# Patient Record
Sex: MALE | Race: WHITE | Employment: OTHER | URBAN - NONMETROPOLITAN AREA
[De-identification: names, ages, dates, MRNs, and addresses within clinical notes are randomized per-mention and may not be internally consistent; named-entity substitution may affect disease eponyms.]

---

## 2017-01-18 ENCOUNTER — OFFICE VISIT (OUTPATIENT)
Dept: FAMILY MEDICINE CLINIC | Age: 72
End: 2017-01-18

## 2017-01-18 VITALS
BODY MASS INDEX: 32.5 KG/M2 | SYSTOLIC BLOOD PRESSURE: 130 MMHG | WEIGHT: 227 LBS | HEIGHT: 70 IN | HEART RATE: 76 BPM | DIASTOLIC BLOOD PRESSURE: 80 MMHG

## 2017-01-18 DIAGNOSIS — E78.00 PURE HYPERCHOLESTEROLEMIA: ICD-10-CM

## 2017-01-18 DIAGNOSIS — F32.A DEPRESSION, UNSPECIFIED DEPRESSION TYPE: ICD-10-CM

## 2017-01-18 DIAGNOSIS — I10 ESSENTIAL HYPERTENSION: ICD-10-CM

## 2017-01-18 DIAGNOSIS — N18.3 CKD (CHRONIC KIDNEY DISEASE), STAGE 3 (MODERATE): ICD-10-CM

## 2017-01-18 DIAGNOSIS — Z11.59 NEED FOR HEPATITIS C SCREENING TEST: Primary | ICD-10-CM

## 2017-01-18 DIAGNOSIS — D64.9 ANEMIA, UNSPECIFIED TYPE: ICD-10-CM

## 2017-01-18 DIAGNOSIS — C44.92 SCC (SQUAMOUS CELL CARCINOMA): ICD-10-CM

## 2017-01-18 DIAGNOSIS — G47.33 OSA (OBSTRUCTIVE SLEEP APNEA): ICD-10-CM

## 2017-01-18 DIAGNOSIS — Z12.5 SCREENING PSA (PROSTATE SPECIFIC ANTIGEN): ICD-10-CM

## 2017-01-18 DIAGNOSIS — L57.0 ACTINIC KERATOSIS: ICD-10-CM

## 2017-01-18 DIAGNOSIS — M48.062 SPINAL STENOSIS, LUMBAR REGION, WITH NEUROGENIC CLAUDICATION: ICD-10-CM

## 2017-01-18 PROCEDURE — 1123F ACP DISCUSS/DSCN MKR DOCD: CPT | Performed by: FAMILY MEDICINE

## 2017-01-18 PROCEDURE — 1036F TOBACCO NON-USER: CPT | Performed by: FAMILY MEDICINE

## 2017-01-18 PROCEDURE — G8484 FLU IMMUNIZE NO ADMIN: HCPCS | Performed by: FAMILY MEDICINE

## 2017-01-18 PROCEDURE — 17000 DESTRUCT PREMALG LESION: CPT | Performed by: FAMILY MEDICINE

## 2017-01-18 PROCEDURE — G8427 DOCREV CUR MEDS BY ELIG CLIN: HCPCS | Performed by: FAMILY MEDICINE

## 2017-01-18 PROCEDURE — 17003 DESTRUCT PREMALG LES 2-14: CPT | Performed by: FAMILY MEDICINE

## 2017-01-18 PROCEDURE — 3017F COLORECTAL CA SCREEN DOC REV: CPT | Performed by: FAMILY MEDICINE

## 2017-01-18 PROCEDURE — 99214 OFFICE O/P EST MOD 30 MIN: CPT | Performed by: FAMILY MEDICINE

## 2017-01-18 PROCEDURE — 4040F PNEUMOC VAC/ADMIN/RCVD: CPT | Performed by: FAMILY MEDICINE

## 2017-01-18 PROCEDURE — G8419 CALC BMI OUT NRM PARAM NOF/U: HCPCS | Performed by: FAMILY MEDICINE

## 2017-01-18 RX ORDER — HYDROCODONE BITARTRATE AND ACETAMINOPHEN 5; 325 MG/1; MG/1
1 TABLET ORAL EVERY 6 HOURS PRN
Qty: 60 TABLET | Refills: 0 | Status: ON HOLD | OUTPATIENT
Start: 2017-01-18 | End: 2017-03-11

## 2017-01-18 RX ORDER — HYDROCODONE BITARTRATE AND ACETAMINOPHEN 5; 325 MG/1; MG/1
1 TABLET ORAL EVERY 6 HOURS PRN
COMMUNITY
End: 2017-01-18 | Stop reason: SDUPTHER

## 2017-01-18 ASSESSMENT — ENCOUNTER SYMPTOMS
BACK PAIN: 1
EYES NEGATIVE: 1
ALLERGIC/IMMUNOLOGIC NEGATIVE: 1
GASTROINTESTINAL NEGATIVE: 1
RESPIRATORY NEGATIVE: 1

## 2017-01-20 ENCOUNTER — INITIAL CONSULT (OUTPATIENT)
Dept: NEUROSURGERY | Age: 72
End: 2017-01-20

## 2017-01-20 VITALS
BODY MASS INDEX: 32.64 KG/M2 | SYSTOLIC BLOOD PRESSURE: 152 MMHG | HEIGHT: 70 IN | HEART RATE: 64 BPM | DIASTOLIC BLOOD PRESSURE: 98 MMHG | WEIGHT: 228 LBS

## 2017-01-20 DIAGNOSIS — M48.062 SPINAL STENOSIS, LUMBAR REGION, WITH NEUROGENIC CLAUDICATION: Primary | ICD-10-CM

## 2017-01-20 PROCEDURE — 4040F PNEUMOC VAC/ADMIN/RCVD: CPT | Performed by: NEUROLOGICAL SURGERY

## 2017-01-20 PROCEDURE — G8484 FLU IMMUNIZE NO ADMIN: HCPCS | Performed by: NEUROLOGICAL SURGERY

## 2017-01-20 PROCEDURE — G8419 CALC BMI OUT NRM PARAM NOF/U: HCPCS | Performed by: NEUROLOGICAL SURGERY

## 2017-01-20 PROCEDURE — 3017F COLORECTAL CA SCREEN DOC REV: CPT | Performed by: NEUROLOGICAL SURGERY

## 2017-01-20 PROCEDURE — 99203 OFFICE O/P NEW LOW 30 MIN: CPT | Performed by: NEUROLOGICAL SURGERY

## 2017-01-20 PROCEDURE — 1036F TOBACCO NON-USER: CPT | Performed by: NEUROLOGICAL SURGERY

## 2017-01-20 PROCEDURE — G8427 DOCREV CUR MEDS BY ELIG CLIN: HCPCS | Performed by: NEUROLOGICAL SURGERY

## 2017-01-24 ASSESSMENT — VISUAL ACUITY: VA_NORMAL: 1

## 2017-01-27 ENCOUNTER — OFFICE VISIT (OUTPATIENT)
Dept: ORTHOPEDIC SURGERY | Age: 72
End: 2017-01-27

## 2017-01-27 VITALS
HEIGHT: 70 IN | DIASTOLIC BLOOD PRESSURE: 90 MMHG | HEART RATE: 82 BPM | WEIGHT: 228 LBS | SYSTOLIC BLOOD PRESSURE: 144 MMHG | BODY MASS INDEX: 32.64 KG/M2

## 2017-01-27 DIAGNOSIS — M48.062 SPINAL STENOSIS, LUMBAR REGION, WITH NEUROGENIC CLAUDICATION: ICD-10-CM

## 2017-01-27 DIAGNOSIS — M43.24 ANKYLOSIS OF THORACIC SPINE: ICD-10-CM

## 2017-01-27 DIAGNOSIS — M48.04 STENOSIS, SPINAL, THORACIC: ICD-10-CM

## 2017-01-27 DIAGNOSIS — M51.37 DEGENERATION OF LUMBAR OR LUMBOSACRAL INTERVERTEBRAL DISC: Primary | ICD-10-CM

## 2017-01-27 PROCEDURE — 4040F PNEUMOC VAC/ADMIN/RCVD: CPT | Performed by: ORTHOPAEDIC SURGERY

## 2017-01-27 PROCEDURE — 1123F ACP DISCUSS/DSCN MKR DOCD: CPT | Performed by: ORTHOPAEDIC SURGERY

## 2017-01-27 PROCEDURE — 99213 OFFICE O/P EST LOW 20 MIN: CPT | Performed by: ORTHOPAEDIC SURGERY

## 2017-01-27 PROCEDURE — G8427 DOCREV CUR MEDS BY ELIG CLIN: HCPCS | Performed by: ORTHOPAEDIC SURGERY

## 2017-01-27 PROCEDURE — 3017F COLORECTAL CA SCREEN DOC REV: CPT | Performed by: ORTHOPAEDIC SURGERY

## 2017-01-27 PROCEDURE — G8419 CALC BMI OUT NRM PARAM NOF/U: HCPCS | Performed by: ORTHOPAEDIC SURGERY

## 2017-01-27 PROCEDURE — 1036F TOBACCO NON-USER: CPT | Performed by: ORTHOPAEDIC SURGERY

## 2017-01-27 PROCEDURE — G8484 FLU IMMUNIZE NO ADMIN: HCPCS | Performed by: ORTHOPAEDIC SURGERY

## 2017-02-02 ENCOUNTER — OFFICE VISIT (OUTPATIENT)
Dept: FAMILY MEDICINE CLINIC | Age: 72
End: 2017-02-02

## 2017-02-02 VITALS
WEIGHT: 226 LBS | BODY MASS INDEX: 32.35 KG/M2 | HEIGHT: 70 IN | HEART RATE: 72 BPM | SYSTOLIC BLOOD PRESSURE: 143 MMHG | DIASTOLIC BLOOD PRESSURE: 100 MMHG

## 2017-02-02 DIAGNOSIS — Z01.818 PREOPERATIVE GENERAL PHYSICAL EXAMINATION: Primary | ICD-10-CM

## 2017-02-02 PROCEDURE — G8419 CALC BMI OUT NRM PARAM NOF/U: HCPCS | Performed by: FAMILY MEDICINE

## 2017-02-02 PROCEDURE — 3017F COLORECTAL CA SCREEN DOC REV: CPT | Performed by: FAMILY MEDICINE

## 2017-02-02 PROCEDURE — G8484 FLU IMMUNIZE NO ADMIN: HCPCS | Performed by: FAMILY MEDICINE

## 2017-02-02 PROCEDURE — 1036F TOBACCO NON-USER: CPT | Performed by: FAMILY MEDICINE

## 2017-02-02 PROCEDURE — G8427 DOCREV CUR MEDS BY ELIG CLIN: HCPCS | Performed by: FAMILY MEDICINE

## 2017-02-02 PROCEDURE — 99214 OFFICE O/P EST MOD 30 MIN: CPT | Performed by: FAMILY MEDICINE

## 2017-02-02 PROCEDURE — 1123F ACP DISCUSS/DSCN MKR DOCD: CPT | Performed by: FAMILY MEDICINE

## 2017-02-02 PROCEDURE — 4040F PNEUMOC VAC/ADMIN/RCVD: CPT | Performed by: FAMILY MEDICINE

## 2017-02-02 RX ORDER — METOPROLOL SUCCINATE 50 MG/1
50 TABLET, EXTENDED RELEASE ORAL DAILY
Qty: 30 TABLET | Refills: 11 | Status: SHIPPED | OUTPATIENT
Start: 2017-02-02 | End: 2017-02-06 | Stop reason: SDUPTHER

## 2017-02-02 ASSESSMENT — ENCOUNTER SYMPTOMS
RESPIRATORY NEGATIVE: 1
BACK PAIN: 1
GASTROINTESTINAL NEGATIVE: 1
EYES NEGATIVE: 1
ALLERGIC/IMMUNOLOGIC NEGATIVE: 1

## 2017-02-03 ENCOUNTER — OFFICE VISIT (OUTPATIENT)
Dept: ORTHOPEDIC SURGERY | Age: 72
End: 2017-02-03

## 2017-02-03 ENCOUNTER — TELEPHONE (OUTPATIENT)
Dept: FAMILY MEDICINE CLINIC | Age: 72
End: 2017-02-03

## 2017-02-03 VITALS
HEIGHT: 70 IN | WEIGHT: 228 LBS | DIASTOLIC BLOOD PRESSURE: 90 MMHG | BODY MASS INDEX: 32.64 KG/M2 | SYSTOLIC BLOOD PRESSURE: 154 MMHG | HEART RATE: 65 BPM

## 2017-02-03 DIAGNOSIS — M51.37 DEGENERATION OF LUMBAR OR LUMBOSACRAL INTERVERTEBRAL DISC: ICD-10-CM

## 2017-02-03 DIAGNOSIS — M48.062 SPINAL STENOSIS, LUMBAR REGION, WITH NEUROGENIC CLAUDICATION: Primary | ICD-10-CM

## 2017-02-03 DIAGNOSIS — R22.1 MASS IN NECK: ICD-10-CM

## 2017-02-03 DIAGNOSIS — M54.2 NECK PAIN: ICD-10-CM

## 2017-02-03 PROCEDURE — 4040F PNEUMOC VAC/ADMIN/RCVD: CPT | Performed by: ORTHOPAEDIC SURGERY

## 2017-02-03 PROCEDURE — G8427 DOCREV CUR MEDS BY ELIG CLIN: HCPCS | Performed by: ORTHOPAEDIC SURGERY

## 2017-02-03 PROCEDURE — 3017F COLORECTAL CA SCREEN DOC REV: CPT | Performed by: ORTHOPAEDIC SURGERY

## 2017-02-03 PROCEDURE — 99213 OFFICE O/P EST LOW 20 MIN: CPT | Performed by: ORTHOPAEDIC SURGERY

## 2017-02-03 PROCEDURE — 1036F TOBACCO NON-USER: CPT | Performed by: ORTHOPAEDIC SURGERY

## 2017-02-03 PROCEDURE — 1123F ACP DISCUSS/DSCN MKR DOCD: CPT | Performed by: ORTHOPAEDIC SURGERY

## 2017-02-03 PROCEDURE — G8484 FLU IMMUNIZE NO ADMIN: HCPCS | Performed by: ORTHOPAEDIC SURGERY

## 2017-02-03 PROCEDURE — G8419 CALC BMI OUT NRM PARAM NOF/U: HCPCS | Performed by: ORTHOPAEDIC SURGERY

## 2017-02-06 ENCOUNTER — TELEPHONE (OUTPATIENT)
Dept: FAMILY MEDICINE CLINIC | Age: 72
End: 2017-02-06

## 2017-02-06 RX ORDER — METOPROLOL SUCCINATE 50 MG/1
TABLET, EXTENDED RELEASE ORAL
Qty: 30 TABLET | Refills: 11 | Status: SHIPPED | OUTPATIENT
Start: 2017-02-06 | End: 2018-02-05 | Stop reason: SDUPTHER

## 2017-02-12 PROBLEM — R22.1 MASS IN NECK: Status: ACTIVE | Noted: 2017-02-12

## 2017-02-14 ENCOUNTER — INITIAL CONSULT (OUTPATIENT)
Dept: SURGERY | Age: 72
End: 2017-02-14

## 2017-02-14 ENCOUNTER — TELEPHONE (OUTPATIENT)
Dept: SURGERY | Age: 72
End: 2017-02-14

## 2017-02-14 VITALS
HEART RATE: 72 BPM | TEMPERATURE: 98.3 F | HEIGHT: 70 IN | RESPIRATION RATE: 16 BRPM | SYSTOLIC BLOOD PRESSURE: 118 MMHG | DIASTOLIC BLOOD PRESSURE: 72 MMHG | WEIGHT: 225 LBS | BODY MASS INDEX: 32.21 KG/M2

## 2017-02-14 DIAGNOSIS — R59.0 LYMPHADENOPATHY, SUPRACLAVICULAR: Primary | ICD-10-CM

## 2017-02-14 PROCEDURE — G8419 CALC BMI OUT NRM PARAM NOF/U: HCPCS | Performed by: SURGERY

## 2017-02-14 PROCEDURE — 99204 OFFICE O/P NEW MOD 45 MIN: CPT | Performed by: SURGERY

## 2017-02-14 PROCEDURE — 4040F PNEUMOC VAC/ADMIN/RCVD: CPT | Performed by: SURGERY

## 2017-02-14 PROCEDURE — 1036F TOBACCO NON-USER: CPT | Performed by: SURGERY

## 2017-02-14 PROCEDURE — 1123F ACP DISCUSS/DSCN MKR DOCD: CPT | Performed by: SURGERY

## 2017-02-14 PROCEDURE — 3017F COLORECTAL CA SCREEN DOC REV: CPT | Performed by: SURGERY

## 2017-02-14 PROCEDURE — G8427 DOCREV CUR MEDS BY ELIG CLIN: HCPCS | Performed by: SURGERY

## 2017-02-14 PROCEDURE — G8484 FLU IMMUNIZE NO ADMIN: HCPCS | Performed by: SURGERY

## 2017-02-15 ENCOUNTER — TELEPHONE (OUTPATIENT)
Dept: GENERAL RADIOLOGY | Age: 72
End: 2017-02-15

## 2017-02-15 DIAGNOSIS — R59.0 SUPRACLAVICULAR LYMPHADENOPATHY: Primary | ICD-10-CM

## 2017-02-21 ENCOUNTER — HOSPITAL ENCOUNTER (OUTPATIENT)
Dept: ULTRASOUND IMAGING | Age: 72
Discharge: HOME | End: 2017-02-21
Payer: MEDICARE

## 2017-02-21 ENCOUNTER — HOSPITAL ENCOUNTER (OUTPATIENT)
Age: 72
Setting detail: SPECIMEN
Discharge: HOME | End: 2017-02-21
Payer: MEDICARE

## 2017-02-21 DIAGNOSIS — R59.0 SUPRACLAVICULAR ADENOPATHY: Primary | ICD-10-CM

## 2017-02-21 DIAGNOSIS — R59.0 SUPRACLAVICULAR LYMPHADENOPATHY: ICD-10-CM

## 2017-02-21 DIAGNOSIS — R59.0 SUPRACLAVICULAR LYMPHADENOPATHY: Primary | ICD-10-CM

## 2017-02-22 ENCOUNTER — HOSPITAL ENCOUNTER (OUTPATIENT)
Dept: PREADMISSION TESTING | Age: 72
Discharge: HOME | End: 2017-02-22
Payer: MEDICARE

## 2017-02-22 VITALS
TEMPERATURE: 97.9 F | DIASTOLIC BLOOD PRESSURE: 74 MMHG | HEART RATE: 58 BPM | OXYGEN SATURATION: 97 % | HEIGHT: 70 IN | SYSTOLIC BLOOD PRESSURE: 109 MMHG | RESPIRATION RATE: 16 BRPM | WEIGHT: 227.4 LBS | BODY MASS INDEX: 32.56 KG/M2

## 2017-02-22 LAB
ABSOLUTE EOS #: 0.2 K/UL (ref 0–0.4)
ABSOLUTE LYMPH #: 2.1 K/UL (ref 1–4.8)
ABSOLUTE MONO #: 1 K/UL (ref 0.1–1.3)
ANION GAP SERPL CALCULATED.3IONS-SCNC: 12 MMOL/L (ref 9–17)
BASOPHILS # BLD: 1 % (ref 0–2)
BASOPHILS ABSOLUTE: 0.1 K/UL (ref 0–0.2)
BUN BLDV-MCNC: 23 MG/DL (ref 8–23)
BUN/CREAT BLD: ABNORMAL (ref 9–20)
CALCIUM SERPL-MCNC: 9.7 MG/DL (ref 8.6–10.4)
CHLORIDE BLD-SCNC: 104 MMOL/L (ref 98–107)
CO2: 24 MMOL/L (ref 20–31)
CREAT SERPL-MCNC: 1.06 MG/DL (ref 0.7–1.2)
DIFFERENTIAL TYPE: ABNORMAL
EOSINOPHILS RELATIVE PERCENT: 2 % (ref 0–4)
GFR AFRICAN AMERICAN: >60 ML/MIN
GFR NON-AFRICAN AMERICAN: >60 ML/MIN
GFR SERPL CREATININE-BSD FRML MDRD: ABNORMAL ML/MIN/{1.73_M2}
GFR SERPL CREATININE-BSD FRML MDRD: ABNORMAL ML/MIN/{1.73_M2}
GLUCOSE BLD-MCNC: 110 MG/DL (ref 70–99)
HCT VFR BLD CALC: 44.5 % (ref 41–53)
HEMOGLOBIN: 14.9 G/DL (ref 13.5–17.5)
LYMPHOCYTES # BLD: 26 % (ref 24–44)
MCH RBC QN AUTO: 27.4 PG (ref 26–34)
MCHC RBC AUTO-ENTMCNC: 33.5 G/DL (ref 31–37)
MCV RBC AUTO: 81.6 FL (ref 80–100)
MONOCYTES # BLD: 13 % (ref 1–7)
PDW BLD-RTO: 15.5 % (ref 11.5–14.9)
PLATELET # BLD: 208 K/UL (ref 150–450)
PLATELET ESTIMATE: ABNORMAL
PMV BLD AUTO: 8.1 FL (ref 6–12)
POTASSIUM SERPL-SCNC: 4.8 MMOL/L (ref 3.7–5.3)
RBC # BLD: 5.45 M/UL (ref 4.5–5.9)
RBC # BLD: ABNORMAL 10*6/UL
SEG NEUTROPHILS: 58 % (ref 36–66)
SEGMENTED NEUTROPHILS ABSOLUTE COUNT: 4.7 K/UL (ref 1.3–9.1)
SODIUM BLD-SCNC: 140 MMOL/L (ref 135–144)
WBC # BLD: 8 K/UL (ref 3.5–11)
WBC # BLD: ABNORMAL 10*3/UL

## 2017-02-22 PROCEDURE — 36415 COLL VENOUS BLD VENIPUNCTURE: CPT

## 2017-02-22 PROCEDURE — 93005 ELECTROCARDIOGRAM TRACING: CPT

## 2017-02-22 PROCEDURE — 80048 BASIC METABOLIC PNL TOTAL CA: CPT

## 2017-02-22 PROCEDURE — 85025 COMPLETE CBC W/AUTO DIFF WBC: CPT

## 2017-02-23 ENCOUNTER — ANESTHESIA EVENT (OUTPATIENT)
Dept: OPERATING ROOM | Age: 72
DRG: 460 | End: 2017-02-23
Payer: MEDICARE

## 2017-02-23 LAB
EKG ATRIAL RATE: 54 BPM
EKG P AXIS: 27 DEGREES
EKG P-R INTERVAL: 194 MS
EKG Q-T INTERVAL: 440 MS
EKG QRS DURATION: 90 MS
EKG QTC CALCULATION (BAZETT): 417 MS
EKG R AXIS: 19 DEGREES
EKG T AXIS: 22 DEGREES
EKG VENTRICULAR RATE: 54 BPM
SURGICAL PATHOLOGY REPORT: NORMAL
SURGICAL PATHOLOGY REPORT: NORMAL

## 2017-02-24 LAB — FLOW CYTOMETRY BL: NORMAL

## 2017-03-03 ENCOUNTER — TELEPHONE (OUTPATIENT)
Dept: SURGERY | Age: 72
End: 2017-03-03

## 2017-03-08 ENCOUNTER — APPOINTMENT (OUTPATIENT)
Dept: GENERAL RADIOLOGY | Age: 72
DRG: 460 | End: 2017-03-08
Attending: ORTHOPAEDIC SURGERY
Payer: MEDICARE

## 2017-03-08 ENCOUNTER — HOSPITAL ENCOUNTER (INPATIENT)
Age: 72
LOS: 3 days | Discharge: HOME | DRG: 460 | End: 2017-03-11
Attending: ORTHOPAEDIC SURGERY | Admitting: ORTHOPAEDIC SURGERY
Payer: MEDICARE

## 2017-03-08 ENCOUNTER — ANESTHESIA (OUTPATIENT)
Dept: OPERATING ROOM | Age: 72
DRG: 460 | End: 2017-03-08
Payer: MEDICARE

## 2017-03-08 VITALS — DIASTOLIC BLOOD PRESSURE: 67 MMHG | OXYGEN SATURATION: 96 % | TEMPERATURE: 97.7 F | SYSTOLIC BLOOD PRESSURE: 136 MMHG

## 2017-03-08 PROCEDURE — 7100000000 HC PACU RECOVERY - FIRST 15 MIN: Performed by: ORTHOPAEDIC SURGERY

## 2017-03-08 PROCEDURE — 3700000000 HC ANESTHESIA ATTENDED CARE: Performed by: ORTHOPAEDIC SURGERY

## 2017-03-08 PROCEDURE — C1713 ANCHOR/SCREW BN/BN,TIS/BN: HCPCS | Performed by: ORTHOPAEDIC SURGERY

## 2017-03-08 PROCEDURE — 6370000000 HC RX 637 (ALT 250 FOR IP): Performed by: ORTHOPAEDIC SURGERY

## 2017-03-08 PROCEDURE — 76001 FL GREATER THAN 1 HOUR: CPT

## 2017-03-08 PROCEDURE — 6360000002 HC RX W HCPCS: Performed by: ORTHOPAEDIC SURGERY

## 2017-03-08 PROCEDURE — 94760 N-INVAS EAR/PLS OXIMETRY 1: CPT

## 2017-03-08 PROCEDURE — 72100 X-RAY EXAM L-S SPINE 2/3 VWS: CPT

## 2017-03-08 PROCEDURE — 4A11X4G MONITORING OF PERIPHERAL NERVOUS ELECTRICAL ACTIVITY, INTRAOPERATIVE, EXTERNAL APPROACH: ICD-10-PCS | Performed by: ORTHOPAEDIC SURGERY

## 2017-03-08 PROCEDURE — 2720000010 HC SURG SUPPLY STERILE: Performed by: ORTHOPAEDIC SURGERY

## 2017-03-08 PROCEDURE — 6360000002 HC RX W HCPCS: Performed by: ANESTHESIOLOGY

## 2017-03-08 PROCEDURE — 2500000003 HC RX 250 WO HCPCS: Performed by: NURSE ANESTHETIST, CERTIFIED REGISTERED

## 2017-03-08 PROCEDURE — 2580000003 HC RX 258: Performed by: ANESTHESIOLOGY

## 2017-03-08 PROCEDURE — L8699 PROSTHETIC IMPLANT NOS: HCPCS | Performed by: ORTHOPAEDIC SURGERY

## 2017-03-08 PROCEDURE — 2500000003 HC RX 250 WO HCPCS

## 2017-03-08 PROCEDURE — 3700000001 HC ADD 15 MINUTES (ANESTHESIA): Performed by: ORTHOPAEDIC SURGERY

## 2017-03-08 PROCEDURE — C1729 CATH, DRAINAGE: HCPCS | Performed by: ORTHOPAEDIC SURGERY

## 2017-03-08 PROCEDURE — 6370000000 HC RX 637 (ALT 250 FOR IP): Performed by: ANESTHESIOLOGY

## 2017-03-08 PROCEDURE — 6360000002 HC RX W HCPCS: Performed by: NURSE ANESTHETIST, CERTIFIED REGISTERED

## 2017-03-08 PROCEDURE — 1200000000 HC SEMI PRIVATE

## 2017-03-08 PROCEDURE — 0SG00A1 FUSION LUM JT W INTBD FUS DEV, POST APPR P COL, OPEN: ICD-10-PCS | Performed by: ORTHOPAEDIC SURGERY

## 2017-03-08 PROCEDURE — 3600000003 HC SURGERY LEVEL 3 BASE: Performed by: ORTHOPAEDIC SURGERY

## 2017-03-08 PROCEDURE — 2500000003 HC RX 250 WO HCPCS: Performed by: ANESTHESIOLOGY

## 2017-03-08 PROCEDURE — 2500000003 HC RX 250 WO HCPCS: Performed by: ORTHOPAEDIC SURGERY

## 2017-03-08 PROCEDURE — 3600000013 HC SURGERY LEVEL 3 ADDTL 15MIN: Performed by: ORTHOPAEDIC SURGERY

## 2017-03-08 PROCEDURE — 3209999900 FLUORO FOR SURGICAL PROCEDURES

## 2017-03-08 PROCEDURE — 2780000010 HC IMPLANT OTHER: Performed by: ORTHOPAEDIC SURGERY

## 2017-03-08 PROCEDURE — 2720000003 HC MISC SUTURE/STAPLES/RELOADS/ETC: Performed by: ORTHOPAEDIC SURGERY

## 2017-03-08 PROCEDURE — 94664 DEMO&/EVAL PT USE INHALER: CPT

## 2017-03-08 PROCEDURE — 2580000003 HC RX 258: Performed by: NURSE ANESTHETIST, CERTIFIED REGISTERED

## 2017-03-08 PROCEDURE — C1768 GRAFT, VASCULAR: HCPCS | Performed by: ORTHOPAEDIC SURGERY

## 2017-03-08 PROCEDURE — 7100000001 HC PACU RECOVERY - ADDTL 15 MIN: Performed by: ORTHOPAEDIC SURGERY

## 2017-03-08 DEVICE — DURASEAL® DURAL SEALANT SYSTEM 5ML 5 PACK
Type: IMPLANTABLE DEVICE | Site: SPINE LUMBAR | Status: FUNCTIONAL
Brand: DURASEAL®

## 2017-03-08 DEVICE — 6.5MM REVERE PEDICLE SCREW 40MM
Type: IMPLANTABLE DEVICE | Site: SPINE LUMBAR | Status: FUNCTIONAL
Brand: REVERE

## 2017-03-08 DEVICE — PARALLEL CONNECTOR CLAMP, 5.5MM TO 5.5MM
Type: IMPLANTABLE DEVICE | Site: SPINE LUMBAR | Status: FUNCTIONAL
Brand: REVERE

## 2017-03-08 DEVICE — REVERE LOCKING CAP
Type: IMPLANTABLE DEVICE | Site: SPINE LUMBAR | Status: FUNCTIONAL
Brand: REVERE

## 2017-03-08 DEVICE — 5.5MM STRAIGHT ROD, 75MM
Type: IMPLANTABLE DEVICE | Site: SPINE LUMBAR | Status: FUNCTIONAL
Brand: REVERE

## 2017-03-08 DEVICE — GRAFT BNE CHIP 30 CC FD CORTICAL CANC: Type: IMPLANTABLE DEVICE | Site: SPINE LUMBAR | Status: FUNCTIONAL

## 2017-03-08 DEVICE — RISE SPACER 8X22MM, 8-14MM, 10&DEG;
Type: IMPLANTABLE DEVICE | Site: SPINE LUMBAR | Status: FUNCTIONAL
Brand: RISE

## 2017-03-08 DEVICE — 5.5MM CURVED ROD, 75MM
Type: IMPLANTABLE DEVICE | Site: SPINE LUMBAR | Status: FUNCTIONAL
Brand: REVERE

## 2017-03-08 RX ORDER — ROCURONIUM BROMIDE 10 MG/ML
INJECTION, SOLUTION INTRAVENOUS PRN
Status: DISCONTINUED | OUTPATIENT
Start: 2017-03-08 | End: 2017-03-08 | Stop reason: SDUPTHER

## 2017-03-08 RX ORDER — METOPROLOL SUCCINATE 50 MG/1
50 TABLET, EXTENDED RELEASE ORAL DAILY
Status: DISCONTINUED | OUTPATIENT
Start: 2017-03-08 | End: 2017-03-11 | Stop reason: HOSPADM

## 2017-03-08 RX ORDER — SODIUM CHLORIDE 0.9 % (FLUSH) 0.9 %
10 SYRINGE (ML) INJECTION EVERY 12 HOURS SCHEDULED
Status: DISCONTINUED | OUTPATIENT
Start: 2017-03-08 | End: 2017-03-08 | Stop reason: HOSPADM

## 2017-03-08 RX ORDER — DOCUSATE SODIUM 100 MG/1
100 CAPSULE, LIQUID FILLED ORAL 2 TIMES DAILY
Status: DISCONTINUED | OUTPATIENT
Start: 2017-03-08 | End: 2017-03-11 | Stop reason: HOSPADM

## 2017-03-08 RX ORDER — EPHEDRINE SULFATE 50 MG/ML
INJECTION, SOLUTION INTRAVENOUS PRN
Status: DISCONTINUED | OUTPATIENT
Start: 2017-03-08 | End: 2017-03-08 | Stop reason: SDUPTHER

## 2017-03-08 RX ORDER — OXYCODONE HYDROCHLORIDE AND ACETAMINOPHEN 5; 325 MG/1; MG/1
2 TABLET ORAL EVERY 4 HOURS PRN
Status: DISCONTINUED | OUTPATIENT
Start: 2017-03-08 | End: 2017-03-10

## 2017-03-08 RX ORDER — FENOFIBRATE 160 MG/1
160 TABLET ORAL DAILY
Status: DISCONTINUED | OUTPATIENT
Start: 2017-03-08 | End: 2017-03-11 | Stop reason: HOSPADM

## 2017-03-08 RX ORDER — SODIUM CHLORIDE, SODIUM LACTATE, POTASSIUM CHLORIDE, CALCIUM CHLORIDE 600; 310; 30; 20 MG/100ML; MG/100ML; MG/100ML; MG/100ML
INJECTION, SOLUTION INTRAVENOUS CONTINUOUS
Status: DISCONTINUED | OUTPATIENT
Start: 2017-03-08 | End: 2017-03-08

## 2017-03-08 RX ORDER — MORPHINE SULFATE 4 MG/ML
4 INJECTION, SOLUTION INTRAMUSCULAR; INTRAVENOUS
Status: DISCONTINUED | OUTPATIENT
Start: 2017-03-08 | End: 2017-03-11 | Stop reason: HOSPADM

## 2017-03-08 RX ORDER — CYCLOBENZAPRINE HCL 10 MG
10 TABLET ORAL 3 TIMES DAILY PRN
Status: DISCONTINUED | OUTPATIENT
Start: 2017-03-08 | End: 2017-03-11 | Stop reason: HOSPADM

## 2017-03-08 RX ORDER — MORPHINE SULFATE 2 MG/ML
2 INJECTION, SOLUTION INTRAMUSCULAR; INTRAVENOUS EVERY 5 MIN PRN
Status: DISCONTINUED | OUTPATIENT
Start: 2017-03-08 | End: 2017-03-08 | Stop reason: HOSPADM

## 2017-03-08 RX ORDER — TRANEXAMIC ACID 100 MG/ML
INJECTION, SOLUTION INTRAVENOUS PRN
Status: DISCONTINUED | OUTPATIENT
Start: 2017-03-08 | End: 2017-03-08 | Stop reason: SDUPTHER

## 2017-03-08 RX ORDER — SCOLOPAMINE TRANSDERMAL SYSTEM 1 MG/1
1 PATCH, EXTENDED RELEASE TRANSDERMAL
Status: DISCONTINUED | OUTPATIENT
Start: 2017-03-08 | End: 2017-03-11

## 2017-03-08 RX ORDER — ONDANSETRON 2 MG/ML
4 INJECTION INTRAMUSCULAR; INTRAVENOUS
Status: COMPLETED | OUTPATIENT
Start: 2017-03-08 | End: 2017-03-08

## 2017-03-08 RX ORDER — ONDANSETRON 2 MG/ML
4 INJECTION INTRAMUSCULAR; INTRAVENOUS EVERY 6 HOURS PRN
Status: DISCONTINUED | OUTPATIENT
Start: 2017-03-08 | End: 2017-03-11 | Stop reason: HOSPADM

## 2017-03-08 RX ORDER — MORPHINE SULFATE 2 MG/ML
2 INJECTION, SOLUTION INTRAMUSCULAR; INTRAVENOUS
Status: DISCONTINUED | OUTPATIENT
Start: 2017-03-08 | End: 2017-03-11 | Stop reason: HOSPADM

## 2017-03-08 RX ORDER — PROPOFOL 10 MG/ML
INJECTION, EMULSION INTRAVENOUS PRN
Status: DISCONTINUED | OUTPATIENT
Start: 2017-03-08 | End: 2017-03-08 | Stop reason: SDUPTHER

## 2017-03-08 RX ORDER — SIMVASTATIN 20 MG
20 TABLET ORAL DAILY
Status: DISCONTINUED | OUTPATIENT
Start: 2017-03-08 | End: 2017-03-11 | Stop reason: HOSPADM

## 2017-03-08 RX ORDER — LABETALOL HYDROCHLORIDE 5 MG/ML
5 INJECTION, SOLUTION INTRAVENOUS EVERY 10 MIN PRN
Status: DISCONTINUED | OUTPATIENT
Start: 2017-03-08 | End: 2017-03-08 | Stop reason: HOSPADM

## 2017-03-08 RX ORDER — BUPIVACAINE HYDROCHLORIDE AND EPINEPHRINE 2.5; 5 MG/ML; UG/ML
INJECTION, SOLUTION EPIDURAL; INFILTRATION; INTRACAUDAL; PERINEURAL PRN
Status: DISCONTINUED | OUTPATIENT
Start: 2017-03-08 | End: 2017-03-08 | Stop reason: HOSPADM

## 2017-03-08 RX ORDER — SODIUM CHLORIDE 0.9 % (FLUSH) 0.9 %
10 SYRINGE (ML) INJECTION PRN
Status: DISCONTINUED | OUTPATIENT
Start: 2017-03-08 | End: 2017-03-11 | Stop reason: HOSPADM

## 2017-03-08 RX ORDER — ACETAMINOPHEN 325 MG/1
650 TABLET ORAL EVERY 4 HOURS PRN
Status: DISCONTINUED | OUTPATIENT
Start: 2017-03-08 | End: 2017-03-11 | Stop reason: HOSPADM

## 2017-03-08 RX ORDER — LIDOCAINE HYDROCHLORIDE 10 MG/ML
1 INJECTION, SOLUTION EPIDURAL; INFILTRATION; INTRACAUDAL; PERINEURAL
Status: COMPLETED | OUTPATIENT
Start: 2017-03-08 | End: 2017-03-08

## 2017-03-08 RX ORDER — DEXAMETHASONE SODIUM PHOSPHATE 4 MG/ML
INJECTION, SOLUTION INTRA-ARTICULAR; INTRALESIONAL; INTRAMUSCULAR; INTRAVENOUS; SOFT TISSUE PRN
Status: DISCONTINUED | OUTPATIENT
Start: 2017-03-08 | End: 2017-03-08 | Stop reason: SDUPTHER

## 2017-03-08 RX ORDER — OXYCODONE HYDROCHLORIDE AND ACETAMINOPHEN 5; 325 MG/1; MG/1
1 TABLET ORAL EVERY 4 HOURS PRN
Status: DISCONTINUED | OUTPATIENT
Start: 2017-03-08 | End: 2017-03-10

## 2017-03-08 RX ORDER — MEPERIDINE HYDROCHLORIDE 25 MG/ML
12.5 INJECTION INTRAMUSCULAR; INTRAVENOUS; SUBCUTANEOUS EVERY 5 MIN PRN
Status: DISCONTINUED | OUTPATIENT
Start: 2017-03-08 | End: 2017-03-08 | Stop reason: HOSPADM

## 2017-03-08 RX ORDER — DIPHENHYDRAMINE HYDROCHLORIDE 50 MG/ML
12.5 INJECTION INTRAMUSCULAR; INTRAVENOUS
Status: DISCONTINUED | OUTPATIENT
Start: 2017-03-08 | End: 2017-03-08 | Stop reason: HOSPADM

## 2017-03-08 RX ORDER — SERTRALINE HYDROCHLORIDE 100 MG/1
100 TABLET, FILM COATED ORAL DAILY
Status: DISCONTINUED | OUTPATIENT
Start: 2017-03-08 | End: 2017-03-11 | Stop reason: HOSPADM

## 2017-03-08 RX ORDER — SODIUM CHLORIDE 0.9 % (FLUSH) 0.9 %
10 SYRINGE (ML) INJECTION EVERY 12 HOURS SCHEDULED
Status: DISCONTINUED | OUTPATIENT
Start: 2017-03-08 | End: 2017-03-11 | Stop reason: HOSPADM

## 2017-03-08 RX ORDER — SODIUM CHLORIDE 0.9 % (FLUSH) 0.9 %
10 SYRINGE (ML) INJECTION PRN
Status: DISCONTINUED | OUTPATIENT
Start: 2017-03-08 | End: 2017-03-08 | Stop reason: HOSPADM

## 2017-03-08 RX ADMIN — TRANEXAMIC ACID 1000 MG: 100 INJECTION, SOLUTION INTRAVENOUS at 08:06

## 2017-03-08 RX ADMIN — EPHEDRINE SULFATE 10 MG: 50 INJECTION INTRAMUSCULAR; INTRAVENOUS; SUBCUTANEOUS at 08:25

## 2017-03-08 RX ADMIN — SODIUM CHLORIDE, POTASSIUM CHLORIDE, SODIUM LACTATE AND CALCIUM CHLORIDE: 600; 310; 30; 20 INJECTION, SOLUTION INTRAVENOUS at 07:16

## 2017-03-08 RX ADMIN — CEFAZOLIN SODIUM 2 G: 1 INJECTION, POWDER, FOR SOLUTION INTRAMUSCULAR; INTRAVENOUS at 07:36

## 2017-03-08 RX ADMIN — Medication 2 G: at 20:27

## 2017-03-08 RX ADMIN — TRANEXAMIC ACID 1000 MG: 100 INJECTION, SOLUTION INTRAVENOUS at 11:58

## 2017-03-08 RX ADMIN — EPHEDRINE SULFATE 10 MG: 50 INJECTION INTRAMUSCULAR; INTRAVENOUS; SUBCUTANEOUS at 08:10

## 2017-03-08 RX ADMIN — LIDOCAINE HYDROCHLORIDE 50 MG: 10 INJECTION, SOLUTION EPIDURAL; INFILTRATION; INTRACAUDAL; PERINEURAL at 07:42

## 2017-03-08 RX ADMIN — SODIUM CHLORIDE, POTASSIUM CHLORIDE, SODIUM LACTATE AND CALCIUM CHLORIDE: 600; 310; 30; 20 INJECTION, SOLUTION INTRAVENOUS at 12:40

## 2017-03-08 RX ADMIN — REMIFENTANIL HYDROCHLORIDE 0.11 MCG/KG/MIN: 1 INJECTION, POWDER, LYOPHILIZED, FOR SOLUTION INTRAVENOUS at 07:47

## 2017-03-08 RX ADMIN — SODIUM CHLORIDE, POTASSIUM CHLORIDE, SODIUM LACTATE AND CALCIUM CHLORIDE: 600; 310; 30; 20 INJECTION, SOLUTION INTRAVENOUS at 09:49

## 2017-03-08 RX ADMIN — PHENYLEPHRINE HYDROCHLORIDE 36 MCG/MIN: 10 INJECTION, SOLUTION INTRAMUSCULAR; INTRAVENOUS; SUBCUTANEOUS at 08:59

## 2017-03-08 RX ADMIN — ONDANSETRON 4 MG: 2 INJECTION INTRAMUSCULAR; INTRAVENOUS at 13:00

## 2017-03-08 RX ADMIN — CEFAZOLIN SODIUM 1 G: 1 INJECTION, SOLUTION INTRAVENOUS at 11:25

## 2017-03-08 RX ADMIN — PROPOFOL 160 MG: 10 INJECTION, EMULSION INTRAVENOUS at 07:42

## 2017-03-08 RX ADMIN — OXYCODONE HYDROCHLORIDE AND ACETAMINOPHEN 2 TABLET: 5; 325 TABLET ORAL at 17:49

## 2017-03-08 RX ADMIN — DOCUSATE SODIUM 100 MG: 100 CAPSULE, LIQUID FILLED ORAL at 20:23

## 2017-03-08 RX ADMIN — DEXAMETHASONE SODIUM PHOSPHATE 4 MG: 4 INJECTION, SOLUTION INTRAMUSCULAR; INTRAVENOUS at 12:58

## 2017-03-08 RX ADMIN — MORPHINE SULFATE 2 MG: 2 INJECTION, SOLUTION INTRAMUSCULAR; INTRAVENOUS at 20:24

## 2017-03-08 RX ADMIN — ROCURONIUM BROMIDE 40 MG: 10 INJECTION, SOLUTION INTRAVENOUS at 07:42

## 2017-03-08 RX ADMIN — MORPHINE SULFATE 4 MG: 4 INJECTION, SOLUTION INTRAMUSCULAR; INTRAVENOUS at 15:11

## 2017-03-08 ASSESSMENT — PAIN DESCRIPTION - LOCATION
LOCATION: BACK

## 2017-03-08 ASSESSMENT — PAIN DESCRIPTION - ONSET: ONSET: ON-GOING

## 2017-03-08 ASSESSMENT — PAIN DESCRIPTION - DESCRIPTORS
DESCRIPTORS: ACHING;DISCOMFORT
DESCRIPTORS: ACHING;DISCOMFORT

## 2017-03-08 ASSESSMENT — PAIN SCALES - GENERAL
PAINLEVEL_OUTOF10: 5
PAINLEVEL_OUTOF10: 0
PAINLEVEL_OUTOF10: 0
PAINLEVEL_OUTOF10: 3
PAINLEVEL_OUTOF10: 8
PAINLEVEL_OUTOF10: 3
PAINLEVEL_OUTOF10: 8
PAINLEVEL_OUTOF10: 9

## 2017-03-08 ASSESSMENT — PAIN DESCRIPTION - PAIN TYPE
TYPE: SURGICAL PAIN

## 2017-03-08 ASSESSMENT — PAIN DESCRIPTION - ORIENTATION
ORIENTATION: LOWER
ORIENTATION: LOWER

## 2017-03-08 ASSESSMENT — PAIN DESCRIPTION - FREQUENCY: FREQUENCY: INTERMITTENT

## 2017-03-08 ASSESSMENT — PAIN DESCRIPTION - PROGRESSION: CLINICAL_PROGRESSION: GRADUALLY IMPROVING

## 2017-03-08 ASSESSMENT — PAIN - FUNCTIONAL ASSESSMENT: PAIN_FUNCTIONAL_ASSESSMENT: 0-10

## 2017-03-09 ENCOUNTER — APPOINTMENT (OUTPATIENT)
Dept: GENERAL RADIOLOGY | Age: 72
DRG: 460 | End: 2017-03-09
Attending: ORTHOPAEDIC SURGERY
Payer: MEDICARE

## 2017-03-09 LAB
ALLEN TEST: ABNORMAL
CARBOXYHEMOGLOBIN: 1.6 % (ref 0–5)
FIO2: ABNORMAL
HCO3 ARTERIAL: 25 MMOL/L (ref 22–26)
HCT VFR BLD CALC: 39.8 % (ref 41–53)
HEMOGLOBIN: 13.2 G/DL (ref 13.5–17.5)
MCH RBC QN AUTO: 27.2 PG (ref 26–34)
MCHC RBC AUTO-ENTMCNC: 33.1 G/DL (ref 31–37)
MCV RBC AUTO: 82.1 FL (ref 80–100)
METHEMOGLOBIN: 0.7 % (ref 0–1.9)
MODE: ABNORMAL
NEGATIVE BASE EXCESS, ART: ABNORMAL MMOL/L (ref 0–2)
NOTIFICATION TIME: ABNORMAL
NOTIFICATION: ABNORMAL
O2 DEVICE/FLOW/%: ABNORMAL
O2 SAT, ARTERIAL: 92.3 % (ref 95–98)
OXYHEMOGLOBIN: ABNORMAL % (ref 95–98)
PATIENT TEMP: 37
PCO2 ARTERIAL: 36.2 MMHG (ref 35–45)
PCO2, ART, TEMP ADJ: ABNORMAL (ref 35–45)
PDW BLD-RTO: 16.2 % (ref 11.5–14.9)
PEEP/CPAP: ABNORMAL
PH ARTERIAL: 7.45 (ref 7.35–7.45)
PH, ART, TEMP ADJ: ABNORMAL (ref 7.35–7.45)
PLATELET # BLD: 164 K/UL (ref 150–450)
PMV BLD AUTO: 7.9 FL (ref 6–12)
PO2 ARTERIAL: 67.1 MMHG (ref 80–100)
PO2, ART, TEMP ADJ: ABNORMAL MMHG (ref 80–100)
POSITIVE BASE EXCESS, ART: 1 MMOL/L (ref 0–2)
PSV: ABNORMAL
PT. POSITION: ABNORMAL
RBC # BLD: 4.84 M/UL (ref 4.5–5.9)
RESPIRATORY RATE: 16
SAMPLE SITE: ABNORMAL
SET RATE: ABNORMAL
TEXT FOR RESPIRATORY: ABNORMAL
TOTAL HB: ABNORMAL G/DL (ref 12–16)
TOTAL RATE: ABNORMAL
VT: ABNORMAL
WBC # BLD: 18 K/UL (ref 3.5–11)

## 2017-03-09 PROCEDURE — 2580000003 HC RX 258: Performed by: ORTHOPAEDIC SURGERY

## 2017-03-09 PROCEDURE — 71010 XR CHEST PORTABLE: CPT

## 2017-03-09 PROCEDURE — 6360000002 HC RX W HCPCS: Performed by: ORTHOPAEDIC SURGERY

## 2017-03-09 PROCEDURE — 2500000003 HC RX 250 WO HCPCS: Performed by: INTERNAL MEDICINE

## 2017-03-09 PROCEDURE — 97530 THERAPEUTIC ACTIVITIES: CPT

## 2017-03-09 PROCEDURE — 6360000002 HC RX W HCPCS: Performed by: INTERNAL MEDICINE

## 2017-03-09 PROCEDURE — 6370000000 HC RX 637 (ALT 250 FOR IP): Performed by: INTERNAL MEDICINE

## 2017-03-09 PROCEDURE — 6370000000 HC RX 637 (ALT 250 FOR IP): Performed by: ORTHOPAEDIC SURGERY

## 2017-03-09 PROCEDURE — G8987 SELF CARE CURRENT STATUS: HCPCS

## 2017-03-09 PROCEDURE — 36600 WITHDRAWAL OF ARTERIAL BLOOD: CPT

## 2017-03-09 PROCEDURE — 36415 COLL VENOUS BLD VENIPUNCTURE: CPT

## 2017-03-09 PROCEDURE — 97162 PT EVAL MOD COMPLEX 30 MIN: CPT

## 2017-03-09 PROCEDURE — 85027 COMPLETE CBC AUTOMATED: CPT

## 2017-03-09 PROCEDURE — 99223 1ST HOSP IP/OBS HIGH 75: CPT | Performed by: INTERNAL MEDICINE

## 2017-03-09 PROCEDURE — 97166 OT EVAL MOD COMPLEX 45 MIN: CPT

## 2017-03-09 PROCEDURE — 82805 BLOOD GASES W/O2 SATURATION: CPT

## 2017-03-09 PROCEDURE — G8988 SELF CARE GOAL STATUS: HCPCS

## 2017-03-09 PROCEDURE — 1200000000 HC SEMI PRIVATE

## 2017-03-09 RX ORDER — HYDRALAZINE HYDROCHLORIDE 20 MG/ML
10 INJECTION INTRAMUSCULAR; INTRAVENOUS EVERY 4 HOURS PRN
Status: DISCONTINUED | OUTPATIENT
Start: 2017-03-09 | End: 2017-03-11 | Stop reason: HOSPADM

## 2017-03-09 RX ORDER — AMLODIPINE BESYLATE 10 MG/1
10 TABLET ORAL ONCE
Status: COMPLETED | OUTPATIENT
Start: 2017-03-09 | End: 2017-03-09

## 2017-03-09 RX ADMIN — METOPROLOL SUCCINATE 50 MG: 50 TABLET, EXTENDED RELEASE ORAL at 10:05

## 2017-03-09 RX ADMIN — HYDRALAZINE HYDROCHLORIDE 10 MG: 20 INJECTION INTRAMUSCULAR; INTRAVENOUS at 16:33

## 2017-03-09 RX ADMIN — Medication 10 ML: at 10:06

## 2017-03-09 RX ADMIN — MORPHINE SULFATE 2 MG: 2 INJECTION, SOLUTION INTRAMUSCULAR; INTRAVENOUS at 15:07

## 2017-03-09 RX ADMIN — FENOFIBRATE 160 MG: 160 TABLET ORAL at 10:05

## 2017-03-09 RX ADMIN — AMLODIPINE BESYLATE 10 MG: 10 TABLET ORAL at 14:14

## 2017-03-09 RX ADMIN — SIMVASTATIN 20 MG: 20 TABLET, FILM COATED ORAL at 10:05

## 2017-03-09 RX ADMIN — Medication 2 G: at 04:23

## 2017-03-09 RX ADMIN — OXYCODONE HYDROCHLORIDE AND ACETAMINOPHEN 2 TABLET: 5; 325 TABLET ORAL at 22:44

## 2017-03-09 RX ADMIN — DOCUSATE SODIUM 100 MG: 100 CAPSULE, LIQUID FILLED ORAL at 20:37

## 2017-03-09 RX ADMIN — Medication 10 ML: at 20:37

## 2017-03-09 RX ADMIN — SERTRALINE HYDROCHLORIDE 100 MG: 100 TABLET, FILM COATED ORAL at 10:05

## 2017-03-09 RX ADMIN — MORPHINE SULFATE 2 MG: 2 INJECTION, SOLUTION INTRAMUSCULAR; INTRAVENOUS at 20:36

## 2017-03-09 RX ADMIN — ONDANSETRON 4 MG: 2 INJECTION INTRAMUSCULAR; INTRAVENOUS at 15:21

## 2017-03-09 RX ADMIN — ONDANSETRON 4 MG: 2 INJECTION INTRAMUSCULAR; INTRAVENOUS at 08:53

## 2017-03-09 RX ADMIN — MORPHINE SULFATE 2 MG: 2 INJECTION, SOLUTION INTRAMUSCULAR; INTRAVENOUS at 09:20

## 2017-03-09 RX ADMIN — OXYCODONE HYDROCHLORIDE AND ACETAMINOPHEN 1 TABLET: 5; 325 TABLET ORAL at 03:16

## 2017-03-09 RX ADMIN — OXYCODONE HYDROCHLORIDE AND ACETAMINOPHEN 1 TABLET: 5; 325 TABLET ORAL at 12:58

## 2017-03-09 RX ADMIN — METOPROLOL TARTRATE 5 MG: 1 INJECTION, SOLUTION INTRAVENOUS at 15:21

## 2017-03-09 RX ADMIN — DOCUSATE SODIUM 100 MG: 100 CAPSULE, LIQUID FILLED ORAL at 10:05

## 2017-03-09 ASSESSMENT — PAIN SCALES - GENERAL
PAINLEVEL_OUTOF10: 3
PAINLEVEL_OUTOF10: 0
PAINLEVEL_OUTOF10: 10
PAINLEVEL_OUTOF10: 3
PAINLEVEL_OUTOF10: 5
PAINLEVEL_OUTOF10: 4
PAINLEVEL_OUTOF10: 5
PAINLEVEL_OUTOF10: 6
PAINLEVEL_OUTOF10: 3
PAINLEVEL_OUTOF10: 3
PAINLEVEL_OUTOF10: 0
PAINLEVEL_OUTOF10: 6
PAINLEVEL_OUTOF10: 5

## 2017-03-09 ASSESSMENT — PAIN DESCRIPTION - ORIENTATION
ORIENTATION: LOWER
ORIENTATION: LOWER

## 2017-03-09 ASSESSMENT — PAIN DESCRIPTION - LOCATION
LOCATION: BACK

## 2017-03-09 ASSESSMENT — PAIN DESCRIPTION - DESCRIPTORS
DESCRIPTORS: ACHING;DISCOMFORT
DESCRIPTORS: ACHING

## 2017-03-09 ASSESSMENT — PAIN DESCRIPTION - FREQUENCY: FREQUENCY: INTERMITTENT

## 2017-03-09 ASSESSMENT — PAIN DESCRIPTION - PAIN TYPE
TYPE: SURGICAL PAIN

## 2017-03-10 LAB
-: ABNORMAL
AMORPHOUS: ABNORMAL
BACTERIA: ABNORMAL
BILIRUBIN URINE: NEGATIVE
CASTS UA: ABNORMAL /LPF
COLOR: YELLOW
COMMENT UA: ABNORMAL
CRYSTALS, UA: ABNORMAL /HPF
EPITHELIAL CELLS UA: ABNORMAL /HPF
GLUCOSE URINE: NEGATIVE
HCT VFR BLD CALC: 37.1 % (ref 41–53)
HEMOGLOBIN: 12.3 G/DL (ref 13.5–17.5)
KETONES, URINE: NEGATIVE
LEUKOCYTE ESTERASE, URINE: NEGATIVE
MCH RBC QN AUTO: 27 PG (ref 26–34)
MCHC RBC AUTO-ENTMCNC: 33.2 G/DL (ref 31–37)
MCV RBC AUTO: 81.1 FL (ref 80–100)
MUCUS: ABNORMAL
NITRITE, URINE: NEGATIVE
OTHER OBSERVATIONS UA: ABNORMAL
PDW BLD-RTO: 16.5 % (ref 11.5–14.9)
PH UA: 6 (ref 5–8)
PLATELET # BLD: 185 K/UL (ref 150–450)
PMV BLD AUTO: 7.8 FL (ref 6–12)
PROTEIN UA: NEGATIVE
RBC # BLD: 4.57 M/UL (ref 4.5–5.9)
RBC UA: ABNORMAL /HPF
RENAL EPITHELIAL, UA: ABNORMAL /HPF
SPECIFIC GRAVITY UA: 1.01 (ref 1–1.03)
TRICHOMONAS: ABNORMAL
TURBIDITY: CLEAR
URINE HGB: ABNORMAL
UROBILINOGEN, URINE: NORMAL
WBC # BLD: 18.9 K/UL (ref 3.5–11)
WBC UA: ABNORMAL /HPF
YEAST: ABNORMAL

## 2017-03-10 PROCEDURE — 36415 COLL VENOUS BLD VENIPUNCTURE: CPT

## 2017-03-10 PROCEDURE — 51702 INSERT TEMP BLADDER CATH: CPT

## 2017-03-10 PROCEDURE — 6370000000 HC RX 637 (ALT 250 FOR IP): Performed by: NURSE PRACTITIONER

## 2017-03-10 PROCEDURE — 97116 GAIT TRAINING THERAPY: CPT

## 2017-03-10 PROCEDURE — 97110 THERAPEUTIC EXERCISES: CPT

## 2017-03-10 PROCEDURE — 6370000000 HC RX 637 (ALT 250 FOR IP): Performed by: ORTHOPAEDIC SURGERY

## 2017-03-10 PROCEDURE — 51798 US URINE CAPACITY MEASURE: CPT

## 2017-03-10 PROCEDURE — 97530 THERAPEUTIC ACTIVITIES: CPT

## 2017-03-10 PROCEDURE — 99232 SBSQ HOSP IP/OBS MODERATE 35: CPT | Performed by: INTERNAL MEDICINE

## 2017-03-10 PROCEDURE — 85027 COMPLETE CBC AUTOMATED: CPT

## 2017-03-10 PROCEDURE — 94660 CPAP INITIATION&MGMT: CPT

## 2017-03-10 PROCEDURE — 94762 N-INVAS EAR/PLS OXIMTRY CONT: CPT

## 2017-03-10 PROCEDURE — 2580000003 HC RX 258: Performed by: ORTHOPAEDIC SURGERY

## 2017-03-10 PROCEDURE — 97535 SELF CARE MNGMENT TRAINING: CPT

## 2017-03-10 PROCEDURE — 81001 URINALYSIS AUTO W/SCOPE: CPT

## 2017-03-10 PROCEDURE — 1200000000 HC SEMI PRIVATE

## 2017-03-10 RX ORDER — HYDROCODONE BITARTRATE AND ACETAMINOPHEN 5; 325 MG/1; MG/1
1 TABLET ORAL EVERY 6 HOURS PRN
Status: DISCONTINUED | OUTPATIENT
Start: 2017-03-10 | End: 2017-03-11 | Stop reason: HOSPADM

## 2017-03-10 RX ORDER — HYDROCODONE BITARTRATE AND ACETAMINOPHEN 5; 325 MG/1; MG/1
2 TABLET ORAL EVERY 6 HOURS PRN
Status: DISCONTINUED | OUTPATIENT
Start: 2017-03-10 | End: 2017-03-11 | Stop reason: HOSPADM

## 2017-03-10 RX ORDER — BISACODYL 10 MG
10 SUPPOSITORY, RECTAL RECTAL DAILY PRN
Status: DISCONTINUED | OUTPATIENT
Start: 2017-03-10 | End: 2017-03-11 | Stop reason: HOSPADM

## 2017-03-10 RX ADMIN — OXYCODONE HYDROCHLORIDE AND ACETAMINOPHEN 2 TABLET: 5; 325 TABLET ORAL at 02:10

## 2017-03-10 RX ADMIN — SIMVASTATIN 20 MG: 20 TABLET, FILM COATED ORAL at 08:00

## 2017-03-10 RX ADMIN — Medication 10 ML: at 08:00

## 2017-03-10 RX ADMIN — METOPROLOL SUCCINATE 50 MG: 50 TABLET, EXTENDED RELEASE ORAL at 08:00

## 2017-03-10 RX ADMIN — Medication 10 ML: at 20:34

## 2017-03-10 RX ADMIN — HYDROCODONE BITARTRATE AND ACETAMINOPHEN 1 TABLET: 5; 325 TABLET ORAL at 22:43

## 2017-03-10 RX ADMIN — SERTRALINE HYDROCHLORIDE 100 MG: 100 TABLET, FILM COATED ORAL at 08:00

## 2017-03-10 RX ADMIN — DOCUSATE SODIUM 100 MG: 100 CAPSULE, LIQUID FILLED ORAL at 20:34

## 2017-03-10 RX ADMIN — HYDROCODONE BITARTRATE AND ACETAMINOPHEN 2 TABLET: 5; 325 TABLET ORAL at 16:09

## 2017-03-10 RX ADMIN — FENOFIBRATE 160 MG: 160 TABLET ORAL at 08:00

## 2017-03-10 RX ADMIN — DOCUSATE SODIUM 100 MG: 100 CAPSULE, LIQUID FILLED ORAL at 08:00

## 2017-03-10 RX ADMIN — HYDROCODONE BITARTRATE AND ACETAMINOPHEN 2 TABLET: 5; 325 TABLET ORAL at 10:07

## 2017-03-10 ASSESSMENT — ENCOUNTER SYMPTOMS
BACK PAIN: 1
APNEA: 0
CHEST TIGHTNESS: 0
COUGH: 0
CONSTIPATION: 1
CHOKING: 0

## 2017-03-10 ASSESSMENT — PAIN DESCRIPTION - LOCATION
LOCATION: BACK

## 2017-03-10 ASSESSMENT — PAIN SCALES - GENERAL
PAINLEVEL_OUTOF10: 7
PAINLEVEL_OUTOF10: 0
PAINLEVEL_OUTOF10: 7
PAINLEVEL_OUTOF10: 5
PAINLEVEL_OUTOF10: 5
PAINLEVEL_OUTOF10: 6
PAINLEVEL_OUTOF10: 6
PAINLEVEL_OUTOF10: 4
PAINLEVEL_OUTOF10: 2
PAINLEVEL_OUTOF10: 7

## 2017-03-10 ASSESSMENT — PAIN DESCRIPTION - ORIENTATION
ORIENTATION: LOWER

## 2017-03-10 ASSESSMENT — PAIN DESCRIPTION - PROGRESSION: CLINICAL_PROGRESSION: GRADUALLY IMPROVING

## 2017-03-10 ASSESSMENT — PAIN DESCRIPTION - PAIN TYPE
TYPE: SURGICAL PAIN

## 2017-03-10 ASSESSMENT — PAIN DESCRIPTION - DESCRIPTORS: DESCRIPTORS: ACHING

## 2017-03-11 VITALS
TEMPERATURE: 98.1 F | RESPIRATION RATE: 16 BRPM | SYSTOLIC BLOOD PRESSURE: 162 MMHG | WEIGHT: 227 LBS | HEIGHT: 70 IN | BODY MASS INDEX: 32.5 KG/M2 | HEART RATE: 91 BPM | OXYGEN SATURATION: 99 % | DIASTOLIC BLOOD PRESSURE: 81 MMHG

## 2017-03-11 LAB
ABSOLUTE EOS #: 0 K/UL (ref 0–0.4)
ABSOLUTE LYMPH #: 1.74 K/UL (ref 1–4.8)
ABSOLUTE MONO #: 0.7 K/UL (ref 0.1–1.3)
ANION GAP SERPL CALCULATED.3IONS-SCNC: 17 MMOL/L (ref 9–17)
BASOPHILS # BLD: 0 % (ref 0–2)
BASOPHILS ABSOLUTE: 0 K/UL (ref 0–0.2)
BUN BLDV-MCNC: 19 MG/DL (ref 8–23)
BUN/CREAT BLD: ABNORMAL (ref 9–20)
CALCIUM SERPL-MCNC: 9.4 MG/DL (ref 8.6–10.4)
CHLORIDE BLD-SCNC: 97 MMOL/L (ref 98–107)
CO2: 20 MMOL/L (ref 20–31)
CREAT SERPL-MCNC: 0.92 MG/DL (ref 0.7–1.2)
DIFFERENTIAL TYPE: ABNORMAL
EOSINOPHILS RELATIVE PERCENT: 0 % (ref 0–4)
GFR AFRICAN AMERICAN: >60 ML/MIN
GFR NON-AFRICAN AMERICAN: >60 ML/MIN
GFR SERPL CREATININE-BSD FRML MDRD: ABNORMAL ML/MIN/{1.73_M2}
GFR SERPL CREATININE-BSD FRML MDRD: ABNORMAL ML/MIN/{1.73_M2}
GLUCOSE BLD-MCNC: 192 MG/DL (ref 70–99)
HCT VFR BLD CALC: 36.9 % (ref 41–53)
HEMOGLOBIN: 12.1 G/DL (ref 13.5–17.5)
LYMPHOCYTES # BLD: 10 % (ref 24–44)
MCH RBC QN AUTO: 27.3 PG (ref 26–34)
MCHC RBC AUTO-ENTMCNC: 32.7 G/DL (ref 31–37)
MCV RBC AUTO: 83.4 FL (ref 80–100)
MONOCYTES # BLD: 4 % (ref 1–7)
MORPHOLOGY: ABNORMAL
PDW BLD-RTO: 16.3 % (ref 11.5–14.9)
PLATELET # BLD: 203 K/UL (ref 150–450)
PLATELET ESTIMATE: ABNORMAL
PMV BLD AUTO: 8 FL (ref 6–12)
POTASSIUM SERPL-SCNC: 4.2 MMOL/L (ref 3.7–5.3)
RBC # BLD: 4.42 M/UL (ref 4.5–5.9)
RBC # BLD: ABNORMAL 10*6/UL
SEG NEUTROPHILS: 86 % (ref 36–66)
SEGMENTED NEUTROPHILS ABSOLUTE COUNT: 14.96 K/UL (ref 1.3–9.1)
SODIUM BLD-SCNC: 134 MMOL/L (ref 135–144)
WBC # BLD: 17.4 K/UL (ref 3.5–11)
WBC # BLD: ABNORMAL 10*3/UL

## 2017-03-11 PROCEDURE — 97530 THERAPEUTIC ACTIVITIES: CPT

## 2017-03-11 PROCEDURE — 94762 N-INVAS EAR/PLS OXIMTRY CONT: CPT

## 2017-03-11 PROCEDURE — 94660 CPAP INITIATION&MGMT: CPT

## 2017-03-11 PROCEDURE — 2580000003 HC RX 258: Performed by: ORTHOPAEDIC SURGERY

## 2017-03-11 PROCEDURE — 6370000000 HC RX 637 (ALT 250 FOR IP): Performed by: INTERNAL MEDICINE

## 2017-03-11 PROCEDURE — 6370000000 HC RX 637 (ALT 250 FOR IP): Performed by: NURSE PRACTITIONER

## 2017-03-11 PROCEDURE — 6370000000 HC RX 637 (ALT 250 FOR IP): Performed by: ORTHOPAEDIC SURGERY

## 2017-03-11 PROCEDURE — 80048 BASIC METABOLIC PNL TOTAL CA: CPT

## 2017-03-11 PROCEDURE — 97110 THERAPEUTIC EXERCISES: CPT

## 2017-03-11 PROCEDURE — 51798 US URINE CAPACITY MEASURE: CPT

## 2017-03-11 PROCEDURE — 85025 COMPLETE CBC W/AUTO DIFF WBC: CPT

## 2017-03-11 PROCEDURE — 97535 SELF CARE MNGMENT TRAINING: CPT

## 2017-03-11 PROCEDURE — 51702 INSERT TEMP BLADDER CATH: CPT

## 2017-03-11 PROCEDURE — 6360000002 HC RX W HCPCS: Performed by: ORTHOPAEDIC SURGERY

## 2017-03-11 PROCEDURE — 36415 COLL VENOUS BLD VENIPUNCTURE: CPT

## 2017-03-11 PROCEDURE — 99232 SBSQ HOSP IP/OBS MODERATE 35: CPT | Performed by: INTERNAL MEDICINE

## 2017-03-11 RX ORDER — TAMSULOSIN HYDROCHLORIDE 0.4 MG/1
0.4 CAPSULE ORAL DAILY
Status: DISCONTINUED | OUTPATIENT
Start: 2017-03-11 | End: 2017-03-11 | Stop reason: HOSPADM

## 2017-03-11 RX ORDER — HYDROCODONE BITARTRATE AND ACETAMINOPHEN 5; 325 MG/1; MG/1
1 TABLET ORAL EVERY 6 HOURS PRN
Qty: 15 TABLET | Refills: 0 | Status: SHIPPED | OUTPATIENT
Start: 2017-03-11 | End: 2017-05-05 | Stop reason: ALTCHOICE

## 2017-03-11 RX ORDER — TAMSULOSIN HYDROCHLORIDE 0.4 MG/1
0.4 CAPSULE ORAL DAILY
Qty: 30 CAPSULE | Refills: 12 | Status: SHIPPED | OUTPATIENT
Start: 2017-03-11 | End: 2017-04-03 | Stop reason: SDUPTHER

## 2017-03-11 RX ORDER — TAMSULOSIN HYDROCHLORIDE 0.4 MG/1
0.4 CAPSULE ORAL DAILY
Status: DISCONTINUED | OUTPATIENT
Start: 2017-03-11 | End: 2017-03-11 | Stop reason: SDUPTHER

## 2017-03-11 RX ORDER — DUTASTERIDE 0.5 MG/1
0.5 CAPSULE, LIQUID FILLED ORAL DAILY
Qty: 30 CAPSULE | Refills: 0 | Status: SHIPPED | OUTPATIENT
Start: 2017-03-11 | End: 2017-03-20

## 2017-03-11 RX ADMIN — HYDROCODONE BITARTRATE AND ACETAMINOPHEN 2 TABLET: 5; 325 TABLET ORAL at 15:33

## 2017-03-11 RX ADMIN — METOPROLOL SUCCINATE 50 MG: 50 TABLET, EXTENDED RELEASE ORAL at 09:04

## 2017-03-11 RX ADMIN — TAMSULOSIN HYDROCHLORIDE 0.4 MG: 0.4 CAPSULE ORAL at 15:34

## 2017-03-11 RX ADMIN — FENOFIBRATE 160 MG: 160 TABLET ORAL at 09:04

## 2017-03-11 RX ADMIN — DOCUSATE SODIUM 100 MG: 100 CAPSULE, LIQUID FILLED ORAL at 09:04

## 2017-03-11 RX ADMIN — Medication 10 ML: at 00:22

## 2017-03-11 RX ADMIN — SIMVASTATIN 20 MG: 20 TABLET, FILM COATED ORAL at 09:04

## 2017-03-11 RX ADMIN — SERTRALINE HYDROCHLORIDE 100 MG: 100 TABLET, FILM COATED ORAL at 09:04

## 2017-03-11 RX ADMIN — MORPHINE SULFATE 4 MG: 4 INJECTION, SOLUTION INTRAMUSCULAR; INTRAVENOUS at 00:22

## 2017-03-11 RX ADMIN — HYDROCODONE BITARTRATE AND ACETAMINOPHEN 2 TABLET: 5; 325 TABLET ORAL at 05:02

## 2017-03-11 RX ADMIN — Medication 10 ML: at 09:04

## 2017-03-11 ASSESSMENT — PAIN SCALES - GENERAL
PAINLEVEL_OUTOF10: 8
PAINLEVEL_OUTOF10: 2
PAINLEVEL_OUTOF10: 5
PAINLEVEL_OUTOF10: 6
PAINLEVEL_OUTOF10: 5
PAINLEVEL_OUTOF10: 3
PAINLEVEL_OUTOF10: 8
PAINLEVEL_OUTOF10: 8

## 2017-03-11 ASSESSMENT — PAIN DESCRIPTION - LOCATION
LOCATION: BACK

## 2017-03-11 ASSESSMENT — PAIN DESCRIPTION - ORIENTATION
ORIENTATION: LOWER

## 2017-03-11 ASSESSMENT — PAIN DESCRIPTION - DESCRIPTORS: DESCRIPTORS: ACHING

## 2017-03-11 ASSESSMENT — PAIN DESCRIPTION - PAIN TYPE
TYPE: SURGICAL PAIN

## 2017-03-17 ENCOUNTER — TELEPHONE (OUTPATIENT)
Dept: FAMILY MEDICINE CLINIC | Age: 72
End: 2017-03-17

## 2017-03-20 ENCOUNTER — OFFICE VISIT (OUTPATIENT)
Dept: UROLOGY | Age: 72
End: 2017-03-20
Payer: MEDICARE

## 2017-03-20 VITALS
BODY MASS INDEX: 31.5 KG/M2 | WEIGHT: 220 LBS | TEMPERATURE: 98.5 F | SYSTOLIC BLOOD PRESSURE: 114 MMHG | HEIGHT: 70 IN | HEART RATE: 82 BPM | DIASTOLIC BLOOD PRESSURE: 78 MMHG

## 2017-03-20 DIAGNOSIS — K59.00 CONSTIPATION, UNSPECIFIED CONSTIPATION TYPE: ICD-10-CM

## 2017-03-20 DIAGNOSIS — R33.9 URINARY RETENTION: Primary | ICD-10-CM

## 2017-03-20 DIAGNOSIS — R35.1 NOCTURIA: ICD-10-CM

## 2017-03-20 PROCEDURE — 1123F ACP DISCUSS/DSCN MKR DOCD: CPT | Performed by: NURSE PRACTITIONER

## 2017-03-20 PROCEDURE — 1036F TOBACCO NON-USER: CPT | Performed by: NURSE PRACTITIONER

## 2017-03-20 PROCEDURE — G8484 FLU IMMUNIZE NO ADMIN: HCPCS | Performed by: NURSE PRACTITIONER

## 2017-03-20 PROCEDURE — 51700 IRRIGATION OF BLADDER: CPT | Performed by: NURSE PRACTITIONER

## 2017-03-20 PROCEDURE — 1111F DSCHRG MED/CURRENT MED MERGE: CPT | Performed by: NURSE PRACTITIONER

## 2017-03-20 PROCEDURE — G8427 DOCREV CUR MEDS BY ELIG CLIN: HCPCS | Performed by: NURSE PRACTITIONER

## 2017-03-20 PROCEDURE — G8417 CALC BMI ABV UP PARAM F/U: HCPCS | Performed by: NURSE PRACTITIONER

## 2017-03-20 PROCEDURE — 4040F PNEUMOC VAC/ADMIN/RCVD: CPT | Performed by: NURSE PRACTITIONER

## 2017-03-20 PROCEDURE — 3017F COLORECTAL CA SCREEN DOC REV: CPT | Performed by: NURSE PRACTITIONER

## 2017-03-20 PROCEDURE — 99204 OFFICE O/P NEW MOD 45 MIN: CPT | Performed by: NURSE PRACTITIONER

## 2017-03-20 ASSESSMENT — ENCOUNTER SYMPTOMS
NAUSEA: 0
EYE PAIN: 0
WHEEZING: 0
SHORTNESS OF BREATH: 1
EYE REDNESS: 0
ABDOMINAL PAIN: 0
COUGH: 0
BACK PAIN: 0
VOMITING: 0
COLOR CHANGE: 0

## 2017-03-21 ENCOUNTER — TELEPHONE (OUTPATIENT)
Dept: UROLOGY | Age: 72
End: 2017-03-21

## 2017-03-21 NOTE — TELEPHONE ENCOUNTER
FYI:Patients wife is calling to report out put states that he is doing well 3:30pm - 10:00 out put was 1330 cc and today 1 am - 4:15 out was 2050 cc. Drinking lots of fluids.

## 2017-03-23 DIAGNOSIS — G89.29 CHRONIC LOW BACK PAIN, UNSPECIFIED BACK PAIN LATERALITY, WITH SCIATICA PRESENCE UNSPECIFIED: Primary | ICD-10-CM

## 2017-03-23 DIAGNOSIS — M54.5 CHRONIC LOW BACK PAIN, UNSPECIFIED BACK PAIN LATERALITY, WITH SCIATICA PRESENCE UNSPECIFIED: Primary | ICD-10-CM

## 2017-03-24 ENCOUNTER — OFFICE VISIT (OUTPATIENT)
Dept: ORTHOPEDIC SURGERY | Age: 72
End: 2017-03-24

## 2017-03-24 VITALS
BODY MASS INDEX: 31.5 KG/M2 | DIASTOLIC BLOOD PRESSURE: 85 MMHG | WEIGHT: 220 LBS | HEIGHT: 70 IN | HEART RATE: 73 BPM | SYSTOLIC BLOOD PRESSURE: 143 MMHG

## 2017-03-24 DIAGNOSIS — M51.37 DEGENERATION OF LUMBAR OR LUMBOSACRAL INTERVERTEBRAL DISC: Primary | ICD-10-CM

## 2017-03-24 DIAGNOSIS — M48.062 SPINAL STENOSIS, LUMBAR REGION, WITH NEUROGENIC CLAUDICATION: ICD-10-CM

## 2017-03-24 PROCEDURE — 99024 POSTOP FOLLOW-UP VISIT: CPT | Performed by: ORTHOPAEDIC SURGERY

## 2017-04-03 ENCOUNTER — OFFICE VISIT (OUTPATIENT)
Dept: UROLOGY | Age: 72
End: 2017-04-03
Payer: MEDICARE

## 2017-04-03 VITALS
HEIGHT: 70 IN | TEMPERATURE: 98.7 F | WEIGHT: 221 LBS | BODY MASS INDEX: 31.64 KG/M2 | DIASTOLIC BLOOD PRESSURE: 75 MMHG | SYSTOLIC BLOOD PRESSURE: 133 MMHG | HEART RATE: 72 BPM

## 2017-04-03 DIAGNOSIS — R35.1 NOCTURIA: ICD-10-CM

## 2017-04-03 DIAGNOSIS — R33.9 INCOMPLETE EMPTYING OF BLADDER: ICD-10-CM

## 2017-04-03 DIAGNOSIS — R33.9 URINARY RETENTION: Primary | ICD-10-CM

## 2017-04-03 PROCEDURE — 4040F PNEUMOC VAC/ADMIN/RCVD: CPT | Performed by: NURSE PRACTITIONER

## 2017-04-03 PROCEDURE — 99213 OFFICE O/P EST LOW 20 MIN: CPT | Performed by: NURSE PRACTITIONER

## 2017-04-03 PROCEDURE — 1036F TOBACCO NON-USER: CPT | Performed by: NURSE PRACTITIONER

## 2017-04-03 PROCEDURE — 1111F DSCHRG MED/CURRENT MED MERGE: CPT | Performed by: NURSE PRACTITIONER

## 2017-04-03 PROCEDURE — 3017F COLORECTAL CA SCREEN DOC REV: CPT | Performed by: NURSE PRACTITIONER

## 2017-04-03 PROCEDURE — 51798 US URINE CAPACITY MEASURE: CPT | Performed by: NURSE PRACTITIONER

## 2017-04-03 PROCEDURE — 1123F ACP DISCUSS/DSCN MKR DOCD: CPT | Performed by: NURSE PRACTITIONER

## 2017-04-03 PROCEDURE — G8427 DOCREV CUR MEDS BY ELIG CLIN: HCPCS | Performed by: NURSE PRACTITIONER

## 2017-04-03 PROCEDURE — G8417 CALC BMI ABV UP PARAM F/U: HCPCS | Performed by: NURSE PRACTITIONER

## 2017-04-03 RX ORDER — TAMSULOSIN HYDROCHLORIDE 0.4 MG/1
0.4 CAPSULE ORAL DAILY
Qty: 90 CAPSULE | Refills: 3 | Status: SHIPPED | OUTPATIENT
Start: 2017-04-03 | End: 2017-10-02 | Stop reason: SDUPTHER

## 2017-04-03 ASSESSMENT — ENCOUNTER SYMPTOMS
SHORTNESS OF BREATH: 0
WHEEZING: 0
EYE PAIN: 0
COUGH: 0
BACK PAIN: 0
NAUSEA: 0
EYE REDNESS: 0
VOMITING: 0
ABDOMINAL PAIN: 0
COLOR CHANGE: 0

## 2017-04-05 ENCOUNTER — OFFICE VISIT (OUTPATIENT)
Dept: PRIMARY CARE CLINIC | Age: 72
End: 2017-04-05
Payer: MEDICARE

## 2017-04-05 VITALS
HEIGHT: 70 IN | DIASTOLIC BLOOD PRESSURE: 62 MMHG | BODY MASS INDEX: 31.21 KG/M2 | WEIGHT: 218 LBS | SYSTOLIC BLOOD PRESSURE: 104 MMHG | HEART RATE: 80 BPM | RESPIRATION RATE: 16 BRPM

## 2017-04-05 DIAGNOSIS — R21 RASH OF BACK: ICD-10-CM

## 2017-04-05 PROCEDURE — G8417 CALC BMI ABV UP PARAM F/U: HCPCS | Performed by: PHYSICIAN ASSISTANT

## 2017-04-05 PROCEDURE — 1123F ACP DISCUSS/DSCN MKR DOCD: CPT | Performed by: PHYSICIAN ASSISTANT

## 2017-04-05 PROCEDURE — G8427 DOCREV CUR MEDS BY ELIG CLIN: HCPCS | Performed by: PHYSICIAN ASSISTANT

## 2017-04-05 PROCEDURE — 3017F COLORECTAL CA SCREEN DOC REV: CPT | Performed by: PHYSICIAN ASSISTANT

## 2017-04-05 PROCEDURE — 4040F PNEUMOC VAC/ADMIN/RCVD: CPT | Performed by: PHYSICIAN ASSISTANT

## 2017-04-05 PROCEDURE — 1036F TOBACCO NON-USER: CPT | Performed by: PHYSICIAN ASSISTANT

## 2017-04-05 PROCEDURE — 1111F DSCHRG MED/CURRENT MED MERGE: CPT | Performed by: PHYSICIAN ASSISTANT

## 2017-04-05 PROCEDURE — 99213 OFFICE O/P EST LOW 20 MIN: CPT | Performed by: PHYSICIAN ASSISTANT

## 2017-04-05 RX ORDER — METHYLPREDNISOLONE 4 MG/1
TABLET ORAL
Qty: 1 KIT | Refills: 0 | Status: SHIPPED | OUTPATIENT
Start: 2017-04-05 | End: 2017-05-05 | Stop reason: ALTCHOICE

## 2017-04-05 RX ORDER — SULFAMETHOXAZOLE AND TRIMETHOPRIM 800; 160 MG/1; MG/1
1 TABLET ORAL 2 TIMES DAILY
Qty: 14 TABLET | Refills: 0 | Status: SHIPPED | OUTPATIENT
Start: 2017-04-05 | End: 2017-07-31 | Stop reason: ALTCHOICE

## 2017-04-05 ASSESSMENT — ENCOUNTER SYMPTOMS
RESPIRATORY NEGATIVE: 1
BACK PAIN: 0
NAUSEA: 0

## 2017-04-21 ENCOUNTER — TELEPHONE (OUTPATIENT)
Dept: ORTHOPEDIC SURGERY | Age: 72
End: 2017-04-21

## 2017-04-21 DIAGNOSIS — M51.37 DEGENERATION OF LUMBAR OR LUMBOSACRAL INTERVERTEBRAL DISC: Primary | ICD-10-CM

## 2017-04-24 ENCOUNTER — EVALUATION (OUTPATIENT)
Dept: PHYSICAL THERAPY | Age: 72
End: 2017-04-24
Payer: MEDICARE

## 2017-04-24 DIAGNOSIS — M51.37 DEGENERATION OF LUMBAR OR LUMBOSACRAL INTERVERTEBRAL DISC: Primary | ICD-10-CM

## 2017-04-24 PROCEDURE — G8979 MOBILITY GOAL STATUS: HCPCS | Performed by: PHYSICAL THERAPIST

## 2017-04-24 PROCEDURE — G8978 MOBILITY CURRENT STATUS: HCPCS | Performed by: PHYSICAL THERAPIST

## 2017-04-24 PROCEDURE — 97110 THERAPEUTIC EXERCISES: CPT | Performed by: PHYSICAL THERAPIST

## 2017-04-24 PROCEDURE — 97161 PT EVAL LOW COMPLEX 20 MIN: CPT | Performed by: PHYSICAL THERAPIST

## 2017-04-24 ASSESSMENT — PAIN DESCRIPTION - PROGRESSION
CLINICAL_PROGRESSION: NOT CHANGED
CLINICAL_PROGRESSION: NOT CHANGED

## 2017-04-24 ASSESSMENT — PAIN DESCRIPTION - FREQUENCY
FREQUENCY: INTERMITTENT
FREQUENCY: INTERMITTENT

## 2017-04-24 ASSESSMENT — PAIN SCALES - GENERAL: PAINLEVEL_OUTOF10: 0

## 2017-04-24 ASSESSMENT — PAIN DESCRIPTION - PAIN TYPE
TYPE: CHRONIC PAIN
TYPE: CHRONIC PAIN

## 2017-04-24 ASSESSMENT — PAIN DESCRIPTION - DESCRIPTORS
DESCRIPTORS: CRAMPING
DESCRIPTORS: CRAMPING

## 2017-04-24 ASSESSMENT — PAIN DESCRIPTION - LOCATION
LOCATION: BACK;LEG
LOCATION: BACK;LEG

## 2017-04-24 ASSESSMENT — PAIN DESCRIPTION - ORIENTATION
ORIENTATION: LEFT
ORIENTATION: LEFT

## 2017-04-26 ENCOUNTER — TREATMENT (OUTPATIENT)
Dept: PHYSICAL THERAPY | Age: 72
End: 2017-04-26
Payer: MEDICARE

## 2017-04-26 DIAGNOSIS — M51.37 DEGENERATION OF LUMBAR OR LUMBOSACRAL INTERVERTEBRAL DISC: Primary | ICD-10-CM

## 2017-04-26 PROCEDURE — 97110 THERAPEUTIC EXERCISES: CPT | Performed by: PHYSICAL THERAPIST

## 2017-05-01 ENCOUNTER — TREATMENT (OUTPATIENT)
Dept: PHYSICAL THERAPY | Age: 72
End: 2017-05-01
Payer: MEDICARE

## 2017-05-01 DIAGNOSIS — M51.37 DEGENERATION OF LUMBAR OR LUMBOSACRAL INTERVERTEBRAL DISC: Primary | ICD-10-CM

## 2017-05-01 PROCEDURE — 97110 THERAPEUTIC EXERCISES: CPT | Performed by: PHYSICAL THERAPIST

## 2017-05-03 DIAGNOSIS — M48.062 SPINAL STENOSIS, LUMBAR REGION, WITH NEUROGENIC CLAUDICATION: Primary | ICD-10-CM

## 2017-05-04 ENCOUNTER — TREATMENT (OUTPATIENT)
Dept: PHYSICAL THERAPY | Age: 72
End: 2017-05-04
Payer: MEDICARE

## 2017-05-04 DIAGNOSIS — M51.37 DEGENERATION OF LUMBAR OR LUMBOSACRAL INTERVERTEBRAL DISC: Primary | ICD-10-CM

## 2017-05-04 PROCEDURE — 97110 THERAPEUTIC EXERCISES: CPT | Performed by: PHYSICAL THERAPIST

## 2017-05-05 ENCOUNTER — OFFICE VISIT (OUTPATIENT)
Dept: ORTHOPEDIC SURGERY | Age: 72
End: 2017-05-05

## 2017-05-05 VITALS
HEIGHT: 70 IN | SYSTOLIC BLOOD PRESSURE: 131 MMHG | WEIGHT: 221 LBS | BODY MASS INDEX: 31.64 KG/M2 | DIASTOLIC BLOOD PRESSURE: 79 MMHG | HEART RATE: 68 BPM

## 2017-05-05 DIAGNOSIS — M51.37 DEGENERATION OF LUMBAR OR LUMBOSACRAL INTERVERTEBRAL DISC: Primary | ICD-10-CM

## 2017-05-05 DIAGNOSIS — M48.062 SPINAL STENOSIS, LUMBAR REGION, WITH NEUROGENIC CLAUDICATION: ICD-10-CM

## 2017-05-05 PROCEDURE — 99024 POSTOP FOLLOW-UP VISIT: CPT | Performed by: ORTHOPAEDIC SURGERY

## 2017-05-08 ENCOUNTER — TREATMENT (OUTPATIENT)
Dept: PHYSICAL THERAPY | Age: 72
End: 2017-05-08
Payer: MEDICARE

## 2017-05-08 DIAGNOSIS — M51.37 DEGENERATION OF LUMBAR OR LUMBOSACRAL INTERVERTEBRAL DISC: Primary | ICD-10-CM

## 2017-05-08 PROCEDURE — 97110 THERAPEUTIC EXERCISES: CPT | Performed by: PHYSICAL THERAPIST

## 2017-05-11 ENCOUNTER — TREATMENT (OUTPATIENT)
Dept: PHYSICAL THERAPY | Age: 72
End: 2017-05-11
Payer: MEDICARE

## 2017-05-11 DIAGNOSIS — M51.37 DEGENERATION OF LUMBAR OR LUMBOSACRAL INTERVERTEBRAL DISC: Primary | ICD-10-CM

## 2017-05-11 PROCEDURE — 97110 THERAPEUTIC EXERCISES: CPT | Performed by: PHYSICAL THERAPIST

## 2017-05-18 ENCOUNTER — TREATMENT (OUTPATIENT)
Dept: PHYSICAL THERAPY | Age: 72
End: 2017-05-18
Payer: MEDICARE

## 2017-05-18 DIAGNOSIS — M51.37 DEGENERATION OF LUMBAR OR LUMBOSACRAL INTERVERTEBRAL DISC: Primary | ICD-10-CM

## 2017-05-18 PROCEDURE — 97110 THERAPEUTIC EXERCISES: CPT | Performed by: PHYSICAL THERAPIST

## 2017-05-22 ENCOUNTER — TREATMENT (OUTPATIENT)
Dept: PHYSICAL THERAPY | Age: 72
End: 2017-05-22
Payer: MEDICARE

## 2017-05-22 DIAGNOSIS — M51.37 DEGENERATION OF LUMBAR OR LUMBOSACRAL INTERVERTEBRAL DISC: Primary | ICD-10-CM

## 2017-05-22 PROCEDURE — 97110 THERAPEUTIC EXERCISES: CPT | Performed by: PHYSICAL THERAPIST

## 2017-07-05 RX ORDER — HYDROCODONE BITARTRATE AND ACETAMINOPHEN 5; 325 MG/1; MG/1
1 TABLET ORAL EVERY 6 HOURS PRN
Qty: 60 TABLET | Refills: 0 | Status: SHIPPED | OUTPATIENT
Start: 2017-07-05 | End: 2017-07-31 | Stop reason: ALTCHOICE

## 2017-07-12 ENCOUNTER — HOSPITAL ENCOUNTER (OUTPATIENT)
Age: 72
Setting detail: SPECIMEN
Discharge: HOME | End: 2017-07-12
Payer: MEDICARE

## 2017-07-12 LAB — HEPATITIS C ANTIBODY: NONREACTIVE

## 2017-07-31 ENCOUNTER — OFFICE VISIT (OUTPATIENT)
Dept: FAMILY MEDICINE CLINIC | Age: 72
End: 2017-07-31
Payer: MEDICARE

## 2017-07-31 VITALS
HEIGHT: 70 IN | BODY MASS INDEX: 32.5 KG/M2 | SYSTOLIC BLOOD PRESSURE: 120 MMHG | DIASTOLIC BLOOD PRESSURE: 70 MMHG | HEART RATE: 72 BPM | WEIGHT: 227 LBS

## 2017-07-31 DIAGNOSIS — M48.062 SPINAL STENOSIS, LUMBAR REGION, WITH NEUROGENIC CLAUDICATION: ICD-10-CM

## 2017-07-31 DIAGNOSIS — E78.00 PURE HYPERCHOLESTEROLEMIA: ICD-10-CM

## 2017-07-31 DIAGNOSIS — M21.372 FOOT DROP, LEFT: ICD-10-CM

## 2017-07-31 DIAGNOSIS — C44.92 SCC (SQUAMOUS CELL CARCINOMA): ICD-10-CM

## 2017-07-31 DIAGNOSIS — Z12.5 SCREENING PSA (PROSTATE SPECIFIC ANTIGEN): ICD-10-CM

## 2017-07-31 DIAGNOSIS — F32.A DEPRESSION, UNSPECIFIED DEPRESSION TYPE: ICD-10-CM

## 2017-07-31 DIAGNOSIS — Z12.11 SCREENING FOR COLON CANCER: Primary | ICD-10-CM

## 2017-07-31 DIAGNOSIS — I10 ESSENTIAL HYPERTENSION: ICD-10-CM

## 2017-07-31 DIAGNOSIS — N18.3 CKD (CHRONIC KIDNEY DISEASE), STAGE 3 (MODERATE): ICD-10-CM

## 2017-07-31 DIAGNOSIS — G47.33 OSA (OBSTRUCTIVE SLEEP APNEA): ICD-10-CM

## 2017-07-31 DIAGNOSIS — D64.9 ANEMIA, UNSPECIFIED TYPE: ICD-10-CM

## 2017-07-31 PROCEDURE — 99214 OFFICE O/P EST MOD 30 MIN: CPT | Performed by: FAMILY MEDICINE

## 2017-07-31 PROCEDURE — 4040F PNEUMOC VAC/ADMIN/RCVD: CPT | Performed by: FAMILY MEDICINE

## 2017-07-31 PROCEDURE — 3017F COLORECTAL CA SCREEN DOC REV: CPT | Performed by: FAMILY MEDICINE

## 2017-07-31 PROCEDURE — G8427 DOCREV CUR MEDS BY ELIG CLIN: HCPCS | Performed by: FAMILY MEDICINE

## 2017-07-31 PROCEDURE — 1123F ACP DISCUSS/DSCN MKR DOCD: CPT | Performed by: FAMILY MEDICINE

## 2017-07-31 PROCEDURE — G8417 CALC BMI ABV UP PARAM F/U: HCPCS | Performed by: FAMILY MEDICINE

## 2017-07-31 PROCEDURE — 1036F TOBACCO NON-USER: CPT | Performed by: FAMILY MEDICINE

## 2017-07-31 RX ORDER — GABAPENTIN 100 MG/1
100 CAPSULE ORAL 3 TIMES DAILY
COMMUNITY
End: 2017-07-31 | Stop reason: ALTCHOICE

## 2017-07-31 ASSESSMENT — ENCOUNTER SYMPTOMS
GASTROINTESTINAL NEGATIVE: 1
EYES NEGATIVE: 1
RESPIRATORY NEGATIVE: 1
BACK PAIN: 1
ALLERGIC/IMMUNOLOGIC NEGATIVE: 1

## 2017-07-31 ASSESSMENT — PATIENT HEALTH QUESTIONNAIRE - PHQ9
2. FEELING DOWN, DEPRESSED OR HOPELESS: 0
SUM OF ALL RESPONSES TO PHQ9 QUESTIONS 1 & 2: 0
1. LITTLE INTEREST OR PLEASURE IN DOING THINGS: 0
SUM OF ALL RESPONSES TO PHQ QUESTIONS 1-9: 0

## 2017-08-04 ENCOUNTER — EVALUATION (OUTPATIENT)
Dept: PHYSICAL THERAPY | Age: 72
End: 2017-08-04
Payer: MEDICARE

## 2017-08-04 DIAGNOSIS — M21.372 LEFT FOOT DROP: Primary | ICD-10-CM

## 2017-08-04 PROCEDURE — 97035 APP MDLTY 1+ULTRASOUND EA 15: CPT | Performed by: PHYSICAL THERAPIST

## 2017-08-04 PROCEDURE — G8979 MOBILITY GOAL STATUS: HCPCS | Performed by: PHYSICAL THERAPIST

## 2017-08-04 PROCEDURE — 97162 PT EVAL MOD COMPLEX 30 MIN: CPT | Performed by: PHYSICAL THERAPIST

## 2017-08-04 PROCEDURE — 97110 THERAPEUTIC EXERCISES: CPT | Performed by: PHYSICAL THERAPIST

## 2017-08-04 PROCEDURE — G8978 MOBILITY CURRENT STATUS: HCPCS | Performed by: PHYSICAL THERAPIST

## 2017-08-04 ASSESSMENT — PAIN DESCRIPTION - ORIENTATION: ORIENTATION: LEFT;ANTERIOR

## 2017-08-04 ASSESSMENT — PAIN DESCRIPTION - DESCRIPTORS: DESCRIPTORS: TIGHTNESS;DISCOMFORT

## 2017-08-04 ASSESSMENT — PAIN DESCRIPTION - PAIN TYPE: TYPE: ACUTE PAIN

## 2017-08-04 ASSESSMENT — PAIN DESCRIPTION - PROGRESSION: CLINICAL_PROGRESSION: GRADUALLY IMPROVING

## 2017-08-04 ASSESSMENT — PAIN SCALES - GENERAL: PAINLEVEL_OUTOF10: 0

## 2017-08-04 ASSESSMENT — PAIN DESCRIPTION - ONSET: ONSET: UNABLE TO TELL

## 2017-08-04 ASSESSMENT — PAIN DESCRIPTION - FREQUENCY: FREQUENCY: INTERMITTENT

## 2017-08-04 ASSESSMENT — PAIN DESCRIPTION - LOCATION: LOCATION: LEG;FOOT;ANKLE

## 2017-08-07 DIAGNOSIS — M54.5 LOW BACK PAIN, UNSPECIFIED BACK PAIN LATERALITY, UNSPECIFIED CHRONICITY, WITH SCIATICA PRESENCE UNSPECIFIED: Primary | ICD-10-CM

## 2017-08-08 ENCOUNTER — TREATMENT (OUTPATIENT)
Dept: PHYSICAL THERAPY | Age: 72
End: 2017-08-08
Payer: MEDICARE

## 2017-08-08 ENCOUNTER — TELEPHONE (OUTPATIENT)
Dept: FAMILY MEDICINE CLINIC | Age: 72
End: 2017-08-08

## 2017-08-08 ENCOUNTER — INITIAL CONSULT (OUTPATIENT)
Dept: SURGERY | Age: 72
End: 2017-08-08

## 2017-08-08 VITALS
TEMPERATURE: 98.4 F | BODY MASS INDEX: 32.78 KG/M2 | DIASTOLIC BLOOD PRESSURE: 82 MMHG | RESPIRATION RATE: 16 BRPM | HEIGHT: 70 IN | SYSTOLIC BLOOD PRESSURE: 128 MMHG | WEIGHT: 229 LBS

## 2017-08-08 DIAGNOSIS — M21.372 LEFT FOOT DROP: Primary | ICD-10-CM

## 2017-08-08 DIAGNOSIS — Z12.11 ENCOUNTER FOR SCREENING COLONOSCOPY: Primary | ICD-10-CM

## 2017-08-08 PROCEDURE — 3017F COLORECTAL CA SCREEN DOC REV: CPT | Performed by: SURGERY

## 2017-08-08 PROCEDURE — 97140 MANUAL THERAPY 1/> REGIONS: CPT | Performed by: PHYSICAL THERAPIST

## 2017-08-08 PROCEDURE — 1123F ACP DISCUSS/DSCN MKR DOCD: CPT | Performed by: SURGERY

## 2017-08-08 PROCEDURE — 97035 APP MDLTY 1+ULTRASOUND EA 15: CPT | Performed by: PHYSICAL THERAPIST

## 2017-08-08 PROCEDURE — 97110 THERAPEUTIC EXERCISES: CPT | Performed by: PHYSICAL THERAPIST

## 2017-08-08 PROCEDURE — 99999 PR OFFICE/OUTPT VISIT,PROCEDURE ONLY: CPT | Performed by: SURGERY

## 2017-08-08 PROCEDURE — 1036F TOBACCO NON-USER: CPT | Performed by: SURGERY

## 2017-08-08 PROCEDURE — G8417 CALC BMI ABV UP PARAM F/U: HCPCS | Performed by: SURGERY

## 2017-08-08 PROCEDURE — G8427 DOCREV CUR MEDS BY ELIG CLIN: HCPCS | Performed by: SURGERY

## 2017-08-08 PROCEDURE — 4040F PNEUMOC VAC/ADMIN/RCVD: CPT | Performed by: SURGERY

## 2017-08-10 ENCOUNTER — TREATMENT (OUTPATIENT)
Dept: PHYSICAL THERAPY | Age: 72
End: 2017-08-10
Payer: MEDICARE

## 2017-08-10 DIAGNOSIS — M21.372 LEFT FOOT DROP: Primary | ICD-10-CM

## 2017-08-10 PROCEDURE — 97110 THERAPEUTIC EXERCISES: CPT | Performed by: PHYSICAL THERAPIST

## 2017-08-10 PROCEDURE — 97140 MANUAL THERAPY 1/> REGIONS: CPT | Performed by: PHYSICAL THERAPIST

## 2017-08-10 PROCEDURE — 97035 APP MDLTY 1+ULTRASOUND EA 15: CPT | Performed by: PHYSICAL THERAPIST

## 2017-08-11 ENCOUNTER — OFFICE VISIT (OUTPATIENT)
Dept: ORTHOPEDIC SURGERY | Age: 72
End: 2017-08-11
Payer: MEDICARE

## 2017-08-11 VITALS
HEART RATE: 84 BPM | SYSTOLIC BLOOD PRESSURE: 122 MMHG | WEIGHT: 227 LBS | DIASTOLIC BLOOD PRESSURE: 76 MMHG | BODY MASS INDEX: 32.5 KG/M2 | HEIGHT: 70 IN

## 2017-08-11 DIAGNOSIS — M48.062 SPINAL STENOSIS, LUMBAR REGION, WITH NEUROGENIC CLAUDICATION: ICD-10-CM

## 2017-08-11 DIAGNOSIS — M54.5 LOW BACK PAIN, UNSPECIFIED BACK PAIN LATERALITY, UNSPECIFIED CHRONICITY, WITH SCIATICA PRESENCE UNSPECIFIED: Primary | ICD-10-CM

## 2017-08-11 DIAGNOSIS — M51.37 DEGENERATION OF LUMBAR OR LUMBOSACRAL INTERVERTEBRAL DISC: ICD-10-CM

## 2017-08-11 PROCEDURE — G8427 DOCREV CUR MEDS BY ELIG CLIN: HCPCS | Performed by: ORTHOPAEDIC SURGERY

## 2017-08-11 PROCEDURE — 1036F TOBACCO NON-USER: CPT | Performed by: ORTHOPAEDIC SURGERY

## 2017-08-11 PROCEDURE — 3017F COLORECTAL CA SCREEN DOC REV: CPT | Performed by: ORTHOPAEDIC SURGERY

## 2017-08-11 PROCEDURE — 1123F ACP DISCUSS/DSCN MKR DOCD: CPT | Performed by: ORTHOPAEDIC SURGERY

## 2017-08-11 PROCEDURE — 99213 OFFICE O/P EST LOW 20 MIN: CPT | Performed by: ORTHOPAEDIC SURGERY

## 2017-08-11 PROCEDURE — 4040F PNEUMOC VAC/ADMIN/RCVD: CPT | Performed by: ORTHOPAEDIC SURGERY

## 2017-08-11 PROCEDURE — G8417 CALC BMI ABV UP PARAM F/U: HCPCS | Performed by: ORTHOPAEDIC SURGERY

## 2017-08-14 ASSESSMENT — ENCOUNTER SYMPTOMS: BACK PAIN: 1

## 2017-08-15 ENCOUNTER — HOSPITAL ENCOUNTER (OUTPATIENT)
Dept: PHYSICAL THERAPY | Age: 72
Setting detail: THERAPIES SERIES
Discharge: HOME | End: 2017-08-15
Payer: MEDICARE

## 2017-08-15 PROCEDURE — 97110 THERAPEUTIC EXERCISES: CPT

## 2017-08-15 PROCEDURE — 97140 MANUAL THERAPY 1/> REGIONS: CPT

## 2017-08-18 ENCOUNTER — HOSPITAL ENCOUNTER (OUTPATIENT)
Dept: PHYSICAL THERAPY | Age: 72
Setting detail: THERAPIES SERIES
Discharge: HOME | End: 2017-08-18
Payer: MEDICARE

## 2017-08-18 PROCEDURE — 97110 THERAPEUTIC EXERCISES: CPT

## 2017-08-18 PROCEDURE — 97035 APP MDLTY 1+ULTRASOUND EA 15: CPT

## 2017-08-18 PROCEDURE — 97140 MANUAL THERAPY 1/> REGIONS: CPT

## 2017-08-22 ENCOUNTER — APPOINTMENT (OUTPATIENT)
Dept: PHYSICAL THERAPY | Age: 72
End: 2017-08-22
Payer: MEDICARE

## 2017-08-23 ENCOUNTER — ANESTHESIA (OUTPATIENT)
Dept: OPERATING ROOM | Age: 72
End: 2017-08-23
Payer: MEDICARE

## 2017-08-23 ENCOUNTER — ANESTHESIA EVENT (OUTPATIENT)
Dept: OPERATING ROOM | Age: 72
End: 2017-08-23
Payer: MEDICARE

## 2017-08-23 ENCOUNTER — HOSPITAL ENCOUNTER (OUTPATIENT)
Age: 72
Setting detail: OUTPATIENT SURGERY
Discharge: HOME | End: 2017-08-23
Attending: SURGERY | Admitting: SURGERY
Payer: MEDICARE

## 2017-08-23 VITALS
SYSTOLIC BLOOD PRESSURE: 120 MMHG | RESPIRATION RATE: 10 BRPM | DIASTOLIC BLOOD PRESSURE: 58 MMHG | OXYGEN SATURATION: 96 %

## 2017-08-23 VITALS
RESPIRATION RATE: 16 BRPM | SYSTOLIC BLOOD PRESSURE: 124 MMHG | BODY MASS INDEX: 31.64 KG/M2 | HEIGHT: 70 IN | DIASTOLIC BLOOD PRESSURE: 69 MMHG | TEMPERATURE: 97.2 F | HEART RATE: 56 BPM | OXYGEN SATURATION: 95 % | WEIGHT: 221 LBS

## 2017-08-23 PROCEDURE — 7100000011 HC PHASE II RECOVERY - ADDTL 15 MIN: Performed by: SURGERY

## 2017-08-23 PROCEDURE — 3700000001 HC ADD 15 MINUTES (ANESTHESIA): Performed by: SURGERY

## 2017-08-23 PROCEDURE — 3609027000 HC COLONOSCOPY: Performed by: SURGERY

## 2017-08-23 PROCEDURE — G0121 COLON CA SCRN NOT HI RSK IND: HCPCS | Performed by: SURGERY

## 2017-08-23 PROCEDURE — 7100000010 HC PHASE II RECOVERY - FIRST 15 MIN: Performed by: SURGERY

## 2017-08-23 PROCEDURE — 6360000002 HC RX W HCPCS: Performed by: NURSE ANESTHETIST, CERTIFIED REGISTERED

## 2017-08-23 PROCEDURE — 2580000003 HC RX 258: Performed by: SURGERY

## 2017-08-23 PROCEDURE — 3700000000 HC ANESTHESIA ATTENDED CARE: Performed by: SURGERY

## 2017-08-23 PROCEDURE — 00810 PR ANESTH,INTESTINE,SCOPE,LOW: CPT | Performed by: NURSE ANESTHETIST, CERTIFIED REGISTERED

## 2017-08-23 RX ORDER — FENTANYL CITRATE 50 UG/ML
INJECTION, SOLUTION INTRAMUSCULAR; INTRAVENOUS PRN
Status: DISCONTINUED | OUTPATIENT
Start: 2017-08-23 | End: 2017-08-23 | Stop reason: SDUPTHER

## 2017-08-23 RX ORDER — PROPOFOL 10 MG/ML
INJECTION, EMULSION INTRAVENOUS PRN
Status: DISCONTINUED | OUTPATIENT
Start: 2017-08-23 | End: 2017-08-23 | Stop reason: SDUPTHER

## 2017-08-23 RX ORDER — SODIUM CHLORIDE, SODIUM LACTATE, POTASSIUM CHLORIDE, CALCIUM CHLORIDE 600; 310; 30; 20 MG/100ML; MG/100ML; MG/100ML; MG/100ML
INJECTION, SOLUTION INTRAVENOUS CONTINUOUS
Status: DISCONTINUED | OUTPATIENT
Start: 2017-08-23 | End: 2017-08-23 | Stop reason: HOSPADM

## 2017-08-23 RX ORDER — MIDAZOLAM HYDROCHLORIDE 1 MG/ML
INJECTION INTRAMUSCULAR; INTRAVENOUS PRN
Status: DISCONTINUED | OUTPATIENT
Start: 2017-08-23 | End: 2017-08-23 | Stop reason: SDUPTHER

## 2017-08-23 RX ADMIN — PROPOFOL 20 MG: 10 INJECTION, EMULSION INTRAVENOUS at 08:46

## 2017-08-23 RX ADMIN — SODIUM CHLORIDE, POTASSIUM CHLORIDE, SODIUM LACTATE AND CALCIUM CHLORIDE: 600; 310; 30; 20 INJECTION, SOLUTION INTRAVENOUS at 08:14

## 2017-08-23 RX ADMIN — MIDAZOLAM 2 MG: 1 INJECTION INTRAMUSCULAR; INTRAVENOUS at 08:40

## 2017-08-23 RX ADMIN — PROPOFOL 40 MG: 10 INJECTION, EMULSION INTRAVENOUS at 08:40

## 2017-08-23 RX ADMIN — FENTANYL CITRATE 100 MCG: 50 INJECTION INTRAMUSCULAR; INTRAVENOUS at 08:40

## 2017-08-23 ASSESSMENT — PAIN SCALES - GENERAL
PAINLEVEL_OUTOF10: 0

## 2017-08-23 ASSESSMENT — PAIN - FUNCTIONAL ASSESSMENT: PAIN_FUNCTIONAL_ASSESSMENT: 0-10

## 2017-08-24 ENCOUNTER — APPOINTMENT (OUTPATIENT)
Dept: PHYSICAL THERAPY | Age: 72
End: 2017-08-24
Payer: MEDICARE

## 2017-08-24 ENCOUNTER — HOSPITAL ENCOUNTER (OUTPATIENT)
Dept: PHYSICAL THERAPY | Age: 72
Setting detail: THERAPIES SERIES
Discharge: HOME | End: 2017-08-24
Payer: MEDICARE

## 2017-08-24 PROCEDURE — 97140 MANUAL THERAPY 1/> REGIONS: CPT

## 2017-08-24 PROCEDURE — 97035 APP MDLTY 1+ULTRASOUND EA 15: CPT

## 2017-08-24 PROCEDURE — 97110 THERAPEUTIC EXERCISES: CPT

## 2017-08-25 ENCOUNTER — OFFICE VISIT (OUTPATIENT)
Dept: ORTHOPEDIC SURGERY | Age: 72
End: 2017-08-25
Payer: MEDICARE

## 2017-08-25 VITALS
WEIGHT: 227 LBS | HEART RATE: 64 BPM | HEIGHT: 70 IN | SYSTOLIC BLOOD PRESSURE: 114 MMHG | DIASTOLIC BLOOD PRESSURE: 68 MMHG | BODY MASS INDEX: 32.5 KG/M2

## 2017-08-25 DIAGNOSIS — G57.30 PERONEAL NERVE PALSY, UNSPECIFIED LATERALITY: ICD-10-CM

## 2017-08-25 DIAGNOSIS — M48.062 SPINAL STENOSIS, LUMBAR REGION, WITH NEUROGENIC CLAUDICATION: ICD-10-CM

## 2017-08-25 DIAGNOSIS — M51.37 DEGENERATION OF LUMBAR OR LUMBOSACRAL INTERVERTEBRAL DISC: ICD-10-CM

## 2017-08-25 DIAGNOSIS — M54.5 LOW BACK PAIN, UNSPECIFIED BACK PAIN LATERALITY, UNSPECIFIED CHRONICITY, WITH SCIATICA PRESENCE UNSPECIFIED: Primary | ICD-10-CM

## 2017-08-25 DIAGNOSIS — M21.372 LEFT FOOT DROP: ICD-10-CM

## 2017-08-25 PROCEDURE — 1123F ACP DISCUSS/DSCN MKR DOCD: CPT | Performed by: ORTHOPAEDIC SURGERY

## 2017-08-25 PROCEDURE — 99213 OFFICE O/P EST LOW 20 MIN: CPT | Performed by: ORTHOPAEDIC SURGERY

## 2017-08-25 PROCEDURE — G8427 DOCREV CUR MEDS BY ELIG CLIN: HCPCS | Performed by: ORTHOPAEDIC SURGERY

## 2017-08-25 PROCEDURE — 1036F TOBACCO NON-USER: CPT | Performed by: ORTHOPAEDIC SURGERY

## 2017-08-25 PROCEDURE — 3017F COLORECTAL CA SCREEN DOC REV: CPT | Performed by: ORTHOPAEDIC SURGERY

## 2017-08-25 PROCEDURE — G8417 CALC BMI ABV UP PARAM F/U: HCPCS | Performed by: ORTHOPAEDIC SURGERY

## 2017-08-25 PROCEDURE — 4040F PNEUMOC VAC/ADMIN/RCVD: CPT | Performed by: ORTHOPAEDIC SURGERY

## 2017-08-29 ENCOUNTER — HOSPITAL ENCOUNTER (OUTPATIENT)
Dept: PHYSICAL THERAPY | Age: 72
Setting detail: THERAPIES SERIES
Discharge: HOME | End: 2017-08-29
Payer: MEDICARE

## 2017-08-29 PROCEDURE — 97110 THERAPEUTIC EXERCISES: CPT

## 2017-08-29 PROCEDURE — 97140 MANUAL THERAPY 1/> REGIONS: CPT

## 2017-08-30 ASSESSMENT — ENCOUNTER SYMPTOMS: BACK PAIN: 1

## 2017-09-01 ENCOUNTER — HOSPITAL ENCOUNTER (OUTPATIENT)
Dept: PHYSICAL THERAPY | Age: 72
Setting detail: THERAPIES SERIES
Discharge: HOME | End: 2017-09-01
Payer: MEDICARE

## 2017-09-01 PROCEDURE — G8980 MOBILITY D/C STATUS: HCPCS | Performed by: PHYSICAL THERAPIST

## 2017-09-01 PROCEDURE — 97110 THERAPEUTIC EXERCISES: CPT | Performed by: PHYSICAL THERAPIST

## 2017-09-01 PROCEDURE — G8979 MOBILITY GOAL STATUS: HCPCS | Performed by: PHYSICAL THERAPIST

## 2017-09-01 PROCEDURE — 97112 NEUROMUSCULAR REEDUCATION: CPT | Performed by: PHYSICAL THERAPIST

## 2017-09-13 RX ORDER — SIMVASTATIN 20 MG
TABLET ORAL
Qty: 90 TABLET | Refills: 1 | Status: SHIPPED | OUTPATIENT
Start: 2017-09-13 | End: 2018-03-22 | Stop reason: SDUPTHER

## 2017-09-13 RX ORDER — FENOFIBRATE 145 MG/1
TABLET, COATED ORAL
Qty: 90 TABLET | Refills: 1 | Status: SHIPPED | OUTPATIENT
Start: 2017-09-13 | End: 2018-03-12 | Stop reason: SDUPTHER

## 2017-10-02 ENCOUNTER — OFFICE VISIT (OUTPATIENT)
Dept: UROLOGY | Age: 72
End: 2017-10-02
Payer: MEDICARE

## 2017-10-02 VITALS
WEIGHT: 227.07 LBS | RESPIRATION RATE: 16 BRPM | HEART RATE: 51 BPM | BODY MASS INDEX: 32.51 KG/M2 | HEIGHT: 70 IN | DIASTOLIC BLOOD PRESSURE: 82 MMHG | SYSTOLIC BLOOD PRESSURE: 160 MMHG

## 2017-10-02 DIAGNOSIS — R33.9 URINARY RETENTION: ICD-10-CM

## 2017-10-02 DIAGNOSIS — R35.1 NOCTURIA: ICD-10-CM

## 2017-10-02 DIAGNOSIS — R33.9 INCOMPLETE BLADDER EMPTYING: Primary | ICD-10-CM

## 2017-10-02 PROCEDURE — 1036F TOBACCO NON-USER: CPT | Performed by: NURSE PRACTITIONER

## 2017-10-02 PROCEDURE — G8484 FLU IMMUNIZE NO ADMIN: HCPCS | Performed by: NURSE PRACTITIONER

## 2017-10-02 PROCEDURE — G8417 CALC BMI ABV UP PARAM F/U: HCPCS | Performed by: NURSE PRACTITIONER

## 2017-10-02 PROCEDURE — 1123F ACP DISCUSS/DSCN MKR DOCD: CPT | Performed by: NURSE PRACTITIONER

## 2017-10-02 PROCEDURE — 4040F PNEUMOC VAC/ADMIN/RCVD: CPT | Performed by: NURSE PRACTITIONER

## 2017-10-02 PROCEDURE — 3017F COLORECTAL CA SCREEN DOC REV: CPT | Performed by: NURSE PRACTITIONER

## 2017-10-02 PROCEDURE — G8427 DOCREV CUR MEDS BY ELIG CLIN: HCPCS | Performed by: NURSE PRACTITIONER

## 2017-10-02 PROCEDURE — 99213 OFFICE O/P EST LOW 20 MIN: CPT | Performed by: NURSE PRACTITIONER

## 2017-10-02 PROCEDURE — 51798 US URINE CAPACITY MEASURE: CPT | Performed by: NURSE PRACTITIONER

## 2017-10-02 RX ORDER — TAMSULOSIN HYDROCHLORIDE 0.4 MG/1
0.4 CAPSULE ORAL DAILY
Qty: 90 CAPSULE | Refills: 3 | Status: SHIPPED | OUTPATIENT
Start: 2017-10-02 | End: 2018-08-09 | Stop reason: SDUPTHER

## 2017-10-02 ASSESSMENT — ENCOUNTER SYMPTOMS
VOMITING: 0
BACK PAIN: 0
SHORTNESS OF BREATH: 0
WHEEZING: 0
COLOR CHANGE: 0
EYE REDNESS: 0
COUGH: 0
ABDOMINAL PAIN: 0
EYE PAIN: 0
NAUSEA: 0

## 2017-10-02 NOTE — LETTER
[facility]  [facility] - [place]  [address]  [address]  [address] [facility] [address]  Dept: [phone]  Dept Fax: [phone]        10/2/17    Patient: Deacon Verma  YOB: 1945    Dear Perry Yoo MD,    I had the pleasure of seeing one of your patients, Alvaerz Antoine today in the office today. Below are the relevant portions of my assessment and plan of care. IMPRESSION:    1. Incomplete bladder emptying    2. Urinary retention    3. Nocturia        PLAN:  Continue Flomax    Decrease fluids 2 hours before bed    Call with worsening s/s or questions sooner. Check BP and report to PCP readings. With concerns discuss with PCP. No Follow-up on file. Prescriptions Ordered:  Orders Placed This Encounter   Medications    tamsulosin (FLOMAX) 0.4 MG capsule     Sig: Take 1 capsule by mouth daily     Dispense:  90 capsule     Refill:  3      Orders Placed:  Orders Placed This Encounter   Procedures    CT MEASUREMENT,POST-VOID RESIDUAL VOLUME BY US,NON-IMAGING       Thank you for allowing me to participate in the care of this patient. I will keep you updated on this patient's follow up and I look forward to serving you and your patients again in the future.     Ruth Henley, CNP

## 2017-10-02 NOTE — PROGRESS NOTES
[facility]  [facility] - [place]  [address]  [address]  [address] [facility] [address]  Dept: [address] [facility] Urology Office Note - Established    Patient:  Thien Lyle  YOB: 1945  Date: 10/2/2017    The patient is a 67 y.o. male who presents today for evaluation of the following problems:   Chief Complaint   Patient presents with    6 Month Follow-Up     doing well       HPI  Here with his spouse to for follow up on urinary retention 3/17. Here for 6 month follow up post cath pull. He continues Flomax daily- feels he empties well, rare nocturia, deneis weak stream. Denies constipation. Summary of old records: N/A    Additional History: PVR today 96 ml. Procedures Today: N/A    Urinalysis today:  No results found for this visit on 10/02/17. Last several PSA's:  Lab Results   Component Value Date    PSA 1.28 07/12/2017    PSA 1.64 06/16/2016    PSA 2.43 06/10/2015     Last total testosterone:  No results found for: TESTOSTERONE    AUA Symptom Score (10/2/2017):   INCOMPLETE EMPTYING: How often have you had the sensation of not emptying your bladder?: Not at all  FREQUENCY: How often do you have to urinate less than every two hours?: Not at all  INTERMITTENCY: How often have you found you stopped and started again several times when you urinated?: Not at all  URGENCY: How often have you found it difficult to postpone urination?: Not at all  WEAK STREAM: How often have you had a weak urinary stream?: Not at all  STRAINING: How often have you had to strain to start  urination?: Not at all  NOCTURIA: How many times did you typically get up at night to uriniate?: NONE  TOTAL I-PSS SCORE[de-identified] 0  How would you feel if you were to spend the rest of your life with your urinary condition?: Delighted    Last BUN and creatinine:  Lab Results   Component Value Date    BUN 18 07/12/2017     Lab Results   Component Value Date    CREATININE 1.05 07/12/2017       Additional Lab/Culture results: none    Imaging Reviewed during this Office Visit: none  (results were independently reviewed by physician and radiology report verified)    PAST MEDICAL, FAMILY AND SOCIAL HISTORY UPDATE:  Past Medical History:   Diagnosis Date    Agoraphobia     Anxiety     Arthritis     Chronic pain     back    CKD (chronic kidney disease)     Claustrophobia     Depression     Enlarged lymph node     rt. lower neck.  Hearing aid worn     dar. ears.  Hearing loss     Hyperlipidemia     Hypertension     RAFAT (obstructive sleep apnea)     PONV (postoperative nausea and vomiting)     SCC (squamous cell carcinoma)     HAND    Spinal stenosis, lumbar region, with neurogenic claudication     Wears glasses      Past Surgical History:   Procedure Laterality Date    BACK SURGERY  10/2013    Dr. Berta Edgar: hardware involved.     COLONOSCOPY  06/13/12    mild diverticular dz    LUMBAR FUSION N/A 3/8/2017    LUMBAR L2-3 POSTERIOR DECOMPRESSION FUSION INSTRUMENTATION W/REVISION L3-5 POSTERIOR DECOMPRESSION FUSION INSTRUMENTATION ([address] [place] [address])  CC SN/KILEY performed by Essence Abdul MD at [facility] Right     lower neck, 2-    FL COLON CA SCRN NOT HI RSK IND N/A 8/23/2017    COLONOSCOPY performed by Bill Grajeda MD at [address] [facility] PRE-MALIGNANT / BENIGN SKIN LESION EXCISION      SKIN BIOPSY      VASECTOMY       Family History   Problem Relation Age of Onset    Diabetes Mother     Diabetes Brother     Cancer Father      lung cancer    Cancer Brother      lung cancer    Diabetes Brother      Outpatient Prescriptions Marked as Taking for the 10/2/17 encounter (Office Visit) with Yobany Welsh CNP   Medication Sig Dispense Refill    tamsulosin (FLOMAX) 0.4 MG capsule Take 1 capsule by mouth daily 90 capsule 3    simvastatin (ZOCOR) 20 MG tablet TAKE ONE TABLET BY MOUTH DAILY 90 tablet 1    fenofibrate (TRICOR) 145 MG tablet TAKE ONE TABLET BY MOUTH DAILY 90 tablet 1    metoprolol succinate (TOPROL XL) 50 MG extended release tablet 02/0617-take 1/2 tab daily (Patient taking differently: Take 50 mg by mouth daily ) 30 tablet 11    sertraline (ZOLOFT) 100 MG tablet TAKE ONE TABLET BY MOUTH DAILY 90 tablet 2    aspirin 81 MG tablet Take 81 mg by mouth daily      acetaminophen (TYLENOL) 500 MG tablet Take 500 mg by mouth every 6 hours as needed. Gabapentin; Lorazepam; Phenergan [promethazine hcl]; and Morphine and related  History   Smoking Status    Never Smoker   Smokeless Tobacco    Never Used     (If patient a smoker, smoking cessation counseling offered)    History   Alcohol Use No       REVIEW OF SYSTEMS:  Review of Systems   Constitutional: Negative for activity change, chills and fever. Eyes: Negative for pain, redness and visual disturbance. Respiratory: Negative for cough, shortness of breath and wheezing. Cardiovascular: Negative for chest pain and leg swelling. Gastrointestinal: Negative for abdominal pain, nausea and vomiting. Genitourinary: Negative for difficulty urinating, discharge, dysuria, flank pain, frequency, hematuria, scrotal swelling and testicular pain. Musculoskeletal: Negative for back pain, joint swelling and myalgias. Skin: Negative for color change and rash. Neurological: Negative for dizziness, tremors and numbness. Hematological: Negative for adenopathy. Does not bruise/bleed easily. Physical Exam:      Vitals:    10/02/17 1054   BP: (!) 160/82   Pulse:    Resp:      Body mass index is 32.51 kg/(m^2). Patient is a 67 y.o. male in no acute distress and alert and oriented to person, place and time. Physical Exam  Constitutional: Patient in no acute distress. Neuro: Alert and oriented to person, place and time.   Psych: Mood normal, affect normal  Skin: warm, pink, No rash noted  HEENT: Head: Normocephalic and atraumatic  Conjunctivae and EOM are normal. Pupils are equal, round  Nose: Normal  Right External Ear: Normal; Left External Ear: Normal  Mouth: Mucosa Moist  Neck: Supple  Lungs: Respiratory effort is normal  Cardiovascular: strong and regular. Abdomen: Soft, non-tender, non-distended   Bladder non-tender and not distended. Musculoskeletal: Normal gait and station      Assessment and Plan      1. Incomplete bladder emptying    2. Urinary retention    3. Nocturia           Plan:    Continue Flomax    Decrease fluids 2 hours before bed    Call with worsening s/s or questions sooner. Check BP and report to PCP readings. With concerns discuss with PCP. No Follow-up on file.     Prescriptions Ordered:  Orders Placed This Encounter   Medications    tamsulosin (FLOMAX) 0.4 MG capsule     Sig: Take 1 capsule by mouth daily     Dispense:  90 capsule     Refill:  3      Orders Placed:  Orders Placed This Encounter   Procedures    [place] MEASUREMENT,POST-VOID RESIDUAL VOLUME BY US,NON-IMAGING          Chasity Chambers, CNP

## 2017-10-02 NOTE — MR AVS SNAPSHOT
After Visit Summary             Knaa Fontaine   10/2/2017 10:30 AM   Office Visit    Description:  Male : 1945   Provider:  Ashvin Tabor CNP   Department:  [facility] - [address] [place] [address] 114 and Future Appointments         Below is a list of your follow-up and future appointments. This may not be a complete list as you may have made appointments directly with providers that we are not aware of or your providers may have made some for you. Please call your providers to confirm appointments. It is important to keep your appointments. Please bring your current insurance card, photo ID, co-pay, and all medication bottles to your appointment. If self-pay, payment is expected at the time of service. Your To-Do List     Future Appointments Provider Department Dept Phone    10/20/2017 9:45 AM Zoran Robert MD [facility] ORTHOPEDICS [phone]    Please arrive 15 minutes prior to appointment, bring photo ID and insurance card. 2018 8:10 AM SCHEDULE, Kathy Rodriguez [facility] [address] LAB [facility]  Laboratory [phone]    If this is a fasting lab, please do not eat or drink past midnight other than water. 2018 8:40 AM Ar Kowalski MD [place] [address] [phone]    Please arrive 15 minutes prior to appointment, bring photo ID and insurance card. Follow-Up    Return in about 1 year (around 10/2/2018), or if symptoms worsen or fail to improve. Information from Your Visit        Department     Name Address Phone Fax    [facility] [address]  [address]  [address] [place] [address] 78 [phone]      You Were Seen for:         Comments    Incomplete bladder emptying   [788.21. ICD-9-CM]         Vital Signs     Blood Pressure Pulse Respirations Height Weight Body Mass Index    160/82 51 16 5' 10.08\" (1.78 m) 227 lb 1.2 oz (103 kg) 32.51 kg/m2    Smoking Status                   Never Smoker Additional Information about your Body Mass Index (BMI)           Your BMI as listed above is considered obese (30 or more). BMI is an estimate of body fat, calculated from your height and weight. The higher your BMI, the greater your risk of heart disease, high blood pressure, type 2 diabetes, stroke, gallstones, arthritis, sleep apnea, and certain cancers. BMI is not perfect. It may overestimate body fat in athletes and people who are more muscular. Even a small weight loss (between 5 and 10 percent of your current weight) by decreasing your calorie intake and becoming more physically active will help lower your risk of developing or worsening diseases associated with obesity. Learn more at: [employer]          Instructions    Continue Flomax    Decrease fluids 2 hours before bed    Call with worsening s/s or questions sooner. Check BP and report to PCP readings. With concerns discuss with PCP. Where to Get Your Medications      These medications were sent to [address] [place] [id number]     Phone:  [phone]     tamsulosin 0.4 MG capsule         Your Current Medications Are              tamsulosin (FLOMAX) 0.4 MG capsule Take 1 capsule by mouth daily    simvastatin (ZOCOR) 20 MG tablet TAKE ONE TABLET BY MOUTH DAILY    fenofibrate (TRICOR) 145 MG tablet TAKE ONE TABLET BY MOUTH DAILY    metoprolol succinate (TOPROL XL) 50 MG extended release tablet 02/0617-take 1/2 tab daily    sertraline (ZOLOFT) 100 MG tablet TAKE ONE TABLET BY MOUTH DAILY    aspirin 81 MG tablet Take 81 mg by mouth daily    acetaminophen (TYLENOL) 500 MG tablet Take 500 mg by mouth every 6 hours as needed.       Allergies              Gabapentin     dizzy    Lorazepam Other (See Comments)    Double vision    Phenergan [Promethazine Hcl] Other (See Comments)    \"out of it\" guardian has access to your record, the parent or guardian should login with their own [employer] username and password and access your record to view the After Visit Summary. Additional Information  If you have questions, please contact the physician practice where you receive care. Remember, [employer] is NOT to be used for urgent needs. For medical emergencies, dial 911. For questions regarding your [employer] account call [phone]. If you have a clinical question, please call your doctor's office.

## 2017-10-16 DIAGNOSIS — M48.062 SPINAL STENOSIS, LUMBAR REGION, WITH NEUROGENIC CLAUDICATION: ICD-10-CM

## 2017-10-16 DIAGNOSIS — M51.37 DEGENERATION OF LUMBAR OR LUMBOSACRAL INTERVERTEBRAL DISC: Primary | ICD-10-CM

## 2017-10-20 ENCOUNTER — NURSE ONLY (OUTPATIENT)
Dept: LAB | Age: 72
End: 2017-10-20
Payer: MEDICARE

## 2017-10-20 ENCOUNTER — HOSPITAL ENCOUNTER (OUTPATIENT)
Dept: GENERAL RADIOLOGY | Age: 72
Discharge: HOME | End: 2017-10-20
Payer: MEDICARE

## 2017-10-20 ENCOUNTER — OFFICE VISIT (OUTPATIENT)
Dept: ORTHOPEDIC SURGERY | Age: 72
End: 2017-10-20
Payer: MEDICARE

## 2017-10-20 VITALS
WEIGHT: 227 LBS | HEIGHT: 70 IN | DIASTOLIC BLOOD PRESSURE: 82 MMHG | HEART RATE: 76 BPM | SYSTOLIC BLOOD PRESSURE: 135 MMHG | BODY MASS INDEX: 32.5 KG/M2

## 2017-10-20 DIAGNOSIS — M48.062 SPINAL STENOSIS, LUMBAR REGION, WITH NEUROGENIC CLAUDICATION: Primary | ICD-10-CM

## 2017-10-20 DIAGNOSIS — M51.37 DEGENERATION OF LUMBAR OR LUMBOSACRAL INTERVERTEBRAL DISC: ICD-10-CM

## 2017-10-20 DIAGNOSIS — Z23 NEED FOR INFLUENZA VACCINATION: Primary | ICD-10-CM

## 2017-10-20 DIAGNOSIS — M48.062 SPINAL STENOSIS, LUMBAR REGION, WITH NEUROGENIC CLAUDICATION: ICD-10-CM

## 2017-10-20 PROCEDURE — 99213 OFFICE O/P EST LOW 20 MIN: CPT | Performed by: ORTHOPAEDIC SURGERY

## 2017-10-20 PROCEDURE — 4040F PNEUMOC VAC/ADMIN/RCVD: CPT | Performed by: ORTHOPAEDIC SURGERY

## 2017-10-20 PROCEDURE — G8427 DOCREV CUR MEDS BY ELIG CLIN: HCPCS | Performed by: ORTHOPAEDIC SURGERY

## 2017-10-20 PROCEDURE — 1036F TOBACCO NON-USER: CPT | Performed by: ORTHOPAEDIC SURGERY

## 2017-10-20 PROCEDURE — 3017F COLORECTAL CA SCREEN DOC REV: CPT | Performed by: ORTHOPAEDIC SURGERY

## 2017-10-20 PROCEDURE — G8417 CALC BMI ABV UP PARAM F/U: HCPCS | Performed by: ORTHOPAEDIC SURGERY

## 2017-10-20 PROCEDURE — 90662 IIV NO PRSV INCREASED AG IM: CPT | Performed by: FAMILY MEDICINE

## 2017-10-20 PROCEDURE — G8484 FLU IMMUNIZE NO ADMIN: HCPCS | Performed by: ORTHOPAEDIC SURGERY

## 2017-10-20 PROCEDURE — G0008 ADMIN INFLUENZA VIRUS VAC: HCPCS | Performed by: FAMILY MEDICINE

## 2017-10-20 PROCEDURE — 1123F ACP DISCUSS/DSCN MKR DOCD: CPT | Performed by: ORTHOPAEDIC SURGERY

## 2017-10-20 PROCEDURE — 72100 X-RAY EXAM L-S SPINE 2/3 VWS: CPT

## 2017-10-20 RX ORDER — MELOXICAM 15 MG/1
15 TABLET ORAL DAILY
Qty: 30 TABLET | Refills: 3 | Status: ON HOLD | OUTPATIENT
Start: 2017-10-20 | End: 2018-01-31 | Stop reason: HOSPADM

## 2017-11-10 NOTE — TELEPHONE ENCOUNTER
[place] from [place] and [place] reviewed, last filled 7/6/17 #60 for a 15 day supply. Report available for your review. Last OV 7/31/17; next OV 1/31/18.

## 2017-11-13 RX ORDER — HYDROCODONE BITARTRATE AND ACETAMINOPHEN 5; 325 MG/1; MG/1
1 TABLET ORAL EVERY 6 HOURS PRN
Qty: 60 TABLET | Refills: 0 | Status: SHIPPED | OUTPATIENT
Start: 2017-11-13 | End: 2017-12-18 | Stop reason: SDUPTHER

## 2017-11-17 ENCOUNTER — OFFICE VISIT (OUTPATIENT)
Dept: ORTHOPEDIC SURGERY | Age: 72
End: 2017-11-17
Payer: MEDICARE

## 2017-11-17 VITALS
BODY MASS INDEX: 32.5 KG/M2 | DIASTOLIC BLOOD PRESSURE: 75 MMHG | HEIGHT: 70 IN | SYSTOLIC BLOOD PRESSURE: 107 MMHG | WEIGHT: 227 LBS | HEART RATE: 76 BPM

## 2017-11-17 DIAGNOSIS — M51.37 DEGENERATION OF LUMBAR OR LUMBOSACRAL INTERVERTEBRAL DISC: Primary | ICD-10-CM

## 2017-11-17 DIAGNOSIS — M48.062 SPINAL STENOSIS, LUMBAR REGION, WITH NEUROGENIC CLAUDICATION: ICD-10-CM

## 2017-11-17 PROCEDURE — G8427 DOCREV CUR MEDS BY ELIG CLIN: HCPCS | Performed by: ORTHOPAEDIC SURGERY

## 2017-11-17 PROCEDURE — 1123F ACP DISCUSS/DSCN MKR DOCD: CPT | Performed by: ORTHOPAEDIC SURGERY

## 2017-11-17 PROCEDURE — 99213 OFFICE O/P EST LOW 20 MIN: CPT | Performed by: ORTHOPAEDIC SURGERY

## 2017-11-17 PROCEDURE — G8417 CALC BMI ABV UP PARAM F/U: HCPCS | Performed by: ORTHOPAEDIC SURGERY

## 2017-11-17 PROCEDURE — 1036F TOBACCO NON-USER: CPT | Performed by: ORTHOPAEDIC SURGERY

## 2017-11-17 PROCEDURE — 3017F COLORECTAL CA SCREEN DOC REV: CPT | Performed by: ORTHOPAEDIC SURGERY

## 2017-11-17 PROCEDURE — G8484 FLU IMMUNIZE NO ADMIN: HCPCS | Performed by: ORTHOPAEDIC SURGERY

## 2017-11-17 PROCEDURE — 4040F PNEUMOC VAC/ADMIN/RCVD: CPT | Performed by: ORTHOPAEDIC SURGERY

## 2017-11-20 ENCOUNTER — TELEPHONE (OUTPATIENT)
Dept: GENERAL RADIOLOGY | Age: 72
End: 2017-11-20

## 2017-11-20 DIAGNOSIS — Z79.01 CURRENT USE OF LONG TERM ANTICOAGULATION: ICD-10-CM

## 2017-11-20 DIAGNOSIS — Z01.818 PRE-OP TESTING: Primary | ICD-10-CM

## 2017-11-22 ENCOUNTER — HOSPITAL ENCOUNTER (OUTPATIENT)
Dept: LAB | Age: 72
Setting detail: SPECIMEN
Discharge: HOME | End: 2017-11-22
Payer: MEDICARE

## 2017-11-22 DIAGNOSIS — Z79.01 CURRENT USE OF LONG TERM ANTICOAGULATION: ICD-10-CM

## 2017-11-22 DIAGNOSIS — Z01.818 PRE-OP TESTING: ICD-10-CM

## 2017-11-22 LAB
INR BLD: 1.1
PARTIAL THROMBOPLASTIN TIME: 25 SEC (ref 27–35)
PROTHROMBIN TIME: 11.2 SEC (ref 9.4–11.3)

## 2017-11-22 PROCEDURE — 36415 COLL VENOUS BLD VENIPUNCTURE: CPT

## 2017-11-22 PROCEDURE — 85730 THROMBOPLASTIN TIME PARTIAL: CPT

## 2017-11-22 PROCEDURE — 85610 PROTHROMBIN TIME: CPT

## 2017-11-24 ENCOUNTER — HOSPITAL ENCOUNTER (OUTPATIENT)
Dept: GENERAL RADIOLOGY | Age: 72
Discharge: HOME | End: 2017-11-24
Payer: MEDICARE

## 2017-11-24 ENCOUNTER — HOSPITAL ENCOUNTER (OUTPATIENT)
Dept: CT IMAGING | Age: 72
Discharge: HOME | End: 2017-11-24
Payer: MEDICARE

## 2017-11-24 VITALS
BODY MASS INDEX: 32.24 KG/M2 | DIASTOLIC BLOOD PRESSURE: 77 MMHG | OXYGEN SATURATION: 95 % | TEMPERATURE: 98.1 F | SYSTOLIC BLOOD PRESSURE: 129 MMHG | HEIGHT: 70 IN | RESPIRATION RATE: 16 BRPM | WEIGHT: 225.2 LBS | HEART RATE: 57 BPM

## 2017-11-24 DIAGNOSIS — M51.37 DEGENERATION OF LUMBAR OR LUMBOSACRAL INTERVERTEBRAL DISC: ICD-10-CM

## 2017-11-24 DIAGNOSIS — M48.062 SPINAL STENOSIS, LUMBAR REGION, WITH NEUROGENIC CLAUDICATION: ICD-10-CM

## 2017-11-24 PROCEDURE — 7100000011 HC PHASE II RECOVERY - ADDTL 15 MIN

## 2017-11-24 PROCEDURE — 6360000004 HC RX CONTRAST MEDICATION: Performed by: ORTHOPAEDIC SURGERY

## 2017-11-24 PROCEDURE — 7100000010 HC PHASE II RECOVERY - FIRST 15 MIN

## 2017-11-24 PROCEDURE — 72132 CT LUMBAR SPINE W/DYE: CPT

## 2017-11-24 PROCEDURE — 62304 MYELOGRAPHY LUMBAR INJECTION: CPT

## 2017-11-24 RX ADMIN — IOHEXOL 12 ML: 240 INJECTION, SOLUTION INTRATHECAL; INTRAVASCULAR; INTRAVENOUS; ORAL at 12:31

## 2017-11-24 ASSESSMENT — PAIN - FUNCTIONAL ASSESSMENT: PAIN_FUNCTIONAL_ASSESSMENT: 0-10

## 2017-11-24 ASSESSMENT — PAIN SCALES - GENERAL
PAINLEVEL_OUTOF10: 0

## 2017-11-27 ENCOUNTER — TELEPHONE (OUTPATIENT)
Dept: ORTHOPEDIC SURGERY | Age: 72
End: 2017-11-27

## 2017-12-18 RX ORDER — SERTRALINE HYDROCHLORIDE 100 MG/1
TABLET, FILM COATED ORAL
Qty: 90 TABLET | Refills: 1 | Status: SHIPPED | OUTPATIENT
Start: 2017-12-18 | End: 2018-08-09 | Stop reason: SDUPTHER

## 2017-12-18 RX ORDER — HYDROCODONE BITARTRATE AND ACETAMINOPHEN 5; 325 MG/1; MG/1
1 TABLET ORAL EVERY 6 HOURS PRN
Qty: 60 TABLET | Refills: 0 | Status: SHIPPED | OUTPATIENT
Start: 2017-12-18 | End: 2018-01-17 | Stop reason: SDUPTHER

## 2017-12-22 ENCOUNTER — OFFICE VISIT (OUTPATIENT)
Dept: ORTHOPEDIC SURGERY | Age: 72
End: 2017-12-22
Payer: MEDICARE

## 2017-12-22 VITALS
SYSTOLIC BLOOD PRESSURE: 136 MMHG | BODY MASS INDEX: 32.5 KG/M2 | HEART RATE: 57 BPM | DIASTOLIC BLOOD PRESSURE: 80 MMHG | WEIGHT: 227 LBS | HEIGHT: 70 IN

## 2017-12-22 DIAGNOSIS — M51.37 DEGENERATION OF LUMBAR OR LUMBOSACRAL INTERVERTEBRAL DISC: Primary | ICD-10-CM

## 2017-12-22 DIAGNOSIS — M51.26 LUMBAR DISC HERNIATION: ICD-10-CM

## 2017-12-22 DIAGNOSIS — M48.062 SPINAL STENOSIS, LUMBAR REGION, WITH NEUROGENIC CLAUDICATION: ICD-10-CM

## 2017-12-22 DIAGNOSIS — M40.30 FLAT BACK SYNDROME, POSTPROCEDURAL: ICD-10-CM

## 2017-12-22 PROCEDURE — G8417 CALC BMI ABV UP PARAM F/U: HCPCS | Performed by: ORTHOPAEDIC SURGERY

## 2017-12-22 PROCEDURE — G8427 DOCREV CUR MEDS BY ELIG CLIN: HCPCS | Performed by: ORTHOPAEDIC SURGERY

## 2017-12-22 PROCEDURE — 1036F TOBACCO NON-USER: CPT | Performed by: ORTHOPAEDIC SURGERY

## 2017-12-22 PROCEDURE — 4040F PNEUMOC VAC/ADMIN/RCVD: CPT | Performed by: ORTHOPAEDIC SURGERY

## 2017-12-22 PROCEDURE — 99213 OFFICE O/P EST LOW 20 MIN: CPT | Performed by: ORTHOPAEDIC SURGERY

## 2017-12-22 PROCEDURE — 3017F COLORECTAL CA SCREEN DOC REV: CPT | Performed by: ORTHOPAEDIC SURGERY

## 2017-12-22 PROCEDURE — G8484 FLU IMMUNIZE NO ADMIN: HCPCS | Performed by: ORTHOPAEDIC SURGERY

## 2017-12-22 PROCEDURE — 1123F ACP DISCUSS/DSCN MKR DOCD: CPT | Performed by: ORTHOPAEDIC SURGERY

## 2017-12-22 ASSESSMENT — ENCOUNTER SYMPTOMS: BACK PAIN: 1

## 2017-12-22 NOTE — PROGRESS NOTES
Subjective:      Patient ID: Ahmet Bass is a 67 y.o. male. Back Pain     Please refer to all my previous clinic notes. Patient status post lumbar decompression fusion at on at to 3 to a previous L3 to 5 fusion. Patient initially did well for proximally 6 months and developed recurrent low back and radicular leg pain. Patient subsequently has had a CT myelogram    Review of Systems   Musculoskeletal: Positive for back pain. Objective:   Physical Exam   Constitutional: He is oriented to person, place, and time. He appears well-developed and well-nourished. HENT:   Head: Normocephalic and atraumatic. Eyes: Conjunctivae and EOM are normal.   Neck: Normal range of motion. Pulmonary/Chest: Effort normal. No respiratory distress. Neurological: He is alert and oriented to person, place, and time. He has normal strength. No sensory deficit. Normal gait   Skin: Skin is warm and dry. Psychiatric: His behavior is normal. Thought content normal.   Nursing note and vitals reviewed. CT myelogram is reviewed patient with solid arthrodesis L3 to L5 what appears to be a solid developing arthrodesis at L2-3 patient overall with a moderately severe flat back deformity particularly at L5-S1 patient with large new disc herniation L1-2 with severe canal compromise and complete obliteration of the myelographic   Assessment:      Encounter Diagnoses   Name Primary?     Degeneration of lumbar or lumbosacral intervertebral disc Yes    Spinal stenosis, lumbar region, with neurogenic claudication     Flat back syndrome, postprocedural     Lumbar disc herniation      Interval development of severe L1-2 disc herniation resulting in severe lumbar spinal stenosis at L1-2        Plan:      Unfortunately due to severity of severe disc herniation at L1-2 recommend patient undergo revision posterior instrumentation with an add-on L1-2 PLIF    Risks benefits complications discussed with the patient and his

## 2018-01-17 ENCOUNTER — OFFICE VISIT (OUTPATIENT)
Dept: FAMILY MEDICINE CLINIC | Age: 73
End: 2018-01-17
Payer: MEDICARE

## 2018-01-17 VITALS
SYSTOLIC BLOOD PRESSURE: 112 MMHG | DIASTOLIC BLOOD PRESSURE: 68 MMHG | WEIGHT: 229 LBS | HEIGHT: 70 IN | HEART RATE: 65 BPM | BODY MASS INDEX: 32.78 KG/M2 | OXYGEN SATURATION: 95 %

## 2018-01-17 DIAGNOSIS — Z01.818 PREOPERATIVE GENERAL PHYSICAL EXAMINATION: Primary | ICD-10-CM

## 2018-01-17 DIAGNOSIS — M48.062 SPINAL STENOSIS, LUMBAR REGION, WITH NEUROGENIC CLAUDICATION: ICD-10-CM

## 2018-01-17 DIAGNOSIS — N18.30 STAGE 3 CHRONIC KIDNEY DISEASE (HCC): ICD-10-CM

## 2018-01-17 DIAGNOSIS — G47.33 OSA (OBSTRUCTIVE SLEEP APNEA): ICD-10-CM

## 2018-01-17 DIAGNOSIS — D64.9 ANEMIA, UNSPECIFIED TYPE: ICD-10-CM

## 2018-01-17 DIAGNOSIS — H90.6 MIXED CONDUCTIVE AND SENSORINEURAL HEARING LOSS OF BOTH EARS: ICD-10-CM

## 2018-01-17 DIAGNOSIS — I10 ESSENTIAL HYPERTENSION: ICD-10-CM

## 2018-01-17 DIAGNOSIS — E78.00 PURE HYPERCHOLESTEROLEMIA: ICD-10-CM

## 2018-01-17 PROCEDURE — G8417 CALC BMI ABV UP PARAM F/U: HCPCS | Performed by: FAMILY MEDICINE

## 2018-01-17 PROCEDURE — 3017F COLORECTAL CA SCREEN DOC REV: CPT | Performed by: FAMILY MEDICINE

## 2018-01-17 PROCEDURE — G8484 FLU IMMUNIZE NO ADMIN: HCPCS | Performed by: FAMILY MEDICINE

## 2018-01-17 PROCEDURE — G8427 DOCREV CUR MEDS BY ELIG CLIN: HCPCS | Performed by: FAMILY MEDICINE

## 2018-01-17 PROCEDURE — 1036F TOBACCO NON-USER: CPT | Performed by: FAMILY MEDICINE

## 2018-01-17 PROCEDURE — 1123F ACP DISCUSS/DSCN MKR DOCD: CPT | Performed by: FAMILY MEDICINE

## 2018-01-17 PROCEDURE — 4040F PNEUMOC VAC/ADMIN/RCVD: CPT | Performed by: FAMILY MEDICINE

## 2018-01-17 PROCEDURE — 99214 OFFICE O/P EST MOD 30 MIN: CPT | Performed by: FAMILY MEDICINE

## 2018-01-17 RX ORDER — HYDROCODONE BITARTRATE AND ACETAMINOPHEN 5; 325 MG/1; MG/1
1 TABLET ORAL EVERY 6 HOURS PRN
Qty: 60 TABLET | Refills: 0 | Status: ON HOLD | OUTPATIENT
Start: 2018-01-17 | End: 2018-01-31 | Stop reason: HOSPADM

## 2018-01-17 ASSESSMENT — ENCOUNTER SYMPTOMS
RESPIRATORY NEGATIVE: 1
ALLERGIC/IMMUNOLOGIC NEGATIVE: 1
GASTROINTESTINAL NEGATIVE: 1
EYES NEGATIVE: 1
BACK PAIN: 1

## 2018-01-17 NOTE — PROGRESS NOTES
Allergic/Immunologic: Negative. Neurological: Positive for numbness (both legs). Hematological: Negative. Psychiatric/Behavioral: Negative. Objective:   Physical Exam   Constitutional: He is oriented to person, place, and time. He appears well-developed and well-nourished. No distress. HENT:   Head: Normocephalic and atraumatic. Right Ear: External ear normal.   Left Ear: External ear normal.   Mouth/Throat: Oropharynx is clear and moist. No oropharyngeal exudate. Eyes: Conjunctivae and EOM are normal. Pupils are equal, round, and reactive to light. No scleral icterus. Neck: Neck supple. No thyromegaly present. Cardiovascular: Normal rate, regular rhythm, normal heart sounds and intact distal pulses. No murmur heard. Pulmonary/Chest: Effort normal and breath sounds normal. No respiratory distress. He has no wheezes. Abdominal: Soft. Bowel sounds are normal. He exhibits no distension. There is no tenderness. There is no rebound. Genitourinary: Rectum normal. Rectal exam shows no mass and anal tone normal. Prostate is not enlarged and not tender. Musculoskeletal: He exhibits no edema. Lumbar back: He exhibits decreased range of motion, tenderness, deformity (flattened lordotic curve) and pain. Back:    Lymphadenopathy:     He has no cervical adenopathy. Neurological: He is alert and oriented to person, place, and time. A sensory deficit (left leg at present.) is present. Skin: Skin is warm and dry. No lesion and no rash noted. No erythema. Psychiatric: He has a normal mood and affect. His behavior is normal. Judgment and thought content normal.     /68   Pulse 65   Ht 5' 10\" (1.778 m)   Wt 229 lb (103.9 kg)   SpO2 95%   BMI 32.86 kg/m²     Assessment:    severe spinal stenosis from herniated disc L1-2. Bilateral radicular pain and paresthesias left > right  Prior decompression and fusion L3-5    Encounter Diagnoses   Name Primary?     Preoperative general physical examination Yes    Spinal stenosis, lumbar region, with neurogenic claudication     Pure hypercholesterolemia     Anemia, unspecified type     Essential hypertension     RAFAT (obstructive sleep apnea)     Stage 3 chronic kidney disease     Mixed conductive and sensorineural hearing loss of both ears            Plan:      Preoperative labs: he has routine follow up in a week or so with labs. Will update cxr and ekg at that. Ok to proceed with surgery as planned. Hold mobic and asa 7 days prior to surgery. Rec. Taking metoprolol am of surgery with small sip of water.

## 2018-01-18 ENCOUNTER — TELEPHONE (OUTPATIENT)
Dept: FAMILY MEDICINE CLINIC | Age: 73
End: 2018-01-18

## 2018-01-18 DIAGNOSIS — G47.33 OSA ON CPAP: Primary | ICD-10-CM

## 2018-01-18 DIAGNOSIS — Z99.89 OSA ON CPAP: Primary | ICD-10-CM

## 2018-01-24 ENCOUNTER — HOSPITAL ENCOUNTER (OUTPATIENT)
Dept: LAB | Age: 73
Setting detail: SPECIMEN
Discharge: HOME | End: 2018-01-24
Payer: MEDICARE

## 2018-01-24 ENCOUNTER — ANESTHESIA EVENT (OUTPATIENT)
Dept: OPERATING ROOM | Age: 73
DRG: 457 | End: 2018-01-24
Payer: MEDICARE

## 2018-01-24 DIAGNOSIS — D64.9 ANEMIA, UNSPECIFIED TYPE: ICD-10-CM

## 2018-01-24 DIAGNOSIS — Z01.818 PREOPERATIVE GENERAL PHYSICAL EXAMINATION: ICD-10-CM

## 2018-01-24 DIAGNOSIS — I10 ESSENTIAL HYPERTENSION: ICD-10-CM

## 2018-01-24 DIAGNOSIS — E78.00 PURE HYPERCHOLESTEROLEMIA: ICD-10-CM

## 2018-01-24 LAB
-: ABNORMAL
ABSOLUTE EOS #: 0.2 K/UL (ref 0–0.4)
ABSOLUTE IMMATURE GRANULOCYTE: ABNORMAL K/UL (ref 0–0.3)
ABSOLUTE LYMPH #: 2.1 K/UL (ref 1–4.8)
ABSOLUTE MONO #: 0.9 K/UL (ref 0.1–1.2)
AMORPHOUS: ABNORMAL
ANION GAP SERPL CALCULATED.3IONS-SCNC: 14 MMOL/L (ref 9–17)
BACTERIA: ABNORMAL
BASOPHILS # BLD: 1 % (ref 0–2)
BASOPHILS ABSOLUTE: 0.1 K/UL (ref 0–0.2)
BILIRUBIN URINE: NEGATIVE
BUN BLDV-MCNC: 18 MG/DL (ref 8–23)
BUN/CREAT BLD: 19 (ref 9–20)
CALCIUM SERPL-MCNC: 9.3 MG/DL (ref 8.6–10.4)
CASTS UA: ABNORMAL /LPF (ref 0–2)
CHLORIDE BLD-SCNC: 105 MMOL/L (ref 98–107)
CHOLESTEROL/HDL RATIO: 4.6
CHOLESTEROL: 175 MG/DL
CO2: 24 MMOL/L (ref 20–31)
COLOR: NORMAL
COMMENT UA: NORMAL
CREAT SERPL-MCNC: 0.94 MG/DL (ref 0.7–1.2)
CRYSTALS, UA: ABNORMAL /HPF
DIFFERENTIAL TYPE: ABNORMAL
EOSINOPHILS RELATIVE PERCENT: 3 % (ref 1–8)
EPITHELIAL CELLS UA: ABNORMAL /HPF (ref 0–5)
GFR AFRICAN AMERICAN: >60 ML/MIN
GFR NON-AFRICAN AMERICAN: >60 ML/MIN
GFR SERPL CREATININE-BSD FRML MDRD: ABNORMAL ML/MIN/{1.73_M2}
GFR SERPL CREATININE-BSD FRML MDRD: ABNORMAL ML/MIN/{1.73_M2}
GLUCOSE BLD-MCNC: 111 MG/DL (ref 70–99)
GLUCOSE URINE: NEGATIVE
HCT VFR BLD CALC: 42.9 % (ref 41–53)
HDLC SERPL-MCNC: 38 MG/DL
HEMOGLOBIN: 14.1 G/DL (ref 13.5–17.5)
IMMATURE GRANULOCYTES: ABNORMAL %
IRON SATURATION: 11 % (ref 20–55)
IRON: 43 UG/DL (ref 59–158)
KETONES, URINE: NEGATIVE
LDL CHOLESTEROL: 110 MG/DL (ref 0–130)
LEUKOCYTE ESTERASE, URINE: NEGATIVE
LYMPHOCYTES # BLD: 27 % (ref 15–43)
MCH RBC QN AUTO: 26.1 PG (ref 26–34)
MCHC RBC AUTO-ENTMCNC: 32.8 G/DL (ref 31–37)
MCV RBC AUTO: 79.6 FL (ref 80–100)
MONOCYTES # BLD: 11 % (ref 6–14)
MUCUS: ABNORMAL
NITRITE, URINE: NEGATIVE
NRBC AUTOMATED: ABNORMAL PER 100 WBC
OTHER OBSERVATIONS UA: ABNORMAL
PDW BLD-RTO: 16.9 % (ref 11–14.5)
PH UA: 6 (ref 5–6)
PLATELET # BLD: 211 K/UL (ref 140–450)
PLATELET ESTIMATE: ABNORMAL
PMV BLD AUTO: 7.6 FL (ref 6–12)
POTASSIUM SERPL-SCNC: 4.2 MMOL/L (ref 3.7–5.3)
PROTEIN UA: NEGATIVE
RBC # BLD: 5.39 M/UL (ref 4.5–5.9)
RBC # BLD: ABNORMAL 10*6/UL
RBC UA: ABNORMAL /HPF (ref 0–4)
RENAL EPITHELIAL, UA: ABNORMAL /HPF
SEG NEUTROPHILS: 58 % (ref 44–74)
SEGMENTED NEUTROPHILS ABSOLUTE COUNT: 4.6 K/UL (ref 1.8–7.7)
SODIUM BLD-SCNC: 143 MMOL/L (ref 135–144)
SPECIFIC GRAVITY UA: 1.02 (ref 1.01–1.02)
TOTAL IRON BINDING CAPACITY: 396 UG/DL (ref 250–450)
TRICHOMONAS: ABNORMAL
TRIGL SERPL-MCNC: 137 MG/DL
TURBIDITY: NORMAL
UNSATURATED IRON BINDING CAPACITY: 353 UG/DL (ref 112–347)
URINE HGB: NEGATIVE
UROBILINOGEN, URINE: NORMAL
VLDLC SERPL CALC-MCNC: ABNORMAL MG/DL (ref 1–30)
WBC # BLD: 7.9 K/UL (ref 3.5–11)
WBC # BLD: ABNORMAL 10*3/UL
WBC UA: ABNORMAL /HPF (ref 0–4)
YEAST: ABNORMAL

## 2018-01-24 PROCEDURE — 83540 ASSAY OF IRON: CPT

## 2018-01-24 PROCEDURE — 80048 BASIC METABOLIC PNL TOTAL CA: CPT

## 2018-01-24 PROCEDURE — 80061 LIPID PANEL: CPT

## 2018-01-24 PROCEDURE — 85025 COMPLETE CBC W/AUTO DIFF WBC: CPT

## 2018-01-24 PROCEDURE — 36415 COLL VENOUS BLD VENIPUNCTURE: CPT

## 2018-01-24 PROCEDURE — 81001 URINALYSIS AUTO W/SCOPE: CPT

## 2018-01-24 PROCEDURE — 83550 IRON BINDING TEST: CPT

## 2018-01-26 ENCOUNTER — APPOINTMENT (OUTPATIENT)
Dept: GENERAL RADIOLOGY | Age: 73
DRG: 457 | End: 2018-01-26
Attending: ORTHOPAEDIC SURGERY
Payer: MEDICARE

## 2018-01-26 ENCOUNTER — HOSPITAL ENCOUNTER (INPATIENT)
Age: 73
LOS: 5 days | Discharge: HOME HEALTH | DRG: 457 | End: 2018-01-31
Attending: ORTHOPAEDIC SURGERY | Admitting: ORTHOPAEDIC SURGERY
Payer: MEDICARE

## 2018-01-26 ENCOUNTER — ANESTHESIA (OUTPATIENT)
Dept: OPERATING ROOM | Age: 73
DRG: 457 | End: 2018-01-26
Payer: MEDICARE

## 2018-01-26 VITALS
TEMPERATURE: 97.3 F | DIASTOLIC BLOOD PRESSURE: 78 MMHG | RESPIRATION RATE: 18 BRPM | SYSTOLIC BLOOD PRESSURE: 140 MMHG | OXYGEN SATURATION: 100 %

## 2018-01-26 DIAGNOSIS — M48.062 SPINAL STENOSIS, LUMBAR REGION, WITH NEUROGENIC CLAUDICATION: Primary | ICD-10-CM

## 2018-01-26 PROBLEM — M54.9 BACK PAIN: Status: ACTIVE | Noted: 2018-01-26

## 2018-01-26 LAB
BILIRUBIN URINE: NEGATIVE
COLOR: YELLOW
COMMENT UA: NORMAL
GLUCOSE URINE: NEGATIVE
HCT VFR BLD CALC: 35.3 % (ref 41–53)
HEMOGLOBIN: 11.3 G/DL (ref 13.5–17.5)
KETONES, URINE: NEGATIVE
LEUKOCYTE ESTERASE, URINE: NEGATIVE
NITRITE, URINE: NEGATIVE
PH UA: 6.5 (ref 5–8)
PROTEIN UA: NEGATIVE
SPECIFIC GRAVITY UA: 1.02 (ref 1–1.03)
SURGICAL PATHOLOGY REPORT: NORMAL
TURBIDITY: CLEAR
URINE HGB: NEGATIVE
UROBILINOGEN, URINE: NORMAL

## 2018-01-26 PROCEDURE — 6360000002 HC RX W HCPCS: Performed by: ORTHOPAEDIC SURGERY

## 2018-01-26 PROCEDURE — 0SG1071 FUSION OF 2 OR MORE LUMBAR VERTEBRAL JOINTS WITH AUTOLOGOUS TISSUE SUBSTITUTE, POSTERIOR APPROACH, POSTERIOR COLUMN, OPEN APPROACH: ICD-10-PCS | Performed by: ORTHOPAEDIC SURGERY

## 2018-01-26 PROCEDURE — 22610 ARTHRD PST TQ 1NTRSPC THRC: CPT | Performed by: ORTHOPAEDIC SURGERY

## 2018-01-26 PROCEDURE — 2500000003 HC RX 250 WO HCPCS

## 2018-01-26 PROCEDURE — 36415 COLL VENOUS BLD VENIPUNCTURE: CPT

## 2018-01-26 PROCEDURE — 2580000003 HC RX 258

## 2018-01-26 PROCEDURE — 22842 INSERT SPINE FIXATION DEVICE: CPT | Performed by: ORTHOPAEDIC SURGERY

## 2018-01-26 PROCEDURE — 85014 HEMATOCRIT: CPT

## 2018-01-26 PROCEDURE — 01NB0ZZ RELEASE LUMBAR NERVE, OPEN APPROACH: ICD-10-PCS | Performed by: ORTHOPAEDIC SURGERY

## 2018-01-26 PROCEDURE — 7100000001 HC PACU RECOVERY - ADDTL 15 MIN: Performed by: ORTHOPAEDIC SURGERY

## 2018-01-26 PROCEDURE — 3600000013 HC SURGERY LEVEL 3 ADDTL 15MIN: Performed by: ORTHOPAEDIC SURGERY

## 2018-01-26 PROCEDURE — 63016 REMOVE SPINE LAMINA >2 THRC: CPT | Performed by: ORTHOPAEDIC SURGERY

## 2018-01-26 PROCEDURE — 6370000000 HC RX 637 (ALT 250 FOR IP): Performed by: ORTHOPAEDIC SURGERY

## 2018-01-26 PROCEDURE — 2580000003 HC RX 258: Performed by: ORTHOPAEDIC SURGERY

## 2018-01-26 PROCEDURE — 63030 LAMOT DCMPRN NRV RT 1 LMBR: CPT | Performed by: ORTHOPAEDIC SURGERY

## 2018-01-26 PROCEDURE — 0SG0071 FUSION OF LUMBAR VERTEBRAL JOINT WITH AUTOLOGOUS TISSUE SUBSTITUTE, POSTERIOR APPROACH, POSTERIOR COLUMN, OPEN APPROACH: ICD-10-PCS | Performed by: ORTHOPAEDIC SURGERY

## 2018-01-26 PROCEDURE — 22614 ARTHRD PST TQ 1NTRSPC EA ADD: CPT | Performed by: ORTHOPAEDIC SURGERY

## 2018-01-26 PROCEDURE — 2500000003 HC RX 250 WO HCPCS: Performed by: ORTHOPAEDIC SURGERY

## 2018-01-26 PROCEDURE — C1762 CONN TISS, HUMAN(INC FASCIA): HCPCS | Performed by: ORTHOPAEDIC SURGERY

## 2018-01-26 PROCEDURE — 88300 SURGICAL PATH GROSS: CPT

## 2018-01-26 PROCEDURE — 7100000000 HC PACU RECOVERY - FIRST 15 MIN: Performed by: ORTHOPAEDIC SURGERY

## 2018-01-26 PROCEDURE — 2720000010 HC SURG SUPPLY STERILE: Performed by: ORTHOPAEDIC SURGERY

## 2018-01-26 PROCEDURE — 6360000002 HC RX W HCPCS: Performed by: ANESTHESIOLOGY

## 2018-01-26 PROCEDURE — 86900 BLOOD TYPING SEROLOGIC ABO: CPT

## 2018-01-26 PROCEDURE — 3209999900 FLUORO FOR SURGICAL PROCEDURES

## 2018-01-26 PROCEDURE — A6402 STERILE GAUZE <= 16 SQ IN: HCPCS | Performed by: ORTHOPAEDIC SURGERY

## 2018-01-26 PROCEDURE — 86901 BLOOD TYPING SEROLOGIC RH(D): CPT

## 2018-01-26 PROCEDURE — 94664 DEMO&/EVAL PT USE INHALER: CPT

## 2018-01-26 PROCEDURE — 3700000000 HC ANESTHESIA ATTENDED CARE: Performed by: ORTHOPAEDIC SURGERY

## 2018-01-26 PROCEDURE — 94760 N-INVAS EAR/PLS OXIMETRY 1: CPT

## 2018-01-26 PROCEDURE — 0RGA071 FUSION OF THORACOLUMBAR VERTEBRAL JOINT WITH AUTOLOGOUS TISSUE SUBSTITUTE, POSTERIOR APPROACH, POSTERIOR COLUMN, OPEN APPROACH: ICD-10-PCS | Performed by: ORTHOPAEDIC SURGERY

## 2018-01-26 PROCEDURE — 2580000003 HC RX 258: Performed by: ANESTHESIOLOGY

## 2018-01-26 PROCEDURE — 6360000002 HC RX W HCPCS

## 2018-01-26 PROCEDURE — C1713 ANCHOR/SCREW BN/BN,TIS/BN: HCPCS | Performed by: ORTHOPAEDIC SURGERY

## 2018-01-26 PROCEDURE — 3700000001 HC ADD 15 MINUTES (ANESTHESIA): Performed by: ORTHOPAEDIC SURGERY

## 2018-01-26 PROCEDURE — 72100 X-RAY EXAM L-S SPINE 2/3 VWS: CPT

## 2018-01-26 PROCEDURE — 3600000003 HC SURGERY LEVEL 3 BASE: Performed by: ORTHOPAEDIC SURGERY

## 2018-01-26 PROCEDURE — A6403 STERILE GAUZE>16 <= 48 SQ IN: HCPCS | Performed by: ORTHOPAEDIC SURGERY

## 2018-01-26 PROCEDURE — 2780000010 HC IMPLANT OTHER: Performed by: ORTHOPAEDIC SURGERY

## 2018-01-26 PROCEDURE — 86850 RBC ANTIBODY SCREEN: CPT

## 2018-01-26 PROCEDURE — 85018 HEMOGLOBIN: CPT

## 2018-01-26 PROCEDURE — 0RG7071 FUSION OF 2 TO 7 THORACIC VERTEBRAL JOINTS WITH AUTOLOGOUS TISSUE SUBSTITUTE, POSTERIOR APPROACH, POSTERIOR COLUMN, OPEN APPROACH: ICD-10-PCS | Performed by: ORTHOPAEDIC SURGERY

## 2018-01-26 PROCEDURE — 86920 COMPATIBILITY TEST SPIN: CPT

## 2018-01-26 PROCEDURE — 0SP20JZ REMOVAL OF SYNTHETIC SUBSTITUTE FROM LUMBAR VERTEBRAL DISC, OPEN APPROACH: ICD-10-PCS | Performed by: ORTHOPAEDIC SURGERY

## 2018-01-26 PROCEDURE — 81003 URINALYSIS AUTO W/O SCOPE: CPT

## 2018-01-26 PROCEDURE — 00UT0JZ SUPPLEMENT SPINAL MENINGES WITH SYNTHETIC SUBSTITUTE, OPEN APPROACH: ICD-10-PCS | Performed by: ORTHOPAEDIC SURGERY

## 2018-01-26 PROCEDURE — 1200000000 HC SEMI PRIVATE

## 2018-01-26 PROCEDURE — 0ST20ZZ RESECTION OF LUMBAR VERTEBRAL DISC, OPEN APPROACH: ICD-10-PCS | Performed by: ORTHOPAEDIC SURGERY

## 2018-01-26 DEVICE — 5.5MM STRAIGHT ROD, CP2, 500MM
Type: IMPLANTABLE DEVICE | Site: SPINE LUMBAR | Status: FUNCTIONAL
Brand: REVERE

## 2018-01-26 DEVICE — DURAGEN® DURAL GRAFT MATRIX 1PK, 2X2 DOM
Type: IMPLANTABLE DEVICE | Site: SPINE LUMBAR | Status: FUNCTIONAL
Brand: DURAGEN®

## 2018-01-26 DEVICE — 5.5MM REVERE PEDICLE SCREW 45MM
Type: IMPLANTABLE DEVICE | Site: SPINE LUMBAR | Status: FUNCTIONAL
Brand: REVERE

## 2018-01-26 DEVICE — AGENT HEMSTAT 8ML FLX TIP MTRX + DISP SURGIFLO: Type: IMPLANTABLE DEVICE | Site: SPINE LUMBAR | Status: FUNCTIONAL

## 2018-01-26 DEVICE — GRAFT BNE CHIP 30 CC FD CORTICAL CANC: Type: IMPLANTABLE DEVICE | Site: SPINE LUMBAR | Status: FUNCTIONAL

## 2018-01-26 DEVICE — REVERE LOCKING CAP
Type: IMPLANTABLE DEVICE | Site: SPINE LUMBAR | Status: FUNCTIONAL
Brand: REVERE

## 2018-01-26 RX ORDER — ONDANSETRON 2 MG/ML
4 INJECTION INTRAMUSCULAR; INTRAVENOUS
Status: COMPLETED | OUTPATIENT
Start: 2018-01-26 | End: 2018-01-26

## 2018-01-26 RX ORDER — DIPHENHYDRAMINE HYDROCHLORIDE 50 MG/ML
12.5 INJECTION INTRAMUSCULAR; INTRAVENOUS
Status: DISCONTINUED | OUTPATIENT
Start: 2018-01-26 | End: 2018-01-26 | Stop reason: HOSPADM

## 2018-01-26 RX ORDER — SODIUM CHLORIDE, SODIUM LACTATE, POTASSIUM CHLORIDE, CALCIUM CHLORIDE 600; 310; 30; 20 MG/100ML; MG/100ML; MG/100ML; MG/100ML
INJECTION, SOLUTION INTRAVENOUS CONTINUOUS
Status: DISCONTINUED | OUTPATIENT
Start: 2018-01-26 | End: 2018-01-26

## 2018-01-26 RX ORDER — MEPERIDINE HYDROCHLORIDE 50 MG/ML
12.5 INJECTION INTRAMUSCULAR; INTRAVENOUS; SUBCUTANEOUS EVERY 5 MIN PRN
Status: DISCONTINUED | OUTPATIENT
Start: 2018-01-26 | End: 2018-01-26 | Stop reason: HOSPADM

## 2018-01-26 RX ORDER — MORPHINE SULFATE 2 MG/ML
1 INJECTION, SOLUTION INTRAMUSCULAR; INTRAVENOUS
Status: DISCONTINUED | OUTPATIENT
Start: 2018-01-26 | End: 2018-01-31 | Stop reason: HOSPADM

## 2018-01-26 RX ORDER — HYDROCODONE BITARTRATE AND ACETAMINOPHEN 5; 325 MG/1; MG/1
2 TABLET ORAL EVERY 4 HOURS PRN
Status: DISCONTINUED | OUTPATIENT
Start: 2018-01-26 | End: 2018-01-31 | Stop reason: HOSPADM

## 2018-01-26 RX ORDER — DEXAMETHASONE SODIUM PHOSPHATE 4 MG/ML
INJECTION, SOLUTION INTRA-ARTICULAR; INTRALESIONAL; INTRAMUSCULAR; INTRAVENOUS; SOFT TISSUE PRN
Status: DISCONTINUED | OUTPATIENT
Start: 2018-01-26 | End: 2018-01-26 | Stop reason: SDUPTHER

## 2018-01-26 RX ORDER — TRANEXAMIC ACID 100 MG/ML
INJECTION, SOLUTION INTRAVENOUS PRN
Status: DISCONTINUED | OUTPATIENT
Start: 2018-01-26 | End: 2018-01-26 | Stop reason: SDUPTHER

## 2018-01-26 RX ORDER — HYDROCODONE BITARTRATE AND ACETAMINOPHEN 5; 325 MG/1; MG/1
1 TABLET ORAL EVERY 4 HOURS PRN
Status: DISCONTINUED | OUTPATIENT
Start: 2018-01-26 | End: 2018-01-31 | Stop reason: HOSPADM

## 2018-01-26 RX ORDER — KETAMINE HYDROCHLORIDE 10 MG/ML
INJECTION, SOLUTION INTRAMUSCULAR; INTRAVENOUS PRN
Status: DISCONTINUED | OUTPATIENT
Start: 2018-01-26 | End: 2018-01-26 | Stop reason: SDUPTHER

## 2018-01-26 RX ORDER — SERTRALINE HYDROCHLORIDE 100 MG/1
100 TABLET, FILM COATED ORAL DAILY
Status: DISCONTINUED | OUTPATIENT
Start: 2018-01-26 | End: 2018-01-31 | Stop reason: HOSPADM

## 2018-01-26 RX ORDER — METOPROLOL SUCCINATE 50 MG/1
50 TABLET, EXTENDED RELEASE ORAL DAILY
Status: DISCONTINUED | OUTPATIENT
Start: 2018-01-27 | End: 2018-01-31 | Stop reason: HOSPADM

## 2018-01-26 RX ORDER — LIDOCAINE HYDROCHLORIDE 20 MG/ML
INJECTION, SOLUTION EPIDURAL; INFILTRATION; INTRACAUDAL; PERINEURAL PRN
Status: DISCONTINUED | OUTPATIENT
Start: 2018-01-26 | End: 2018-01-26 | Stop reason: SDUPTHER

## 2018-01-26 RX ORDER — ACETAMINOPHEN 325 MG/1
650 TABLET ORAL EVERY 4 HOURS PRN
Status: DISCONTINUED | OUTPATIENT
Start: 2018-01-26 | End: 2018-01-31 | Stop reason: HOSPADM

## 2018-01-26 RX ORDER — TAMSULOSIN HYDROCHLORIDE 0.4 MG/1
0.4 CAPSULE ORAL DAILY
Status: DISCONTINUED | OUTPATIENT
Start: 2018-01-26 | End: 2018-01-31 | Stop reason: HOSPADM

## 2018-01-26 RX ORDER — SODIUM CHLORIDE 0.9 % (FLUSH) 0.9 %
10 SYRINGE (ML) INJECTION PRN
Status: DISCONTINUED | OUTPATIENT
Start: 2018-01-26 | End: 2018-01-31 | Stop reason: HOSPADM

## 2018-01-26 RX ORDER — LABETALOL HYDROCHLORIDE 5 MG/ML
5 INJECTION, SOLUTION INTRAVENOUS EVERY 10 MIN PRN
Status: DISCONTINUED | OUTPATIENT
Start: 2018-01-26 | End: 2018-01-26 | Stop reason: HOSPADM

## 2018-01-26 RX ORDER — SIMVASTATIN 20 MG
20 TABLET ORAL NIGHTLY
Status: DISCONTINUED | OUTPATIENT
Start: 2018-01-26 | End: 2018-01-31 | Stop reason: HOSPADM

## 2018-01-26 RX ORDER — LIDOCAINE HYDROCHLORIDE 10 MG/ML
INJECTION, SOLUTION EPIDURAL; INFILTRATION; INTRACAUDAL; PERINEURAL PRN
Status: DISCONTINUED | OUTPATIENT
Start: 2018-01-26 | End: 2018-01-26 | Stop reason: SDUPTHER

## 2018-01-26 RX ORDER — FENTANYL CITRATE 50 UG/ML
INJECTION, SOLUTION INTRAMUSCULAR; INTRAVENOUS PRN
Status: DISCONTINUED | OUTPATIENT
Start: 2018-01-26 | End: 2018-01-26 | Stop reason: SDUPTHER

## 2018-01-26 RX ORDER — METHOCARBAMOL 750 MG/1
1500 TABLET, FILM COATED ORAL EVERY 6 HOURS PRN
Status: DISCONTINUED | OUTPATIENT
Start: 2018-01-26 | End: 2018-01-31 | Stop reason: HOSPADM

## 2018-01-26 RX ORDER — GLYCOPYRROLATE 0.2 MG/ML
INJECTION INTRAMUSCULAR; INTRAVENOUS PRN
Status: DISCONTINUED | OUTPATIENT
Start: 2018-01-26 | End: 2018-01-26 | Stop reason: SDUPTHER

## 2018-01-26 RX ORDER — SODIUM CHLORIDE 0.9 % (FLUSH) 0.9 %
10 SYRINGE (ML) INJECTION EVERY 12 HOURS SCHEDULED
Status: DISCONTINUED | OUTPATIENT
Start: 2018-01-26 | End: 2018-01-31 | Stop reason: HOSPADM

## 2018-01-26 RX ORDER — MIDAZOLAM HYDROCHLORIDE 1 MG/ML
INJECTION INTRAMUSCULAR; INTRAVENOUS PRN
Status: DISCONTINUED | OUTPATIENT
Start: 2018-01-26 | End: 2018-01-26 | Stop reason: SDUPTHER

## 2018-01-26 RX ORDER — FENOFIBRATE 160 MG/1
160 TABLET ORAL DAILY
Status: DISCONTINUED | OUTPATIENT
Start: 2018-01-26 | End: 2018-01-31 | Stop reason: HOSPADM

## 2018-01-26 RX ORDER — MORPHINE SULFATE 2 MG/ML
2 INJECTION, SOLUTION INTRAMUSCULAR; INTRAVENOUS
Status: DISCONTINUED | OUTPATIENT
Start: 2018-01-26 | End: 2018-01-31 | Stop reason: HOSPADM

## 2018-01-26 RX ORDER — PROPOFOL 10 MG/ML
INJECTION, EMULSION INTRAVENOUS PRN
Status: DISCONTINUED | OUTPATIENT
Start: 2018-01-26 | End: 2018-01-26 | Stop reason: SDUPTHER

## 2018-01-26 RX ORDER — EPHEDRINE SULFATE 50 MG/ML
INJECTION, SOLUTION INTRAVENOUS PRN
Status: DISCONTINUED | OUTPATIENT
Start: 2018-01-26 | End: 2018-01-26 | Stop reason: SDUPTHER

## 2018-01-26 RX ADMIN — ONDANSETRON 4 MG: 2 INJECTION INTRAMUSCULAR; INTRAVENOUS at 11:55

## 2018-01-26 RX ADMIN — PHENYLEPHRINE HYDROCHLORIDE 200 MCG: 10 INJECTION INTRAVENOUS at 07:51

## 2018-01-26 RX ADMIN — SODIUM CHLORIDE, POTASSIUM CHLORIDE, SODIUM LACTATE AND CALCIUM CHLORIDE: 600; 310; 30; 20 INJECTION, SOLUTION INTRAVENOUS at 08:23

## 2018-01-26 RX ADMIN — PHENYLEPHRINE HYDROCHLORIDE 200 MCG: 10 INJECTION INTRAVENOUS at 07:43

## 2018-01-26 RX ADMIN — CEFAZOLIN 2 G: 1 INJECTION, POWDER, FOR SOLUTION INTRAMUSCULAR; INTRAVENOUS at 22:01

## 2018-01-26 RX ADMIN — KETAMINE HYDROCHLORIDE 30 MG: 10 INJECTION, SOLUTION INTRAMUSCULAR; INTRAVENOUS at 07:34

## 2018-01-26 RX ADMIN — EPHEDRINE SULFATE 10 MG: 50 INJECTION INTRAMUSCULAR; INTRAVENOUS; SUBCUTANEOUS at 07:44

## 2018-01-26 RX ADMIN — EPHEDRINE SULFATE 10 MG: 50 INJECTION INTRAMUSCULAR; INTRAVENOUS; SUBCUTANEOUS at 07:48

## 2018-01-26 RX ADMIN — PHENYLEPHRINE HYDROCHLORIDE 200 MCG: 10 INJECTION INTRAVENOUS at 10:59

## 2018-01-26 RX ADMIN — KETAMINE HYDROCHLORIDE 10 MG: 10 INJECTION, SOLUTION INTRAMUSCULAR; INTRAVENOUS at 10:37

## 2018-01-26 RX ADMIN — EPHEDRINE SULFATE 5 MG: 50 INJECTION INTRAMUSCULAR; INTRAVENOUS; SUBCUTANEOUS at 11:00

## 2018-01-26 RX ADMIN — MIDAZOLAM HYDROCHLORIDE 2 MG: 2 INJECTION, SOLUTION INTRAMUSCULAR; INTRAVENOUS at 07:32

## 2018-01-26 RX ADMIN — DEXAMETHASONE SODIUM PHOSPHATE 4 MG: 4 INJECTION, SOLUTION INTRAMUSCULAR; INTRAVENOUS at 07:27

## 2018-01-26 RX ADMIN — LIDOCAINE HYDROCHLORIDE 100 MG: 20 INJECTION, SOLUTION EPIDURAL; INFILTRATION; INTRACAUDAL; PERINEURAL at 12:22

## 2018-01-26 RX ADMIN — PHENYLEPHRINE HYDROCHLORIDE 200 MCG: 10 INJECTION INTRAVENOUS at 09:50

## 2018-01-26 RX ADMIN — ONDANSETRON 4 MG: 2 INJECTION INTRAMUSCULAR; INTRAVENOUS at 07:27

## 2018-01-26 RX ADMIN — PHENYLEPHRINE HYDROCHLORIDE 100 MCG: 10 INJECTION INTRAVENOUS at 08:29

## 2018-01-26 RX ADMIN — EPHEDRINE SULFATE 5 MG: 50 INJECTION INTRAMUSCULAR; INTRAVENOUS; SUBCUTANEOUS at 07:55

## 2018-01-26 RX ADMIN — TAMSULOSIN HYDROCHLORIDE 0.4 MG: 0.4 CAPSULE ORAL at 22:02

## 2018-01-26 RX ADMIN — PHENYLEPHRINE HYDROCHLORIDE 200 MCG: 10 INJECTION INTRAVENOUS at 08:44

## 2018-01-26 RX ADMIN — PHENYLEPHRINE HYDROCHLORIDE 200 MCG: 10 INJECTION INTRAVENOUS at 09:05

## 2018-01-26 RX ADMIN — SIMVASTATIN 20 MG: 20 TABLET, FILM COATED ORAL at 22:02

## 2018-01-26 RX ADMIN — CEFAZOLIN SODIUM 2 G: 1 INJECTION, POWDER, FOR SOLUTION INTRAMUSCULAR; INTRAVENOUS at 11:39

## 2018-01-26 RX ADMIN — CEFAZOLIN SODIUM 2 G: 1 INJECTION, POWDER, FOR SOLUTION INTRAMUSCULAR; INTRAVENOUS at 07:39

## 2018-01-26 RX ADMIN — PHENYLEPHRINE HYDROCHLORIDE 200 MCG: 10 INJECTION INTRAVENOUS at 10:49

## 2018-01-26 RX ADMIN — PROPOFOL 150 MG: 10 INJECTION, EMULSION INTRAVENOUS at 07:34

## 2018-01-26 RX ADMIN — TRANEXAMIC ACID 1000 MG: 100 INJECTION, SOLUTION INTRAVENOUS at 11:02

## 2018-01-26 RX ADMIN — PHENYLEPHRINE HYDROCHLORIDE 100 MCG: 10 INJECTION INTRAVENOUS at 09:46

## 2018-01-26 RX ADMIN — SODIUM CHLORIDE, POTASSIUM CHLORIDE, SODIUM LACTATE AND CALCIUM CHLORIDE: 600; 310; 30; 20 INJECTION, SOLUTION INTRAVENOUS at 07:04

## 2018-01-26 RX ADMIN — LIDOCAINE HYDROCHLORIDE 50 MG: 10 INJECTION, SOLUTION EPIDURAL; INFILTRATION; INTRACAUDAL; PERINEURAL at 07:34

## 2018-01-26 RX ADMIN — FENTANYL CITRATE 100 MCG: 50 INJECTION INTRAMUSCULAR; INTRAVENOUS at 07:34

## 2018-01-26 RX ADMIN — SODIUM CHLORIDE, POTASSIUM CHLORIDE, SODIUM LACTATE AND CALCIUM CHLORIDE: 600; 310; 30; 20 INJECTION, SOLUTION INTRAVENOUS at 09:06

## 2018-01-26 RX ADMIN — PHENYLEPHRINE HYDROCHLORIDE 200 MCG: 10 INJECTION INTRAVENOUS at 08:53

## 2018-01-26 RX ADMIN — PHENYLEPHRINE HYDROCHLORIDE 200 MCG: 10 INJECTION INTRAVENOUS at 09:25

## 2018-01-26 RX ADMIN — PHENYLEPHRINE HYDROCHLORIDE 100 MCG: 10 INJECTION INTRAVENOUS at 09:43

## 2018-01-26 RX ADMIN — EPHEDRINE SULFATE 5 MG: 50 INJECTION INTRAMUSCULAR; INTRAVENOUS; SUBCUTANEOUS at 10:49

## 2018-01-26 RX ADMIN — PHENYLEPHRINE HYDROCHLORIDE 200 MCG: 10 INJECTION INTRAVENOUS at 07:48

## 2018-01-26 RX ADMIN — TRANEXAMIC ACID 1000 MG: 100 INJECTION, SOLUTION INTRAVENOUS at 07:39

## 2018-01-26 RX ADMIN — PHENYLEPHRINE HYDROCHLORIDE 100 MCG: 10 INJECTION INTRAVENOUS at 10:28

## 2018-01-26 RX ADMIN — FENOFIBRATE 160 MG: 160 TABLET ORAL at 22:01

## 2018-01-26 RX ADMIN — EPHEDRINE SULFATE 5 MG: 50 INJECTION INTRAMUSCULAR; INTRAVENOUS; SUBCUTANEOUS at 10:27

## 2018-01-26 RX ADMIN — PHENYLEPHRINE HYDROCHLORIDE 100 MCG: 10 INJECTION INTRAVENOUS at 10:25

## 2018-01-26 RX ADMIN — PHENYLEPHRINE HYDROCHLORIDE 100 MCG: 10 INJECTION INTRAVENOUS at 10:10

## 2018-01-26 RX ADMIN — GLYCOPYRROLATE 0.2 MG: 0.2 INJECTION, SOLUTION INTRAMUSCULAR; INTRAVENOUS at 07:42

## 2018-01-26 RX ADMIN — PHENYLEPHRINE HYDROCHLORIDE 200 MCG: 10 INJECTION INTRAVENOUS at 10:44

## 2018-01-26 RX ADMIN — PHENYLEPHRINE HYDROCHLORIDE 50 MCG/MIN: 10 INJECTION, SOLUTION INTRAMUSCULAR; INTRAVENOUS; SUBCUTANEOUS at 11:13

## 2018-01-26 RX ADMIN — FENTANYL CITRATE 50 MCG: 50 INJECTION INTRAMUSCULAR; INTRAVENOUS at 09:56

## 2018-01-26 RX ADMIN — PHENYLEPHRINE HYDROCHLORIDE 100 MCG: 10 INJECTION INTRAVENOUS at 08:02

## 2018-01-26 RX ADMIN — PHENYLEPHRINE HYDROCHLORIDE 200 MCG: 10 INJECTION INTRAVENOUS at 09:11

## 2018-01-26 RX ADMIN — EPHEDRINE SULFATE 5 MG: 50 INJECTION INTRAMUSCULAR; INTRAVENOUS; SUBCUTANEOUS at 08:12

## 2018-01-26 RX ADMIN — PHENYLEPHRINE HYDROCHLORIDE 200 MCG: 10 INJECTION INTRAVENOUS at 07:55

## 2018-01-26 RX ADMIN — EPHEDRINE SULFATE 5 MG: 50 INJECTION INTRAMUSCULAR; INTRAVENOUS; SUBCUTANEOUS at 10:10

## 2018-01-26 ASSESSMENT — PULMONARY FUNCTION TESTS
PIF_VALUE: 17
PIF_VALUE: 25
PIF_VALUE: 25
PIF_VALUE: 26
PIF_VALUE: 22
PIF_VALUE: 17
PIF_VALUE: 26
PIF_VALUE: 26
PIF_VALUE: 25
PIF_VALUE: 26
PIF_VALUE: 22
PIF_VALUE: 18
PIF_VALUE: 23
PIF_VALUE: 3
PIF_VALUE: 3
PIF_VALUE: 23
PIF_VALUE: 26
PIF_VALUE: 15
PIF_VALUE: 24
PIF_VALUE: 25
PIF_VALUE: 3
PIF_VALUE: 22
PIF_VALUE: 24
PIF_VALUE: 25
PIF_VALUE: 27
PIF_VALUE: 16
PIF_VALUE: 25
PIF_VALUE: 2
PIF_VALUE: 22
PIF_VALUE: 29
PIF_VALUE: 24
PIF_VALUE: 25
PIF_VALUE: 3
PIF_VALUE: 25
PIF_VALUE: 34
PIF_VALUE: 26
PIF_VALUE: 3
PIF_VALUE: 4
PIF_VALUE: 3
PIF_VALUE: 23
PIF_VALUE: 27
PIF_VALUE: 26
PIF_VALUE: 3
PIF_VALUE: 22
PIF_VALUE: 20
PIF_VALUE: 23
PIF_VALUE: 25
PIF_VALUE: 23
PIF_VALUE: 26
PIF_VALUE: 23
PIF_VALUE: 23
PIF_VALUE: 24
PIF_VALUE: 17
PIF_VALUE: 25
PIF_VALUE: 2
PIF_VALUE: 22
PIF_VALUE: 1
PIF_VALUE: 26
PIF_VALUE: 26
PIF_VALUE: 25
PIF_VALUE: 25
PIF_VALUE: 27
PIF_VALUE: 24
PIF_VALUE: 22
PIF_VALUE: 26
PIF_VALUE: 25
PIF_VALUE: 33
PIF_VALUE: 24
PIF_VALUE: 25
PIF_VALUE: 26
PIF_VALUE: 3
PIF_VALUE: 25
PIF_VALUE: 26
PIF_VALUE: 27
PIF_VALUE: 24
PIF_VALUE: 22
PIF_VALUE: 26
PIF_VALUE: 29
PIF_VALUE: 26
PIF_VALUE: 30
PIF_VALUE: 3
PIF_VALUE: 25
PIF_VALUE: 31
PIF_VALUE: 17
PIF_VALUE: 25
PIF_VALUE: 35
PIF_VALUE: 26
PIF_VALUE: 6
PIF_VALUE: 3
PIF_VALUE: 26
PIF_VALUE: 25
PIF_VALUE: 23
PIF_VALUE: 17
PIF_VALUE: 23
PIF_VALUE: 27
PIF_VALUE: 26
PIF_VALUE: 17
PIF_VALUE: 24
PIF_VALUE: 25
PIF_VALUE: 25
PIF_VALUE: 17
PIF_VALUE: 23
PIF_VALUE: 23
PIF_VALUE: 27
PIF_VALUE: 25
PIF_VALUE: 3
PIF_VALUE: 25
PIF_VALUE: 24
PIF_VALUE: 28
PIF_VALUE: 25
PIF_VALUE: 27
PIF_VALUE: 26
PIF_VALUE: 26
PIF_VALUE: 23
PIF_VALUE: 26
PIF_VALUE: 26
PIF_VALUE: 25
PIF_VALUE: 2
PIF_VALUE: 25
PIF_VALUE: 26
PIF_VALUE: 25
PIF_VALUE: 24
PIF_VALUE: 27
PIF_VALUE: 26
PIF_VALUE: 24
PIF_VALUE: 29
PIF_VALUE: 27
PIF_VALUE: 24
PIF_VALUE: 23
PIF_VALUE: 24
PIF_VALUE: 23
PIF_VALUE: 26
PIF_VALUE: 25
PIF_VALUE: 27
PIF_VALUE: 26
PIF_VALUE: 2
PIF_VALUE: 24
PIF_VALUE: 25
PIF_VALUE: 32
PIF_VALUE: 23
PIF_VALUE: 24
PIF_VALUE: 2
PIF_VALUE: 26
PIF_VALUE: 23
PIF_VALUE: 25
PIF_VALUE: 24
PIF_VALUE: 3
PIF_VALUE: 26
PIF_VALUE: 29
PIF_VALUE: 31
PIF_VALUE: 25
PIF_VALUE: 25
PIF_VALUE: 32
PIF_VALUE: 23
PIF_VALUE: 25
PIF_VALUE: 24
PIF_VALUE: 23
PIF_VALUE: 29
PIF_VALUE: 4
PIF_VALUE: 29
PIF_VALUE: 26
PIF_VALUE: 24
PIF_VALUE: 24
PIF_VALUE: 7
PIF_VALUE: 26
PIF_VALUE: 24
PIF_VALUE: 29
PIF_VALUE: 32
PIF_VALUE: 27
PIF_VALUE: 28
PIF_VALUE: 3
PIF_VALUE: 23
PIF_VALUE: 4
PIF_VALUE: 28
PIF_VALUE: 28
PIF_VALUE: 3
PIF_VALUE: 24
PIF_VALUE: 25
PIF_VALUE: 3
PIF_VALUE: 25
PIF_VALUE: 26
PIF_VALUE: 24
PIF_VALUE: 36
PIF_VALUE: 24
PIF_VALUE: 3
PIF_VALUE: 25
PIF_VALUE: 23
PIF_VALUE: 26
PIF_VALUE: 25
PIF_VALUE: 25
PIF_VALUE: 23
PIF_VALUE: 24
PIF_VALUE: 25
PIF_VALUE: 24
PIF_VALUE: 19
PIF_VALUE: 29
PIF_VALUE: 26
PIF_VALUE: 1
PIF_VALUE: 24
PIF_VALUE: 26
PIF_VALUE: 3
PIF_VALUE: 26
PIF_VALUE: 3
PIF_VALUE: 26
PIF_VALUE: 25
PIF_VALUE: 24
PIF_VALUE: 23
PIF_VALUE: 26
PIF_VALUE: 27
PIF_VALUE: 26
PIF_VALUE: 26
PIF_VALUE: 2
PIF_VALUE: 36
PIF_VALUE: 27
PIF_VALUE: 3
PIF_VALUE: 4
PIF_VALUE: 24
PIF_VALUE: 24
PIF_VALUE: 3
PIF_VALUE: 26
PIF_VALUE: 2
PIF_VALUE: 23
PIF_VALUE: 24
PIF_VALUE: 26
PIF_VALUE: 24
PIF_VALUE: 6
PIF_VALUE: 27
PIF_VALUE: 25
PIF_VALUE: 27
PIF_VALUE: 23
PIF_VALUE: 26
PIF_VALUE: 25
PIF_VALUE: 12
PIF_VALUE: 26
PIF_VALUE: 0
PIF_VALUE: 12
PIF_VALUE: 26
PIF_VALUE: 24
PIF_VALUE: 26
PIF_VALUE: 17
PIF_VALUE: 2
PIF_VALUE: 20
PIF_VALUE: 32
PIF_VALUE: 26
PIF_VALUE: 24
PIF_VALUE: 22
PIF_VALUE: 30
PIF_VALUE: 26
PIF_VALUE: 26
PIF_VALUE: 25
PIF_VALUE: 22
PIF_VALUE: 26
PIF_VALUE: 15
PIF_VALUE: 26
PIF_VALUE: 26
PIF_VALUE: 4
PIF_VALUE: 23
PIF_VALUE: 26
PIF_VALUE: 23
PIF_VALUE: 26
PIF_VALUE: 28
PIF_VALUE: 25
PIF_VALUE: 25
PIF_VALUE: 26
PIF_VALUE: 24
PIF_VALUE: 3
PIF_VALUE: 39
PIF_VALUE: 25
PIF_VALUE: 24
PIF_VALUE: 3
PIF_VALUE: 26
PIF_VALUE: 18
PIF_VALUE: 26
PIF_VALUE: 26
PIF_VALUE: 3
PIF_VALUE: 26
PIF_VALUE: 25
PIF_VALUE: 26
PIF_VALUE: 26
PIF_VALUE: 25
PIF_VALUE: 26
PIF_VALUE: 23
PIF_VALUE: 27
PIF_VALUE: 25
PIF_VALUE: 23
PIF_VALUE: 26
PIF_VALUE: 31
PIF_VALUE: 24
PIF_VALUE: 26
PIF_VALUE: 24
PIF_VALUE: 23
PIF_VALUE: 23
PIF_VALUE: 3
PIF_VALUE: 24
PIF_VALUE: 23
PIF_VALUE: 25
PIF_VALUE: 27
PIF_VALUE: 32
PIF_VALUE: 7
PIF_VALUE: 25
PIF_VALUE: 26
PIF_VALUE: 3
PIF_VALUE: 35

## 2018-01-26 ASSESSMENT — PAIN DESCRIPTION - DESCRIPTORS
DESCRIPTORS: ACHING
DESCRIPTORS: SORE

## 2018-01-26 ASSESSMENT — PAIN SCALES - GENERAL
PAINLEVEL_OUTOF10: 0
PAINLEVEL_OUTOF10: 2

## 2018-01-26 ASSESSMENT — PAIN DESCRIPTION - ORIENTATION: ORIENTATION: LOWER

## 2018-01-26 ASSESSMENT — PAIN DESCRIPTION - PROGRESSION: CLINICAL_PROGRESSION: NOT CHANGED

## 2018-01-26 ASSESSMENT — PAIN DESCRIPTION - FREQUENCY: FREQUENCY: INTERMITTENT

## 2018-01-26 ASSESSMENT — PAIN DESCRIPTION - PAIN TYPE: TYPE: SURGICAL PAIN

## 2018-01-26 ASSESSMENT — PAIN DESCRIPTION - LOCATION: LOCATION: BACK

## 2018-01-26 ASSESSMENT — PAIN DESCRIPTION - ONSET: ONSET: ON-GOING

## 2018-01-26 ASSESSMENT — PAIN - FUNCTIONAL ASSESSMENT: PAIN_FUNCTIONAL_ASSESSMENT: 0-10

## 2018-01-26 NOTE — OP NOTE
[address]                   [address] [facility], [address]                                 OPERATIVE REPORT    PATIENT NAME: Cira Corcoran                     :        1945  MED REC NO:   [id number]                              ROOM:  ACCOUNT NO:   [de-identified]                           ADMIT DATE: 2018  PROVIDER:     Yamilet Jenknis    DATE OF PROCEDURE:  2018    PREOPERATIVE DIAGNOSES:  L1-2 disk herniation, severe lumbar spinal  stenosis, near complete obliteration of the thecal canal status post L2-L5  PLIF. POSTOPERATIVE DIAGNOSES:  L1-2 disk herniation, severe lumbar spinal  stenosis, near complete obliteration of the thecal canal status post L2-L5  PLIF. OPERATING SURGEON:  Dr. Yamilet Jenkins. ANESTHESIA:  General.    BLOOD LOSS:  Between 1000 and 1500. OPERATION PERFORMED:  L1-2 decompression with diskectomy; posterior  segmental instrumentation, T10-L5; removal of hardware, deep, x2 to include  2 previously placed rods; posterior fusion, T10-L2 with allograft,  autograft, and fluoroscopic assistance. FINDINGS:  1.  Neuromonitoring performed throughout the case. No significant  abnormalities. 2.  Fluoroscopy utilized for level localization, aid in pedicle screw  placement, and final fluoroscopic images. All hardware appeared reasonable  with acceptable alignment. The patient with known moderate flat back  deformity. Unfortunately, due to inability to perform interbody fusion, we  were unable to substantially improve the L1-2 disk space. 3.  The patient with incidental durotomy after the maturity of disk  protrusion or at least the major offending portion of disk protrusion had  been enabled; however, in performing the more complete annulotomy, a  back-sided drill tear was obtained that was rather substantial.  This was  unable to be visualized, however, appeared to seal very readily with no  further CSF leakage. Nevertheless, this forced us to no longer pursue an  interbody fusion. PROCEDURE IN DETAIL:  The patient was taken to the operating room, placed  under general anesthesia, transferred to the [place] frame Kristin Narrow table,  checked for padding and positioning. Back was prepped and draped in usual  sterile fashion. Midline incision was made, carried down through the skin  and subcutaneous tissue extending up to T10 and down to the patient's  previous hardware. Beginning at T10, the parasternal musculature was elevated out to the  pedicle entry position and facet joints. This was carried distally and  gradually on to the hardware below exposing the hardware down to the bismark  connector below L3 pedicle screw. After obtaining acceptable exposure, the previously placed rods were  removed. At this juncture, decompression at L1-2 was performed with laminectomy. Bilateral facetectomies through the near foraminotomy on the right. The  patient was very tight. Through the left, we did a full foraminotomy and  approached the disk through a TLIF approach. The patient with severely  protruding disk significant epidural plexus, actually took an extended  period of time to obtain fairly decent hemostasis. Nevertheless,  intermittently, we had to try and control the venous plexuses overlying  this disk a little bit more and this was a little bit more troublesome than  the normal.    The patient's dura showed significant amounts of adhesion to the underlying  disk. Nevertheless, gradually, we were able to get it freed medially. An  annulotomy was initiated. This gave us roughly a 6-7 mm window to work through. As we were able to  do this, I was able to actually extract a fairly sizable disk fragment that  _____ the disk and migrated slightly cephaladly subligamentously. As this exposure increased, we were able to increase our dural retraction a  bit more.     We had initiated shaving with a 7 mm shaver and may be even guided up to an  8 or 9 mm shaver and then as we were going to get the dura a little bit  more freed up, we retracted a little bit more medially and increased our  annulotomy slightly and went to increase the annulotomy and it became quit  apparent that I had injured the dura on the front side adjacent to the  disk. Nevertheless, after removing the retractor and placement of Gel foam, this  very rapidly sealed with no further CSF leakage. Attempt to visualize the skin, however, proved that to perform an interbody  fusion was going to be highly dangerous and likely not very fruitful. We therefore elected to seal the dura with DuraGen followed by some blue  sealant despite the fact that if left alone, would no longer appear to be  leaking. After we did this, again, it no longer appeared to leak. It should be noted that prior to doing the decompression, we had actually  placed the L1 screws and for while I distracted on the L1-2 screws to try  and get a little bit decompression to aid in the diskectomy, unfortunately,  this put the epidural plexus _____ difficult to control the bleeding;  therefore, we had taken this off and then proceeded. After initially sealing the dura, we elected to go ahead and place the T10  and T11 screws with an aid of fluoroscopic assistance. Then we performed the full sealant with the DuraGen and sealant on the  dura. Then I placed the bismark, connected them to the previous bismark-bismark  connector and the L2 screw and then on up through the L1, T10, and T11  screws which we had added today. The bismark initially was placed on the left  and then on the right. 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 Therefore, I do not think we needed to go up to T9 on this patient.   The  lamina were decorticated from T9, 10, and 11 slightly with a high-speed  bur, cortical cancellous allograft was placed in this

## 2018-01-26 NOTE — BRIEF OP NOTE
Brief Postoperative Note    Raheem Bocanegra  YOB: 1945  [id number]    Pre-operative Diagnosis: L1-2 disk herniation history of L2-L5 PLIF    Post-operative Diagnosis: Same    Procedure: T10-L5 pif with L1-2 decompression diskectomy    Anesthesia: General    Surgeons/Assistants: Keenan    Estimated Blood Loss: [id number]    Complications: None    Specimens: Was Not Obtained    Findings: incidental durotomy anteriorly; globus    Electronically signed by Quique Rodgers MD on 1/26/2018 at 12:42 PM

## 2018-01-26 NOTE — H&P
palpation. No localized tenderness. No guarding or rigidity. No palpable organomegaly. LYMPHATICS:  No palpable cervical lymphadenopathy. LOCOMOTOR, BACK AND SPINE:  Pt has some limitation of movement to the back                EXTREMITIES:  Symmetrical, no pedal edema. Mars sign negative. No discoloration or ulcerations. NEUROLOGIC:  The patient is conscious, alert, oriented, No apparent focal sensory or motor deficits.                                                                                      PROVISIONAL DIAGNOSES / SURGERY:      L1-L2 DISC HERNIATION;T9 THRU L5 STENOSIS      LUMBAR POSTERIOR DECOMPRESSION AND FUSION L1 L2  THORACIC & LUMBAR POSTERIOR DECOMPRESSION AND FUSION REVISION T9 THRU L5          Patient Active Problem List    Diagnosis Date Noted    Lumbar disc herniation 12/22/2017    Flat back syndrome, postprocedural 12/22/2017    Left foot drop 08/04/2017    Mass in neck 02/12/2017    CKD (chronic kidney disease)     SCC (squamous cell carcinoma)     Claustrophobia     Depression     Hearing loss     Essential (primary) hypertension     Agoraphobia     Spinal stenosis, lumbar region, with neurogenic claudication 11/15/2013    Degeneration of lumbar or lumbosacral intervertebral disc 11/15/2013    Hyperlipemia 08/29/2013    RAFAT on CPAP 08/29/2013    Anxiety 08/29/2013           MARIBEL CAMPOVERDE, CNP on 1/26/2018 at 7:22 AM

## 2018-01-26 NOTE — CARE COORDINATION
CASE MANAGEMENT NOTE:    Admission Date:  1/26/2018 Yi Gustafson is a 68 y.o.  male    Admitted for : Back pain [M54.9]    Met with:  PatientKalpana Owusu    PCP:  Royal Machuca                                Insurance:  Medicare      Current Residence/ Living Arrangements:  independently at home             Current Services PTA:  No    Is patient agreeable to VNS: No    Freedom of choice provided: Yes         VNS chosen:  No    DME:  straight cane, Wheeled walker, CPAP    Home Oxygen: No    Nebulizer: No    Supplier: N/A    Potential Assistance Needed: Yes, ?outpt Therapy    SNF needed: No    Pharmacy:  [facility]       Does Patient want to use MEDS to BEDS? No    Family Members/Caregivers that pt would like involved in their care:    Yes    If yes, list name here:  Wife, [facility]    Transportation Provider:  Patient & Wife                   Discharge Plan:  1/26/18 Medicare Pt. Lives in 2 story home w/ steps w/ wife. DME wheeled walker, cane, CPAP, Denies VNS, ?outpt therapy.  PT/OT on board, will follow rec//KB               Readmission Risk              Readmission Risk:        13.5       Age 72 or Greater:  1    Admitted from SNF or Requires Paid or Family Care:  0    Currently has CHF,COPD,ARF,CRI,or is on dialysis:  0    Takes more than 5 Prescription Medications:  0    Takes Digoxin,Insulin,Anticoagulants,Narcotics or ASA/Plavix:  [facility] in Past 12 Months:  10    On Disability:  0    Patient Considers own Health:  2.5          Electronically signed by: Azam Casper RN on 1/26/2018 at 3:50 PM

## 2018-01-26 NOTE — ANESTHESIA PRE PROCEDURE
 SKIN BIOPSY      VASECTOMY         Social History:    Social History   Substance Use Topics    Smoking status: Never Smoker    Smokeless tobacco: Never Used    Alcohol use No                                Counseling given: Not Answered      Vital Signs (Current):   Vitals:    01/26/18 0627   BP: (!) 155/89   Pulse: 60   Resp: 18   Temp: 97.7 °F (36.5 °C)   TempSrc: Oral   SpO2: 97%   Weight: 226 lb (102.5 kg)   Height: 5' 10\" (1.778 m)                                              BP Readings from Last 3 Encounters:   01/26/18 (!) 155/89   01/17/18 112/68   12/22/17 136/80       NPO Status: Time of last liquid consumption: 1900                        Time of last solid consumption: 1900                        Date of last liquid consumption: 01/25/18                        Date of last solid food consumption: 01/25/18    BMI:   Wt Readings from Last 3 Encounters:   01/26/18 226 lb (102.5 kg)   01/17/18 229 lb (103.9 kg)   12/22/17 227 lb (103 kg)     Body mass index is 32.43 kg/m². CBC:   Lab Results   Component Value Date    WBC 7.9 01/24/2018    RBC 5.39 01/24/2018    HGB 14.1 01/24/2018    HCT 42.9 01/24/2018    MCV 79.6 01/24/2018    RDW 16.9 01/24/2018     01/24/2018       CMP:   Lab Results   Component Value Date     01/24/2018    K 4.2 01/24/2018     01/24/2018    CO2 24 01/24/2018    BUN 18 01/24/2018    CREATININE 0.94 01/24/2018    GFRAA >60 01/24/2018    LABGLOM >60 01/24/2018    GLUCOSE 111 01/24/2018    PROT 6.8 06/10/2014    CALCIUM 9.3 01/24/2018    BILITOT 0.38 06/10/2014    ALKPHOS 68 06/10/2014    AST 31 06/10/2014    ALT 18 06/10/2014       POC Tests: No results for input(s): POCGLU, POCNA, POCK, POCCL, POCBUN, POCHEMO, POCHCT in the last 72 hours.     Coags:   Lab Results   Component Value Date    PROTIME 11.2 11/22/2017    INR 1.1 11/22/2017    APTT 25.0 11/22/2017       HCG (If Applicable): No results found for: PREGTESTUR, PREGSERUM, HCG, HCGQUANT     ABGs:   Lab

## 2018-01-27 LAB
HCT VFR BLD CALC: 31.9 % (ref 41–53)
HEMOGLOBIN: 10.7 G/DL (ref 13.5–17.5)
MCH RBC QN AUTO: 26.6 PG (ref 26–34)
MCHC RBC AUTO-ENTMCNC: 33.5 G/DL (ref 31–37)
MCV RBC AUTO: 79.6 FL (ref 80–100)
NRBC AUTOMATED: ABNORMAL PER 100 WBC
PDW BLD-RTO: 16.5 % (ref 11.5–14.9)
PLATELET # BLD: 173 K/UL (ref 150–450)
PMV BLD AUTO: 8 FL (ref 6–12)
RBC # BLD: 4.01 M/UL (ref 4.5–5.9)
WBC # BLD: 14.9 K/UL (ref 3.5–11)

## 2018-01-27 PROCEDURE — 6360000002 HC RX W HCPCS: Performed by: ORTHOPAEDIC SURGERY

## 2018-01-27 PROCEDURE — 36415 COLL VENOUS BLD VENIPUNCTURE: CPT

## 2018-01-27 PROCEDURE — 99024 POSTOP FOLLOW-UP VISIT: CPT | Performed by: ORTHOPAEDIC SURGERY

## 2018-01-27 PROCEDURE — 1200000000 HC SEMI PRIVATE

## 2018-01-27 PROCEDURE — 6370000000 HC RX 637 (ALT 250 FOR IP): Performed by: ORTHOPAEDIC SURGERY

## 2018-01-27 PROCEDURE — 85027 COMPLETE CBC AUTOMATED: CPT

## 2018-01-27 PROCEDURE — 2580000003 HC RX 258: Performed by: ORTHOPAEDIC SURGERY

## 2018-01-27 RX ADMIN — CEFAZOLIN 2 G: 1 INJECTION, POWDER, FOR SOLUTION INTRAMUSCULAR; INTRAVENOUS at 05:31

## 2018-01-27 RX ADMIN — SERTRALINE HYDROCHLORIDE 100 MG: 100 TABLET ORAL at 08:34

## 2018-01-27 RX ADMIN — METOPROLOL SUCCINATE 50 MG: 50 TABLET, EXTENDED RELEASE ORAL at 08:34

## 2018-01-27 RX ADMIN — HYDROCODONE BITARTRATE AND ACETAMINOPHEN 1 TABLET: 5; 325 TABLET ORAL at 16:35

## 2018-01-27 RX ADMIN — FENOFIBRATE 160 MG: 160 TABLET ORAL at 08:34

## 2018-01-27 RX ADMIN — TAMSULOSIN HYDROCHLORIDE 0.4 MG: 0.4 CAPSULE ORAL at 08:34

## 2018-01-27 RX ADMIN — Medication 10 ML: at 21:01

## 2018-01-27 RX ADMIN — Medication 10 ML: at 08:34

## 2018-01-27 RX ADMIN — SIMVASTATIN 20 MG: 20 TABLET, FILM COATED ORAL at 21:01

## 2018-01-27 RX ADMIN — HYDROCODONE BITARTRATE AND ACETAMINOPHEN 1 TABLET: 5; 325 TABLET ORAL at 05:31

## 2018-01-27 ASSESSMENT — PAIN DESCRIPTION - ONSET: ONSET: ON-GOING

## 2018-01-27 ASSESSMENT — PAIN DESCRIPTION - PROGRESSION: CLINICAL_PROGRESSION: NOT CHANGED

## 2018-01-27 ASSESSMENT — PAIN DESCRIPTION - LOCATION: LOCATION: BACK

## 2018-01-27 ASSESSMENT — PAIN SCALES - GENERAL
PAINLEVEL_OUTOF10: 2
PAINLEVEL_OUTOF10: 3
PAINLEVEL_OUTOF10: 3

## 2018-01-27 ASSESSMENT — PAIN DESCRIPTION - DESCRIPTORS: DESCRIPTORS: SORE

## 2018-01-27 ASSESSMENT — PAIN DESCRIPTION - FREQUENCY: FREQUENCY: CONTINUOUS

## 2018-01-27 ASSESSMENT — PAIN DESCRIPTION - ORIENTATION: ORIENTATION: LOWER

## 2018-01-27 ASSESSMENT — PAIN DESCRIPTION - PAIN TYPE: TYPE: SURGICAL PAIN

## 2018-01-27 NOTE — PLAN OF CARE
Problem: Falls - Risk of  Goal: Absence of falls  Outcome: Met This Shift  Call light in reach, 2 top side rails are up. Bed is locked and in lowest position. Uses call light appropriately. Safety maintained and will continue to monitor. Patient remained on bedrest this shift. Problem: Pain:  Goal: Pain level will decrease  Pain level will decrease   Outcome: Met This Shift  Pain assessed with each interaction. Medication offered as needed. Assisted with/encouraged position of comfort. Pain reassessment done and will continue to monitor. Patient denies pain and no medications were given.

## 2018-01-27 NOTE — CARE COORDINATION
surgery? Yes  Did the physician reorder the beta blocker for post op medications? yes  If the physician did not reorder the beta blocker was he notified not applicable      Prophylactic Post Op Antibiotics  Is the antibiotic scheduled to end within 24 hours of Anesthesia end time? Yes    Rodriguez  Does patient have a rodriguez? Yes  It is recommended that the rodriguez be discontinued by POD#2.  Place a sticky note to the physician to remind him/her that the rodriguez should be out by the following date: 1-28-18

## 2018-01-27 NOTE — CARE COORDINATION
ONGOING DISCHARGE PLAN:    Patient is POD #1 L1-2 decompression with diskectomy; posterior segmental instrumentation, T10-L5; removal of hardware, deep, x2 to include 2 previously placed rods; posterior fusion, T10-L2 with allograft, autograft, and fluoroscopic assistance. Case Management and LSW to follow for discharge planning. PT and OT are ordered and awaiting recommendations. Will continue to follow for additional discharge needs.     Electronically signed by Sari Ritter RN on 1/27/2018 at 8:04 AM

## 2018-01-27 NOTE — PLAN OF CARE
Problem: Pain:  Goal: Pain level will decrease  Pain level will decrease  Outcome: Ongoing  Patient instructed on pain scale. Pain med offered. Patient declined stating he hardly had much discomfort. Patient also instructed to not wait until the pain becomes intense before asking for pain med. Patient acknowledged.

## 2018-01-28 LAB
HCT VFR BLD CALC: 31.5 % (ref 41–53)
HEMOGLOBIN: 10.2 G/DL (ref 13.5–17.5)
MCH RBC QN AUTO: 25.5 PG (ref 26–34)
MCHC RBC AUTO-ENTMCNC: 32.3 G/DL (ref 31–37)
MCV RBC AUTO: 78.7 FL (ref 80–100)
NRBC AUTOMATED: ABNORMAL PER 100 WBC
PDW BLD-RTO: 16.9 % (ref 11.5–14.9)
PLATELET # BLD: 169 K/UL (ref 150–450)
PMV BLD AUTO: 7.6 FL (ref 6–12)
RBC # BLD: 4.01 M/UL (ref 4.5–5.9)
WBC # BLD: 13.2 K/UL (ref 3.5–11)

## 2018-01-28 PROCEDURE — 85027 COMPLETE CBC AUTOMATED: CPT

## 2018-01-28 PROCEDURE — 6370000000 HC RX 637 (ALT 250 FOR IP): Performed by: ORTHOPAEDIC SURGERY

## 2018-01-28 PROCEDURE — 1200000000 HC SEMI PRIVATE

## 2018-01-28 PROCEDURE — 36415 COLL VENOUS BLD VENIPUNCTURE: CPT

## 2018-01-28 PROCEDURE — 2580000003 HC RX 258: Performed by: ORTHOPAEDIC SURGERY

## 2018-01-28 RX ADMIN — SIMVASTATIN 20 MG: 20 TABLET, FILM COATED ORAL at 20:52

## 2018-01-28 RX ADMIN — SERTRALINE HYDROCHLORIDE 100 MG: 100 TABLET ORAL at 08:46

## 2018-01-28 RX ADMIN — Medication 10 ML: at 08:47

## 2018-01-28 RX ADMIN — TAMSULOSIN HYDROCHLORIDE 0.4 MG: 0.4 CAPSULE ORAL at 08:46

## 2018-01-28 RX ADMIN — Medication 10 ML: at 20:53

## 2018-01-28 RX ADMIN — FENOFIBRATE 160 MG: 160 TABLET ORAL at 08:46

## 2018-01-28 RX ADMIN — METOPROLOL SUCCINATE 50 MG: 50 TABLET, EXTENDED RELEASE ORAL at 08:46

## 2018-01-28 ASSESSMENT — PAIN SCALES - GENERAL: PAINLEVEL_OUTOF10: 2

## 2018-01-28 ASSESSMENT — PAIN DESCRIPTION - PAIN TYPE: TYPE: CHRONIC PAIN;SURGICAL PAIN

## 2018-01-28 ASSESSMENT — PAIN DESCRIPTION - LOCATION: LOCATION: BACK

## 2018-01-28 NOTE — CARE COORDINATION
ONGOING DISCHARGE PLAN:    The patient is POD #2 L1-2 decompression with diskectomy; posterior segmental instrumentation, T10-L5; removal of hardware, deep, x2 to include 2 previously placed rods; posterior fusion, T10-L2 with allograft, autograft, and fluoroscopic assistance. Awaiting PT/OT recommendations for discharge planning on patient. The patient is from home with spouse and has declined VNS. Will continue to follow for additional discharge needs.     Electronically signed by Dina Sofia RN on 1/28/2018 at 8:06 AM

## 2018-01-28 NOTE — FLOWSHEET NOTE
01/28/18 1217   Encounter Summary   Services provided to: Patient   Referral/Consult From: Natali   Continue Visiting (1/28/18)   Complexity of Encounter Low   Length of Encounter 15 minutes   Spiritual/Moravian   Type Ritual   Sacraments   Sacrament of Sick-Anointing Anointed  (Don August 1/28/18)

## 2018-01-29 PROCEDURE — 99024 POSTOP FOLLOW-UP VISIT: CPT | Performed by: ORTHOPAEDIC SURGERY

## 2018-01-29 PROCEDURE — 1200000000 HC SEMI PRIVATE

## 2018-01-29 PROCEDURE — 2580000003 HC RX 258: Performed by: ORTHOPAEDIC SURGERY

## 2018-01-29 PROCEDURE — 6370000000 HC RX 637 (ALT 250 FOR IP): Performed by: ORTHOPAEDIC SURGERY

## 2018-01-29 RX ORDER — BISACODYL 10 MG
10 SUPPOSITORY, RECTAL RECTAL DAILY PRN
Status: DISCONTINUED | OUTPATIENT
Start: 2018-01-29 | End: 2018-01-31 | Stop reason: HOSPADM

## 2018-01-29 RX ADMIN — Medication 10 ML: at 09:33

## 2018-01-29 RX ADMIN — TAMSULOSIN HYDROCHLORIDE 0.4 MG: 0.4 CAPSULE ORAL at 09:32

## 2018-01-29 RX ADMIN — METOPROLOL SUCCINATE 50 MG: 50 TABLET, EXTENDED RELEASE ORAL at 09:32

## 2018-01-29 RX ADMIN — FENOFIBRATE 160 MG: 160 TABLET ORAL at 09:32

## 2018-01-29 RX ADMIN — SERTRALINE HYDROCHLORIDE 100 MG: 100 TABLET ORAL at 09:32

## 2018-01-29 RX ADMIN — SIMVASTATIN 20 MG: 20 TABLET, FILM COATED ORAL at 22:11

## 2018-01-29 ASSESSMENT — PAIN SCALES - GENERAL
PAINLEVEL_OUTOF10: 0
PAINLEVEL_OUTOF10: 0

## 2018-01-29 NOTE — PLAN OF CARE
Problem: Falls - Risk of  Intervention: Fall precautions  Pt. Free of falls and injuries this shift. Goal: Absence of falls  Outcome: Ongoing      Problem: Pain:  Intervention: Promote participation in pain management plan  Adequate pain control achieved this shift. See MAR. Problem: Risk for Impaired Skin Integrity  Goal: Tissue integrity - skin and mucous membranes  Structural intactness and normal physiological function of skin and  mucous membranes. Outcome: Ongoing    Intervention: SKIN ASSESSMENT  Skin integrity improved/maintained this shift. See head to toe assessment.

## 2018-01-30 LAB
ABO/RH: NORMAL
ANTIBODY SCREEN: NEGATIVE
ARM BAND NUMBER: NORMAL
BLD PROD TYP BPU: NORMAL
BLOOD BANK COMMENT: NORMAL
CROSSMATCH RESULT: NORMAL
DISPENSE STATUS BLOOD BANK: NORMAL
EXPIRATION DATE: NORMAL
TRANSFUSION STATUS: NORMAL
UNIT DIVISION: 0
UNIT NUMBER: NORMAL

## 2018-01-30 PROCEDURE — 2580000003 HC RX 258: Performed by: ORTHOPAEDIC SURGERY

## 2018-01-30 PROCEDURE — 99024 POSTOP FOLLOW-UP VISIT: CPT | Performed by: ORTHOPAEDIC SURGERY

## 2018-01-30 PROCEDURE — 1200000000 HC SEMI PRIVATE

## 2018-01-30 PROCEDURE — G8979 MOBILITY GOAL STATUS: HCPCS

## 2018-01-30 PROCEDURE — G8988 SELF CARE GOAL STATUS: HCPCS

## 2018-01-30 PROCEDURE — 97116 GAIT TRAINING THERAPY: CPT

## 2018-01-30 PROCEDURE — 97165 OT EVAL LOW COMPLEX 30 MIN: CPT

## 2018-01-30 PROCEDURE — 97530 THERAPEUTIC ACTIVITIES: CPT

## 2018-01-30 PROCEDURE — 97161 PT EVAL LOW COMPLEX 20 MIN: CPT

## 2018-01-30 PROCEDURE — G8978 MOBILITY CURRENT STATUS: HCPCS

## 2018-01-30 PROCEDURE — 6370000000 HC RX 637 (ALT 250 FOR IP): Performed by: ORTHOPAEDIC SURGERY

## 2018-01-30 PROCEDURE — G8987 SELF CARE CURRENT STATUS: HCPCS

## 2018-01-30 RX ADMIN — METOPROLOL SUCCINATE 50 MG: 50 TABLET, EXTENDED RELEASE ORAL at 09:23

## 2018-01-30 RX ADMIN — Medication 10 ML: at 09:24

## 2018-01-30 RX ADMIN — SIMVASTATIN 20 MG: 20 TABLET, FILM COATED ORAL at 21:57

## 2018-01-30 RX ADMIN — HYDROCODONE BITARTRATE AND ACETAMINOPHEN 1 TABLET: 5; 325 TABLET ORAL at 21:57

## 2018-01-30 RX ADMIN — TAMSULOSIN HYDROCHLORIDE 0.4 MG: 0.4 CAPSULE ORAL at 09:23

## 2018-01-30 RX ADMIN — BISACODYL 10 MG: 10 SUPPOSITORY RECTAL at 09:44

## 2018-01-30 RX ADMIN — FENOFIBRATE 160 MG: 160 TABLET ORAL at 09:23

## 2018-01-30 RX ADMIN — SERTRALINE HYDROCHLORIDE 100 MG: 100 TABLET ORAL at 09:23

## 2018-01-30 ASSESSMENT — PAIN DESCRIPTION - FREQUENCY: FREQUENCY: CONTINUOUS

## 2018-01-30 ASSESSMENT — PAIN DESCRIPTION - PAIN TYPE
TYPE: SURGICAL PAIN
TYPE: SURGICAL PAIN

## 2018-01-30 ASSESSMENT — PAIN DESCRIPTION - DESCRIPTORS: DESCRIPTORS: ACHING

## 2018-01-30 ASSESSMENT — 9 HOLE PEG TEST
TEST_RESULT: FUNCTIONAL
TEST_RESULT: FUNCTIONAL

## 2018-01-30 ASSESSMENT — PAIN DESCRIPTION - PROGRESSION
CLINICAL_PROGRESSION: GRADUALLY IMPROVING
CLINICAL_PROGRESSION: GRADUALLY WORSENING

## 2018-01-30 ASSESSMENT — PAIN DESCRIPTION - ORIENTATION
ORIENTATION: LEFT
ORIENTATION: LEFT

## 2018-01-30 ASSESSMENT — PAIN SCALES - GENERAL
PAINLEVEL_OUTOF10: 0
PAINLEVEL_OUTOF10: 3
PAINLEVEL_OUTOF10: 0
PAINLEVEL_OUTOF10: 0

## 2018-01-30 ASSESSMENT — PAIN DESCRIPTION - LOCATION
LOCATION: LEG
LOCATION: LEG

## 2018-01-30 ASSESSMENT — PAIN DESCRIPTION - DIRECTION: RADIATING_TOWARDS: FOOT

## 2018-01-30 ASSESSMENT — PAIN DESCRIPTION - ONSET: ONSET: ON-GOING

## 2018-01-30 NOTE — CARE COORDINATION
[address] Transitions Interview     2018    Patient: Amarjit Gunter Patient : 1945   MRN: [id number]  Reason for Admission: There are no discharge diagnoses documented for the most recent discharge. RARS: [facility] Risk Score: 17.5       Patient sitting up in chair reading paper when writer visited, plan is home with no vns at this time, pt/ot, will follow//JU      Readmission Risk  Patient Active Problem List   Diagnosis    Hyperlipemia    RAFAT on CPAP    Anxiety    Spinal stenosis, lumbar region, with neurogenic claudication    Degeneration of lumbar or lumbosacral intervertebral disc    CKD (chronic kidney disease)    SCC (squamous cell carcinoma)    Claustrophobia    Depression    Hearing loss    Essential (primary) hypertension    Agoraphobia    Mass in neck    Left foot drop    Lumbar disc herniation    Flat back syndrome, postprocedural    Back pain       Inpatient Assessment  Care Transitions Summary    Care Transitions Inpatient Review  Medication Review  Are you able to afford your medications?:  Yes  Housing Review  Who do you live with?:  Partner/Spouse/SO  Social Support  Durable Medical Equipment  Patient DME:  Straight cane, Walker  Patient Home Equipment:  CPAP  Functional Review  Hearing and Vision  Care Transitions Interventions         Follow Up  Future Appointments  Date Time Provider Lorena Lucia   2018 9:45 AM Analia De Paz MD [facility]       Health Maintenance  There are no preventive care reminders to display for this patient.     Cresencio Schumacher RN

## 2018-01-30 NOTE — CARE COORDINATION
Will Cunningham from  spoke with patient.   He is agreeable to the Home To Stay Program.    Electronically signed by Jaye Bowling RN on 1/30/2018 at 3:01 PM

## 2018-01-31 VITALS
RESPIRATION RATE: 16 BRPM | DIASTOLIC BLOOD PRESSURE: 77 MMHG | TEMPERATURE: 97.9 F | WEIGHT: 226 LBS | SYSTOLIC BLOOD PRESSURE: 128 MMHG | OXYGEN SATURATION: 96 % | HEIGHT: 70 IN | BODY MASS INDEX: 32.35 KG/M2 | HEART RATE: 85 BPM

## 2018-01-31 LAB
BILIRUBIN URINE: NEGATIVE
COLOR: YELLOW
COMMENT UA: NORMAL
GLUCOSE URINE: NEGATIVE
KETONES, URINE: NEGATIVE
LEUKOCYTE ESTERASE, URINE: NEGATIVE
NITRITE, URINE: NEGATIVE
PH UA: 6 (ref 5–8)
PROTEIN UA: NEGATIVE
SPECIFIC GRAVITY UA: 1.01 (ref 1–1.03)
TURBIDITY: CLEAR
URINE HGB: NEGATIVE
UROBILINOGEN, URINE: NORMAL

## 2018-01-31 PROCEDURE — 97530 THERAPEUTIC ACTIVITIES: CPT

## 2018-01-31 PROCEDURE — 97116 GAIT TRAINING THERAPY: CPT

## 2018-01-31 PROCEDURE — 51798 US URINE CAPACITY MEASURE: CPT

## 2018-01-31 PROCEDURE — 51702 INSERT TEMP BLADDER CATH: CPT

## 2018-01-31 PROCEDURE — 6370000000 HC RX 637 (ALT 250 FOR IP): Performed by: ORTHOPAEDIC SURGERY

## 2018-01-31 PROCEDURE — 97110 THERAPEUTIC EXERCISES: CPT

## 2018-01-31 PROCEDURE — 99024 POSTOP FOLLOW-UP VISIT: CPT | Performed by: ORTHOPAEDIC SURGERY

## 2018-01-31 PROCEDURE — 81003 URINALYSIS AUTO W/O SCOPE: CPT

## 2018-01-31 RX ORDER — HYDROCODONE BITARTRATE AND ACETAMINOPHEN 5; 325 MG/1; MG/1
1-2 TABLET ORAL EVERY 4 HOURS PRN
Qty: 40 TABLET | Refills: 0 | Status: SHIPPED | OUTPATIENT
Start: 2018-01-31 | End: 2018-02-16 | Stop reason: SDUPTHER

## 2018-01-31 RX ADMIN — TAMSULOSIN HYDROCHLORIDE 0.4 MG: 0.4 CAPSULE ORAL at 08:52

## 2018-01-31 RX ADMIN — FENOFIBRATE 160 MG: 160 TABLET ORAL at 08:52

## 2018-01-31 RX ADMIN — METOPROLOL SUCCINATE 50 MG: 50 TABLET, EXTENDED RELEASE ORAL at 08:52

## 2018-01-31 RX ADMIN — SERTRALINE HYDROCHLORIDE 100 MG: 100 TABLET ORAL at 08:52

## 2018-01-31 NOTE — PROGRESS NOTES
01/31/18 0959   PT Individual Minutes   Time In 0915   Time Out 0940   Minutes 25
Dr. Bañuelos Gathers rounds and would like to keep the patient on strict bedrest, probably until Monday. He also would like to keep the rodriguez in as long as the patient is on bedrest related to urinary retention.
[facility]: ALYSHA JEFFREY   INPATIENT OCCUPATIONAL THERAPY  PROGRESS NOTE  Date: 2018  Patient Name: Fede Leon      Room: 3580/0420-99  MRN: [id number]    : 1945  (78 y.o.) Gender: male   Referring Practitioner: Dr Emili Gutierrez   Diagnosis: s/p PLIF with discectomy   General  Chart Reviewed: Yes  Patient assessed for rehabilitation services?: Yes  Additional Pertinent Hx: Back Surgery; Family / Caregiver Present: Yes  Referring Practitioner: Dr Emili Gutierrez   Diagnosis: s/p PLIF with discectomy     Restrictions  Restrictions/Precautions: Surgical Protocols, General Precautions  Position Activity Restriction  Spinal Precautions: No Twisting, No Bending       Subjective  Comments: pt in process of being D/C, tx focussed on safety education  Patient Currently in Pain: Denies          Objective        Balance  Sitting Balance: Modified independent   Standing Balance: Stand by assistance  Standing Balance  Time: 1-2 minutes c RW  Activity: chair>WC transfer, 10 steps  Comment: VCs for safety and tech c F return  Functional Mobility  Functional - Mobility Device: Rolling Walker  Activity:  (chair>WC transfer, 10 steps)  Assist Level: Stand by assistance  Functional Mobility Comments: VCs for safety and tech c F return, no LOB noted        Assessment  Performance deficits / Impairments: Decreased functional mobility ; Decreased ADL status; Decreased safe awareness;Decreased endurance  Prognosis: Good  Discharge Recommendations: Home with assist PRN  Activity Tolerance: Patient Tolerated treatment well  Safety Devices in place: Not Applicable  Type of devices:  (pt being DC'd)          Patient Education:  Patient Education: OT POC, review Back Program, home safety, RW safety , bed mobility following back precautions  Learner:patient and significant other  Method: demonstration, explanation and handout       Outcome: acknowledged understanding     Plan  Safety Devices  Safety Devices in place: Not Applicable  Type
Incentive spirometer instructed
Patient up to bathroom. Very dyspneic on exertion. Assisted back to bed and HOB elevated. Oxygen continued. Complaining of rated 5 chest pain described as heaviness. States that this is new to her. Denies calf tenderness. No evidence of leg swelling or redness. Lung sounds with wheezing and few rhonchi noted. States that she has COPD but does not have this dyspnea at home. Asking for her inhaler. \"I always take it with breakfast.\" Dr. Surjit Sung rounds and notified of this.
Physical Therapy    Facility/Department: [facility] MED SURG  Initial Assessment    NAME: Kiana May  : 1945  MRN: [id number]    Date of Service: 2018    T10-L5 pif with L1-2 decompression diskectomy     has a past medical history of Agoraphobia; Anxiety; Arthritis; Basal cell carcinoma; Chronic pain; CKD (chronic kidney disease); Claustrophobia; Depression; Enlarged lymph node; Hearing aid worn; Hearing loss; Hyperlipidemia; Hypertension; RAFAT (obstructive sleep apnea); PONV (postoperative nausea and vomiting); SCC (squamous cell carcinoma); Spinal stenosis, lumbar region, with neurogenic claudication; and Wears glasses. has a past surgical history that includes Vasectomy; Colonoscopy (12); e-malignant / benign skin lesion excision; back surgery (10/2013); skin biopsy; lymph node biopsy (Right); lumbar fusion (N/A, 3/8/2017); colon ca scrn not hi rsk ind (N/A, 2017); and arthrodesis posterior/posterolateral thoracic (N/A, 2018).     Restrictions  Restrictions/Precautions  Restrictions/Precautions: General Precautions  Required Braces or Orthoses?: No  Vision/Hearing  Vision: Impaired  Vision Exceptions: Wears glasses at all times  Hearing: Exceptions to [facility] Exceptions: Hard of hearing/hearing concerns;Bilateral hearing aid     Subjective  General  Chart Reviewed: Yes  Patient assessed for rehabilitation services?: Yes  Family / Caregiver Present: Yes (spouse)  Diagnosis: T10-L5 pif with L1-2 decompression diskectomy  Follows Commands: Within Functional Limits  Pain Screening  Patient Currently in Pain: Denies  Vital Signs  Patient Currently in Pain: Denies       Orientation  Orientation  Overall Orientation Status: Within Normal Limits    Social/Functional History  Social/Functional History  Lives With: Spouse  Type of Home: House  Home Layout: Two level  Home Access: Stairs to enter without rails  Entrance Stairs - Number of Steps: 1  Bathroom Shower/Tub: Tub/Shower unit  Home
Surgical Progress Note    POD: 1    Patient doing fairly well  Vitals:    18 0634   BP: 109/65   Pulse: 101   Resp: 18   Temp: 97.7 °F (36.5 °C)   SpO2: 95%      Temp (24hrs), Av.6 °F (35.9 °C), Min:95.9 °F (35.5 °C), Max:98.1 °F (36.7 °C)       Pain Control Good  No unusual nausea    Exam:  Moderate drainage  No headache  No leg pain    Lungs:  No respiratory distress    Labs reviewed:  Hemoglobin   Date/Time Value Ref Range Status   2018 06:26 AM 10.7 (L) 13.5 - 17.5 g/dL Final     Hematocrit   Date/Time Value Ref Range Status   2018 06:26 AM 31.9 (L) 41 - 53 % Final     WBC   Date/Time Value Ref Range Status   2018 06:26 AM 14.9 (H) 3.5 - 11.0 k/uL Final     Sodium   Date/Time Value Ref Range Status   2018 07:56  135 - 144 mmol/L Final     Potassium   Date/Time Value Ref Range Status   2018 07:56 AM 4.2 3.7 - 5.3 mmol/L Final     Chloride   Date/Time Value Ref Range Status   2018 07:56  98 - 107 mmol/L Final     CO2   Date/Time Value Ref Range Status   2018 07:56 AM 24 20 - 31 mmol/L Final     INR   Date/Time Value Ref Range Status   2017 09:51 AM 1.1  Final     Comment:           Therapeutic Range:  Normal:     INR = 0.8-1.3  Venous Thrombosis Prevention:     INR = 2.0-3.0  Mechanical Heart Valve Replacement:     INR = 2.5-3.5        Performed at [facility] [address] [facility] [address] [place] [phone]         I/O last 3 completed shifts: In: 4859 [P.O.:1200;  I.V.:4849]  Out: 7453 [Urine:2350; Drains:250; Blood:850]    Assessment:  Doing well  Csf drainage likely  History of severe post op urninary retention - continue rodriguez while at bed rest  Plan:  See my orders  Bed rest    Jef Bueno MD  2018 9:08 AM
Surgical Progress Note    POD: 3    Patient doing fairly well  Vitals:    18 0611   BP: (!) 142/86   Pulse: 80   Resp: 24   Temp: 98.2 °F (36.8 °C)   SpO2: 95%      Temp (24hrs), Av.1 °F (36.7 °C), Min:97.9 °F (36.6 °C), Max:98.2 °F (36.8 °C)       Pain Control Good  No unusual nausea    Exam:  Some dysesthesia and weakness left thigh    Drain output decreased      Lungs:  No respiratory distress    Labs reviewed:  Hemoglobin   Date/Time Value Ref Range Status   2018 05:38 AM 10.2 (L) 13.5 - 17.5 g/dL Final     Hematocrit   Date/Time Value Ref Range Status   2018 05:38 AM 31.5 (L) 41 - 53 % Final     WBC   Date/Time Value Ref Range Status   2018 05:38 AM 13.2 (H) 3.5 - 11.0 k/uL Final     Sodium   Date/Time Value Ref Range Status   2018 07:56  135 - 144 mmol/L Final     Potassium   Date/Time Value Ref Range Status   2018 07:56 AM 4.2 3.7 - 5.3 mmol/L Final     Chloride   Date/Time Value Ref Range Status   2018 07:56  98 - 107 mmol/L Final     CO2   Date/Time Value Ref Range Status   2018 07:56 AM 24 20 - 31 mmol/L Final     INR   Date/Time Value Ref Range Status   2017 09:51 AM 1.1  Final     Comment:           Therapeutic Range:  Normal:     INR = 0.8-1.3  Venous Thrombosis Prevention:     INR = 2.0-3.0  Mechanical Heart Valve Replacement:     INR = 2.5-3.5        Performed at [facility] [address] [facility] [address] [place] [phone]         I/O last 3 completed shifts: In: 4784 [P.O.:1800;  I.V.:10]  Out: 7810 [Urine:7625; Drains:185]    Assessment:  Doing well  Drain to suction     Fu output    Plan:  See my orders  Follow up out put   Continue bed rest   Will see later today  Allison Wise MD  2018 7:40 AM
lof rolling for bed mobility   Short term goal 2 Pt mod indep with transfers   Short term goal 3 Pt amb 200 ft with rw and supervision   Short term goal 4 Pt negotiate 1 step in order to enter home, SBA   Conditions Requiring Skilled Therapeutic Intervention   Body structures, Functions, Activity limitations Decreased functional mobility ; Decreased balance;Decreased endurance   Prognosis Good   Decision Making Low Complexity   REQUIRES PT FOLLOW UP Yes   Discharge Recommendations Home with assist PRN;Outpatient PT   Activity Tolerance   Activity Tolerance Patient Tolerated treatment well   PT Equipment Recommendations   Equipment Needed No   Plan   Times per week 1-2x/day, 7 days a week   Current Treatment Recommendations Strengthening;Balance Training;Transfer Training; Endurance Training;Stair training;Gait Training   Safety Devices   Type of devices Call light within reach;Gait belt;Left in bed  (Wife remains present)   Restraints   Initially in place No   PT Whiteboard Notes   Therapy Whiteboard 1/31/18: [place]. A for bed mobs. , CGA for transfers. Amb. 175' with RW and CGA. Negotiates 3 stairs with 1HR. 1 episode of knee buckling with stairs, able to self correct.
deficits / Impairments: Decreased functional mobility , Decreased ADL status, Decreased safe awareness, Decreased endurance  Treatment Diagnosis: Altered ability to care for self   Prognosis: Good  Decision Making: Medium Complexity  History: Moderate chart review   Exam: 4 areas of altered performance   Patient Education: OT POC, Back Program, EC/WS   REQUIRES OT FOLLOW UP: Yes  Discharge Recommendations: Home with assist PRN  Activity Tolerance: Patient limited by fatigue         G CODE  OT G-codes  Functional Assessment Tool Used: AM PAC Basic Cares   Score: 16  Functional Limitation: Self care  Self Care Current Status (): At least 40 percent but less than 60 percent impaired, limited or restricted  Self Care Goal Status ():  At least 20 percent but less than 40 percent impaired, limited or restricted    Goals  Patient Goals   Patient goals : Return home   Short term goals  Time Frame for Short term goals: 1-3 days  Short term goal 1: Tolerate 10-25 minutes of Functional / General activity to support mobiltiy and care performance   Short term goal 2: D/V understanding of Back Program and Modified Care techniques to promote safety and indenpendence in care tasks   Short term goal 3: D/V Understanding of DME/AE and care strategies with application to Care needs   Short term goal 4: Participate in care tasks from seated position using safe techniques for mobility and self care    Plan  Safety Devices  Safety Devices in place: Yes  Type of devices: Left in chair, Call light within reach     Plan  Times per week: 1-4 sesisons  Times per day: Daily  Current Treatment Recommendations: Functional Mobility Training, Safety Education & Training, Self-Care / ADL, Patient/Caregiver Education & Training, Endurance Training, Positioning    OT Equipment Recommendations  Equipment Needed: Yes (To Be determined )  OT Individual Minutes  Time In: 8047  Time Out: [phone]  Minutes: 35    Electronically signed by Christian Randall

## 2018-01-31 NOTE — FLOWSHEET NOTE
Repeat bladder scan done after patient voided in toilet. Residual urine is 775. #16 F rodriguez catheter inserted without difficulty using sterile technique. Immediate return of clear straw colored urine. Patient tolerated procedure well. Urine specimen sent to lab for UA and reflex culture. Patient expresses relief.

## 2018-01-31 NOTE — PLAN OF CARE
Problem: Falls - Risk of  Goal: Absence of falls  Outcome: Met This Shift  Call light in reach, 2 top side rails are up. Bed is locked and in lowest position. Uses call light appropriately. Safety maintained and will continue to monitor. Patient is compliant using call light. Problem: Pain:  Goal: Pain level will decrease  Pain level will decrease   Outcome: Met This Shift  Pain assessed with each interaction. Medication offered as needed. Assisted with/encouraged position of comfort. Pain reassessment done and will continue to monitor. Medicated at  for left leg pain with relief stated. Problem: Risk for Impaired Skin Integrity  Goal: Tissue integrity - skin and mucous membranes  Structural intactness and normal physiological function of skin and  mucous membranes. Outcome: Met This Shift  Skin assessment completed. See Head to Toe assessment for details. No skin breakdown noted. Problem: Urinary Retention:  Goal: Urinary elimination within specified parameters  Urinary elimination within specified parameters  Outcome: Not Met This Shift  [place] catheter removed on day shift. Patient voids small amounts frequently and occasionally dribbles. Bladder scan reveals retention of 680 ml. Ashton catheter reinserted.

## 2018-02-01 ENCOUNTER — CARE COORDINATION (OUTPATIENT)
Dept: CASE MANAGEMENT | Age: 73
End: 2018-02-01

## 2018-02-01 ENCOUNTER — TELEPHONE (OUTPATIENT)
Dept: UROLOGY | Age: 73
End: 2018-02-01

## 2018-02-01 DIAGNOSIS — M54.42 CHRONIC MIDLINE LOW BACK PAIN WITH BILATERAL SCIATICA: Primary | ICD-10-CM

## 2018-02-01 DIAGNOSIS — M51.37 DEGENERATION OF LUMBAR OR LUMBOSACRAL INTERVERTEBRAL DISC: Primary | ICD-10-CM

## 2018-02-01 DIAGNOSIS — M54.41 CHRONIC MIDLINE LOW BACK PAIN WITH BILATERAL SCIATICA: Primary | ICD-10-CM

## 2018-02-01 DIAGNOSIS — G89.29 CHRONIC MIDLINE LOW BACK PAIN WITH BILATERAL SCIATICA: Primary | ICD-10-CM

## 2018-02-01 PROCEDURE — 1111F DSCHRG MED/CURRENT MED MERGE: CPT

## 2018-02-01 NOTE — TELEPHONE ENCOUNTER
Per phone call- urinary retention post-op back pain, continues rodriguez:    Continue Flomax    Avoid constipation    Follow up next week for fill and pull    Call sooner if needed.

## 2018-02-05 RX ORDER — METOPROLOL SUCCINATE 50 MG/1
TABLET, EXTENDED RELEASE ORAL
Qty: 30 TABLET | Refills: 5 | Status: SHIPPED | OUTPATIENT
Start: 2018-02-05 | End: 2018-08-03 | Stop reason: SDUPTHER

## 2018-02-07 ENCOUNTER — PROCEDURE VISIT (OUTPATIENT)
Dept: UROLOGY | Age: 73
End: 2018-02-07
Payer: MEDICARE

## 2018-02-07 VITALS
WEIGHT: 227.07 LBS | HEART RATE: 65 BPM | BODY MASS INDEX: 32.51 KG/M2 | TEMPERATURE: 98 F | DIASTOLIC BLOOD PRESSURE: 59 MMHG | HEIGHT: 70 IN | SYSTOLIC BLOOD PRESSURE: 110 MMHG

## 2018-02-07 DIAGNOSIS — K59.00 CONSTIPATION, UNSPECIFIED CONSTIPATION TYPE: ICD-10-CM

## 2018-02-07 DIAGNOSIS — R33.9 URINARY RETENTION: Primary | ICD-10-CM

## 2018-02-07 PROCEDURE — 3017F COLORECTAL CA SCREEN DOC REV: CPT | Performed by: NURSE PRACTITIONER

## 2018-02-07 PROCEDURE — G8417 CALC BMI ABV UP PARAM F/U: HCPCS | Performed by: NURSE PRACTITIONER

## 2018-02-07 PROCEDURE — G8484 FLU IMMUNIZE NO ADMIN: HCPCS | Performed by: NURSE PRACTITIONER

## 2018-02-07 PROCEDURE — 1036F TOBACCO NON-USER: CPT | Performed by: NURSE PRACTITIONER

## 2018-02-07 PROCEDURE — 99213 OFFICE O/P EST LOW 20 MIN: CPT | Performed by: NURSE PRACTITIONER

## 2018-02-07 PROCEDURE — 51700 IRRIGATION OF BLADDER: CPT | Performed by: NURSE PRACTITIONER

## 2018-02-07 PROCEDURE — 1111F DSCHRG MED/CURRENT MED MERGE: CPT | Performed by: NURSE PRACTITIONER

## 2018-02-07 PROCEDURE — 1123F ACP DISCUSS/DSCN MKR DOCD: CPT | Performed by: NURSE PRACTITIONER

## 2018-02-07 PROCEDURE — 4040F PNEUMOC VAC/ADMIN/RCVD: CPT | Performed by: NURSE PRACTITIONER

## 2018-02-07 PROCEDURE — G8427 DOCREV CUR MEDS BY ELIG CLIN: HCPCS | Performed by: NURSE PRACTITIONER

## 2018-02-07 ASSESSMENT — ENCOUNTER SYMPTOMS
VOMITING: 0
BACK PAIN: 0
COLOR CHANGE: 0
WHEEZING: 0
EYE REDNESS: 0
COUGH: 0
ABDOMINAL PAIN: 0
EYE PAIN: 0
SHORTNESS OF BREATH: 0
NAUSEA: 0

## 2018-02-07 NOTE — PROGRESS NOTES
[facility]  [facility] UROLOGY SPECIALISTS - [place]  [address]  [address]  [address] [facility] [address]  Dept: [address] [facility] Urology Office Note - Established    Patient:  Awa George  YOB: 1945  Date: 2/7/2018    The patient is a 68 y.o. male who presents today for evaluation of the following problems:   Chief Complaint   Patient presents with    Urinary Retention     fill and pull       HPI  Here for post op back surgery for retention- cath was inserted, here for fill and pull. He continues Flomax daily, has had a good BM yesterday. Summary of old records: N/A    Additional History: N/A    Procedures Today: N/A    Urinalysis today:  No results found for this visit on 02/07/18. Last several PSA's:  Lab Results   Component Value Date    PSA 1.28 07/12/2017    PSA 1.64 06/16/2016    PSA 2.43 06/10/2015     Last total testosterone:  No results found for: TESTOSTERONE    AUA Symptom Score (2/7/2018): Last BUN and creatinine:  Lab Results   Component Value Date    BUN 18 01/24/2018     Lab Results   Component Value Date    CREATININE 0.94 01/24/2018       Additional Lab/Culture results: none    Imaging Reviewed during this Office Visit: none    (results were independently reviewed by physician and radiology report verified)    PAST MEDICAL, FAMILY AND SOCIAL HISTORY UPDATE:  Past Medical History:   Diagnosis Date    Agoraphobia     Anxiety     Arthritis     Basal cell carcinoma     Chronic pain     back    CKD (chronic kidney disease)     Claustrophobia     Depression     Enlarged lymph node     rt. lower neck.  Hearing aid worn     dar. ears.     Hearing loss     Hyperlipidemia     Hypertension     RAFAT (obstructive sleep apnea)     PONV (postoperative nausea and vomiting)     SCC (squamous cell carcinoma)     HAND    Spinal stenosis, lumbar region, with neurogenic claudication     Wears glasses      Past Surgical History:

## 2018-02-09 ENCOUNTER — HOSPITAL ENCOUNTER (OUTPATIENT)
Dept: GENERAL RADIOLOGY | Age: 73
Discharge: HOME | End: 2018-02-11
Payer: MEDICARE

## 2018-02-09 ENCOUNTER — CARE COORDINATION (OUTPATIENT)
Dept: CASE MANAGEMENT | Age: 73
End: 2018-02-09

## 2018-02-09 ENCOUNTER — OFFICE VISIT (OUTPATIENT)
Dept: ORTHOPEDIC SURGERY | Age: 73
End: 2018-02-09

## 2018-02-09 VITALS
BODY MASS INDEX: 32.78 KG/M2 | HEIGHT: 70 IN | DIASTOLIC BLOOD PRESSURE: 76 MMHG | WEIGHT: 229 LBS | SYSTOLIC BLOOD PRESSURE: 130 MMHG | HEART RATE: 68 BPM

## 2018-02-09 DIAGNOSIS — M40.30 FLAT BACK SYNDROME, POSTPROCEDURAL: ICD-10-CM

## 2018-02-09 DIAGNOSIS — M51.26 LUMBAR DISC HERNIATION: ICD-10-CM

## 2018-02-09 DIAGNOSIS — M51.37 DEGENERATION OF LUMBAR OR LUMBOSACRAL INTERVERTEBRAL DISC: Primary | ICD-10-CM

## 2018-02-09 DIAGNOSIS — M48.062 SPINAL STENOSIS, LUMBAR REGION, WITH NEUROGENIC CLAUDICATION: ICD-10-CM

## 2018-02-09 DIAGNOSIS — G89.29 CHRONIC MIDLINE LOW BACK PAIN WITH BILATERAL SCIATICA: ICD-10-CM

## 2018-02-09 DIAGNOSIS — M51.37 DEGENERATION OF LUMBAR OR LUMBOSACRAL INTERVERTEBRAL DISC: ICD-10-CM

## 2018-02-09 DIAGNOSIS — M54.42 CHRONIC MIDLINE LOW BACK PAIN WITH BILATERAL SCIATICA: ICD-10-CM

## 2018-02-09 DIAGNOSIS — M54.41 CHRONIC MIDLINE LOW BACK PAIN WITH BILATERAL SCIATICA: ICD-10-CM

## 2018-02-09 PROCEDURE — 72100 X-RAY EXAM L-S SPINE 2/3 VWS: CPT

## 2018-02-09 PROCEDURE — 99024 POSTOP FOLLOW-UP VISIT: CPT | Performed by: ORTHOPAEDIC SURGERY

## 2018-02-09 NOTE — CARE COORDINATION
[facility] Transitions Follow Up Call    2018    Patient: Eddie Diaz  Patient : 1945   MRN: [id number]  Reason for Admission: There are no discharge diagnoses documented for the most recent discharge.   Discharge Date: 18 RARS: Risk Score: 17.5       Spoke with: patient's wife    Care Transitions Subsequent and Final Call    Subsequent and Final Calls  Care Transitions Interventions  Other Interventions:        Patient's wife stated patient was doing well, no needs or concerns, stated didn't feel that further care transition calls were needed, has home care and will follow up with pcp for any needs, care transitons completed//JU    Follow Up  Future Appointments  Date Time Provider Lorena Lucia   2018 2:30 PM Florida Dar, [address] [place] [address]   3/2/2018 9:00 AM MD GUSTAVO Simental-Janee [address]       Alva Sow RN

## 2018-02-16 DIAGNOSIS — M48.062 SPINAL STENOSIS, LUMBAR REGION, WITH NEUROGENIC CLAUDICATION: ICD-10-CM

## 2018-02-16 RX ORDER — HYDROCODONE BITARTRATE AND ACETAMINOPHEN 5; 325 MG/1; MG/1
1-2 TABLET ORAL EVERY 4 HOURS PRN
Qty: 40 TABLET | Refills: 0 | Status: SHIPPED | OUTPATIENT
Start: 2018-02-16 | End: 2018-02-23

## 2018-02-28 ENCOUNTER — OFFICE VISIT (OUTPATIENT)
Dept: UROLOGY | Age: 73
End: 2018-02-28
Payer: MEDICARE

## 2018-02-28 VITALS
BODY MASS INDEX: 31.64 KG/M2 | WEIGHT: 221 LBS | TEMPERATURE: 97.9 F | SYSTOLIC BLOOD PRESSURE: 111 MMHG | HEIGHT: 70 IN | DIASTOLIC BLOOD PRESSURE: 58 MMHG | HEART RATE: 78 BPM

## 2018-02-28 DIAGNOSIS — R33.9 URINARY RETENTION: Primary | ICD-10-CM

## 2018-02-28 PROCEDURE — G8484 FLU IMMUNIZE NO ADMIN: HCPCS | Performed by: NURSE PRACTITIONER

## 2018-02-28 PROCEDURE — 1111F DSCHRG MED/CURRENT MED MERGE: CPT | Performed by: NURSE PRACTITIONER

## 2018-02-28 PROCEDURE — G8427 DOCREV CUR MEDS BY ELIG CLIN: HCPCS | Performed by: NURSE PRACTITIONER

## 2018-02-28 PROCEDURE — 4040F PNEUMOC VAC/ADMIN/RCVD: CPT | Performed by: NURSE PRACTITIONER

## 2018-02-28 PROCEDURE — 1036F TOBACCO NON-USER: CPT | Performed by: NURSE PRACTITIONER

## 2018-02-28 PROCEDURE — 3017F COLORECTAL CA SCREEN DOC REV: CPT | Performed by: NURSE PRACTITIONER

## 2018-02-28 PROCEDURE — 51798 US URINE CAPACITY MEASURE: CPT | Performed by: NURSE PRACTITIONER

## 2018-02-28 PROCEDURE — 1123F ACP DISCUSS/DSCN MKR DOCD: CPT | Performed by: NURSE PRACTITIONER

## 2018-02-28 PROCEDURE — G8417 CALC BMI ABV UP PARAM F/U: HCPCS | Performed by: NURSE PRACTITIONER

## 2018-02-28 PROCEDURE — 99213 OFFICE O/P EST LOW 20 MIN: CPT | Performed by: NURSE PRACTITIONER

## 2018-02-28 ASSESSMENT — ENCOUNTER SYMPTOMS
NAUSEA: 0
WHEEZING: 0
EYE PAIN: 0
BACK PAIN: 1
EYE REDNESS: 0
ABDOMINAL PAIN: 0
VOMITING: 0
COLOR CHANGE: 0
SHORTNESS OF BREATH: 0
COUGH: 0

## 2018-02-28 NOTE — LETTER
[facility]  [facility] UROLOGY SPECIALISTS - [place]  [address]  [address]  [address] [facility] [address]  Dept: [phone]  Dept Fax: [phone]        2/28/18    Patient: Eddie Diaz  YOB: 1945    Dear Salome Koo MD,    I had the pleasure of seeing one of your patients, Yeni Kaplan today in the office today. Below are the relevant portions of my assessment and plan of care. IMPRESSION:     1. Urinary retention        PLAN:   continue Flomax     Avoid constipation. - consider Miralax if needed. F/u in 6 - 12 months. No Follow-up on file. Prescriptions Ordered:  No orders of the defined types were placed in this encounter. Orders Placed:  Orders Placed This Encounter   Procedures    IN MEASUREMENT,POST-VOID RESIDUAL VOLUME BY US,NON-IMAGING       Thank you for allowing me to participate in the care of this patient. I will keep you updated on this patient's follow up and I look forward to serving you and your patients again in the future.     Florida Reyes, CNP

## 2018-02-28 NOTE — PROGRESS NOTES
time.  Physical Exam  Constitutional: Patient in no acute distress. Neuro: Alert and oriented to person, place and time. Psych: Mood normal, affect normal  Skin: warm, pink, No rash noted  HEENT: Head: Normocephalic and atraumatic  Conjunctivae and EOM are normal. Pupils are equal, round  Nose: Normal  Right External Ear: Normal; Left External Ear: Normal  Mouth: Mucosa Moist  Neck: Supple  Lungs: Respiratory effort is normal  Cardiovascular: strong and regular. Abdomen: Soft, non-tender, non-distended  Bladder non-tender and not distended. Musculoskeletal: walker      Assessment and Plan      1. Urinary retention           Plan:    continue Flomax     Avoid constipation. - consider Miralax if needed. F/u in 6 - 12 months. No Follow-up on file. Prescriptions Ordered:  No orders of the defined types were placed in this encounter.      Orders Placed:  Orders Placed This Encounter   Procedures    LA MEASUREMENT,POST-VOID RESIDUAL VOLUME BY US,NON-IMAGING          Ananya Nguyễn, CNP

## 2018-03-02 ENCOUNTER — OFFICE VISIT (OUTPATIENT)
Dept: ORTHOPEDIC SURGERY | Age: 73
End: 2018-03-02

## 2018-03-02 ENCOUNTER — HOSPITAL ENCOUNTER (OUTPATIENT)
Dept: LAB | Age: 73
Setting detail: SPECIMEN
Discharge: HOME | End: 2018-03-02
Payer: MEDICARE

## 2018-03-02 VITALS
HEART RATE: 59 BPM | DIASTOLIC BLOOD PRESSURE: 81 MMHG | WEIGHT: 229 LBS | SYSTOLIC BLOOD PRESSURE: 136 MMHG | BODY MASS INDEX: 32.78 KG/M2 | HEIGHT: 70 IN

## 2018-03-02 DIAGNOSIS — M51.37 DEGENERATION OF LUMBAR OR LUMBOSACRAL INTERVERTEBRAL DISC: Primary | ICD-10-CM

## 2018-03-02 DIAGNOSIS — M48.062 SPINAL STENOSIS, LUMBAR REGION, WITH NEUROGENIC CLAUDICATION: ICD-10-CM

## 2018-03-02 DIAGNOSIS — M51.37 DEGENERATION OF LUMBAR OR LUMBOSACRAL INTERVERTEBRAL DISC: ICD-10-CM

## 2018-03-02 DIAGNOSIS — M51.26 LUMBAR DISC HERNIATION: ICD-10-CM

## 2018-03-02 LAB
ABSOLUTE EOS #: 0.2 K/UL (ref 0–0.4)
ABSOLUTE IMMATURE GRANULOCYTE: ABNORMAL K/UL (ref 0–0.3)
ABSOLUTE LYMPH #: 1.4 K/UL (ref 1–4.8)
ABSOLUTE MONO #: 0.9 K/UL (ref 0.1–1.2)
BASOPHILS # BLD: 1 % (ref 0–2)
BASOPHILS ABSOLUTE: 0.1 K/UL (ref 0–0.2)
DIFFERENTIAL TYPE: ABNORMAL
EOSINOPHILS RELATIVE PERCENT: 2 % (ref 1–8)
HCT VFR BLD CALC: 35.8 % (ref 41–53)
HEMOGLOBIN: 11.4 G/DL (ref 13.5–17.5)
IMMATURE GRANULOCYTES: ABNORMAL %
LYMPHOCYTES # BLD: 15 % (ref 15–43)
MCH RBC QN AUTO: 24.7 PG (ref 26–34)
MCHC RBC AUTO-ENTMCNC: 31.8 G/DL (ref 31–37)
MCV RBC AUTO: 77.6 FL (ref 80–100)
MONOCYTES # BLD: 10 % (ref 6–14)
NRBC AUTOMATED: ABNORMAL PER 100 WBC
PDW BLD-RTO: 16.7 % (ref 11–14.5)
PLATELET # BLD: 252 K/UL (ref 140–450)
PLATELET ESTIMATE: ABNORMAL
PMV BLD AUTO: 7.5 FL (ref 6–12)
RBC # BLD: 4.62 M/UL (ref 4.5–5.9)
RBC # BLD: ABNORMAL 10*6/UL
SEG NEUTROPHILS: 72 % (ref 44–74)
SEGMENTED NEUTROPHILS ABSOLUTE COUNT: 6.8 K/UL (ref 1.8–7.7)
WBC # BLD: 9.4 K/UL (ref 3.5–11)
WBC # BLD: ABNORMAL 10*3/UL

## 2018-03-02 PROCEDURE — 36415 COLL VENOUS BLD VENIPUNCTURE: CPT

## 2018-03-02 PROCEDURE — 99024 POSTOP FOLLOW-UP VISIT: CPT | Performed by: ORTHOPAEDIC SURGERY

## 2018-03-02 PROCEDURE — 85025 COMPLETE CBC W/AUTO DIFF WBC: CPT

## 2018-03-02 RX ORDER — HYDROCODONE BITARTRATE AND ACETAMINOPHEN 5; 325 MG/1; MG/1
1-2 TABLET ORAL EVERY 4 HOURS PRN
Qty: 40 TABLET | Refills: 0 | Status: SHIPPED | OUTPATIENT
Start: 2018-03-02 | End: 2018-03-09

## 2018-03-02 RX ORDER — GABAPENTIN 300 MG/1
300 CAPSULE ORAL 3 TIMES DAILY
Qty: 90 CAPSULE | Refills: 3 | Status: SHIPPED | OUTPATIENT
Start: 2018-03-02 | End: 2018-09-26 | Stop reason: ALTCHOICE

## 2018-03-06 ENCOUNTER — HOSPITAL ENCOUNTER (OUTPATIENT)
Dept: PHYSICAL THERAPY | Age: 73
Setting detail: THERAPIES SERIES
Discharge: HOME | End: 2018-03-06
Payer: MEDICARE

## 2018-03-06 PROCEDURE — G8979 MOBILITY GOAL STATUS: HCPCS

## 2018-03-06 PROCEDURE — 97162 PT EVAL MOD COMPLEX 30 MIN: CPT

## 2018-03-06 PROCEDURE — G8978 MOBILITY CURRENT STATUS: HCPCS

## 2018-03-06 ASSESSMENT — PAIN SCALES - GENERAL: PAINLEVEL_OUTOF10: 2

## 2018-03-06 ASSESSMENT — PAIN DESCRIPTION - LOCATION: LOCATION: HIP;BACK

## 2018-03-06 ASSESSMENT — PAIN DESCRIPTION - DESCRIPTORS: DESCRIPTORS: SHARP

## 2018-03-06 ASSESSMENT — PAIN DESCRIPTION - PAIN TYPE: TYPE: CHRONIC PAIN

## 2018-03-06 ASSESSMENT — PAIN DESCRIPTION - ORIENTATION: ORIENTATION: LEFT

## 2018-03-06 ASSESSMENT — PAIN DESCRIPTION - FREQUENCY: FREQUENCY: INTERMITTENT

## 2018-03-06 ASSESSMENT — PAIN DESCRIPTION - ONSET: ONSET: AWAKENED FROM SLEEP

## 2018-03-06 ASSESSMENT — PAIN DESCRIPTION - PROGRESSION: CLINICAL_PROGRESSION: NOT CHANGED

## 2018-03-06 NOTE — FLOWSHEET NOTE
abd/add      Step    Shoulder H. abd/add      Lifting    Shoulder IR/ER      Hand to opp knee    Rowing      Push down squat    Bilateral pull down      UE PNF    Push/pull      LE PNF    Push downs      Wall push ups    Arm circles      SLS    Elbow flex/ext          Chin tuck      Stretching    UT shrugs/rolls      Gastroc/Soleus    Rocking horse      Hamstring          SKTC    Other      Piriformis          Hip flexor          Ladder pull          Pec stretch          Post deltoid           Aquatic Therapy  [] Aquatic therapy with therapeutic exercises (11335)    Timed Code Treatment Minutes: Total Treatment Minutes:  30'    Treatment/Activity Tolerance:  [] Patient tolerated treatment well [] Patient limited by fatique  [x] Patient limited by pain [] Patient limited by other medical complications  [] Other:     Prognosis: [] Good [x] Fair  [] Poor    Patient Requires Follow-up: [x] Yes  [] No    Goals:       Long term goals  Time Frame for Long term goals : 4weeks   Long term goal 1: Independent in HEP   Long term goal 2: Amb with SC x 150ft mod indepenent with improved upright posture. Long term goal 3: Improve Left LE strength to 4+/5 to assist with sit to stand and stairs. Long term goal 4: Timed Stands Test with use of bilateral UE's 8x's in 30sec.     Long term goal 5: Tinetti 24/28 to decrease risk for falls     Plan:   [] Continue per plan of care [] Alter current plan (see comments)  [x] Plan of care initiated [] Hold pending MD visit [] Discharge  Plan for Next Session:  Beena   Electronically signed by:  Elle uHssein, PT

## 2018-03-08 ENCOUNTER — HOSPITAL ENCOUNTER (OUTPATIENT)
Dept: PHYSICAL THERAPY | Age: 73
Setting detail: THERAPIES SERIES
Discharge: HOME | End: 2018-03-08
Payer: MEDICARE

## 2018-03-08 PROCEDURE — 97113 AQUATIC THERAPY/EXERCISES: CPT

## 2018-03-08 NOTE — FLOWSHEET NOTE
Outpatient Physical Therapy    [x] [place]  Phone: [phone]  Fax: [phone]      [] [place]  Phone: [phone]  Fax: [phone]      Physical Therapy Aquatic Flow Sheet   Date:  3/8/2018    Patient Name:  Susanne Sung    :  1945  Restrictions/Precautions:    Diagnosis: M51.37 (ICD-10-CM) - Degeneration of lumbar or lumbosacral intervertebral disc, M51.26 (ICD-10-CM) - Lumbar disc herniation, M48.062 (ICD-10-CM) - Spinal stenosis, lumbar region, with neurogenic claudication   Treatment Diagnosis:    Insurance/Certification information:  Medicare/AARP   Referring Physician:  Dr Nadeem Beard of care signed (Y/N):  n  Visit# / total visits: 2/10 (1 aquatic)  Pain level: 0/10 current in sitting, 8/10 worst      Time In:     2:33            Time Out: 3:61  G-Code (if applicable):      Date G-Code Applied:    Current Status: PT G-Codes  Functional Assessment Tool Used: Tinetti   Score: =57%  Functional Limitation: Mobility: Walking and moving around  Mobility: Walking and Moving Around Current Status (): At least 40 percent but less than 60 percent impaired, limited or restricted  Mobility: Walking and Moving Around Goal Status (): At least 1 percent but less than 20 percent impaired, limited or restricted  Goal Status:     Progress Note: []  Yes  [x]  No  Next due by: Visit #10: Or by 18    Subjective:   Pt rpts to clinic with mild complaints of LBP stating \"It was hurting a little but since the last appointment I've been feeling decent. I've been taking my pain medications\". Objective: DARRIN complete per flow chart with PTA at [facility] for vc to ensure safety, and proper performance of exercise. Comfortable with aquatic exercises this date.    Observation: Upright posture present upon arrival.   Test measurements:          Key  B= Belt DB= Dumbells T= Theratube   H= Hydrotone N= Noodles W= Weights   P= Paddles S= Speedo equipment K= Kickboard     Exercises/Activities Discharge    Plan for Next Session:  Progress aquatics to tolerance.        Electronically signed by:  Gwen Bettencourt PTA

## 2018-03-12 ENCOUNTER — HOSPITAL ENCOUNTER (OUTPATIENT)
Dept: PHYSICAL THERAPY | Age: 73
Setting detail: THERAPIES SERIES
Discharge: HOME | End: 2018-03-12
Payer: MEDICARE

## 2018-03-12 PROCEDURE — 97113 AQUATIC THERAPY/EXERCISES: CPT

## 2018-03-12 RX ORDER — FENOFIBRATE 145 MG/1
TABLET, COATED ORAL
Qty: 90 TABLET | Refills: 3 | Status: SHIPPED | OUTPATIENT
Start: 2018-03-12 | End: 2019-01-08 | Stop reason: SDUPTHER

## 2018-03-14 ENCOUNTER — HOSPITAL ENCOUNTER (OUTPATIENT)
Dept: PHYSICAL THERAPY | Age: 73
Setting detail: THERAPIES SERIES
Discharge: HOME | End: 2018-03-14
Payer: MEDICARE

## 2018-03-14 PROCEDURE — 97113 AQUATIC THERAPY/EXERCISES: CPT

## 2018-03-16 ENCOUNTER — HOSPITAL ENCOUNTER (OUTPATIENT)
Dept: PHYSICAL THERAPY | Age: 73
Setting detail: THERAPIES SERIES
Discharge: HOME | End: 2018-03-16
Payer: MEDICARE

## 2018-03-16 PROCEDURE — 97113 AQUATIC THERAPY/EXERCISES: CPT

## 2018-03-16 NOTE — FLOWSHEET NOTE
care [] Alter current plan (see comments)  [] Plan of care initiated [] Hold pending MD visit [] Discharge    Plan for Next Session:  Practice stair ambulation on pool steps.      Electronically signed by:  Conor Palacios PT

## 2018-03-20 ENCOUNTER — HOSPITAL ENCOUNTER (OUTPATIENT)
Dept: PHYSICAL THERAPY | Age: 73
Setting detail: THERAPIES SERIES
Discharge: HOME | End: 2018-03-20
Payer: MEDICARE

## 2018-03-20 PROCEDURE — 97113 AQUATIC THERAPY/EXERCISES: CPT | Performed by: PHYSICAL THERAPIST

## 2018-03-20 NOTE — FLOWSHEET NOTE
Outpatient Physical Therapy    [x] [place]  Phone: [phone]  Fax: [phone]      [] [place]  Phone: [phone]  Fax: [phone]      Physical Therapy Aquatic Flow Sheet   Date:  3/20/2018    Patient Name:  Yi Gustafson    :  1945  Restrictions/Precautions:    Diagnosis: M51.37 (ICD-10-CM) - Degeneration of lumbar or lumbosacral intervertebral disc, M51.26 (ICD-10-CM) - Lumbar disc herniation, M48.062 (ICD-10-CM) - Spinal stenosis, lumbar region, with neurogenic claudication   Treatment Diagnosis:    Insurance/Certification information:  Medicare/AARP   Referring Physician:  Dr Erica Saunders of care signed (Y/N):  y  Visit# / total visits: 6/10 (5 aquatic)  Pain level: 1-2/10 current in sitting       Time In:     1:05           Time Out:  5:51  G-Code (if applicable):      Date G-Code Applied:    Current Status: PT G-Codes  Functional Assessment Tool Used: Tinetti   Score: =57%  Functional Limitation: Mobility: Walking and moving around  Mobility: Walking and Moving Around Current Status (): At least 40 percent but less than 60 percent impaired, limited or restricted  Mobility: Walking and Moving Around Goal Status (): At least 1 percent but less than 20 percent impaired, limited or restricted  Goal Status:     Progress Note: []  Yes  [x]  No  Next due by: Visit #10: Or by 18    Subjective:     Objective: DARRIN complete per flow chart with PT at [facility] for vc to ensure safety, and proper performance of exercise. Observation: Upright posture present upon arrival.                         Using RW  Test measurements:    Does steps one at a time.  Favors L LE due to weakness and numbness        Key  B= Belt DB= Dumbells T= Theratube   H= Hydrotone N= Noodles W= Weights   P= Paddles S= Speedo equipment K= Kickboard     Exercises/Activities: Needs verbal ques to slow down to controlled motion   Warm-up/Amb    Exercises      Slow forward   4x  HR/TR      Slow sideways  4x  9 [facility] ambulation on pool steps.      Electronically signed by:  Phani Espinal PT

## 2018-03-22 ENCOUNTER — HOSPITAL ENCOUNTER (OUTPATIENT)
Dept: PHYSICAL THERAPY | Age: 73
Setting detail: THERAPIES SERIES
Discharge: HOME | End: 2018-03-22
Payer: MEDICARE

## 2018-03-22 PROCEDURE — 97113 AQUATIC THERAPY/EXERCISES: CPT | Performed by: PHYSICAL THERAPY ASSISTANT

## 2018-03-22 RX ORDER — SIMVASTATIN 20 MG
TABLET ORAL
Qty: 90 TABLET | Refills: 1 | Status: SHIPPED | OUTPATIENT
Start: 2018-03-22 | End: 2018-08-09 | Stop reason: SDUPTHER

## 2018-03-22 NOTE — FLOWSHEET NOTE
Outpatient Physical Therapy    [x] [place]  Phone: [phone]  Fax: [phone]      [] [place]  Phone: [phone]  Fax: [phone]      Physical Therapy Aquatic Flow Sheet   Date:  3/22/2018    Patient Name:  Froilan Bone    :  1945  Restrictions/Precautions:    Diagnosis: M51.37 (ICD-10-CM) - Degeneration of lumbar or lumbosacral intervertebral disc, M51.26 (ICD-10-CM) - Lumbar disc herniation, M48.062 (ICD-10-CM) - Spinal stenosis, lumbar region, with neurogenic claudication   Treatment Diagnosis:    Insurance/Certification information:  Medicare/AARP   Referring Physician:  Dr Ibeth Floyd of care signed (Y/N):  y  Visit# / total visits: 7/10 (6 aquatic)  Pain level: 210        Time In:  1:09             Time Out:  3:30  G-Code (if applicable):      Date G-Code Applied:    Current Status: PT G-Codes  Functional Assessment Tool Used: Tinetti   Score: =57%  Functional Limitation: Mobility: Walking and moving around  Mobility: Walking and Moving Around Current Status (): At least 40 percent but less than 60 percent impaired, limited or restricted  Mobility: Walking and Moving Around Goal Status (): At least 1 percent but less than 20 percent impaired, limited or restricted  Goal Status:     Progress Note: []  Yes  [x]  No  Next due by: Visit #10: Or by 18    Subjective:   Pt reports pain 2/10 at initiation of session, and states he feels pretty good. Pt states feeling sore after session. Objective: DARRIN complete per flow chart with PTA at [facility] for vc to ensure safety, and proper performance of exercise. Observation: Upright posture present upon arrival. Using RW from waiting room into locker. Did not use RW from locker room into pool. Forward flexed posture without RW.                       Test measurements:            Key  B= Belt DB= Dumbells T= Theratube   H= Hydrotone N= Noodles W= Weights   P= Paddles S= Speedo equipment K= Kickboard     Exercises/Activities: Needs current plan (see comments)  [] Plan of care initiated [] Hold pending MD visit [] Discharge    Plan for Next Session:  Practice stair ambulation on pool steps. Continue to progress per patient's tolerance. Treatment completed by Dustin NAIK, under direct supervision of signed and licensed PTA. Electronically signed by:   Nataliia Nguyen PTA

## 2018-03-23 ENCOUNTER — HOSPITAL ENCOUNTER (OUTPATIENT)
Dept: PHYSICAL THERAPY | Age: 73
Setting detail: THERAPIES SERIES
Discharge: HOME | End: 2018-03-23
Payer: MEDICARE

## 2018-03-23 PROCEDURE — 97113 AQUATIC THERAPY/EXERCISES: CPT | Performed by: PHYSICAL THERAPIST

## 2018-03-23 NOTE — FLOWSHEET NOTE
bilateral UE's 8x's in 30sec. Long term goal 5: Tinetti 24/28 to decrease risk for falls      Plan:   [x] Continue per plan of care [] Alter current plan (see comments)  [] Plan of care initiated [] Hold pending MD visit [] Discharge    Plan for Next Session:  Practice stair ambulation on pool steps. Continue to progress per patient's tolerance.        Electronically signed by:  Dany Arciniega PT, DPT

## 2018-03-26 ENCOUNTER — HOSPITAL ENCOUNTER (OUTPATIENT)
Dept: PHYSICAL THERAPY | Age: 73
Setting detail: THERAPIES SERIES
Discharge: HOME | End: 2018-03-26
Payer: MEDICARE

## 2018-03-26 PROCEDURE — 97150 GROUP THERAPEUTIC PROCEDURES: CPT

## 2018-03-26 NOTE — FLOWSHEET NOTE
5: Babita 24/28 to decrease risk for falls      Plan:   [x] Continue per plan of care [] Alter current plan (see comments)  [] Plan of care initiated [] Hold pending MD visit [] Discharge    Plan for Next Session:  Test all goals next session.      Electronically signed by:  Lukasz Tobar PTA

## 2018-03-28 ENCOUNTER — HOSPITAL ENCOUNTER (OUTPATIENT)
Dept: PHYSICAL THERAPY | Age: 73
Setting detail: THERAPIES SERIES
Discharge: HOME | End: 2018-03-28
Payer: MEDICARE

## 2018-03-28 PROCEDURE — 97150 GROUP THERAPEUTIC PROCEDURES: CPT

## 2018-03-28 PROCEDURE — 97110 THERAPEUTIC EXERCISES: CPT

## 2018-03-28 PROCEDURE — 97113 AQUATIC THERAPY/EXERCISES: CPT

## 2018-03-28 NOTE — FLOWSHEET NOTE
use RW from locker room into pool.  Forward flexed posture noted with independent ambulation                     Test measurements:      Key  B= Belt DB= Dumbells T= Theratube   H= Hydrotone N= Noodles W= Weights   P= Paddles S= Speedo equipment K= Kickboard     Exercises/Activities: Needs verbal ques to slow down to controlled motion   Warm-up/Amb    Exercises      Slow forward   4x  HR/TR      Slow sideways  4x  Marches  3 laps    Slow backwards  4x  Mini-squats  3x10    Medium forward    4-way SLR  x20    Medium sideways    Hip circles  x20     Small shuffle    Hamstring curls 2 laps    Jog    Knee extension      Braiding    Pelvic tilts      Bicycling    Scap squeezes          Shoulder flex/ext      Functional    Shoulder abd/add  x25 P    Step  20x ea 5ft  - 4 ft  Shoulder H. abd/add  x25 c P    Lifting    Shoulder IR/ER  x25 P    Hand to opp knee    Rowing  x 25 P    Push down squat    Bilateral pull down  x25 N    UE PNF    Push/pull  20x K    LE PNF    Push downs      Wall push ups    Arm circles      SLS    Elbow flex/ext          Chin tuck      Stretching    UT shrugs/rolls      Gastroc/Soleus    Rocking horse      Hamstring          SKTC    Other      Piriformis    float  5'    Hip flexor    float kick  4'    Ladder pull    float abd/add  4'    Pec stretch          Post deltoid           Aquatic Therapy  [x] Aquatic therapy with therapeutic exercises (37022)    Timed Code Treatment Minutes:  27' Group       18' DARRIN       15' Aqua DARRIN    Total Treatment Minutes:  61'    Treatment/Activity Tolerance:  [x] Patient tolerated treatment well [] Patient limited by fatique  []00 0000Patient limited by pain [] Patient limited by other medical complications  [] Other:     Prognosis: [x] Good [] Fair  [] Poor    Patient Requires Follow-up: [x] Yes  [] No    Goals:       Long term goals  Time Frame for Long term goals : 4 weeks   Long term goal 1: Independent in HEP   Long term goal 2: Amb with SC x 150ft mod

## 2018-03-30 ENCOUNTER — HOSPITAL ENCOUNTER (OUTPATIENT)
Dept: PHYSICAL THERAPY | Age: 73
Setting detail: THERAPIES SERIES
Discharge: HOME | End: 2018-03-30
Payer: MEDICARE

## 2018-03-30 DIAGNOSIS — M51.26 LUMBAR DISC HERNIATION: ICD-10-CM

## 2018-03-30 DIAGNOSIS — M51.37 DEGENERATION OF LUMBAR OR LUMBOSACRAL INTERVERTEBRAL DISC: Primary | ICD-10-CM

## 2018-03-30 PROCEDURE — G8978 MOBILITY CURRENT STATUS: HCPCS

## 2018-03-30 PROCEDURE — 97113 AQUATIC THERAPY/EXERCISES: CPT

## 2018-03-30 PROCEDURE — G8979 MOBILITY GOAL STATUS: HCPCS

## 2018-03-30 NOTE — FLOWSHEET NOTE
[place] equipment K= Kickboard     Exercises/Activities: Needs verbal ques to slow down to controlled motion   Warm-up/Amb    Exercises      Slow forward   4x  HR/TR      Slow sideways  4x  Marches  3 laps    Slow backwards  4x  Mini-squats  3x10    Medium forward    4-way SLR  x20    Medium sideways    Hip circles  x20     Small shuffle    Hamstring curls 2 laps    Jog    Knee extension      Braiding    Pelvic tilts      Bicycling    Scap squeezes          Shoulder flex/ext  x25 P    Functional    Shoulder abd/add  x25 P    Step  20x ea 5ft  - 4 ft  Shoulder H. abd/add  x25 c P    Lifting    Shoulder IR/ER  x25 P    Hand to opp knee    Rowing  x 25 P    Push down squat    Bilateral pull down  x25 N    UE PNF    Push/pull  20x K    LE PNF    Push downs      Wall push ups    Arm circles      SLS    Elbow flex/ext          Chin tuck      Stretching    UT shrugs/rolls      Gastroc/Soleus    Rocking horse      Hamstring          SKTC    Other      Piriformis    float  5'    Hip flexor    float kick  4'    Ladder pull    float abd/add  4'    Pec stretch          Post deltoid           Aquatic Therapy  [x] Aquatic therapy with therapeutic exercises (03287)    Timed Code Treatment Minutes:        44' Aqua DARRIN    Total Treatment Minutes:  40'    Treatment/Activity Tolerance:  [x] Patient tolerated treatment well [] Patient limited by fatique  []Patient limited by pain [] Patient limited by other medical complications  [] Other:     Prognosis: [x] Good [] Fair  [] Poor    Patient Requires Follow-up: [x] Yes  [] No    Goals:       Long term goals  Time Frame for Long term goals : 4 weeks   Long term goal 1: Independent in HEP   Long term goal 2: Amb with SC x 150ft mod indepenent with improved upright posture. (Completed w/o AD independently with slight hunched posture. 28MAR)  Long term goal 3: Improve Left LE strength to 4+/5 to assist with sit to stand and stairs.  (L hip flx: 4/5, ext: 5/5, L knee flx: 4+/5, ext: 5/5.  Pt

## 2018-04-03 ENCOUNTER — HOSPITAL ENCOUNTER (OUTPATIENT)
Dept: PHYSICAL THERAPY | Age: 73
Setting detail: THERAPIES SERIES
Discharge: HOME | End: 2018-04-03
Payer: MEDICARE

## 2018-04-03 PROCEDURE — 97113 AQUATIC THERAPY/EXERCISES: CPT

## 2018-04-05 ENCOUNTER — HOSPITAL ENCOUNTER (OUTPATIENT)
Dept: PHYSICAL THERAPY | Age: 73
Setting detail: THERAPIES SERIES
Discharge: HOME | End: 2018-04-05
Payer: MEDICARE

## 2018-04-05 PROCEDURE — 97113 AQUATIC THERAPY/EXERCISES: CPT

## 2018-04-06 ENCOUNTER — HOSPITAL ENCOUNTER (OUTPATIENT)
Dept: GENERAL RADIOLOGY | Age: 73
Discharge: HOME | End: 2018-04-08
Payer: MEDICARE

## 2018-04-06 ENCOUNTER — OFFICE VISIT (OUTPATIENT)
Dept: ORTHOPEDIC SURGERY | Age: 73
End: 2018-04-06
Payer: MEDICARE

## 2018-04-06 ENCOUNTER — HOSPITAL ENCOUNTER (OUTPATIENT)
Dept: PHYSICAL THERAPY | Age: 73
Setting detail: THERAPIES SERIES
Discharge: HOME | End: 2018-04-06
Payer: MEDICARE

## 2018-04-06 VITALS
HEART RATE: 78 BPM | HEIGHT: 70 IN | BODY MASS INDEX: 32.21 KG/M2 | DIASTOLIC BLOOD PRESSURE: 71 MMHG | WEIGHT: 225 LBS | SYSTOLIC BLOOD PRESSURE: 108 MMHG

## 2018-04-06 DIAGNOSIS — G89.29 CHRONIC MIDLINE LOW BACK PAIN WITH BILATERAL SCIATICA: ICD-10-CM

## 2018-04-06 DIAGNOSIS — M48.062 SPINAL STENOSIS, LUMBAR REGION, WITH NEUROGENIC CLAUDICATION: ICD-10-CM

## 2018-04-06 DIAGNOSIS — M51.37 DEGENERATION OF LUMBAR OR LUMBOSACRAL INTERVERTEBRAL DISC: Primary | ICD-10-CM

## 2018-04-06 DIAGNOSIS — M51.26 LUMBAR DISC HERNIATION: ICD-10-CM

## 2018-04-06 DIAGNOSIS — M40.30 FLAT BACK SYNDROME, POSTPROCEDURAL: ICD-10-CM

## 2018-04-06 DIAGNOSIS — M54.41 CHRONIC MIDLINE LOW BACK PAIN WITH BILATERAL SCIATICA: ICD-10-CM

## 2018-04-06 DIAGNOSIS — M54.42 CHRONIC MIDLINE LOW BACK PAIN WITH BILATERAL SCIATICA: ICD-10-CM

## 2018-04-06 DIAGNOSIS — M51.37 DEGENERATION OF LUMBAR OR LUMBOSACRAL INTERVERTEBRAL DISC: ICD-10-CM

## 2018-04-06 PROCEDURE — G8417 CALC BMI ABV UP PARAM F/U: HCPCS | Performed by: ORTHOPAEDIC SURGERY

## 2018-04-06 PROCEDURE — 72100 X-RAY EXAM L-S SPINE 2/3 VWS: CPT

## 2018-04-06 PROCEDURE — 3017F COLORECTAL CA SCREEN DOC REV: CPT | Performed by: ORTHOPAEDIC SURGERY

## 2018-04-06 PROCEDURE — 1036F TOBACCO NON-USER: CPT | Performed by: ORTHOPAEDIC SURGERY

## 2018-04-06 PROCEDURE — 97113 AQUATIC THERAPY/EXERCISES: CPT | Performed by: PHYSICAL THERAPY ASSISTANT

## 2018-04-06 PROCEDURE — 4040F PNEUMOC VAC/ADMIN/RCVD: CPT | Performed by: ORTHOPAEDIC SURGERY

## 2018-04-06 PROCEDURE — G8427 DOCREV CUR MEDS BY ELIG CLIN: HCPCS | Performed by: ORTHOPAEDIC SURGERY

## 2018-04-06 PROCEDURE — 99213 OFFICE O/P EST LOW 20 MIN: CPT | Performed by: ORTHOPAEDIC SURGERY

## 2018-04-06 PROCEDURE — 1123F ACP DISCUSS/DSCN MKR DOCD: CPT | Performed by: ORTHOPAEDIC SURGERY

## 2018-04-06 RX ORDER — HYDROCODONE BITARTRATE AND ACETAMINOPHEN 5; 325 MG/1; MG/1
1-2 TABLET ORAL EVERY 4 HOURS PRN
Qty: 40 TABLET | Refills: 0 | Status: SHIPPED | OUTPATIENT
Start: 2018-04-06 | End: 2018-05-17 | Stop reason: SDUPTHER

## 2018-04-10 ENCOUNTER — HOSPITAL ENCOUNTER (OUTPATIENT)
Dept: PHYSICAL THERAPY | Age: 73
Setting detail: THERAPIES SERIES
Discharge: HOME | End: 2018-04-10
Payer: MEDICARE

## 2018-04-10 PROCEDURE — 97113 AQUATIC THERAPY/EXERCISES: CPT

## 2018-04-12 ENCOUNTER — HOSPITAL ENCOUNTER (OUTPATIENT)
Dept: PHYSICAL THERAPY | Age: 73
Setting detail: THERAPIES SERIES
Discharge: HOME | End: 2018-04-12
Payer: MEDICARE

## 2018-04-12 PROCEDURE — 97110 THERAPEUTIC EXERCISES: CPT | Performed by: PHYSICAL THERAPIST

## 2018-04-16 ENCOUNTER — HOSPITAL ENCOUNTER (OUTPATIENT)
Dept: PHYSICAL THERAPY | Age: 73
Setting detail: THERAPIES SERIES
Discharge: HOME | End: 2018-04-16
Payer: MEDICARE

## 2018-04-16 PROCEDURE — 97110 THERAPEUTIC EXERCISES: CPT | Performed by: PHYSICAL THERAPY ASSISTANT

## 2018-04-18 ENCOUNTER — APPOINTMENT (OUTPATIENT)
Dept: PHYSICAL THERAPY | Age: 73
End: 2018-04-18
Payer: MEDICARE

## 2018-04-20 ENCOUNTER — HOSPITAL ENCOUNTER (OUTPATIENT)
Dept: PHYSICAL THERAPY | Age: 73
Setting detail: THERAPIES SERIES
Discharge: HOME | End: 2018-04-20
Payer: MEDICARE

## 2018-04-20 PROCEDURE — 97110 THERAPEUTIC EXERCISES: CPT | Performed by: PHYSICAL THERAPIST

## 2018-04-23 ENCOUNTER — HOSPITAL ENCOUNTER (OUTPATIENT)
Dept: PHYSICAL THERAPY | Age: 73
Setting detail: THERAPIES SERIES
Discharge: HOME | End: 2018-04-23
Payer: MEDICARE

## 2018-04-23 PROCEDURE — 97110 THERAPEUTIC EXERCISES: CPT | Performed by: PHYSICAL THERAPY ASSISTANT

## 2018-04-25 ENCOUNTER — APPOINTMENT (OUTPATIENT)
Dept: PHYSICAL THERAPY | Age: 73
End: 2018-04-25
Payer: MEDICARE

## 2018-04-26 PROCEDURE — G8979 MOBILITY GOAL STATUS: HCPCS

## 2018-04-26 PROCEDURE — G8980 MOBILITY D/C STATUS: HCPCS

## 2018-04-27 ENCOUNTER — APPOINTMENT (OUTPATIENT)
Dept: PHYSICAL THERAPY | Age: 73
End: 2018-04-27
Payer: MEDICARE

## 2018-05-17 DIAGNOSIS — M51.26 LUMBAR DISC HERNIATION: ICD-10-CM

## 2018-05-18 RX ORDER — HYDROCODONE BITARTRATE AND ACETAMINOPHEN 5; 325 MG/1; MG/1
1-2 TABLET ORAL EVERY 4 HOURS PRN
Qty: 40 TABLET | Refills: 0 | Status: SHIPPED | OUTPATIENT
Start: 2018-05-18 | End: 2018-06-06 | Stop reason: SDUPTHER

## 2018-05-25 ENCOUNTER — OFFICE VISIT (OUTPATIENT)
Dept: ORTHOPEDIC SURGERY | Age: 73
End: 2018-05-25
Payer: MEDICARE

## 2018-05-25 VITALS
HEIGHT: 70 IN | SYSTOLIC BLOOD PRESSURE: 131 MMHG | DIASTOLIC BLOOD PRESSURE: 77 MMHG | BODY MASS INDEX: 32.21 KG/M2 | WEIGHT: 225 LBS | HEART RATE: 76 BPM

## 2018-05-25 DIAGNOSIS — M51.26 LUMBAR DISC HERNIATION: ICD-10-CM

## 2018-05-25 DIAGNOSIS — M40.30 FLAT BACK SYNDROME, POSTPROCEDURAL: ICD-10-CM

## 2018-05-25 DIAGNOSIS — M51.37 DEGENERATION OF LUMBAR OR LUMBOSACRAL INTERVERTEBRAL DISC: ICD-10-CM

## 2018-05-25 DIAGNOSIS — M96.1 POSTLAMINECTOMY SYNDROME, LUMBAR REGION: Primary | ICD-10-CM

## 2018-05-25 PROCEDURE — G8427 DOCREV CUR MEDS BY ELIG CLIN: HCPCS | Performed by: ORTHOPAEDIC SURGERY

## 2018-05-25 PROCEDURE — 1036F TOBACCO NON-USER: CPT | Performed by: ORTHOPAEDIC SURGERY

## 2018-05-25 PROCEDURE — G8417 CALC BMI ABV UP PARAM F/U: HCPCS | Performed by: ORTHOPAEDIC SURGERY

## 2018-05-25 PROCEDURE — 3017F COLORECTAL CA SCREEN DOC REV: CPT | Performed by: ORTHOPAEDIC SURGERY

## 2018-05-25 PROCEDURE — 4040F PNEUMOC VAC/ADMIN/RCVD: CPT | Performed by: ORTHOPAEDIC SURGERY

## 2018-05-25 PROCEDURE — 99213 OFFICE O/P EST LOW 20 MIN: CPT | Performed by: ORTHOPAEDIC SURGERY

## 2018-05-25 PROCEDURE — 1123F ACP DISCUSS/DSCN MKR DOCD: CPT | Performed by: ORTHOPAEDIC SURGERY

## 2018-06-06 DIAGNOSIS — M51.26 LUMBAR DISC HERNIATION: ICD-10-CM

## 2018-06-06 RX ORDER — HYDROCODONE BITARTRATE AND ACETAMINOPHEN 5; 325 MG/1; MG/1
1-2 TABLET ORAL EVERY 4 HOURS PRN
Qty: 40 TABLET | Refills: 0 | Status: SHIPPED | OUTPATIENT
Start: 2018-06-06 | End: 2018-06-13

## 2018-06-20 DIAGNOSIS — M48.062 SPINAL STENOSIS, LUMBAR REGION, WITH NEUROGENIC CLAUDICATION: Primary | ICD-10-CM

## 2018-06-20 DIAGNOSIS — M51.26 LUMBAR DISC HERNIATION: ICD-10-CM

## 2018-06-20 RX ORDER — HYDROCODONE BITARTRATE AND ACETAMINOPHEN 5; 325 MG/1; MG/1
TABLET ORAL
COMMUNITY
End: 2018-06-20 | Stop reason: SDUPTHER

## 2018-06-20 RX ORDER — HYDROCODONE BITARTRATE AND ACETAMINOPHEN 5; 325 MG/1; MG/1
1 TABLET ORAL EVERY 4 HOURS PRN
Qty: 40 TABLET | Refills: 0 | Status: SHIPPED | OUTPATIENT
Start: 2018-06-20 | End: 2018-07-03 | Stop reason: SDUPTHER

## 2018-07-03 DIAGNOSIS — M48.062 SPINAL STENOSIS, LUMBAR REGION, WITH NEUROGENIC CLAUDICATION: ICD-10-CM

## 2018-07-03 DIAGNOSIS — M51.26 LUMBAR DISC HERNIATION: ICD-10-CM

## 2018-07-03 RX ORDER — HYDROCODONE BITARTRATE AND ACETAMINOPHEN 5; 325 MG/1; MG/1
TABLET ORAL
Qty: 40 TABLET | Refills: 0 | Status: SHIPPED | OUTPATIENT
Start: 2018-07-03 | End: 2018-07-16 | Stop reason: SDUPTHER

## 2018-07-16 DIAGNOSIS — M51.26 LUMBAR DISC HERNIATION: ICD-10-CM

## 2018-07-16 DIAGNOSIS — M48.062 SPINAL STENOSIS, LUMBAR REGION, WITH NEUROGENIC CLAUDICATION: ICD-10-CM

## 2018-07-16 RX ORDER — HYDROCODONE BITARTRATE AND ACETAMINOPHEN 5; 325 MG/1; MG/1
TABLET ORAL
Qty: 40 TABLET | Refills: 0 | Status: SHIPPED | OUTPATIENT
Start: 2018-07-16 | End: 2018-07-26 | Stop reason: SDUPTHER

## 2018-07-26 DIAGNOSIS — M48.062 SPINAL STENOSIS, LUMBAR REGION, WITH NEUROGENIC CLAUDICATION: ICD-10-CM

## 2018-07-26 DIAGNOSIS — M51.26 LUMBAR DISC HERNIATION: ICD-10-CM

## 2018-07-26 RX ORDER — HYDROCODONE BITARTRATE AND ACETAMINOPHEN 5; 325 MG/1; MG/1
TABLET ORAL
Qty: 40 TABLET | Refills: 0 | Status: SHIPPED | OUTPATIENT
Start: 2018-07-26 | End: 2018-08-09 | Stop reason: SDUPTHER

## 2018-08-03 DIAGNOSIS — I10 ESSENTIAL (PRIMARY) HYPERTENSION: Primary | ICD-10-CM

## 2018-08-03 RX ORDER — METOPROLOL SUCCINATE 50 MG/1
TABLET, EXTENDED RELEASE ORAL
Qty: 30 TABLET | Refills: 0 | Status: SHIPPED | OUTPATIENT
Start: 2018-08-03 | End: 2018-08-09 | Stop reason: SDUPTHER

## 2018-08-07 DIAGNOSIS — Z12.5 SCREENING PSA (PROSTATE SPECIFIC ANTIGEN): ICD-10-CM

## 2018-08-07 DIAGNOSIS — E78.00 PURE HYPERCHOLESTEROLEMIA: Primary | ICD-10-CM

## 2018-08-07 DIAGNOSIS — N18.30 STAGE 3 CHRONIC KIDNEY DISEASE (HCC): ICD-10-CM

## 2018-08-07 DIAGNOSIS — I10 ESSENTIAL (PRIMARY) HYPERTENSION: ICD-10-CM

## 2018-08-09 ENCOUNTER — OFFICE VISIT (OUTPATIENT)
Dept: FAMILY MEDICINE CLINIC | Age: 73
End: 2018-08-09
Payer: MEDICARE

## 2018-08-09 ENCOUNTER — HOSPITAL ENCOUNTER (OUTPATIENT)
Dept: LAB | Age: 73
Setting detail: SPECIMEN
Discharge: HOME | End: 2018-08-09
Payer: MEDICARE

## 2018-08-09 VITALS
SYSTOLIC BLOOD PRESSURE: 110 MMHG | HEIGHT: 70 IN | DIASTOLIC BLOOD PRESSURE: 60 MMHG | WEIGHT: 216 LBS | HEART RATE: 80 BPM | BODY MASS INDEX: 30.92 KG/M2

## 2018-08-09 DIAGNOSIS — I10 ESSENTIAL (PRIMARY) HYPERTENSION: ICD-10-CM

## 2018-08-09 DIAGNOSIS — Z12.5 SCREENING PSA (PROSTATE SPECIFIC ANTIGEN): ICD-10-CM

## 2018-08-09 DIAGNOSIS — E78.00 PURE HYPERCHOLESTEROLEMIA: ICD-10-CM

## 2018-08-09 DIAGNOSIS — M51.26 LUMBAR DISC HERNIATION: ICD-10-CM

## 2018-08-09 DIAGNOSIS — M48.062 SPINAL STENOSIS, LUMBAR REGION, WITH NEUROGENIC CLAUDICATION: ICD-10-CM

## 2018-08-09 DIAGNOSIS — R33.9 INCOMPLETE BLADDER EMPTYING: ICD-10-CM

## 2018-08-09 DIAGNOSIS — D50.0 IRON DEFICIENCY ANEMIA DUE TO CHRONIC BLOOD LOSS: ICD-10-CM

## 2018-08-09 DIAGNOSIS — R33.9 URINARY RETENTION: ICD-10-CM

## 2018-08-09 DIAGNOSIS — C44.92 SCC (SQUAMOUS CELL CARCINOMA): ICD-10-CM

## 2018-08-09 DIAGNOSIS — N18.30 STAGE 3 CHRONIC KIDNEY DISEASE (HCC): ICD-10-CM

## 2018-08-09 DIAGNOSIS — G47.33 OSA ON CPAP: ICD-10-CM

## 2018-08-09 DIAGNOSIS — E78.00 PURE HYPERCHOLESTEROLEMIA: Primary | ICD-10-CM

## 2018-08-09 DIAGNOSIS — Z99.89 OSA ON CPAP: ICD-10-CM

## 2018-08-09 LAB
ABSOLUTE EOS #: 0.2 K/UL (ref 0–0.4)
ABSOLUTE IMMATURE GRANULOCYTE: ABNORMAL K/UL (ref 0–0.3)
ABSOLUTE LYMPH #: 2.8 K/UL (ref 1–4.8)
ABSOLUTE MONO #: 1.1 K/UL (ref 0.1–1.2)
ALT SERPL-CCNC: 18 U/L (ref 5–41)
ANION GAP SERPL CALCULATED.3IONS-SCNC: 10 MMOL/L (ref 9–17)
BASOPHILS # BLD: 1 % (ref 0–2)
BASOPHILS ABSOLUTE: 0.1 K/UL (ref 0–0.2)
BUN BLDV-MCNC: 23 MG/DL (ref 8–23)
BUN/CREAT BLD: 22 (ref 9–20)
CALCIUM SERPL-MCNC: 9.4 MG/DL (ref 8.6–10.4)
CHLORIDE BLD-SCNC: 105 MMOL/L (ref 98–107)
CHOLESTEROL/HDL RATIO: 4.3
CHOLESTEROL: 155 MG/DL
CO2: 28 MMOL/L (ref 20–31)
CREAT SERPL-MCNC: 1.06 MG/DL (ref 0.7–1.2)
DIFFERENTIAL TYPE: ABNORMAL
EOSINOPHILS RELATIVE PERCENT: 2 % (ref 1–8)
GFR AFRICAN AMERICAN: >60 ML/MIN
GFR NON-AFRICAN AMERICAN: >60 ML/MIN
GFR SERPL CREATININE-BSD FRML MDRD: ABNORMAL ML/MIN/{1.73_M2}
GFR SERPL CREATININE-BSD FRML MDRD: ABNORMAL ML/MIN/{1.73_M2}
GLUCOSE BLD-MCNC: 95 MG/DL (ref 70–99)
HCT VFR BLD CALC: 38.2 % (ref 41–53)
HDLC SERPL-MCNC: 36 MG/DL
HEMOGLOBIN: 12.1 G/DL (ref 13.5–17.5)
IMMATURE GRANULOCYTES: ABNORMAL %
LDL CHOLESTEROL: 85 MG/DL (ref 0–130)
LYMPHOCYTES # BLD: 29 % (ref 15–43)
MCH RBC QN AUTO: 22.6 PG (ref 26–34)
MCHC RBC AUTO-ENTMCNC: 31.8 G/DL (ref 31–37)
MCV RBC AUTO: 71.2 FL (ref 80–100)
MONOCYTES # BLD: 11 % (ref 6–14)
NRBC AUTOMATED: ABNORMAL PER 100 WBC
PDW BLD-RTO: 20.6 % (ref 11–14.5)
PLATELET # BLD: 262 K/UL (ref 140–450)
PLATELET ESTIMATE: ABNORMAL
PMV BLD AUTO: 7.5 FL (ref 6–12)
POTASSIUM SERPL-SCNC: 4.2 MMOL/L (ref 3.7–5.3)
PROSTATE SPECIFIC ANTIGEN: 1.19 UG/L
RBC # BLD: 5.36 M/UL (ref 4.5–5.9)
RBC # BLD: ABNORMAL 10*6/UL
SEG NEUTROPHILS: 57 % (ref 44–74)
SEGMENTED NEUTROPHILS ABSOLUTE COUNT: 5.5 K/UL (ref 1.8–7.7)
SODIUM BLD-SCNC: 143 MMOL/L (ref 135–144)
TRIGL SERPL-MCNC: 168 MG/DL
VLDLC SERPL CALC-MCNC: ABNORMAL MG/DL (ref 1–30)
WBC # BLD: 9.6 K/UL (ref 3.5–11)
WBC # BLD: ABNORMAL 10*3/UL

## 2018-08-09 PROCEDURE — 85025 COMPLETE CBC W/AUTO DIFF WBC: CPT

## 2018-08-09 PROCEDURE — G0103 PSA SCREENING: HCPCS

## 2018-08-09 PROCEDURE — 1101F PT FALLS ASSESS-DOCD LE1/YR: CPT | Performed by: FAMILY MEDICINE

## 2018-08-09 PROCEDURE — 1123F ACP DISCUSS/DSCN MKR DOCD: CPT | Performed by: FAMILY MEDICINE

## 2018-08-09 PROCEDURE — 36415 COLL VENOUS BLD VENIPUNCTURE: CPT

## 2018-08-09 PROCEDURE — 80061 LIPID PANEL: CPT

## 2018-08-09 PROCEDURE — 80048 BASIC METABOLIC PNL TOTAL CA: CPT

## 2018-08-09 PROCEDURE — 99214 OFFICE O/P EST MOD 30 MIN: CPT | Performed by: FAMILY MEDICINE

## 2018-08-09 PROCEDURE — 3017F COLORECTAL CA SCREEN DOC REV: CPT | Performed by: FAMILY MEDICINE

## 2018-08-09 PROCEDURE — 4040F PNEUMOC VAC/ADMIN/RCVD: CPT | Performed by: FAMILY MEDICINE

## 2018-08-09 PROCEDURE — 1036F TOBACCO NON-USER: CPT | Performed by: FAMILY MEDICINE

## 2018-08-09 PROCEDURE — G8427 DOCREV CUR MEDS BY ELIG CLIN: HCPCS | Performed by: FAMILY MEDICINE

## 2018-08-09 PROCEDURE — 84460 ALANINE AMINO (ALT) (SGPT): CPT

## 2018-08-09 PROCEDURE — G8417 CALC BMI ABV UP PARAM F/U: HCPCS | Performed by: FAMILY MEDICINE

## 2018-08-09 RX ORDER — SIMVASTATIN 20 MG
TABLET ORAL
Qty: 90 TABLET | Refills: 3 | Status: SHIPPED | OUTPATIENT
Start: 2018-08-09 | End: 2019-07-09 | Stop reason: SDUPTHER

## 2018-08-09 RX ORDER — HYDROCODONE BITARTRATE AND ACETAMINOPHEN 5; 325 MG/1; MG/1
TABLET ORAL
Qty: 40 TABLET | Refills: 0 | Status: SHIPPED | OUTPATIENT
Start: 2018-08-09 | End: 2018-08-28 | Stop reason: SDUPTHER

## 2018-08-09 RX ORDER — TAMSULOSIN HYDROCHLORIDE 0.4 MG/1
0.4 CAPSULE ORAL DAILY
Qty: 90 CAPSULE | Refills: 3 | Status: SHIPPED | OUTPATIENT
Start: 2018-08-09 | End: 2019-07-09 | Stop reason: SDUPTHER

## 2018-08-09 RX ORDER — METOPROLOL SUCCINATE 50 MG/1
TABLET, EXTENDED RELEASE ORAL
Qty: 90 TABLET | Refills: 3 | Status: SHIPPED | OUTPATIENT
Start: 2018-08-09 | End: 2019-07-09 | Stop reason: SDUPTHER

## 2018-08-09 RX ORDER — FERROUS SULFATE 325(65) MG
325 TABLET ORAL 2 TIMES DAILY
Qty: 100 TABLET | Refills: 3 | Status: SHIPPED | OUTPATIENT
Start: 2018-08-09 | End: 2019-07-09 | Stop reason: ALTCHOICE

## 2018-08-09 RX ORDER — SERTRALINE HYDROCHLORIDE 100 MG/1
TABLET, FILM COATED ORAL
Qty: 90 TABLET | Refills: 3 | Status: SHIPPED | OUTPATIENT
Start: 2018-08-09 | End: 2019-07-09 | Stop reason: SDUPTHER

## 2018-08-09 ASSESSMENT — ENCOUNTER SYMPTOMS
EYES NEGATIVE: 1
RESPIRATORY NEGATIVE: 1
GASTROINTESTINAL NEGATIVE: 1
ALLERGIC/IMMUNOLOGIC NEGATIVE: 1
BACK PAIN: 1

## 2018-08-09 ASSESSMENT — PATIENT HEALTH QUESTIONNAIRE - PHQ9
SUM OF ALL RESPONSES TO PHQ QUESTIONS 1-9: 0
1. LITTLE INTEREST OR PLEASURE IN DOING THINGS: 0
SUM OF ALL RESPONSES TO PHQ9 QUESTIONS 1 & 2: 0
SUM OF ALL RESPONSES TO PHQ QUESTIONS 1-9: 0
2. FEELING DOWN, DEPRESSED OR HOPELESS: 0

## 2018-08-09 NOTE — PROGRESS NOTES
Subjective:      Patient ID: Cheng Chavez is a 68 y.o. male. Hyperlipidemia     Foot Pain   Associated symptoms include numbness (more into left leg and feet with prolonged standing). Routine follow up on chronic medical conditions, refills, and review of updated labs. I have reviewed the patient's medical history in detail and updated the computerized patient record. He had back surgery 1/26/18 with thoracic and lumbar posterior decompression T9-L5. He had gradual, increasing pain in the months after the surgery, actually more painful than prior to surgery. Possible \"nicked nerve\" theory at the time. He was referred to Dr. Meggan Gross and had some cortisone injections by report x 2. Seemed to work well by report. Pain under better control at present. Has follow up injections scheduled in the future. Compliant with medications . Mood stable. Past Medical History:   Diagnosis Date    Agoraphobia     Anxiety     Arthritis     Basal cell carcinoma     Chronic pain     back    CKD (chronic kidney disease)     Claustrophobia     Depression     Enlarged lymph node     rt. lower neck.  Hearing aid worn     dar. ears.  Hearing loss     Hyperlipidemia     Hypertension     RAFAT (obstructive sleep apnea)     PONV (postoperative nausea and vomiting)     SCC (squamous cell carcinoma)     HAND    Spinal stenosis, lumbar region, with neurogenic claudication     Wears glasses      Past Surgical History:   Procedure Laterality Date    BACK SURGERY  10/2013    Dr. Valente Cunningham: hardware involved.     COLONOSCOPY  06/13/12    mild diverticular dz    LUMBAR FUSION N/A 3/8/2017    LUMBAR L2-3 POSTERIOR DECOMPRESSION FUSION INSTRUMENTATION W/REVISION L3-5 POSTERIOR DECOMPRESSION FUSION INSTRUMENTATION ([address] [place] [address])  CC SN/KILEY performed by Alhaji Khan MD at [facility] Right     lower neck, 2-    IA ARTHRODESIS POSTERIOR/POSTEROLATERAL THORACIC N/A 1/26/2018 THORACIC & LUMBAR POSTERIOR DECOMPRESSION AND FUSION REVISION T9 THRU L5 performed by Clovis Walsh MD at [address] [facility] SCRN NOT  [address] [place] [address] N/A 8/23/2017    COLONOSCOPY performed by David Bolanos MD at [address] [facility] PRE-MALIGNANT / BENIGN SKIN LESION EXCISION      SKIN BIOPSY      VASECTOMY       Current Outpatient Prescriptions   Medication Sig Dispense Refill    metoprolol succinate (TOPROL XL) 50 MG extended release tablet TAKE ONE TABLET BY MOUTH DAILY 30 tablet 0    HYDROcodone-acetaminophen (NORCO) 5-325 MG per tablet 1-2 po every 4 hours, prn. 40 tablet 0    simvastatin (ZOCOR) 20 MG tablet TAKE ONE TABLET BY MOUTH DAILY 90 tablet 1    fenofibrate (TRICOR) 145 MG tablet TAKE ONE TABLET BY MOUTH DAILY 90 tablet 3    sertraline (ZOLOFT) 100 MG tablet TAKE ONE TABLET BY MOUTH DAILY 90 tablet 1    tamsulosin (FLOMAX) 0.4 MG capsule Take 1 capsule by mouth daily 90 capsule 3    aspirin 81 MG tablet Take 81 mg by mouth daily      acetaminophen (TYLENOL) 500 MG tablet Take 500 mg by mouth every 6 hours as needed.  gabapentin (NEURONTIN) 300 MG capsule Take 1 capsule by mouth 3 times daily for 30 days. 90 capsule 3     No current facility-administered medications for this visit. Allergies   Allergen Reactions    Gabapentin      dizzy    Lorazepam Other (See Comments)     Double vision    Phenergan [Promethazine Hcl] Other (See Comments)     \"out of it\"    Morphine And Related Nausea And Vomiting     hallucinations         Review of Systems   Constitutional: Negative. HENT: Positive for hearing loss. Eyes: Negative. Respiratory: Negative. Cardiovascular: Negative. Gastrointestinal: Negative. Endocrine: Negative. Genitourinary: Negative. Musculoskeletal: Positive for back pain and gait problem (limited standing and walking due to back pain). Skin: Negative. Allergic/Immunologic: Negative.     Neurological: Positive for numbness (more into left leg and feet with prolonged standing). Hematological: Negative. Psychiatric/Behavioral: Negative. Objective:   Physical Exam   Constitutional: He is oriented to person, place, and time. He appears well-developed and well-nourished. No distress. HENT:   Head: Normocephalic and atraumatic. Right Ear: External ear normal.   Left Ear: External ear normal.   Mouth/Throat: Oropharynx is clear and moist. No oropharyngeal exudate. Eyes: Conjunctivae and EOM are normal. No scleral icterus. Neck: Neck supple. No thyromegaly present. Cardiovascular: Normal rate, regular rhythm, normal heart sounds and intact distal pulses. No murmur heard. Pulmonary/Chest: Effort normal and breath sounds normal. No respiratory distress. He has no wheezes. Abdominal: Soft. Bowel sounds are normal. He exhibits no distension. There is no tenderness. There is no rebound. Genitourinary: Rectum normal. Rectal exam shows no mass and anal tone normal. Prostate is not enlarged and not tender. Musculoskeletal: Normal range of motion. He exhibits no edema or tenderness. Neurological: He is alert and oriented to person, place, and time. A sensory deficit (left leg at present.) is present. Skin: Skin is warm and dry. Lesion (multiple seb. keratosis lesions. 2 on scalp, 6 on trunk. 2 on left leg) noted. No rash noted. No erythema. Psychiatric: He has a normal mood and affect.  His behavior is normal. Judgment and thought content normal.     /60 (Site: Right Arm, Position: Sitting, Cuff Size: Large Adult)   Pulse 80   Ht 5' 10\" (1.778 m)   Wt 216 lb (98 kg)   BMI 30.99 kg/m²     Hospital Outpatient Visit on 08/09/2018   Component Date Value Ref Range Status    Glucose 08/09/2018 95  70 - 99 mg/dL Final    BUN 08/09/2018 23  8 - 23 mg/dL Final    CREATININE 08/09/2018 1.06  0.70 - 1.20 mg/dL Final    Bun/Cre Ratio 08/09/2018 22* 9 - 20 Final    Calcium 08/09/2018 9.4  8.6 - 10.4 mg/dL Final    Sodium 08/09/2018

## 2018-08-28 DIAGNOSIS — M51.26 LUMBAR DISC HERNIATION: ICD-10-CM

## 2018-08-28 DIAGNOSIS — M48.062 SPINAL STENOSIS, LUMBAR REGION, WITH NEUROGENIC CLAUDICATION: ICD-10-CM

## 2018-08-28 NOTE — TELEPHONE ENCOUNTER
[place] from [place] reviewed, last filled 8/10/18 #40 for a 7 day supply. Report available for your review. Last OV 8/9/18; next OV 1/8/19.

## 2018-08-29 RX ORDER — HYDROCODONE BITARTRATE AND ACETAMINOPHEN 5; 325 MG/1; MG/1
TABLET ORAL
Qty: 40 TABLET | Refills: 0 | Status: SHIPPED | OUTPATIENT
Start: 2018-08-29 | End: 2018-09-19 | Stop reason: SDUPTHER

## 2018-08-31 ENCOUNTER — HOSPITAL ENCOUNTER (OUTPATIENT)
Dept: GENERAL RADIOLOGY | Age: 73
Discharge: HOME | End: 2018-09-02
Payer: MEDICARE

## 2018-08-31 ENCOUNTER — OFFICE VISIT (OUTPATIENT)
Dept: ORTHOPEDIC SURGERY | Age: 73
End: 2018-08-31
Payer: MEDICARE

## 2018-08-31 VITALS
DIASTOLIC BLOOD PRESSURE: 70 MMHG | WEIGHT: 216 LBS | BODY MASS INDEX: 30.92 KG/M2 | HEIGHT: 70 IN | SYSTOLIC BLOOD PRESSURE: 130 MMHG | HEART RATE: 64 BPM

## 2018-08-31 DIAGNOSIS — M51.26 LUMBAR DISC HERNIATION: Primary | ICD-10-CM

## 2018-08-31 DIAGNOSIS — M51.26 LUMBAR DISC HERNIATION: ICD-10-CM

## 2018-08-31 DIAGNOSIS — M51.37 DEGENERATION OF LUMBAR OR LUMBOSACRAL INTERVERTEBRAL DISC: ICD-10-CM

## 2018-08-31 PROCEDURE — G8427 DOCREV CUR MEDS BY ELIG CLIN: HCPCS | Performed by: ORTHOPAEDIC SURGERY

## 2018-08-31 PROCEDURE — G8417 CALC BMI ABV UP PARAM F/U: HCPCS | Performed by: ORTHOPAEDIC SURGERY

## 2018-08-31 PROCEDURE — 99213 OFFICE O/P EST LOW 20 MIN: CPT | Performed by: ORTHOPAEDIC SURGERY

## 2018-08-31 PROCEDURE — 4040F PNEUMOC VAC/ADMIN/RCVD: CPT | Performed by: ORTHOPAEDIC SURGERY

## 2018-08-31 PROCEDURE — 1123F ACP DISCUSS/DSCN MKR DOCD: CPT | Performed by: ORTHOPAEDIC SURGERY

## 2018-08-31 PROCEDURE — 72100 X-RAY EXAM L-S SPINE 2/3 VWS: CPT

## 2018-08-31 PROCEDURE — 1036F TOBACCO NON-USER: CPT | Performed by: ORTHOPAEDIC SURGERY

## 2018-08-31 PROCEDURE — 1101F PT FALLS ASSESS-DOCD LE1/YR: CPT | Performed by: ORTHOPAEDIC SURGERY

## 2018-08-31 PROCEDURE — 3017F COLORECTAL CA SCREEN DOC REV: CPT | Performed by: ORTHOPAEDIC SURGERY

## 2018-09-19 DIAGNOSIS — M48.062 SPINAL STENOSIS, LUMBAR REGION, WITH NEUROGENIC CLAUDICATION: ICD-10-CM

## 2018-09-19 DIAGNOSIS — M51.26 LUMBAR DISC HERNIATION: ICD-10-CM

## 2018-09-19 RX ORDER — HYDROCODONE BITARTRATE AND ACETAMINOPHEN 5; 325 MG/1; MG/1
1 TABLET ORAL EVERY 4 HOURS PRN
Qty: 40 TABLET | Refills: 0 | Status: SHIPPED | OUTPATIENT
Start: 2018-09-19 | End: 2018-10-22 | Stop reason: SDUPTHER

## 2018-09-19 NOTE — TELEPHONE ENCOUNTER
[place] from [place] reviewed, last filled 8/29/18 #40 for a 10 day supply. Report available for your review. Last OV 8/9/18; next OV 1/8/19.

## 2018-09-26 ENCOUNTER — OFFICE VISIT (OUTPATIENT)
Dept: UROLOGY | Age: 73
End: 2018-09-26
Payer: MEDICARE

## 2018-09-26 VITALS — DIASTOLIC BLOOD PRESSURE: 85 MMHG | SYSTOLIC BLOOD PRESSURE: 138 MMHG | TEMPERATURE: 98.2 F | HEART RATE: 60 BPM

## 2018-09-26 DIAGNOSIS — R33.9 URINARY RETENTION: Primary | ICD-10-CM

## 2018-09-26 DIAGNOSIS — Z12.5 SCREENING FOR PROSTATE CANCER: ICD-10-CM

## 2018-09-26 PROCEDURE — 4040F PNEUMOC VAC/ADMIN/RCVD: CPT | Performed by: NURSE PRACTITIONER

## 2018-09-26 PROCEDURE — 1123F ACP DISCUSS/DSCN MKR DOCD: CPT | Performed by: NURSE PRACTITIONER

## 2018-09-26 PROCEDURE — 1101F PT FALLS ASSESS-DOCD LE1/YR: CPT | Performed by: NURSE PRACTITIONER

## 2018-09-26 PROCEDURE — G8417 CALC BMI ABV UP PARAM F/U: HCPCS | Performed by: NURSE PRACTITIONER

## 2018-09-26 PROCEDURE — 99213 OFFICE O/P EST LOW 20 MIN: CPT | Performed by: NURSE PRACTITIONER

## 2018-09-26 PROCEDURE — 3017F COLORECTAL CA SCREEN DOC REV: CPT | Performed by: NURSE PRACTITIONER

## 2018-09-26 PROCEDURE — 1036F TOBACCO NON-USER: CPT | Performed by: NURSE PRACTITIONER

## 2018-09-26 PROCEDURE — G8427 DOCREV CUR MEDS BY ELIG CLIN: HCPCS | Performed by: NURSE PRACTITIONER

## 2018-09-26 ASSESSMENT — ENCOUNTER SYMPTOMS
SHORTNESS OF BREATH: 0
EYE PAIN: 0
ABDOMINAL PAIN: 0
VOMITING: 0
COUGH: 0
EYE REDNESS: 0
BACK PAIN: 0
NAUSEA: 0
COLOR CHANGE: 0
WHEEZING: 0

## 2018-09-26 NOTE — PROGRESS NOTES
08/09/2018       Additional Lab/Culture results:     Imaging Reviewed during this Office Visit:  (results were independently reviewed by physician and radiology report verified)    PAST MEDICAL, FAMILY AND SOCIAL HISTORY UPDATE:  Past Medical History:   Diagnosis Date    Agoraphobia     Anxiety     Arthritis     Basal cell carcinoma     Chronic pain     back    CKD (chronic kidney disease)     Claustrophobia     Depression     Enlarged lymph node     rt. lower neck.  Hearing aid worn     dar. ears.  Hearing loss     Hyperlipidemia     Hypertension     RAFAT (obstructive sleep apnea)     PONV (postoperative nausea and vomiting)     SCC (squamous cell carcinoma)     HAND    Spinal stenosis, lumbar region, with neurogenic claudication     Wears glasses      Past Surgical History:   Procedure Laterality Date    BACK SURGERY  10/2013    Dr. Hero Mirza: hardware involved.     COLONOSCOPY  06/13/12    mild diverticular dz    LUMBAR FUSION N/A 3/8/2017    LUMBAR L2-3 POSTERIOR DECOMPRESSION FUSION INSTRUMENTATION W/REVISION L3-5 POSTERIOR DECOMPRESSION FUSION INSTRUMENTATION ([address] [place] [address])  CC SN/KILEY performed by Risa Ballesteros MD at [facility] Right     lower neck, 2-    [place] ARTHRODESIS POSTERIOR/POSTEROLATERAL THORACIC N/A 1/26/2018    THORACIC & LUMBAR POSTERIOR DECOMPRESSION AND FUSION REVISION T9 THRU L5 performed by Risa Ballesteros MD at [address] [place] [address] [place] SCRN NOT  [address] [place] [address] N/A 8/23/2017    COLONOSCOPY performed by Tia Larios MD at [facility] PRE-MALIGNANT / BENIGN SKIN LESION EXCISION      SKIN BIOPSY      VASECTOMY       Family History   Problem Relation Age of Onset    Diabetes Mother     Diabetes Brother     Cancer Father         lung cancer    Cancer Brother         lung cancer    Diabetes Brother      Outpatient Prescriptions Marked as Taking for the 9/26/18 encounter (Office Visit) with KM Talavera CNP   Medication Sig non-tender and not distended. Musculoskeletal: Normal gait and station  Prostate: enlarged, no nodule or induration. Assessment and Plan      1. Urinary retention    2. Screening for prostate cancer           Plan:    continue Flomax    F/u 1 yr with PVR  Return in about 1 year (around 9/26/2019) for PVR. Prescriptions Ordered:  No orders of the defined types were placed in this encounter. Orders Placed:  No orders of the defined types were placed in this encounter. KM Bhatt CNP    Agree with the ROS entered by the MA.

## 2018-10-16 ENCOUNTER — OFFICE VISIT (OUTPATIENT)
Dept: ORTHOPEDIC SURGERY | Age: 73
End: 2018-10-16
Payer: MEDICARE

## 2018-10-16 DIAGNOSIS — G89.29 CHRONIC MIDLINE LOW BACK PAIN WITH BILATERAL SCIATICA: ICD-10-CM

## 2018-10-16 DIAGNOSIS — M96.1 POSTLAMINECTOMY SYNDROME, LUMBAR REGION: ICD-10-CM

## 2018-10-16 DIAGNOSIS — M40.30 FLAT BACK SYNDROME, POSTPROCEDURAL: Primary | ICD-10-CM

## 2018-10-16 DIAGNOSIS — M54.42 CHRONIC MIDLINE LOW BACK PAIN WITH BILATERAL SCIATICA: ICD-10-CM

## 2018-10-16 DIAGNOSIS — M54.41 CHRONIC MIDLINE LOW BACK PAIN WITH BILATERAL SCIATICA: ICD-10-CM

## 2018-10-16 PROCEDURE — 4040F PNEUMOC VAC/ADMIN/RCVD: CPT | Performed by: ORTHOPAEDIC SURGERY

## 2018-10-16 PROCEDURE — 1123F ACP DISCUSS/DSCN MKR DOCD: CPT | Performed by: ORTHOPAEDIC SURGERY

## 2018-10-16 PROCEDURE — G8417 CALC BMI ABV UP PARAM F/U: HCPCS | Performed by: ORTHOPAEDIC SURGERY

## 2018-10-16 PROCEDURE — G8427 DOCREV CUR MEDS BY ELIG CLIN: HCPCS | Performed by: ORTHOPAEDIC SURGERY

## 2018-10-16 PROCEDURE — G8484 FLU IMMUNIZE NO ADMIN: HCPCS | Performed by: ORTHOPAEDIC SURGERY

## 2018-10-16 PROCEDURE — 1101F PT FALLS ASSESS-DOCD LE1/YR: CPT | Performed by: ORTHOPAEDIC SURGERY

## 2018-10-16 PROCEDURE — 99213 OFFICE O/P EST LOW 20 MIN: CPT | Performed by: ORTHOPAEDIC SURGERY

## 2018-10-16 PROCEDURE — 3017F COLORECTAL CA SCREEN DOC REV: CPT | Performed by: ORTHOPAEDIC SURGERY

## 2018-10-16 PROCEDURE — 1036F TOBACCO NON-USER: CPT | Performed by: ORTHOPAEDIC SURGERY

## 2018-10-16 NOTE — PROGRESS NOTES
Subjective:      Patient ID: Keven Hayes is a 68 y.o. male. HPI  Please refer to all my previous clinic notes    Patient is here follow-up of his postlaminectomy syndrome. Since patient was last seen he has been trialed with a spinal cord stimulator with exceptionally good results. Review of Systems    Objective:   Physical Exam   Constitutional: He is oriented to person, place, and time. He appears well-developed and well-nourished. HENT:   Head: Normocephalic and atraumatic. Eyes: Conjunctivae and EOM are normal.   Neck: Normal range of motion. Pulmonary/Chest: Effort normal. No respiratory distress. Neurological: He is alert and oriented to person, place, and time. He has normal strength. No sensory deficit. Normal gait   Skin: Skin is warm and dry. Psychiatric: His behavior is normal. Thought content normal.   Nursing note and vitals reviewed. Assessment:      Encounter Diagnoses   Name Primary?     Flat back syndrome, postprocedural Yes    Postlaminectomy syndrome, lumbar region     Chronic midline low back pain with bilateral sciatica            Plan:      Okay to schedule spinal cord stimulator placement        Shruti Cunningham MD

## 2018-10-22 DIAGNOSIS — M51.26 LUMBAR DISC HERNIATION: ICD-10-CM

## 2018-10-22 DIAGNOSIS — M48.062 SPINAL STENOSIS, LUMBAR REGION, WITH NEUROGENIC CLAUDICATION: ICD-10-CM

## 2018-10-22 RX ORDER — HYDROCODONE BITARTRATE AND ACETAMINOPHEN 5; 325 MG/1; MG/1
1 TABLET ORAL EVERY 4 HOURS PRN
Qty: 40 TABLET | Refills: 0 | Status: ON HOLD | OUTPATIENT
Start: 2018-10-22 | End: 2018-11-14 | Stop reason: HOSPADM

## 2018-10-22 NOTE — TELEPHONE ENCOUNTER
[place] reviewed, last filled 9/19/18 #40 for a 7 day supply. Report available for your review. Last OV 8/9/18; next OV 1/8/19.

## 2018-10-25 ENCOUNTER — NURSE ONLY (OUTPATIENT)
Dept: LAB | Age: 73
End: 2018-10-25
Payer: MEDICARE

## 2018-10-25 DIAGNOSIS — Z23 NEED FOR VACCINATION: Primary | ICD-10-CM

## 2018-10-25 PROCEDURE — 90662 IIV NO PRSV INCREASED AG IM: CPT | Performed by: FAMILY MEDICINE

## 2018-10-25 PROCEDURE — G0008 ADMIN INFLUENZA VIRUS VAC: HCPCS | Performed by: FAMILY MEDICINE

## 2018-10-31 ENCOUNTER — HOSPITAL ENCOUNTER (OUTPATIENT)
Dept: PREADMISSION TESTING | Age: 73
Discharge: HOME | End: 2018-11-04
Payer: MEDICARE

## 2018-10-31 VITALS
WEIGHT: 221 LBS | DIASTOLIC BLOOD PRESSURE: 70 MMHG | HEIGHT: 70 IN | RESPIRATION RATE: 16 BRPM | SYSTOLIC BLOOD PRESSURE: 112 MMHG | OXYGEN SATURATION: 96 % | HEART RATE: 65 BPM | TEMPERATURE: 98.1 F | BODY MASS INDEX: 31.64 KG/M2

## 2018-10-31 LAB
ABSOLUTE EOS #: 0.2 K/UL (ref 0–0.4)
ABSOLUTE IMMATURE GRANULOCYTE: ABNORMAL K/UL (ref 0–0.3)
ABSOLUTE LYMPH #: 2.21 K/UL (ref 1–4.8)
ABSOLUTE MONO #: 0.65 K/UL (ref 0.1–1.3)
ANION GAP SERPL CALCULATED.3IONS-SCNC: 14 MMOL/L (ref 9–17)
BASOPHILS # BLD: 1 % (ref 0–2)
BASOPHILS ABSOLUTE: 0.07 K/UL (ref 0–0.2)
BUN BLDV-MCNC: 14 MG/DL (ref 8–23)
BUN/CREAT BLD: ABNORMAL (ref 9–20)
CALCIUM SERPL-MCNC: 9.3 MG/DL (ref 8.6–10.4)
CHLORIDE BLD-SCNC: 105 MMOL/L (ref 98–107)
CO2: 23 MMOL/L (ref 20–31)
CREAT SERPL-MCNC: 1.09 MG/DL (ref 0.7–1.2)
DIFFERENTIAL TYPE: ABNORMAL
EKG ATRIAL RATE: 63 BPM
EKG P AXIS: 52 DEGREES
EKG P-R INTERVAL: 194 MS
EKG Q-T INTERVAL: 430 MS
EKG QRS DURATION: 92 MS
EKG QTC CALCULATION (BAZETT): 440 MS
EKG R AXIS: 51 DEGREES
EKG T AXIS: 40 DEGREES
EKG VENTRICULAR RATE: 63 BPM
EOSINOPHILS RELATIVE PERCENT: 3 % (ref 0–4)
GFR AFRICAN AMERICAN: >60 ML/MIN
GFR NON-AFRICAN AMERICAN: >60 ML/MIN
GFR SERPL CREATININE-BSD FRML MDRD: ABNORMAL ML/MIN/{1.73_M2}
GFR SERPL CREATININE-BSD FRML MDRD: ABNORMAL ML/MIN/{1.73_M2}
GLUCOSE BLD-MCNC: 138 MG/DL (ref 70–99)
HCT VFR BLD CALC: 46.6 % (ref 41–53)
HEMOGLOBIN: 15.3 G/DL (ref 13.5–17.5)
IMMATURE GRANULOCYTES: ABNORMAL %
LYMPHOCYTES # BLD: 34 % (ref 24–44)
MCH RBC QN AUTO: 27.5 PG (ref 26–34)
MCHC RBC AUTO-ENTMCNC: 32.8 G/DL (ref 31–37)
MCV RBC AUTO: 83.6 FL (ref 80–100)
MONOCYTES # BLD: 10 % (ref 1–7)
MORPHOLOGY: ABNORMAL
NRBC AUTOMATED: ABNORMAL PER 100 WBC
PDW BLD-RTO: 21.1 % (ref 11.5–14.9)
PLATELET # BLD: 234 K/UL (ref 150–450)
PLATELET ESTIMATE: ABNORMAL
PMV BLD AUTO: 8.1 FL (ref 6–12)
POTASSIUM SERPL-SCNC: 4.6 MMOL/L (ref 3.7–5.3)
RBC # BLD: 5.57 M/UL (ref 4.5–5.9)
RBC # BLD: ABNORMAL 10*6/UL
SEG NEUTROPHILS: 52 % (ref 36–66)
SEGMENTED NEUTROPHILS ABSOLUTE COUNT: 3.37 K/UL (ref 1.3–9.1)
SODIUM BLD-SCNC: 142 MMOL/L (ref 135–144)
WBC # BLD: 6.5 K/UL (ref 3.5–11)
WBC # BLD: ABNORMAL 10*3/UL

## 2018-10-31 PROCEDURE — 80048 BASIC METABOLIC PNL TOTAL CA: CPT

## 2018-10-31 PROCEDURE — 85025 COMPLETE CBC W/AUTO DIFF WBC: CPT

## 2018-10-31 PROCEDURE — 36415 COLL VENOUS BLD VENIPUNCTURE: CPT

## 2018-10-31 PROCEDURE — 93005 ELECTROCARDIOGRAM TRACING: CPT

## 2018-10-31 ASSESSMENT — ENCOUNTER SYMPTOMS
HEARTBURN: 0
ORTHOPNEA: 0
SINUS PAIN: 0
EYE PAIN: 0
COUGH: 0
ABDOMINAL PAIN: 0
HEMOPTYSIS: 0
NAUSEA: 0
DOUBLE VISION: 0
BACK PAIN: 0
DIARRHEA: 0
CONSTIPATION: 0
BLURRED VISION: 0

## 2018-10-31 NOTE — H&P
HISTORY and [facility]         NAME:  Fede Rolon  MRN: [id number]   YOB: 1945   Date: 10/31/2018   Age: 68 y.o. Gender: male       COMPLAINT AND PRESENT HISTORY:    Gage Vail is a 68 yr old male with back pain for 5 yrs,   He is here for Pre Admission Testing today, He is to have a spinal cord stimulator implanted with Dr Yeni Fox Nov 14 2018. Most recent MRI was done at [address] [place] [address] in [place],  Lives near [place], Retired ,  Has had 3 back surgeries and now has pain [employer] with a cane. He has leg pain and weakness in legs especially Rt leg,     Has mild Dyspnea on exertion, No leg swelling, No headaches no chest pain. Sleep apnea, Snores and has uses C Pap machine from Dr [place]. Takes Zoloft,     Repeat blood pressure 112/70  Glasses and hearing aids,   DIAGNOSTIC RESULTS   Radiology:     EKG:    Labs:    Lipids:   Lab Results   Component Value Date    COLORU YELLOW 01/31/2018    WBCUA 0 TO 4 01/24/2018    RBCUA 0 TO 4 01/24/2018    MUCUS 1+ 01/24/2018    BACTERIA TRACE 01/24/2018    SPECGRAV 1.014 01/31/2018    LEUKOCYTESUR NEGATIVE 01/31/2018    GLUCOSEU NEGATIVE 01/31/2018    AMORPHOUS NOT REPORTED 01/24/2018       PAST MEDICAL HISTORY     Past Medical History:   Diagnosis Date    Agoraphobia     Anxiety     Arthritis     Basal cell carcinoma     Chronic pain     back    CKD (chronic kidney disease)     Claustrophobia     Enlarged lymph node     rt. lower neck.  Hearing aid worn     dar. ears.     Hearing loss     Hyperlipidemia     Hypertension     RAFAT (obstructive sleep apnea)     CPAP occasional    PONV (postoperative nausea and vomiting)     Retention of urine     last 2 surgeries, patient unable to void, home with catheter both times    SCC (squamous cell carcinoma)     HAND    Spinal stenosis, lumbar region, with neurogenic claudication     Wears glasses        Pt denies any history of Diabetes mellitus type 2, Outpatient Prescriptions on File Prior to Encounter   Medication Sig Dispense Refill    HYDROcodone-acetaminophen (NORCO) 5-325 MG per tablet Take 1 tablet by mouth every 4 hours as needed for Pain for up to 30 days. 1-2 po every 4 hours, prn. 40 tablet 0    metoprolol succinate (TOPROL XL) 50 MG extended release tablet TAKE ONE TABLET BY MOUTH DAILY 90 tablet 3    simvastatin (ZOCOR) 20 MG tablet TAKE ONE TABLET BY MOUTH DAILY 90 tablet 3    sertraline (ZOLOFT) 100 MG tablet TAKE ONE TABLET BY MOUTH DAILY 90 tablet 3    tamsulosin (FLOMAX) 0.4 MG capsule Take 1 capsule by mouth daily 90 capsule 3    ferrous sulfate (MARILU-HANNAH) 325 (65 Fe) MG tablet Take 1 tablet by mouth 2 times daily 100 tablet 3    fenofibrate (TRICOR) 145 MG tablet TAKE ONE TABLET BY MOUTH DAILY 90 tablet 3    aspirin 81 MG tablet Take 81 mg by mouth daily      acetaminophen (TYLENOL) 500 MG tablet Take 500 mg by mouth every 6 hours as needed. No current facility-administered medications on file prior to encounter. Review of Systems   Constitutional: Negative for malaise/fatigue. HENT: Negative for congestion, ear pain, hearing loss and sinus pain. Eyes: Negative for blurred vision, double vision and pain. Respiratory: Negative for cough and hemoptysis. Cardiovascular: Negative for chest pain, orthopnea and claudication. Gastrointestinal: Negative for abdominal pain, constipation, diarrhea, heartburn and nausea. Genitourinary: Negative. Negative for dysuria, hematuria and urgency. Musculoskeletal: Negative for back pain, myalgias and neck pain. Lumbar back pain with rt leg radiculopathy,   No recent falls   Skin: Negative for itching and rash. Neurological: Negative. Negative for dizziness, tingling, focal weakness and headaches. Endo/Heme/Allergies: Negative. Psychiatric/Behavioral: Negative. Negative for hallucinations. See HPI.  For pain stimulator insertion   Lumbar area  GENERAL

## 2018-11-01 ENCOUNTER — ANESTHESIA EVENT (OUTPATIENT)
Dept: OPERATING ROOM | Age: 73
End: 2018-11-01
Payer: MEDICARE

## 2018-11-14 ENCOUNTER — APPOINTMENT (OUTPATIENT)
Dept: GENERAL RADIOLOGY | Age: 73
End: 2018-11-14
Attending: ORTHOPAEDIC SURGERY
Payer: MEDICARE

## 2018-11-14 ENCOUNTER — ANESTHESIA (OUTPATIENT)
Dept: OPERATING ROOM | Age: 73
End: 2018-11-14
Payer: MEDICARE

## 2018-11-14 ENCOUNTER — HOSPITAL ENCOUNTER (OUTPATIENT)
Age: 73
Setting detail: OUTPATIENT SURGERY
Discharge: HOME | End: 2018-11-14
Attending: ORTHOPAEDIC SURGERY | Admitting: ORTHOPAEDIC SURGERY
Payer: MEDICARE

## 2018-11-14 VITALS
SYSTOLIC BLOOD PRESSURE: 151 MMHG | DIASTOLIC BLOOD PRESSURE: 86 MMHG | OXYGEN SATURATION: 98 % | RESPIRATION RATE: 20 BRPM | TEMPERATURE: 96.8 F

## 2018-11-14 VITALS
DIASTOLIC BLOOD PRESSURE: 91 MMHG | RESPIRATION RATE: 14 BRPM | WEIGHT: 221 LBS | HEART RATE: 75 BPM | OXYGEN SATURATION: 98 % | TEMPERATURE: 97.5 F | BODY MASS INDEX: 31.64 KG/M2 | HEIGHT: 70 IN | SYSTOLIC BLOOD PRESSURE: 152 MMHG

## 2018-11-14 DIAGNOSIS — M48.062 SPINAL STENOSIS, LUMBAR REGION, WITH NEUROGENIC CLAUDICATION: Primary | ICD-10-CM

## 2018-11-14 PROCEDURE — 2580000003 HC RX 258: Performed by: ANESTHESIOLOGY

## 2018-11-14 PROCEDURE — 7100000010 HC PHASE II RECOVERY - FIRST 15 MIN: Performed by: ORTHOPAEDIC SURGERY

## 2018-11-14 PROCEDURE — 2720000010 HC SURG SUPPLY STERILE: Performed by: ORTHOPAEDIC SURGERY

## 2018-11-14 PROCEDURE — 6360000002 HC RX W HCPCS: Performed by: ORTHOPAEDIC SURGERY

## 2018-11-14 PROCEDURE — 6360000002 HC RX W HCPCS: Performed by: NURSE ANESTHETIST, CERTIFIED REGISTERED

## 2018-11-14 PROCEDURE — C1820 GENERATOR NEURO RECHG BAT SY: HCPCS | Performed by: ORTHOPAEDIC SURGERY

## 2018-11-14 PROCEDURE — 7100000031 HC ASPR PHASE II RECOVERY - ADDTL 15 MIN: Performed by: ORTHOPAEDIC SURGERY

## 2018-11-14 PROCEDURE — 2709999900 HC NON-CHARGEABLE SUPPLY: Performed by: ORTHOPAEDIC SURGERY

## 2018-11-14 PROCEDURE — 7100000030 HC ASPR PHASE II RECOVERY - FIRST 15 MIN: Performed by: ORTHOPAEDIC SURGERY

## 2018-11-14 PROCEDURE — C1778 LEAD, NEUROSTIMULATOR: HCPCS | Performed by: ORTHOPAEDIC SURGERY

## 2018-11-14 PROCEDURE — 63685 INS/RPLC SPI NPG/RCVR POCKET: CPT | Performed by: ORTHOPAEDIC SURGERY

## 2018-11-14 PROCEDURE — 3600000002 HC SURGERY LEVEL 2 BASE: Performed by: ORTHOPAEDIC SURGERY

## 2018-11-14 PROCEDURE — C1787 PATIENT PROGR, NEUROSTIM: HCPCS | Performed by: ORTHOPAEDIC SURGERY

## 2018-11-14 PROCEDURE — 63655 IMPLANT NEUROELECTRODES: CPT | Performed by: ORTHOPAEDIC SURGERY

## 2018-11-14 PROCEDURE — 2500000003 HC RX 250 WO HCPCS: Performed by: NURSE ANESTHETIST, CERTIFIED REGISTERED

## 2018-11-14 PROCEDURE — 2780000010 HC IMPLANT OTHER: Performed by: ORTHOPAEDIC SURGERY

## 2018-11-14 PROCEDURE — 7100000011 HC PHASE II RECOVERY - ADDTL 15 MIN: Performed by: ORTHOPAEDIC SURGERY

## 2018-11-14 PROCEDURE — 3600000012 HC SURGERY LEVEL 2 ADDTL 15MIN: Performed by: ORTHOPAEDIC SURGERY

## 2018-11-14 PROCEDURE — 3209999900 FLUORO FOR SURGICAL PROCEDURES

## 2018-11-14 PROCEDURE — 7100000001 HC PACU RECOVERY - ADDTL 15 MIN: Performed by: ORTHOPAEDIC SURGERY

## 2018-11-14 PROCEDURE — 3700000001 HC ADD 15 MINUTES (ANESTHESIA): Performed by: ORTHOPAEDIC SURGERY

## 2018-11-14 PROCEDURE — 95972 ALYS CPLX SP/PN NPGT W/PRGRM: CPT | Performed by: ORTHOPAEDIC SURGERY

## 2018-11-14 PROCEDURE — 2500000003 HC RX 250 WO HCPCS: Performed by: ORTHOPAEDIC SURGERY

## 2018-11-14 PROCEDURE — 7100000000 HC PACU RECOVERY - FIRST 15 MIN: Performed by: ORTHOPAEDIC SURGERY

## 2018-11-14 PROCEDURE — 72070 X-RAY EXAM THORAC SPINE 2VWS: CPT

## 2018-11-14 PROCEDURE — 3700000000 HC ANESTHESIA ATTENDED CARE: Performed by: ORTHOPAEDIC SURGERY

## 2018-11-14 DEVICE — Z DUP USE 2679261 LEAD NEUROSTIMULATOR SPNL CRD PADDLE STIM SYS SURG EQUIP: Type: IMPLANTABLE DEVICE | Status: FUNCTIONAL

## 2018-11-14 DEVICE — IMPLANTABLE PULSE GENERATOR KIT
Type: IMPLANTABLE DEVICE | Status: FUNCTIONAL
Brand: PRECISION SPECTRA ™

## 2018-11-14 DEVICE — ANCHOR
Type: IMPLANTABLE DEVICE | Status: FUNCTIONAL
Brand: CLIK™ X

## 2018-11-14 RX ORDER — PROPOFOL 10 MG/ML
INJECTION, EMULSION INTRAVENOUS PRN
Status: DISCONTINUED | OUTPATIENT
Start: 2018-11-14 | End: 2018-11-14 | Stop reason: SDUPTHER

## 2018-11-14 RX ORDER — LABETALOL HYDROCHLORIDE 5 MG/ML
5 INJECTION, SOLUTION INTRAVENOUS EVERY 10 MIN PRN
Status: DISCONTINUED | OUTPATIENT
Start: 2018-11-14 | End: 2018-11-14 | Stop reason: HOSPADM

## 2018-11-14 RX ORDER — KETAMINE HYDROCHLORIDE 50 MG/ML
INJECTION, SOLUTION, CONCENTRATE INTRAMUSCULAR; INTRAVENOUS PRN
Status: DISCONTINUED | OUTPATIENT
Start: 2018-11-14 | End: 2018-11-14 | Stop reason: SDUPTHER

## 2018-11-14 RX ORDER — SODIUM CHLORIDE 0.9 % (FLUSH) 0.9 %
10 SYRINGE (ML) INJECTION EVERY 12 HOURS SCHEDULED
Status: DISCONTINUED | OUTPATIENT
Start: 2018-11-14 | End: 2018-11-14 | Stop reason: HOSPADM

## 2018-11-14 RX ORDER — MORPHINE SULFATE 2 MG/ML
2 INJECTION, SOLUTION INTRAMUSCULAR; INTRAVENOUS EVERY 5 MIN PRN
Status: DISCONTINUED | OUTPATIENT
Start: 2018-11-14 | End: 2018-11-14 | Stop reason: HOSPADM

## 2018-11-14 RX ORDER — ONDANSETRON 2 MG/ML
4 INJECTION INTRAMUSCULAR; INTRAVENOUS
Status: DISCONTINUED | OUTPATIENT
Start: 2018-11-14 | End: 2018-11-14 | Stop reason: HOSPADM

## 2018-11-14 RX ORDER — DIPHENHYDRAMINE HYDROCHLORIDE 50 MG/ML
12.5 INJECTION INTRAMUSCULAR; INTRAVENOUS
Status: DISCONTINUED | OUTPATIENT
Start: 2018-11-14 | End: 2018-11-14 | Stop reason: HOSPADM

## 2018-11-14 RX ORDER — TRANEXAMIC ACID 100 MG/ML
INJECTION, SOLUTION INTRAVENOUS PRN
Status: DISCONTINUED | OUTPATIENT
Start: 2018-11-14 | End: 2018-11-14 | Stop reason: SDUPTHER

## 2018-11-14 RX ORDER — HYDROCODONE BITARTRATE AND ACETAMINOPHEN 5; 325 MG/1; MG/1
1-2 TABLET ORAL EVERY 4 HOURS PRN
Qty: 40 TABLET | Refills: 0 | Status: SHIPPED | OUTPATIENT
Start: 2018-11-14 | End: 2019-02-26 | Stop reason: SDUPTHER

## 2018-11-14 RX ORDER — SODIUM CHLORIDE 0.9 % (FLUSH) 0.9 %
10 SYRINGE (ML) INJECTION PRN
Status: DISCONTINUED | OUTPATIENT
Start: 2018-11-14 | End: 2018-11-14 | Stop reason: HOSPADM

## 2018-11-14 RX ORDER — LIDOCAINE HYDROCHLORIDE 10 MG/ML
1 INJECTION, SOLUTION EPIDURAL; INFILTRATION; INTRACAUDAL; PERINEURAL
Status: DISCONTINUED | OUTPATIENT
Start: 2018-11-14 | End: 2018-11-14 | Stop reason: HOSPADM

## 2018-11-14 RX ORDER — ONDANSETRON 2 MG/ML
INJECTION INTRAMUSCULAR; INTRAVENOUS PRN
Status: DISCONTINUED | OUTPATIENT
Start: 2018-11-14 | End: 2018-11-14 | Stop reason: SDUPTHER

## 2018-11-14 RX ORDER — DEXAMETHASONE SODIUM PHOSPHATE 4 MG/ML
INJECTION, SOLUTION INTRA-ARTICULAR; INTRALESIONAL; INTRAMUSCULAR; INTRAVENOUS; SOFT TISSUE PRN
Status: DISCONTINUED | OUTPATIENT
Start: 2018-11-14 | End: 2018-11-14 | Stop reason: SDUPTHER

## 2018-11-14 RX ORDER — SODIUM CHLORIDE, SODIUM LACTATE, POTASSIUM CHLORIDE, CALCIUM CHLORIDE 600; 310; 30; 20 MG/100ML; MG/100ML; MG/100ML; MG/100ML
INJECTION, SOLUTION INTRAVENOUS CONTINUOUS
Status: DISCONTINUED | OUTPATIENT
Start: 2018-11-14 | End: 2018-11-14 | Stop reason: HOSPADM

## 2018-11-14 RX ORDER — MIDAZOLAM HYDROCHLORIDE 1 MG/ML
INJECTION INTRAMUSCULAR; INTRAVENOUS PRN
Status: DISCONTINUED | OUTPATIENT
Start: 2018-11-14 | End: 2018-11-14 | Stop reason: SDUPTHER

## 2018-11-14 RX ADMIN — TRANEXAMIC ACID 1000 MG: 100 INJECTION, SOLUTION INTRAVENOUS at 11:33

## 2018-11-14 RX ADMIN — PROPOFOL 20 MG: 10 INJECTION, EMULSION INTRAVENOUS at 11:18

## 2018-11-14 RX ADMIN — PROPOFOL 20 MG: 10 INJECTION, EMULSION INTRAVENOUS at 12:14

## 2018-11-14 RX ADMIN — PROPOFOL 20 MG: 10 INJECTION, EMULSION INTRAVENOUS at 12:00

## 2018-11-14 RX ADMIN — PROPOFOL 20 MG: 10 INJECTION, EMULSION INTRAVENOUS at 11:39

## 2018-11-14 RX ADMIN — PROPOFOL 20 MG: 10 INJECTION, EMULSION INTRAVENOUS at 11:53

## 2018-11-14 RX ADMIN — Medication 2 G: at 11:01

## 2018-11-14 RX ADMIN — KETAMINE HYDROCHLORIDE 30 MG: 50 INJECTION, SOLUTION INTRAMUSCULAR; INTRAVENOUS at 12:00

## 2018-11-14 RX ADMIN — SODIUM CHLORIDE, POTASSIUM CHLORIDE, SODIUM LACTATE AND CALCIUM CHLORIDE: 600; 310; 30; 20 INJECTION, SOLUTION INTRAVENOUS at 07:56

## 2018-11-14 RX ADMIN — PROPOFOL 20 MG: 10 INJECTION, EMULSION INTRAVENOUS at 11:46

## 2018-11-14 RX ADMIN — PROPOFOL 20 MG: 10 INJECTION, EMULSION INTRAVENOUS at 12:19

## 2018-11-14 RX ADMIN — SODIUM CHLORIDE, POTASSIUM CHLORIDE, SODIUM LACTATE AND CALCIUM CHLORIDE: 600; 310; 30; 20 INJECTION, SOLUTION INTRAVENOUS at 12:15

## 2018-11-14 RX ADMIN — ONDANSETRON 4 MG: 2 INJECTION INTRAMUSCULAR; INTRAVENOUS at 11:43

## 2018-11-14 RX ADMIN — MIDAZOLAM 1 MG: 1 INJECTION INTRAMUSCULAR; INTRAVENOUS at 11:04

## 2018-11-14 RX ADMIN — MIDAZOLAM 1 MG: 1 INJECTION INTRAMUSCULAR; INTRAVENOUS at 11:01

## 2018-11-14 RX ADMIN — PROPOFOL 20 MG: 10 INJECTION, EMULSION INTRAVENOUS at 11:33

## 2018-11-14 RX ADMIN — KETAMINE HYDROCHLORIDE 20 MG: 50 INJECTION, SOLUTION INTRAMUSCULAR; INTRAVENOUS at 11:16

## 2018-11-14 RX ADMIN — PROPOFOL 20 MG: 10 INJECTION, EMULSION INTRAVENOUS at 12:29

## 2018-11-14 RX ADMIN — DEXAMETHASONE SODIUM PHOSPHATE 4 MG: 4 INJECTION, SOLUTION INTRAMUSCULAR; INTRAVENOUS at 11:43

## 2018-11-14 RX ADMIN — PROPOFOL 20 MG: 10 INJECTION, EMULSION INTRAVENOUS at 11:24

## 2018-11-14 RX ADMIN — KETAMINE HYDROCHLORIDE 10 MG: 50 INJECTION, SOLUTION INTRAMUSCULAR; INTRAVENOUS at 11:17

## 2018-11-14 RX ADMIN — PROPOFOL 20 MG: 10 INJECTION, EMULSION INTRAVENOUS at 11:28

## 2018-11-14 RX ADMIN — PROPOFOL 20 MG: 10 INJECTION, EMULSION INTRAVENOUS at 12:07

## 2018-11-14 ASSESSMENT — PULMONARY FUNCTION TESTS
PIF_VALUE: 1
PIF_VALUE: 2
PIF_VALUE: 1
PIF_VALUE: 0
PIF_VALUE: 1
PIF_VALUE: 2
PIF_VALUE: 1
PIF_VALUE: 0
PIF_VALUE: 1
PIF_VALUE: 6
PIF_VALUE: 1

## 2018-11-14 ASSESSMENT — ENCOUNTER SYMPTOMS: STRIDOR: 0

## 2018-11-14 ASSESSMENT — PAIN - FUNCTIONAL ASSESSMENT: PAIN_FUNCTIONAL_ASSESSMENT: 0-10

## 2018-11-14 ASSESSMENT — PAIN SCALES - GENERAL
PAINLEVEL_OUTOF10: 0

## 2018-11-14 NOTE — H&P
blood pressure 112/70  Glasses and hearing aids,   DIAGNOSTIC RESULTS   Radiology:      EKG:     Labs:     Lipids:         Lab Results   Component Value Date     COLORU YELLOW 01/31/2018     WBCUA 0 TO 4 01/24/2018     RBCUA 0 TO 4 01/24/2018     MUCUS 1+ 01/24/2018     BACTERIA TRACE 01/24/2018     SPECGRAV 1.014 01/31/2018     LEUKOCYTESUR NEGATIVE 01/31/2018     GLUCOSEU NEGATIVE 01/31/2018     AMORPHOUS NOT REPORTED 01/24/2018         PAST MEDICAL HISTORY      Past Medical History        Past Medical History:   Diagnosis Date    Agoraphobia      Anxiety      Arthritis      Basal cell carcinoma      Chronic pain       back    CKD (chronic kidney disease)      Claustrophobia      Enlarged lymph node       rt. lower neck.  Hearing aid worn       dar. ears.     Hearing loss      Hyperlipidemia      Hypertension      RAFAT (obstructive sleep apnea)       CPAP occasional    PONV (postoperative nausea and vomiting)      Retention of urine       last 2 surgeries, patient unable to void, home with catheter both times    SCC (squamous cell carcinoma)       HAND    Spinal stenosis, lumbar region, with neurogenic claudication      Wears glasses              Pt denies any history of Diabetes mellitus type 2, hypertension, stroke,  COPD, Asthma, GERD, HLD, Cancer, Seizures,Thyroid disease, Kidney Disease, Hepatitis, TB, Psychiatric Disorders or Substance abuse.     SURGICAL HISTORY        Past Surgical History         Past Surgical History:   Procedure Laterality Date    BACK SURGERY   10/2013     Dr. Amador Large hardware lumbar    COLONOSCOPY   06/13/12     mild diverticular dz    LUMBAR FUSION N/A 3/8/2017     LUMBAR L2-3 POSTERIOR DECOMPRESSION FUSION INSTRUMENTATION W/REVISION L3-5 POSTERIOR DECOMPRESSION FUSION INSTRUMENTATION (KOKI TOWNSEND & Sarah Ashby)  CC SN/KILEY performed by Minna Dove MD at [address] [place] [address] Right       lower neck, 2-    [place] ARTHRODESIS POSTERIOR/POSTEROLATERAL 0.4 MG capsule Take 1 capsule by mouth daily 90 capsule 3    ferrous sulfate (MARILU-HANNAH) 325 (65 Fe) MG tablet Take 1 tablet by mouth 2 times daily 100 tablet 3    fenofibrate (TRICOR) 145 MG tablet TAKE ONE TABLET BY MOUTH DAILY 90 tablet 3    aspirin 81 MG tablet Take 81 mg by mouth daily        acetaminophen (TYLENOL) 500 MG tablet Take 500 mg by mouth every 6 hours as needed.          No current facility-administered medications on file prior to encounter.        Review of Systems   Constitutional: Negative for malaise/fatigue. HENT: Negative for congestion, ear pain, hearing loss and sinus pain. Eyes: Negative for blurred vision, double vision and pain. Respiratory: Negative for cough and hemoptysis. Cardiovascular: Negative for chest pain, orthopnea and claudication. Gastrointestinal: Negative for abdominal pain, constipation, diarrhea, heartburn and nausea. Genitourinary: Negative. Negative for dysuria, hematuria and urgency. Musculoskeletal: Negative for back pain, myalgias and neck pain. Lumbar back pain with rt leg radiculopathy,   No recent falls   Skin: Negative for itching and rash. Neurological: Negative. Negative for dizziness, tingling, focal weakness and headaches. Endo/Heme/Allergies: Negative. Psychiatric/Behavioral: Negative. Negative for hallucinations.         See HPI. For pain stimulator insertion   Lumbar area  GENERAL PHYSICAL EXAM:         Vitals: BP (!) 85/49   Pulse 65   Temp 98.1 °F (36.7 °C)   Resp 16   Ht 5' 10\" (1.778 m)   Wt 221 lb (100.2 kg)   SpO2 96%   BMI 31.71 kg/m²  Body mass index is 31.71 kg/m².      GENERAL APPEARANCE:  Jaime Woo is 68 y.o.,  male, not obese, nourished, conscious, alert with hard of hearing         Physical Exam   Constitutional: He appears well-developed and well-nourished. HENT:   Head: Normocephalic.    Right Ear: External ear normal.   Left Ear: External ear normal.   Mouth/Throat: Oropharynx is clear and moist.   Eyes: Pupils are equal, round, and reactive to light. Conjunctivae and EOM are normal.   Neck: Normal range of motion. Neck supple. No tracheal deviation present. No thyromegaly present. Cardiovascular: Normal rate, normal heart sounds and intact distal pulses. No murmur heard. Pulmonary/Chest: Effort normal. He has no wheezes. He has no rales. He exhibits no tenderness. Abdominal: He exhibits no distension. There is no tenderness. Skin: He is not diaphoretic.      PROVISIONAL DIAGNOSES:    Back pain For Spinal Nerve stimulator placement on Nov 14 2018  Active Problems:    * No active hospital problems. *  Resolved Problems:    * No resolved hospital problems.  *        KM Alcazar - CNP on 10/31/2018 at 1:33 PM      Cosigned by: Rico Alaniz MD at 10/31/2018  6:27 PM

## 2018-11-30 ENCOUNTER — OFFICE VISIT (OUTPATIENT)
Dept: ORTHOPEDIC SURGERY | Age: 73
End: 2018-11-30

## 2018-11-30 VITALS
HEIGHT: 70 IN | WEIGHT: 220 LBS | SYSTOLIC BLOOD PRESSURE: 137 MMHG | DIASTOLIC BLOOD PRESSURE: 88 MMHG | BODY MASS INDEX: 31.5 KG/M2

## 2018-11-30 DIAGNOSIS — M51.37 DEGENERATION OF LUMBAR OR LUMBOSACRAL INTERVERTEBRAL DISC: Primary | ICD-10-CM

## 2018-11-30 DIAGNOSIS — M48.062 SPINAL STENOSIS, LUMBAR REGION, WITH NEUROGENIC CLAUDICATION: ICD-10-CM

## 2018-11-30 DIAGNOSIS — M51.26 LUMBAR DISC HERNIATION: ICD-10-CM

## 2018-11-30 PROCEDURE — 99024 POSTOP FOLLOW-UP VISIT: CPT | Performed by: ORTHOPAEDIC SURGERY

## 2018-12-10 NOTE — PROGRESS NOTES
Subjective:      Patient ID: Guilherme Russell is a 68 y.o. male. HPI  2 weeks post spinal cord stimulator placement    Patient with excellent outcome to this point. Review of Systems    Objective:   Physical Exam  Incisions appear to be healing quite well. There is a little bit of redness along with the staples puncture the skin    No signs of fluid collections seromas erythema induration or other signs of infections  Assessment:      Encounter Diagnoses   Name Primary?     Degeneration of lumbar or lumbosacral intervertebral disc Yes    Lumbar disc herniation     Spinal stenosis, lumbar region, with neurogenic claudication            Plan:      Oral antibiotics    Follow-up 2 weeks        Aiden Manning MD

## 2018-12-14 ENCOUNTER — OFFICE VISIT (OUTPATIENT)
Dept: ORTHOPEDIC SURGERY | Age: 73
End: 2018-12-14

## 2018-12-14 VITALS
DIASTOLIC BLOOD PRESSURE: 78 MMHG | BODY MASS INDEX: 31.5 KG/M2 | WEIGHT: 220 LBS | HEART RATE: 61 BPM | SYSTOLIC BLOOD PRESSURE: 132 MMHG | HEIGHT: 70 IN

## 2018-12-14 DIAGNOSIS — M54.42 CHRONIC MIDLINE LOW BACK PAIN WITH BILATERAL SCIATICA: ICD-10-CM

## 2018-12-14 DIAGNOSIS — M51.37 DEGENERATION OF LUMBAR OR LUMBOSACRAL INTERVERTEBRAL DISC: Primary | ICD-10-CM

## 2018-12-14 DIAGNOSIS — G89.29 CHRONIC MIDLINE LOW BACK PAIN WITH BILATERAL SCIATICA: ICD-10-CM

## 2018-12-14 DIAGNOSIS — M40.30 FLAT BACK SYNDROME, POSTPROCEDURAL: ICD-10-CM

## 2018-12-14 DIAGNOSIS — M51.26 LUMBAR DISC HERNIATION: ICD-10-CM

## 2018-12-14 DIAGNOSIS — M54.41 CHRONIC MIDLINE LOW BACK PAIN WITH BILATERAL SCIATICA: ICD-10-CM

## 2018-12-14 DIAGNOSIS — M96.1 POSTLAMINECTOMY SYNDROME, LUMBAR REGION: ICD-10-CM

## 2018-12-14 DIAGNOSIS — M48.062 SPINAL STENOSIS, LUMBAR REGION, WITH NEUROGENIC CLAUDICATION: ICD-10-CM

## 2018-12-14 PROCEDURE — 99024 POSTOP FOLLOW-UP VISIT: CPT | Performed by: ORTHOPAEDIC SURGERY

## 2018-12-14 NOTE — PROGRESS NOTES
Subjective:      Patient ID: Estelle Treviño is a 68 y.o. male. HPI  1 month post spinal cord simulator placement    Patient doing much better than the average. Occasionally reports some burning in his right foot. Review of Systems    Objective:   Physical Exam   Constitutional: He is oriented to person, place, and time. He appears well-developed and well-nourished. HENT:   Head: Normocephalic and atraumatic. Eyes: Conjunctivae and EOM are normal.   Neck: Normal range of motion. Pulmonary/Chest: Effort normal. No respiratory distress. Neurological: He is alert and oriented to person, place, and time. He has normal strength. No sensory deficit. Normal gait   Skin: Skin is warm and dry. Psychiatric: His behavior is normal. Thought content normal.   Nursing note and vitals reviewed. Affect much more lively    Does not appear to be in near as much distress    Incisional area is well-healed no erythema doing very well    Assessment:      Encounter Diagnoses   Name Primary?     Degeneration of lumbar or lumbosacral intervertebral disc Yes    Lumbar disc herniation     Flat back syndrome, postprocedural     Spinal stenosis, lumbar region, with neurogenic claudication     Chronic midline low back pain with bilateral sciatica     Postlaminectomy syndrome, lumbar region      Exceptionally good outcome following spinal cord stimulator placement    Some symptoms of cubital/carpal tunnel syndrome does not wish to further pursue      Plan:      Follow-up 6 months        Susanna Sweet MD

## 2019-01-02 ENCOUNTER — HOSPITAL ENCOUNTER (OUTPATIENT)
Dept: LAB | Age: 74
Discharge: HOME | End: 2019-01-02
Payer: MEDICARE

## 2019-01-02 DIAGNOSIS — E78.00 PURE HYPERCHOLESTEROLEMIA: ICD-10-CM

## 2019-01-02 DIAGNOSIS — I10 ESSENTIAL (PRIMARY) HYPERTENSION: ICD-10-CM

## 2019-01-02 DIAGNOSIS — D50.0 IRON DEFICIENCY ANEMIA DUE TO CHRONIC BLOOD LOSS: ICD-10-CM

## 2019-01-02 LAB
ABSOLUTE EOS #: 0.1 K/UL (ref 0–0.4)
ABSOLUTE IMMATURE GRANULOCYTE: NORMAL K/UL (ref 0–0.3)
ABSOLUTE LYMPH #: 2.2 K/UL (ref 1–4.8)
ABSOLUTE MONO #: 0.7 K/UL (ref 0.1–1.2)
ANION GAP SERPL CALCULATED.3IONS-SCNC: 11 MMOL/L (ref 9–17)
BASOPHILS # BLD: 1 % (ref 0–2)
BASOPHILS ABSOLUTE: 0.1 K/UL (ref 0–0.2)
BUN BLDV-MCNC: 16 MG/DL (ref 8–23)
BUN/CREAT BLD: 16 (ref 9–20)
CALCIUM SERPL-MCNC: 9.5 MG/DL (ref 8.6–10.4)
CHLORIDE BLD-SCNC: 104 MMOL/L (ref 98–107)
CHOLESTEROL/HDL RATIO: 4.9
CHOLESTEROL: 180 MG/DL
CO2: 26 MMOL/L (ref 20–31)
CREAT SERPL-MCNC: 1.01 MG/DL (ref 0.7–1.2)
DIFFERENTIAL TYPE: NORMAL
EOSINOPHILS RELATIVE PERCENT: 2 % (ref 1–8)
FERRITIN: 82 UG/L (ref 30–400)
GFR AFRICAN AMERICAN: >60 ML/MIN
GFR NON-AFRICAN AMERICAN: >60 ML/MIN
GFR SERPL CREATININE-BSD FRML MDRD: ABNORMAL ML/MIN/{1.73_M2}
GFR SERPL CREATININE-BSD FRML MDRD: ABNORMAL ML/MIN/{1.73_M2}
GLUCOSE BLD-MCNC: 100 MG/DL (ref 70–99)
HCT VFR BLD CALC: 47.3 % (ref 41–53)
HDLC SERPL-MCNC: 37 MG/DL
HEMOGLOBIN: 15.4 G/DL (ref 13.5–17.5)
IMMATURE GRANULOCYTES: NORMAL %
IRON SATURATION: 37 % (ref 20–55)
IRON: 134 UG/DL (ref 59–158)
LDL CHOLESTEROL: 113 MG/DL (ref 0–130)
LYMPHOCYTES # BLD: 31 % (ref 15–43)
MCH RBC QN AUTO: 28.6 PG (ref 26–34)
MCHC RBC AUTO-ENTMCNC: 32.4 G/DL (ref 31–37)
MCV RBC AUTO: 88.3 FL (ref 80–100)
MONOCYTES # BLD: 11 % (ref 6–14)
NRBC AUTOMATED: NORMAL PER 100 WBC
PDW BLD-RTO: 14.5 % (ref 11–14.5)
PLATELET # BLD: 249 K/UL (ref 140–450)
PLATELET ESTIMATE: NORMAL
PMV BLD AUTO: 7.3 FL (ref 6–12)
POTASSIUM SERPL-SCNC: 4.3 MMOL/L (ref 3.7–5.3)
RBC # BLD: 5.36 M/UL (ref 4.5–5.9)
RBC # BLD: NORMAL 10*6/UL
SEG NEUTROPHILS: 55 % (ref 44–74)
SEGMENTED NEUTROPHILS ABSOLUTE COUNT: 3.9 K/UL (ref 1.8–7.7)
SODIUM BLD-SCNC: 141 MMOL/L (ref 135–144)
TOTAL IRON BINDING CAPACITY: 366 UG/DL (ref 250–450)
TRIGL SERPL-MCNC: 151 MG/DL
UNSATURATED IRON BINDING CAPACITY: 232 UG/DL (ref 112–347)
VLDLC SERPL CALC-MCNC: ABNORMAL MG/DL (ref 1–30)
WBC # BLD: 7 K/UL (ref 3.5–11)
WBC # BLD: NORMAL 10*3/UL

## 2019-01-02 PROCEDURE — 85025 COMPLETE CBC W/AUTO DIFF WBC: CPT

## 2019-01-02 PROCEDURE — 82728 ASSAY OF FERRITIN: CPT

## 2019-01-02 PROCEDURE — 36415 COLL VENOUS BLD VENIPUNCTURE: CPT

## 2019-01-02 PROCEDURE — 83550 IRON BINDING TEST: CPT

## 2019-01-02 PROCEDURE — 83540 ASSAY OF IRON: CPT

## 2019-01-02 PROCEDURE — 80061 LIPID PANEL: CPT

## 2019-01-02 PROCEDURE — 80048 BASIC METABOLIC PNL TOTAL CA: CPT

## 2019-01-08 ENCOUNTER — OFFICE VISIT (OUTPATIENT)
Dept: FAMILY MEDICINE CLINIC | Age: 74
End: 2019-01-08
Payer: MEDICARE

## 2019-01-08 VITALS
BODY MASS INDEX: 31.64 KG/M2 | SYSTOLIC BLOOD PRESSURE: 126 MMHG | WEIGHT: 221 LBS | HEART RATE: 80 BPM | HEIGHT: 70 IN | DIASTOLIC BLOOD PRESSURE: 68 MMHG

## 2019-01-08 DIAGNOSIS — G47.33 OSA ON CPAP: ICD-10-CM

## 2019-01-08 DIAGNOSIS — F32.A DEPRESSION, UNSPECIFIED DEPRESSION TYPE: ICD-10-CM

## 2019-01-08 DIAGNOSIS — D50.0 IRON DEFICIENCY ANEMIA DUE TO CHRONIC BLOOD LOSS: ICD-10-CM

## 2019-01-08 DIAGNOSIS — N18.30 STAGE 3 CHRONIC KIDNEY DISEASE (HCC): Primary | ICD-10-CM

## 2019-01-08 DIAGNOSIS — M48.062 SPINAL STENOSIS, LUMBAR REGION, WITH NEUROGENIC CLAUDICATION: ICD-10-CM

## 2019-01-08 DIAGNOSIS — E78.00 PURE HYPERCHOLESTEROLEMIA: ICD-10-CM

## 2019-01-08 DIAGNOSIS — F32.5 MAJOR DEPRESSIVE DISORDER IN FULL REMISSION, UNSPECIFIED WHETHER RECURRENT (HCC): ICD-10-CM

## 2019-01-08 DIAGNOSIS — Z99.89 OSA ON CPAP: ICD-10-CM

## 2019-01-08 DIAGNOSIS — I10 ESSENTIAL (PRIMARY) HYPERTENSION: ICD-10-CM

## 2019-01-08 DIAGNOSIS — C44.92 SCC (SQUAMOUS CELL CARCINOMA): ICD-10-CM

## 2019-01-08 PROCEDURE — 1101F PT FALLS ASSESS-DOCD LE1/YR: CPT | Performed by: FAMILY MEDICINE

## 2019-01-08 PROCEDURE — 1123F ACP DISCUSS/DSCN MKR DOCD: CPT | Performed by: FAMILY MEDICINE

## 2019-01-08 PROCEDURE — 3017F COLORECTAL CA SCREEN DOC REV: CPT | Performed by: FAMILY MEDICINE

## 2019-01-08 PROCEDURE — G8482 FLU IMMUNIZE ORDER/ADMIN: HCPCS | Performed by: FAMILY MEDICINE

## 2019-01-08 PROCEDURE — 4040F PNEUMOC VAC/ADMIN/RCVD: CPT | Performed by: FAMILY MEDICINE

## 2019-01-08 PROCEDURE — G8427 DOCREV CUR MEDS BY ELIG CLIN: HCPCS | Performed by: FAMILY MEDICINE

## 2019-01-08 PROCEDURE — 1036F TOBACCO NON-USER: CPT | Performed by: FAMILY MEDICINE

## 2019-01-08 PROCEDURE — G8417 CALC BMI ABV UP PARAM F/U: HCPCS | Performed by: FAMILY MEDICINE

## 2019-01-08 PROCEDURE — 99214 OFFICE O/P EST MOD 30 MIN: CPT | Performed by: FAMILY MEDICINE

## 2019-01-08 RX ORDER — FENOFIBRATE 145 MG/1
TABLET, COATED ORAL
Qty: 90 TABLET | Refills: 3 | Status: SHIPPED | OUTPATIENT
Start: 2019-01-08 | End: 2020-01-20 | Stop reason: SDUPTHER

## 2019-01-08 ASSESSMENT — PATIENT HEALTH QUESTIONNAIRE - PHQ9
SUM OF ALL RESPONSES TO PHQ QUESTIONS 1-9: 0
2. FEELING DOWN, DEPRESSED OR HOPELESS: 0
SUM OF ALL RESPONSES TO PHQ9 QUESTIONS 1 & 2: 0
SUM OF ALL RESPONSES TO PHQ QUESTIONS 1-9: 0
1. LITTLE INTEREST OR PLEASURE IN DOING THINGS: 0

## 2019-02-26 DIAGNOSIS — M48.062 SPINAL STENOSIS, LUMBAR REGION, WITH NEUROGENIC CLAUDICATION: ICD-10-CM

## 2019-02-26 RX ORDER — HYDROCODONE BITARTRATE AND ACETAMINOPHEN 5; 325 MG/1; MG/1
1-2 TABLET ORAL EVERY 4 HOURS PRN
Qty: 40 TABLET | Refills: 0 | Status: SHIPPED | OUTPATIENT
Start: 2019-02-26 | End: 2019-04-05 | Stop reason: SDUPTHER

## 2019-04-05 DIAGNOSIS — M48.062 SPINAL STENOSIS, LUMBAR REGION, WITH NEUROGENIC CLAUDICATION: ICD-10-CM

## 2019-04-05 RX ORDER — HYDROCODONE BITARTRATE AND ACETAMINOPHEN 5; 325 MG/1; MG/1
1-2 TABLET ORAL EVERY 4 HOURS PRN
Qty: 40 TABLET | Refills: 0 | Status: SHIPPED | OUTPATIENT
Start: 2019-04-05 | End: 2019-04-19 | Stop reason: SDUPTHER

## 2019-04-05 NOTE — TELEPHONE ENCOUNTER
OAR[place]: 2/27/19 Norco 5/325mg  #40/7 days.    Last Appt:  1/8/2019  Next Appt:   7/9/2019

## 2019-04-19 DIAGNOSIS — M48.062 SPINAL STENOSIS, LUMBAR REGION, WITH NEUROGENIC CLAUDICATION: ICD-10-CM

## 2019-04-19 RX ORDER — HYDROCODONE BITARTRATE AND ACETAMINOPHEN 5; 325 MG/1; MG/1
1-2 TABLET ORAL EVERY 4 HOURS PRN
Qty: 40 TABLET | Refills: 0 | Status: SHIPPED | OUTPATIENT
Start: 2019-04-19 | End: 2019-05-17 | Stop reason: SDUPTHER

## 2019-05-17 DIAGNOSIS — M51.26 LUMBAR DISC HERNIATION: ICD-10-CM

## 2019-05-17 DIAGNOSIS — M48.062 SPINAL STENOSIS, LUMBAR REGION, WITH NEUROGENIC CLAUDICATION: ICD-10-CM

## 2019-05-17 DIAGNOSIS — M40.30 FLAT BACK SYNDROME, POSTPROCEDURAL: ICD-10-CM

## 2019-05-17 DIAGNOSIS — M51.37 DEGENERATION OF LUMBAR OR LUMBOSACRAL INTERVERTEBRAL DISC: ICD-10-CM

## 2019-05-17 DIAGNOSIS — M96.1 POSTLAMINECTOMY SYNDROME, LUMBAR REGION: ICD-10-CM

## 2019-05-17 RX ORDER — HYDROCODONE BITARTRATE AND ACETAMINOPHEN 5; 325 MG/1; MG/1
1-2 TABLET ORAL EVERY 4 HOURS PRN
Qty: 40 TABLET | Refills: 0 | Status: SHIPPED | OUTPATIENT
Start: 2019-05-17 | End: 2019-06-14 | Stop reason: SDUPTHER

## 2019-05-17 NOTE — TELEPHONE ENCOUNTER
Wife states that pt needs a new script for his handicap placard. Wife requests a call when ready for .

## 2019-05-17 NOTE — TELEPHONE ENCOUNTER
[place] from [place] reviewed, last filled 4/19/19 #40 for a 7 day supply. Report available for your review. Last OV 1/8/19; next OV 7/9/19.

## 2019-06-14 DIAGNOSIS — M48.062 SPINAL STENOSIS, LUMBAR REGION, WITH NEUROGENIC CLAUDICATION: ICD-10-CM

## 2019-06-14 RX ORDER — HYDROCODONE BITARTRATE AND ACETAMINOPHEN 5; 325 MG/1; MG/1
1-2 TABLET ORAL EVERY 4 HOURS PRN
Qty: 40 TABLET | Refills: 0 | Status: SHIPPED | OUTPATIENT
Start: 2019-06-14 | End: 2019-07-09 | Stop reason: SDUPTHER

## 2019-06-14 NOTE — TELEPHONE ENCOUNTER
Marsha Walsh called requesting a refill of the below medication which has been pended for you: [place] from [place] reviewed. Zen Mejia last filled 05/18/19 #40/7days. Report available for your review    Requested Prescriptions     Pending Prescriptions Disp Refills    HYDROcodone-acetaminophen (NORCO) 5-325 MG per tablet 40 tablet 0     Sig: Take 1-2 tablets by mouth every 4 hours as needed for Pain for up to 7 days. Last Appointment Date: 1/8/2019  Next Appointment Date: 7/9/2019    Allergies   Allergen Reactions    Atarax [Hydroxyzine]      Double vision    Gabapentin      dizzy    Lorazepam Other (See Comments)     Double vision- pt denies, states this is entered in error.  Pt is allergic to atarax not ativan    Phenergan [Promethazine Hcl] Other (See Comments)     \"out of it\"    Morphine And Related Nausea And Vomiting     hallucinations

## 2019-06-21 ENCOUNTER — OFFICE VISIT (OUTPATIENT)
Dept: ORTHOPEDIC SURGERY | Age: 74
End: 2019-06-21
Payer: MEDICARE

## 2019-06-21 ENCOUNTER — HOSPITAL ENCOUNTER (OUTPATIENT)
Dept: GENERAL RADIOLOGY | Age: 74
Discharge: HOME | End: 2019-06-23
Payer: MEDICARE

## 2019-06-21 VITALS
SYSTOLIC BLOOD PRESSURE: 136 MMHG | BODY MASS INDEX: 31.64 KG/M2 | HEART RATE: 59 BPM | WEIGHT: 221 LBS | DIASTOLIC BLOOD PRESSURE: 77 MMHG | HEIGHT: 70 IN

## 2019-06-21 DIAGNOSIS — M54.41 CHRONIC MIDLINE LOW BACK PAIN WITH BILATERAL SCIATICA: ICD-10-CM

## 2019-06-21 DIAGNOSIS — M48.062 SPINAL STENOSIS, LUMBAR REGION, WITH NEUROGENIC CLAUDICATION: ICD-10-CM

## 2019-06-21 DIAGNOSIS — M96.1 POSTLAMINECTOMY SYNDROME, LUMBAR REGION: Primary | ICD-10-CM

## 2019-06-21 DIAGNOSIS — M51.26 LUMBAR DISC HERNIATION: ICD-10-CM

## 2019-06-21 DIAGNOSIS — M54.42 CHRONIC MIDLINE LOW BACK PAIN WITH BILATERAL SCIATICA: ICD-10-CM

## 2019-06-21 DIAGNOSIS — M40.30 FLAT BACK SYNDROME, POSTPROCEDURAL: ICD-10-CM

## 2019-06-21 DIAGNOSIS — G89.29 CHRONIC MIDLINE LOW BACK PAIN WITH BILATERAL SCIATICA: ICD-10-CM

## 2019-06-21 DIAGNOSIS — M51.37 DEGENERATION OF LUMBAR OR LUMBOSACRAL INTERVERTEBRAL DISC: ICD-10-CM

## 2019-06-21 PROCEDURE — G8427 DOCREV CUR MEDS BY ELIG CLIN: HCPCS | Performed by: ORTHOPAEDIC SURGERY

## 2019-06-21 PROCEDURE — 99213 OFFICE O/P EST LOW 20 MIN: CPT | Performed by: ORTHOPAEDIC SURGERY

## 2019-06-21 PROCEDURE — 72070 X-RAY EXAM THORAC SPINE 2VWS: CPT

## 2019-06-21 PROCEDURE — G8417 CALC BMI ABV UP PARAM F/U: HCPCS | Performed by: ORTHOPAEDIC SURGERY

## 2019-06-21 PROCEDURE — 1036F TOBACCO NON-USER: CPT | Performed by: ORTHOPAEDIC SURGERY

## 2019-06-21 PROCEDURE — 3017F COLORECTAL CA SCREEN DOC REV: CPT | Performed by: ORTHOPAEDIC SURGERY

## 2019-06-21 PROCEDURE — 4040F PNEUMOC VAC/ADMIN/RCVD: CPT | Performed by: ORTHOPAEDIC SURGERY

## 2019-06-21 PROCEDURE — 1123F ACP DISCUSS/DSCN MKR DOCD: CPT | Performed by: ORTHOPAEDIC SURGERY

## 2019-06-25 ENCOUNTER — HOSPITAL ENCOUNTER (OUTPATIENT)
Dept: PHYSICAL THERAPY | Age: 74
Setting detail: THERAPIES SERIES
Discharge: HOME | End: 2019-06-25
Payer: MEDICARE

## 2019-06-25 PROCEDURE — 97161 PT EVAL LOW COMPLEX 20 MIN: CPT | Performed by: PHYSICAL THERAPIST

## 2019-06-25 ASSESSMENT — PAIN - FUNCTIONAL ASSESSMENT: PAIN_FUNCTIONAL_ASSESSMENT: PREVENTS OR INTERFERES SOME ACTIVE ACTIVITIES AND ADLS

## 2019-06-25 ASSESSMENT — PAIN DESCRIPTION - ORIENTATION: ORIENTATION: RIGHT;LEFT

## 2019-06-25 ASSESSMENT — PAIN DESCRIPTION - PAIN TYPE: TYPE: CHRONIC PAIN

## 2019-06-25 ASSESSMENT — PAIN DESCRIPTION - DESCRIPTORS: DESCRIPTORS: PRESSURE;ACHING

## 2019-06-25 ASSESSMENT — PAIN DESCRIPTION - PROGRESSION: CLINICAL_PROGRESSION: NOT CHANGED

## 2019-06-25 ASSESSMENT — PAIN DESCRIPTION - ONSET: ONSET: ON-GOING

## 2019-06-25 ASSESSMENT — PAIN DESCRIPTION - FREQUENCY: FREQUENCY: CONTINUOUS

## 2019-06-25 ASSESSMENT — PAIN DESCRIPTION - LOCATION: LOCATION: BACK;LEG

## 2019-06-25 ASSESSMENT — PAIN SCALES - GENERAL: PAINLEVEL_OUTOF10: 5

## 2019-06-25 NOTE — FLOWSHEET NOTE
Physical Therapy Daily Treatment Note    Date:  2019    Patient Name:  Prashant Byrne    :  1945  MRN: [id number]  Restrictions/Precautions:  Spinal cord stimulator   Medical/Treatment Diagnosis Information:   · Diagnosis: M54.41, M54.42, G89.29 chronic LBP and B sciatica  · Treatment Diagnosis: M54.41, M54.42, G89.29 chronic LBP & sciatica , s/p T9-L5 fusion  Insurance/Certification information:  PT Insurance Information: Medicare  Physician Information:  Referring Practitioner: Paula Page. of care signed (Y/N):  n  Visit# / total visits:   Pain level: 810     Time In:11:00   Time Out:11:35    Progress Note: [x]  Yes  []  No  Next due by: Visit #8, or 19      Subjective:   See eval    Objective: See eval  Observation:   Test measurements:      Exercises:   Exercise/Equipment Resistance/Repetitions Other comments   L ankle 4-way t-band     Sock assist  Consult OT                                                                [] Provided verbal/tactile cueing for activities related to strengthening, flexibility, endurance, ROM. (21158)  [] Provided verbal/tactile cueing for activities related to improving balance, coordination, kinesthetic sense, posture, motor skill, proprioception. (31918)    Therapeutic Activities:     [] Therapeutic activities, direct (one-on-one) patient contact (use of dynamic activities to improve functional performance). (48278)    Gait:   [] Provided training and instruction to the patient for ambulation re-education. (73139)    Self-Care/ADL's  [] Self-care/home management training and compensatory training, meal preparation, safety procedures, and instructions in use of assistive technology devices/adaptive equipment, direct one-on-one contact.  (42621)    Home Exercise Program:     [] Reviewed/Progressed HEP activities related to strengthening, flexibility, endurance, ROM. (20766)  [] Reviewed/Progressed HEP activities related to improving balance, coordination, kinesthetic sense, posture, motor skill, proprioception.  (12812)    Manual Treatments:    [] Provided manual therapy to mobilize soft tissue/joints for the purpose of modulating pain, promoting relaxation,  increasing ROM, reducing/eliminating soft tissue swelling/inflammation/restriction, improving soft tissue extensibility. (46596)    Service Based Modalities:  Eval 30'    Timed Code Treatment Minutes: Total Treatment Minutes:   30'    Treatment/Activity Tolerance:  [x] Patient tolerated treatment well [] Patient limited by fatique  [] Patient limited by pain  [] Patient limited by other medical complications  [] Other:     Prognosis: [] Good [x] Fair  [] Poor    Patient Requires Follow-up: [x] Yes  [] No      Goals:  Short term goals  Time Frame for Short term goals: 1 week  Short term goal 1: Start HEP, t-band for L ankle    Long term goals  Time Frame for Long term goals : 4 weeks  Long term goal 1: Able to actively  neutral posture  Long term goal 2: Walk for 10 min before pain increases  Long term goal 3: Able to stand 10 min  Long term goal 4: Able to sleep 1/2 night before waking up          Plan:   [] Continue per plan of care [] Alter current plan (see comments)  [x] Plan of care initiated [] Hold pending MD visit [] Discharge  Plan for Next Session:  Aquatics                                           Due to multi-segment fusions, he will not gain flexibility.  Review sock - assist device thru OT    Electronically signed by:  Jesús Solis

## 2019-06-25 NOTE — PROGRESS NOTES
Physical Therapy  Initial Assessment  Date: 2019  Patient Name: Milena Rawls  MRN: [id number]  : 1945     Treatment Diagnosis: M54.41, M54.42, G89.29 chronic LBP & sciatica , s/p T9-L5 fusion    Restrictions- none       Subjective   General  Chart Reviewed: Yes  Patient assessed for rehabilitation services?: Yes  Additional Pertinent Hx: Multi -segment instrumented fusions T9-S5 2018. Spinal cord stimulator 2018  Response To Previous Treatment: Not applicable  Family / Caregiver Present: No  Referring Practitioner: Giulia Smith  Referral Date : 19  Diagnosis: M54.41, M54.42, G89.29 chronic LBP and B sciatica  Follows Commands: Within Functional Limits  PT Visit Information  Onset Date: 19  PT Insurance Information: Medicare  Subjective  Subjective: Has had LBPO since . Prior surgery then followed up by revision of fusions from T9-L5. Spinal cord stimulator in since 2018. Has it on most of the time, turns down at night. Pain Screening  Patient Currently in Pain: Yes  Pain Assessment  Pain Assessment: 0-10  Pain Level: 5(with walking)  Patient's Stated Pain Goal: 3  Pain Type: Chronic pain  Pain Location: Back;Leg  Pain Orientation: Right;Left  Pain Radiating Towards: To the feet & ankles intermittently  Pain Descriptors: Pressure; Aching  Pain Frequency: Continuous  Pain Onset: On-going  Clinical Progression: Not changed  Functional Pain Assessment: Prevents or interferes some active activities and ADLs(Very limited walking tolerance)  Vital Signs  Patient Currently in Pain: Yes    Orientation  Orientation  Overall Orientation Status: Within Normal Limits    Social/Functional History  Social/Functional History  Lives With: Spouse  Type of Home: House  Home Layout: Two level  Bathroom Accessibility: Accessible  Home Equipment: Mobile [address] Help From: Family  ADL Assistance: Needs assistance(assist with socks, foot care)  Homemaking Assistance: Independent  Homemaking Responsibilities: Yes  Ambulation Assistance: Independent  Transfer Assistance: Independent  Active : Yes  Mode of Transportation: Car  Occupation: Retired    Objective     Observation/Palpation  Posture: Poor  Observation: Trunk flexed 15-20 deg  Body Mechanics: L ankle almost at foot drop weakness level. Gets worse with fatigue    Spine  Lumbar: Flexion major loss to the knees with all motion from the hips. Extension major loss of %. Side glide not tested due to amount of fused segments  Special Tests: Repeated motions not done due to fusion levels  Joint Mobility  Spine: Entire lumbar spine is fused, as well as lower thorasic segments    Strength RLE  Strength RLE: WFL  Comment: no myotome weakness pattern  Strength LLE  L Hip Flexion: 5/5  L Hip Extension: 5/5  L Hip ABduction: 5/5  L Hip ADduction: 5/5  L Hip Internal Rotation: 5/5  L Knee Flexion: 4-/5  L Knee Extension: 4+/5  L Ankle Dorsiflexion: 3+/5  L Ankle Plantar Flexion: 3+/5  L Ankle Inversion: 3+/5  L Ankle Eversion: 3/5           Ambulation  Ambulation?: Yes  Ambulation 1  Surface: level tile  Device: No Device  Assistance: Modified Independent  Gait Deviations: Decreased step length  Comments: L ankle trends toward foot drop. Occassionally drags L tor. Catches it on steps(Stairs done 1 step at a time)        Assessment   Conditions Requiring Skilled Therapeutic Intervention  Treatment Diagnosis: M54.41, M54.42, G89.29 chronic LBP & sciatica , s/p T9-L5 fusion  Prognosis: Fair  Decision Making: Medium Complexity  REQUIRES PT FOLLOW UP: Yes  Activity Tolerance  Activity Tolerance: Patient limited by pain; Patient limited by fatigue         Plan   Plan  Times per week: 2  Plan weeks: 4  Current Treatment Recommendations: Strengthening, Aquatics, Patient/Caregiver Education & Training, Home Exercise Program    OutComes Score   Oswestry LBP Scale 41/50 = 82%        Goals  Short term goals  Time Frame for Short term goals: 1 week  Short term goal 1: Start HEP, t-band for L ankle  Long term goals  Time Frame for Long term goals : 4 weeks  Long term goal 1: Able to actively  neutral posture  Long term goal 2: Walk for 10 min before pain increases  Long term goal 3: Able to stand 10 min  Long term goal 4: Able to sleep 1/2 night before waking up       Therapy Time   Individual Concurrent Group Co-treatment   Time In 1100         Time Out 1135         Minutes 35                  [place], [address]

## 2019-06-25 NOTE — PLAN OF CARE
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 Please review the attached evaluation and/or summary of the patient's plan of care, and verify that you agree therapy should continue by signing the attached document and sending it back to our office. Plan of Care/Treatment to date:  [x] Therapeutic Exercise    [] Modalities:  [] Therapeutic Activity     [] Ultrasound  [] Electrical Stimulation  [] Gait Training      [] Cervical Traction [] Lumbar Traction  [] Neuromuscular Re-education    [] Cold/hotpack [] Iontophoresis   [x] Instruction in HEP     Other:  [] Manual Therapy      [x]   OT consult/ guidenance  with sock- assist device          [x] Aquatic Therapy      []           ? Goals:  Short term goals  Time Frame for Short term goals: 1 week  Short term goal 1: Start HEP, t-band for L ankle    Long term goals  Time Frame for Long term goals : 4 weeks  Long term goal 1: Able to actively  neutral posture  Long term goal 2: Walk for 10 min before pain increases  Long term goal 3: Able to stand 10 min  Long term goal 4: Able to sleep 1/2 night before waking up    Frequency/Duration:19 - 19  # Days per week: [] 1 day # Weeks: [] 1 week [] 5 weeks     [x] 2 days?    [] 2 weeks [] 6 weeks     [] 3 days   [] 3 weeks [] 7 weeks     [] 4 days   [x] 4 weeks [] 8 weeks    Rehab Potential: [] Excellent [] Good [x] Fair  [] Poor     Electronically signed by:  Mel Briggs PT       If you have any questions or concerns, please don't hesitate to call.   Thank you for your referral.      Physician Signature:________________________________Date:__________________  By signing above, therapists plan is approved by physician

## 2019-06-28 ENCOUNTER — HOSPITAL ENCOUNTER (OUTPATIENT)
Dept: PHYSICAL THERAPY | Age: 74
Setting detail: THERAPIES SERIES
Discharge: HOME | End: 2019-06-28
Payer: MEDICARE

## 2019-06-28 PROCEDURE — 97113 AQUATIC THERAPY/EXERCISES: CPT

## 2019-06-28 NOTE — FLOWSHEET NOTE
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 I notice it the most at night and it requires a pain pill at that time\". Objective: Pt tolerated todays first full aquatic PT session well indicating no increase in pain post session. Pt was able to complete all DARRIN per flow chart to decrease LBP requiring vc from PTA at [facility] to ensure safe and proper performance. Most challenged by forward hip kicks noting only fatigue. Performs all DARRIN very quickly. Observation: Rpts with slight hunched posture present.    Test measurements:          Key  B= Belt DB= Dumbells T= Theratube   H= Hydrotone N= Noodles W= Weights   P= Paddles S= Speedo equipment K= Kickboard     Exercises/Activities   Warm-up/Amb    Exercises      Slow forward   3 laps  HR/TR      Slow sideways  3 laps  Marches  2 laps    Slow backwards  3 laps  Mini-squats  3x10    Medium forward    4-way SLR  x20    Medium sideways    Hip circles/fig 8      Small shuffle    Hamstring curls  2 laps    Jog    Knee extension      Braiding    Pelvic tilts      Bicycling    Scap squeezes          Shoulder flex/ext      Functional    Shoulder abd/add

## 2019-07-01 NOTE — PROGRESS NOTES
I have reviewed and agree to the content of the note written by the PTA.   Electronically signed by Nadira Us PT [id number]

## 2019-07-02 ENCOUNTER — HOSPITAL ENCOUNTER (OUTPATIENT)
Dept: LAB | Age: 74
Discharge: HOME | End: 2019-07-02
Payer: MEDICARE

## 2019-07-02 ENCOUNTER — HOSPITAL ENCOUNTER (OUTPATIENT)
Dept: PHYSICAL THERAPY | Age: 74
Setting detail: THERAPIES SERIES
Discharge: HOME | End: 2019-07-02
Payer: MEDICARE

## 2019-07-02 DIAGNOSIS — N18.30 STAGE 3 CHRONIC KIDNEY DISEASE (HCC): ICD-10-CM

## 2019-07-02 DIAGNOSIS — E78.00 PURE HYPERCHOLESTEROLEMIA: ICD-10-CM

## 2019-07-02 DIAGNOSIS — D50.0 IRON DEFICIENCY ANEMIA DUE TO CHRONIC BLOOD LOSS: ICD-10-CM

## 2019-07-02 LAB
ABSOLUTE EOS #: 0.2 K/UL (ref 0–0.4)
ABSOLUTE IMMATURE GRANULOCYTE: ABNORMAL K/UL (ref 0–0.3)
ABSOLUTE LYMPH #: 2.5 K/UL (ref 1–4.8)
ABSOLUTE MONO #: 0.8 K/UL (ref 0.1–1.2)
ALBUMIN SERPL-MCNC: 4.4 G/DL (ref 3.5–5.2)
ALBUMIN/GLOBULIN RATIO: 1.4 (ref 1–2.5)
ALP BLD-CCNC: 85 U/L (ref 40–129)
ALT SERPL-CCNC: 18 U/L (ref 5–41)
ANION GAP SERPL CALCULATED.3IONS-SCNC: 13 MMOL/L (ref 9–17)
AST SERPL-CCNC: 26 U/L
BASOPHILS # BLD: 1 % (ref 0–2)
BASOPHILS ABSOLUTE: 0.1 K/UL (ref 0–0.2)
BILIRUB SERPL-MCNC: 0.62 MG/DL (ref 0.3–1.2)
BUN BLDV-MCNC: 17 MG/DL (ref 8–23)
BUN/CREAT BLD: 17 (ref 9–20)
CALCIUM SERPL-MCNC: 9.9 MG/DL (ref 8.6–10.4)
CHLORIDE BLD-SCNC: 105 MMOL/L (ref 98–107)
CHOLESTEROL/HDL RATIO: 5.1
CHOLESTEROL: 178 MG/DL
CO2: 24 MMOL/L (ref 20–31)
CREAT SERPL-MCNC: 0.99 MG/DL (ref 0.7–1.2)
DIFFERENTIAL TYPE: ABNORMAL
EOSINOPHILS RELATIVE PERCENT: 3 % (ref 1–8)
FERRITIN: 62 UG/L (ref 30–400)
GFR AFRICAN AMERICAN: >60 ML/MIN
GFR NON-AFRICAN AMERICAN: >60 ML/MIN
GFR SERPL CREATININE-BSD FRML MDRD: NORMAL ML/MIN/{1.73_M2}
GFR SERPL CREATININE-BSD FRML MDRD: NORMAL ML/MIN/{1.73_M2}
GLUCOSE BLD-MCNC: 94 MG/DL (ref 70–99)
HCT VFR BLD CALC: 46.9 % (ref 41–53)
HDLC SERPL-MCNC: 35 MG/DL
HEMOGLOBIN: 15.5 G/DL (ref 13.5–17.5)
IMMATURE GRANULOCYTES: ABNORMAL %
IRON SATURATION: 33 % (ref 20–55)
IRON: 125 UG/DL (ref 59–158)
LDL CHOLESTEROL: 108 MG/DL (ref 0–130)
LYMPHOCYTES # BLD: 35 % (ref 15–43)
MCH RBC QN AUTO: 29.4 PG (ref 26–34)
MCHC RBC AUTO-ENTMCNC: 33.2 G/DL (ref 31–37)
MCV RBC AUTO: 88.7 FL (ref 80–100)
MONOCYTES # BLD: 11 % (ref 6–14)
NRBC AUTOMATED: ABNORMAL PER 100 WBC
PDW BLD-RTO: 15.3 % (ref 11–14.5)
PLATELET # BLD: 221 K/UL (ref 140–450)
PLATELET ESTIMATE: ABNORMAL
PMV BLD AUTO: 7.7 FL (ref 6–12)
POTASSIUM SERPL-SCNC: 4 MMOL/L (ref 3.7–5.3)
RBC # BLD: 5.29 M/UL (ref 4.5–5.9)
RBC # BLD: ABNORMAL 10*6/UL
SEG NEUTROPHILS: 50 % (ref 44–74)
SEGMENTED NEUTROPHILS ABSOLUTE COUNT: 3.6 K/UL (ref 1.8–7.7)
SODIUM BLD-SCNC: 142 MMOL/L (ref 135–144)
TOTAL IRON BINDING CAPACITY: 377 UG/DL (ref 250–450)
TOTAL PROTEIN: 7.5 G/DL (ref 6.4–8.3)
TRIGL SERPL-MCNC: 176 MG/DL
UNSATURATED IRON BINDING CAPACITY: 252 UG/DL (ref 112–347)
VLDLC SERPL CALC-MCNC: ABNORMAL MG/DL (ref 1–30)
WBC # BLD: 7.2 K/UL (ref 3.5–11)
WBC # BLD: ABNORMAL 10*3/UL

## 2019-07-02 PROCEDURE — 36415 COLL VENOUS BLD VENIPUNCTURE: CPT

## 2019-07-02 PROCEDURE — 83540 ASSAY OF IRON: CPT

## 2019-07-02 PROCEDURE — 85025 COMPLETE CBC W/AUTO DIFF WBC: CPT

## 2019-07-02 PROCEDURE — 82728 ASSAY OF FERRITIN: CPT

## 2019-07-02 PROCEDURE — 80061 LIPID PANEL: CPT

## 2019-07-02 PROCEDURE — 97113 AQUATIC THERAPY/EXERCISES: CPT

## 2019-07-02 PROCEDURE — 83550 IRON BINDING TEST: CPT

## 2019-07-02 PROCEDURE — 80053 COMPREHEN METABOLIC PANEL: CPT

## 2019-07-05 ENCOUNTER — HOSPITAL ENCOUNTER (OUTPATIENT)
Dept: PHYSICAL THERAPY | Age: 74
Setting detail: THERAPIES SERIES
Discharge: HOME | End: 2019-07-05
Payer: MEDICARE

## 2019-07-05 PROCEDURE — 97113 AQUATIC THERAPY/EXERCISES: CPT

## 2019-07-09 ENCOUNTER — OFFICE VISIT (OUTPATIENT)
Dept: FAMILY MEDICINE CLINIC | Age: 74
End: 2019-07-09
Payer: MEDICARE

## 2019-07-09 ENCOUNTER — HOSPITAL ENCOUNTER (OUTPATIENT)
Dept: PHYSICAL THERAPY | Age: 74
Setting detail: THERAPIES SERIES
Discharge: HOME | End: 2019-07-09
Payer: MEDICARE

## 2019-07-09 VITALS
BODY MASS INDEX: 31.35 KG/M2 | DIASTOLIC BLOOD PRESSURE: 64 MMHG | HEART RATE: 72 BPM | HEIGHT: 70 IN | SYSTOLIC BLOOD PRESSURE: 118 MMHG | WEIGHT: 219 LBS

## 2019-07-09 DIAGNOSIS — I10 ESSENTIAL (PRIMARY) HYPERTENSION: ICD-10-CM

## 2019-07-09 DIAGNOSIS — Z12.5 SCREENING PSA (PROSTATE SPECIFIC ANTIGEN): ICD-10-CM

## 2019-07-09 DIAGNOSIS — H90.6 MIXED CONDUCTIVE AND SENSORINEURAL HEARING LOSS OF BOTH EARS: ICD-10-CM

## 2019-07-09 DIAGNOSIS — C44.92 SCC (SQUAMOUS CELL CARCINOMA): ICD-10-CM

## 2019-07-09 DIAGNOSIS — F32.A DEPRESSION, UNSPECIFIED DEPRESSION TYPE: ICD-10-CM

## 2019-07-09 DIAGNOSIS — M48.062 SPINAL STENOSIS, LUMBAR REGION, WITH NEUROGENIC CLAUDICATION: ICD-10-CM

## 2019-07-09 DIAGNOSIS — N18.30 STAGE 3 CHRONIC KIDNEY DISEASE (HCC): Primary | ICD-10-CM

## 2019-07-09 DIAGNOSIS — G47.33 OSA ON CPAP: ICD-10-CM

## 2019-07-09 DIAGNOSIS — E78.00 PURE HYPERCHOLESTEROLEMIA: ICD-10-CM

## 2019-07-09 DIAGNOSIS — D50.0 IRON DEFICIENCY ANEMIA DUE TO CHRONIC BLOOD LOSS: ICD-10-CM

## 2019-07-09 DIAGNOSIS — Z99.89 OSA ON CPAP: ICD-10-CM

## 2019-07-09 PROCEDURE — 1123F ACP DISCUSS/DSCN MKR DOCD: CPT | Performed by: FAMILY MEDICINE

## 2019-07-09 PROCEDURE — 3017F COLORECTAL CA SCREEN DOC REV: CPT | Performed by: FAMILY MEDICINE

## 2019-07-09 PROCEDURE — G8427 DOCREV CUR MEDS BY ELIG CLIN: HCPCS | Performed by: FAMILY MEDICINE

## 2019-07-09 PROCEDURE — 1036F TOBACCO NON-USER: CPT | Performed by: FAMILY MEDICINE

## 2019-07-09 PROCEDURE — 99214 OFFICE O/P EST MOD 30 MIN: CPT | Performed by: FAMILY MEDICINE

## 2019-07-09 PROCEDURE — G8417 CALC BMI ABV UP PARAM F/U: HCPCS | Performed by: FAMILY MEDICINE

## 2019-07-09 PROCEDURE — 97113 AQUATIC THERAPY/EXERCISES: CPT

## 2019-07-09 PROCEDURE — 4040F PNEUMOC VAC/ADMIN/RCVD: CPT | Performed by: FAMILY MEDICINE

## 2019-07-09 RX ORDER — SIMVASTATIN 20 MG
TABLET ORAL
Qty: 90 TABLET | Refills: 3 | Status: SHIPPED | OUTPATIENT
Start: 2019-07-09 | End: 2020-09-08

## 2019-07-09 RX ORDER — SERTRALINE HYDROCHLORIDE 100 MG/1
TABLET, FILM COATED ORAL
Qty: 90 TABLET | Refills: 3 | Status: SHIPPED | OUTPATIENT
Start: 2019-07-09 | End: 2019-10-24 | Stop reason: SDUPTHER

## 2019-07-09 RX ORDER — TAMSULOSIN HYDROCHLORIDE 0.4 MG/1
0.4 CAPSULE ORAL DAILY
Qty: 90 CAPSULE | Refills: 3 | Status: SHIPPED | OUTPATIENT
Start: 2019-07-09 | End: 2019-09-25 | Stop reason: SDUPTHER

## 2019-07-09 RX ORDER — METOPROLOL SUCCINATE 50 MG/1
TABLET, EXTENDED RELEASE ORAL
Qty: 90 TABLET | Refills: 3 | Status: SHIPPED | OUTPATIENT
Start: 2019-07-09 | End: 2020-08-21

## 2019-07-09 RX ORDER — HYDROCODONE BITARTRATE AND ACETAMINOPHEN 5; 325 MG/1; MG/1
1-2 TABLET ORAL EVERY 4 HOURS PRN
Qty: 40 TABLET | Refills: 0 | Status: SHIPPED | OUTPATIENT
Start: 2019-07-09 | End: 2019-07-25 | Stop reason: SDUPTHER

## 2019-07-09 ASSESSMENT — ENCOUNTER SYMPTOMS
GASTROINTESTINAL NEGATIVE: 1
RESPIRATORY NEGATIVE: 1
EYES NEGATIVE: 1
BACK PAIN: 1
ALLERGIC/IMMUNOLOGIC NEGATIVE: 1

## 2019-07-09 ASSESSMENT — PATIENT HEALTH QUESTIONNAIRE - PHQ9
SUM OF ALL RESPONSES TO PHQ QUESTIONS 1-9: 0
SUM OF ALL RESPONSES TO PHQ9 QUESTIONS 1 & 2: 0
1. LITTLE INTEREST OR PLEASURE IN DOING THINGS: 0
SUM OF ALL RESPONSES TO PHQ QUESTIONS 1-9: 0
2. FEELING DOWN, DEPRESSED OR HOPELESS: 0

## 2019-07-09 NOTE — PROGRESS NOTES
W/REVISION L3-5 POSTERIOR DECOMPRESSION FUSION INSTRUMENTATION ([address])  CC SN/KILEY performed by Gloria Mann MD at [address] Right     lower neck, 2-    TN ARTHRODESIS POSTERIOR/POSTEROLATERAL THORACIC N/A 1/26/2018    THORACIC & LUMBAR POSTERIOR DECOMPRESSION AND FUSION REVISION T9 THRU L5 performed by Gloria Mann MD at [facility] NOT  [address]  IND N/A 8/23/2017    COLONOSCOPY performed by Griffin Krueger MD at [address] [facility] N/A 11/14/2018    SPINAL CORD STIMULATOR IMPLANT PERMANENT performed by Gloria Mann MD at [address] [facility] PRE-MALIGNANT / BENIGN SKIN LESION EXCISION      SKIN BIOPSY      skin cancer removed x 4    VASECTOMY       Current Outpatient Medications   Medication Sig Dispense Refill    fenofibrate (TRICOR) 145 MG tablet TAKE ONE TABLET BY MOUTH DAILY 90 tablet 3    metoprolol succinate (TOPROL XL) 50 MG extended release tablet TAKE ONE TABLET BY MOUTH DAILY 90 tablet 3    simvastatin (ZOCOR) 20 MG tablet TAKE ONE TABLET BY MOUTH DAILY 90 tablet 3    sertraline (ZOLOFT) 100 MG tablet TAKE ONE TABLET BY MOUTH DAILY 90 tablet 3    tamsulosin (FLOMAX) 0.4 MG capsule Take 1 capsule by mouth daily 90 capsule 3    aspirin 81 MG tablet Take 81 mg by mouth daily      acetaminophen (TYLENOL) 500 MG tablet Take 500 mg by mouth every 6 hours as needed. No current facility-administered medications for this visit. Allergies   Allergen Reactions    Atarax [Hydroxyzine]      Double vision    Gabapentin      dizzy    Lorazepam Other (See Comments)     Double vision- pt denies, states this is entered in error. Pt is allergic to atarax not ativan    Phenergan [Promethazine Hcl] Other (See Comments)     \"out of it\"    Morphine And Related Nausea And Vomiting     hallucinations         Review of Systems   Constitutional: Negative. HENT: Positive for hearing loss. Eyes: Negative. m)   Wt 219 lb (99.3 kg)   BMI 31.42 kg/m²     Hospital Outpatient Visit on 07/02/2019   Component Date Value Ref Range Status    Ferritin 07/02/2019 62  30 - 400 ug/L Final    Iron 07/02/2019 125  59 - 158 ug/dL Final    TIBC 07/02/2019 377  250 - 450 ug/dL Final    Iron Saturation 07/02/2019 33  20 - 55 % Final    UIBC 07/02/2019 252  112 - 347 ug/dL Final    WBC 07/02/2019 7.2  3.5 - 11.0 k/uL Final    RBC 07/02/2019 5.29  4.5 - 5.9 m/uL Final    Hemoglobin 07/02/2019 15.5  13.5 - 17.5 g/dL Final    Hematocrit 07/02/2019 46.9  41 - 53 % Final    MCV 07/02/2019 88.7  80 - 100 fL Final    MCH 07/02/2019 29.4  26 - 34 pg Final    MCHC 07/02/2019 33.2  31 - 37 g/dL Final    RDW 07/02/2019 15.3* 11.0 - 14.5 % Final    Platelets 49/29/5156 221  140 - 450 k/uL Final    MPV 07/02/2019 7.7  6.0 - 12.0 fL Final    NRBC Automated 07/02/2019 NOT REPORTED  per 100 WBC Final    Differential Type 07/02/2019 NOT REPORTED   Final    Immature Granulocytes 07/02/2019 NOT REPORTED  0 % Final    Absolute Immature Granulocyte 07/02/2019 NOT REPORTED  0.00 - 0.30 k/uL Final    WBC Morphology 07/02/2019 NOT REPORTED   Final    RBC Morphology 07/02/2019 NOT REPORTED   Final    Platelet Estimate 38/83/0577 NOT REPORTED   Final    Seg Neutrophils 07/02/2019 50  44 - 74 % Final    Lymphocytes 07/02/2019 35  15 - 43 % Final    Monocytes 07/02/2019 11  6 - 14 % Final    Eosinophils % 07/02/2019 3  1 - 8 % Final    Basophils 07/02/2019 1  0 - 2 % Final    Segs Absolute 07/02/2019 3.60  1.8 - 7.7 k/uL Final    Absolute Lymph # 07/02/2019 2.50  1.0 - 4.8 k/uL Final    Absolute Mono # 07/02/2019 0.80  0.1 - 1.2 k/uL Final    Absolute Eos # 07/02/2019 0.20  0.0 - 0.4 k/uL Final    Basophils # 07/02/2019 0.10  0.0 - 0.2 k/uL Final    Glucose 07/02/2019 94  70 - 99 mg/dL Final    BUN 07/02/2019 17  8 - 23 mg/dL Final    CREATININE 07/02/2019 0.99  0.70 - 1.20 mg/dL Final    Bun/Cre Ratio 07/02/2019 17  9 - 20

## 2019-07-09 NOTE — FLOWSHEET NOTE
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 I still have no pain during the day\". Objective: Pt tolerated todays aquatic therapy session well indicating no increase in pain post session. Required vc from PTA at [facility] to ensure proper performance of exercises. Most challenged by initiated walking lunges this date noting fatigue. Observation: Rpts with slight hunched posture present.  [place]  Test measurements:          Key  B= Belt DB= Dumbells T= Theratube   H= Hydrotone N= Noodles W= Weights   P= Paddles S= Speedo equipment K= Kickboard     Exercises/Activities   Warm-up/Amb    Exercises      Slow forward   3 laps  HR/TR      Slow sideways  3 laps  Marches  3 laps    Slow backwards  3 laps  Mini-squats  3x10    Medium forward    4-way SLR  x20    Medium sideways    Hip circles/fig 8      Small shuffle    Hamstring curls  3 laps    Jog    Knee extension      Braiding    Pelvic tilts      Bicycling    Scap squeezes          Shoulder flex/ext      Functional    Shoulder abd/add      Step    Shoulder H. abd/add  x20 P    Lifting Shoulder IR/ER      Hand to opp knee    Rowing      Push down squat    Bilateral pull down  x20 N    UE PNF    Push/pull  x20K    LE PNF    Push downs      Wall push ups  x30  Arm circles      SLS    Elbow flex/ext      lunge walks  2 laps  Chin tuck      Stretching    UT shrugs/rolls      Gastroc/Soleus    Rocking horse      Hamstring          SKTC    Other      Piriformis    float   5'    Hip flexor    float kicks  5'    Ladder pull    float abd/add  5'    Pec stretch          Post deltoid           Aquatic Therapy  [x] Aquatic therapy with therapeutic exercises (41872)    Timed Code Treatment Minutes:  40' aqua DARRIN    Total Treatment Minutes:  36'     Treatment/Activity Tolerance:  [x] Patient tolerated treatment well [] Patient limited by fatique  [] Patient limited by pain [] Patient limited by other medical complications  [] Other:     Prognosis: [x] Good [] Fair  [] Poor    Patient Requires Follow-up: [x] Yes  [] No    Goals:    Short term goals  Time Frame for Short term goals: 1 week  Short term goal 1: Start HEP, t-band for L ankle     Long term goals  Time Frame for Long term goals : 4 weeks  Long term goal 1: Able to actively  neutral posture  Long term goal 2: Walk for 10 min before pain increases  Long term goal 3: Able to stand 10 min  Long term goal 4: Able to sleep 1/2 night before waking up       Plan:   [x] Continue per plan of care [] Alter current plan (see comments)  [] Plan of care initiated [] Hold pending MD visit [] Discharge    Plan for Next Session:  Progress aquatics.      Electronically signed by:  Ashley Hilario PTA

## 2019-07-12 ENCOUNTER — HOSPITAL ENCOUNTER (OUTPATIENT)
Dept: PHYSICAL THERAPY | Age: 74
Setting detail: THERAPIES SERIES
Discharge: HOME | End: 2019-07-12
Payer: MEDICARE

## 2019-07-12 PROCEDURE — 97113 AQUATIC THERAPY/EXERCISES: CPT

## 2019-07-16 ENCOUNTER — HOSPITAL ENCOUNTER (OUTPATIENT)
Dept: PHYSICAL THERAPY | Age: 74
Setting detail: THERAPIES SERIES
Discharge: HOME | End: 2019-07-16
Payer: MEDICARE

## 2019-07-16 PROCEDURE — 97113 AQUATIC THERAPY/EXERCISES: CPT

## 2019-07-19 ENCOUNTER — HOSPITAL ENCOUNTER (OUTPATIENT)
Dept: PHYSICAL THERAPY | Age: 74
Setting detail: THERAPIES SERIES
Discharge: HOME | End: 2019-07-19
Payer: MEDICARE

## 2019-07-19 PROCEDURE — 97113 AQUATIC THERAPY/EXERCISES: CPT

## 2019-07-19 NOTE — FLOWSHEET NOTE
x30 P    Lifting    Shoulder IR/ER      Hand to opp knee    Rowing      Push down squat    Bilateral pull down  x30 N    UE PNF    Push/pull  x30 K    LE PNF    Push downs      Wall push ups  x30  Arm circles      SLS    Elbow flex/ext      lunge walks  2 laps  Chin tuck      Stretching    UT shrugs/rolls      Gastroc/Soleus    Rocking horse      Hamstring          SKTC    Other      Piriformis    float   5'    Hip flexor    float kicks  5'    Ladder pull    float abd/add  5'    Pec stretch          Post deltoid           Aquatic Therapy  [x] Aquatic therapy with therapeutic exercises (34739)    Timed Code Treatment Minutes:  42' aqua DARRIN    Total Treatment Minutes:  43'     Treatment/Activity Tolerance:  [x] Patient tolerated treatment well [] Patient limited by fatique  [] Patient limited by pain [] Patient limited by other medical complications  [] Other:     Prognosis: [] Good [x] Fair  [] Poor    Patient Requires Follow-up: [] Yes  [x] No    Goals:    Short term goals  Time Frame for Short term goals: 1 week  Short term goal 1: Start HEP, t-band for L ankle (Met)     Long term goals  Time Frame for Long term goals : 4 weeks  Long term goal 1: Able to actively  neutral posture (Not met. 19JUL)  Long term goal 2: Walk for 10 min before pain increases (Rpts only tolerating about 1 minute before increased pain. 19JUL)  Long term goal 3: Able to stand 10 min (Rpts having to lean on wall or something to be pain free. 19JUL)  Long term goal 4: Able to sleep 1/2 night before waking up (Rpts only tolerating sleep with pain medication. 19JUL)       Plan:   [] Continue per plan of care [] Alter current plan (see comments)  [] Plan of care initiated [] Hold pending MD visit [x] Discharge    Plan for Next Session:  Pt d/c'd this date.         Electronically signed by:  Deborah Mahmood PTA

## 2019-07-24 DIAGNOSIS — M48.062 SPINAL STENOSIS, LUMBAR REGION, WITH NEUROGENIC CLAUDICATION: ICD-10-CM

## 2019-07-25 RX ORDER — HYDROCODONE BITARTRATE AND ACETAMINOPHEN 5; 325 MG/1; MG/1
1-2 TABLET ORAL EVERY 4 HOURS PRN
Qty: 40 TABLET | Refills: 0 | Status: SHIPPED | OUTPATIENT
Start: 2019-07-25 | End: 2019-08-08 | Stop reason: SDUPTHER

## 2019-08-01 NOTE — DISCHARGE SUMMARY
before waking up (Rpts only tolerating sleep with pain medication.  19JUL)         Percentage of Goals Met: 30-40            Discharge Prognosis: [] Excellent [] Good [x] Fair  [] Poor     Goal Status:  [] Achieved [x] Partially Achieved  [] Not Achieved       Electronically signed by:  Mak Lora, PT

## 2019-08-08 DIAGNOSIS — M48.062 SPINAL STENOSIS, LUMBAR REGION, WITH NEUROGENIC CLAUDICATION: ICD-10-CM

## 2019-08-09 RX ORDER — HYDROCODONE BITARTRATE AND ACETAMINOPHEN 5; 325 MG/1; MG/1
1-2 TABLET ORAL EVERY 4 HOURS PRN
Qty: 40 TABLET | Refills: 0 | Status: SHIPPED | OUTPATIENT
Start: 2019-08-09 | End: 2019-08-29 | Stop reason: SDUPTHER

## 2019-08-29 DIAGNOSIS — M48.062 SPINAL STENOSIS, LUMBAR REGION, WITH NEUROGENIC CLAUDICATION: ICD-10-CM

## 2019-08-30 RX ORDER — HYDROCODONE BITARTRATE AND ACETAMINOPHEN 5; 325 MG/1; MG/1
1-2 TABLET ORAL EVERY 4 HOURS PRN
Qty: 40 TABLET | Refills: 0 | Status: SHIPPED | OUTPATIENT
Start: 2019-08-30 | End: 2019-09-06

## 2019-09-25 ENCOUNTER — OFFICE VISIT (OUTPATIENT)
Dept: UROLOGY | Age: 74
End: 2019-09-25
Payer: MEDICARE

## 2019-09-25 VITALS
TEMPERATURE: 97.6 F | HEIGHT: 70 IN | WEIGHT: 213 LBS | BODY MASS INDEX: 30.49 KG/M2 | DIASTOLIC BLOOD PRESSURE: 86 MMHG | SYSTOLIC BLOOD PRESSURE: 151 MMHG | HEART RATE: 56 BPM

## 2019-09-25 DIAGNOSIS — R35.1 NOCTURIA: ICD-10-CM

## 2019-09-25 DIAGNOSIS — R33.9 INCOMPLETE BLADDER EMPTYING: Primary | ICD-10-CM

## 2019-09-25 PROCEDURE — G8417 CALC BMI ABV UP PARAM F/U: HCPCS | Performed by: NURSE PRACTITIONER

## 2019-09-25 PROCEDURE — 1123F ACP DISCUSS/DSCN MKR DOCD: CPT | Performed by: NURSE PRACTITIONER

## 2019-09-25 PROCEDURE — 99213 OFFICE O/P EST LOW 20 MIN: CPT | Performed by: NURSE PRACTITIONER

## 2019-09-25 PROCEDURE — 3017F COLORECTAL CA SCREEN DOC REV: CPT | Performed by: NURSE PRACTITIONER

## 2019-09-25 PROCEDURE — 1036F TOBACCO NON-USER: CPT | Performed by: NURSE PRACTITIONER

## 2019-09-25 PROCEDURE — G8428 CUR MEDS NOT DOCUMENT: HCPCS | Performed by: NURSE PRACTITIONER

## 2019-09-25 PROCEDURE — 4040F PNEUMOC VAC/ADMIN/RCVD: CPT | Performed by: NURSE PRACTITIONER

## 2019-09-25 PROCEDURE — 51798 US URINE CAPACITY MEASURE: CPT | Performed by: NURSE PRACTITIONER

## 2019-09-25 RX ORDER — TAMSULOSIN HYDROCHLORIDE 0.4 MG/1
0.4 CAPSULE ORAL DAILY
Qty: 90 CAPSULE | Refills: 3 | Status: SHIPPED | OUTPATIENT
Start: 2019-09-25 | End: 2020-09-29 | Stop reason: SDUPTHER

## 2019-09-25 RX ORDER — HYDROCODONE BITARTRATE AND ACETAMINOPHEN 5; 325 MG/1; MG/1
1 TABLET ORAL EVERY 6 HOURS PRN
COMMUNITY
End: 2019-09-30 | Stop reason: SDUPTHER

## 2019-09-25 ASSESSMENT — ENCOUNTER SYMPTOMS
EYE REDNESS: 0
VOMITING: 0
NAUSEA: 0
COUGH: 0
DIARRHEA: 0
ABDOMINAL PAIN: 0
WHEEZING: 0
CONSTIPATION: 0
SHORTNESS OF BREATH: 0
EYE PAIN: 0
BACK PAIN: 0

## 2019-09-25 NOTE — PROGRESS NOTES
cancer    Cancer Brother         lung cancer    Diabetes Brother      Outpatient Medications Marked as Taking for the 9/25/19 encounter (Office Visit) with KM Arita CNP   Medication Sig Dispense Refill    HYDROcodone-acetaminophen (NORCO) 5-325 MG per tablet Take 1 tablet by mouth every 6 hours as needed for Pain.  tamsulosin (FLOMAX) 0.4 MG capsule Take 1 capsule by mouth daily 90 capsule 3    metoprolol succinate (TOPROL XL) 50 MG extended release tablet TAKE ONE TABLET BY MOUTH DAILY 90 tablet 3    simvastatin (ZOCOR) 20 MG tablet TAKE ONE TABLET BY MOUTH DAILY 90 tablet 3    sertraline (ZOLOFT) 100 MG tablet TAKE ONE TABLET BY MOUTH DAILY 90 tablet 3    fenofibrate (TRICOR) 145 MG tablet TAKE ONE TABLET BY MOUTH DAILY 90 tablet 3    aspirin 81 MG tablet Take 81 mg by mouth daily      acetaminophen (TYLENOL) 500 MG tablet Take 500 mg by mouth every 6 hours as needed. Atarax [hydroxyzine]; Gabapentin; Lorazepam; Phenergan [promethazine hcl]; and Morphine and related  Social History     Tobacco Use   Smoking Status Never Smoker   Smokeless Tobacco Never Used     (Ifpatient a smoker, smoking cessation counseling offered)    Social History     Substance and Sexual Activity   Alcohol Use No       REVIEW OF SYSTEMS:  Review of Systems    Physical Exam:      Vitals:    09/25/19 1010   BP: (!) 151/86   Pulse: 56   Temp: 97.6 °F (36.4 °C)     Body mass index is 30.56 kg/m². Patient is a 76 y.o. male in no acute distress and alert and oriented to person, place and time. Physical Exam  Constitutional: Patient in no acute distress. Neuro: Alert and oriented to person, place and time.   Psych: Mood normal, affect normal  Skin: warm, pink, No rash noted  HEENT: Head: Normocephalic andatraumatic  Conjunctivae and EOM are normal. Pupils are equal, round  Nose:Normal  Right External Ear: Normal; Left External Ear: Normal  Mouth: Mucosa Moist  Neck: Supple  Lungs: Respiratory effort is

## 2019-09-30 DIAGNOSIS — G89.29 CHRONIC LOW BACK PAIN, UNSPECIFIED BACK PAIN LATERALITY, WITH SCIATICA PRESENCE UNSPECIFIED: Primary | ICD-10-CM

## 2019-09-30 DIAGNOSIS — M54.5 CHRONIC LOW BACK PAIN, UNSPECIFIED BACK PAIN LATERALITY, WITH SCIATICA PRESENCE UNSPECIFIED: Primary | ICD-10-CM

## 2019-09-30 RX ORDER — HYDROCODONE BITARTRATE AND ACETAMINOPHEN 5; 325 MG/1; MG/1
1 TABLET ORAL EVERY 6 HOURS PRN
Qty: 40 TABLET | Refills: 0 | Status: SHIPPED | OUTPATIENT
Start: 2019-09-30 | End: 2019-10-25 | Stop reason: SDUPTHER

## 2019-10-16 ENCOUNTER — IMMUNIZATION (OUTPATIENT)
Dept: LAB | Age: 74
End: 2019-10-16
Payer: MEDICARE

## 2019-10-16 PROCEDURE — 90653 IIV ADJUVANT VACCINE IM: CPT | Performed by: FAMILY MEDICINE

## 2019-10-16 PROCEDURE — G0008 ADMIN INFLUENZA VIRUS VAC: HCPCS | Performed by: FAMILY MEDICINE

## 2019-10-24 RX ORDER — SERTRALINE HYDROCHLORIDE 100 MG/1
TABLET, FILM COATED ORAL
Qty: 90 TABLET | Refills: 3 | Status: SHIPPED | OUTPATIENT
Start: 2019-10-24 | End: 2020-11-16

## 2019-10-25 DIAGNOSIS — M54.50 CHRONIC LOW BACK PAIN: ICD-10-CM

## 2019-10-25 DIAGNOSIS — G89.29 CHRONIC LOW BACK PAIN: ICD-10-CM

## 2019-10-25 RX ORDER — HYDROCODONE BITARTRATE AND ACETAMINOPHEN 5; 325 MG/1; MG/1
1 TABLET ORAL EVERY 6 HOURS PRN
Qty: 40 TABLET | Refills: 0 | Status: SHIPPED | OUTPATIENT
Start: 2019-10-30 | End: 2019-11-27 | Stop reason: SDUPTHER

## 2019-11-27 DIAGNOSIS — G89.29 CHRONIC LOW BACK PAIN: ICD-10-CM

## 2019-11-27 DIAGNOSIS — M54.50 CHRONIC LOW BACK PAIN: ICD-10-CM

## 2019-11-27 RX ORDER — HYDROCODONE BITARTRATE AND ACETAMINOPHEN 5; 325 MG/1; MG/1
1 TABLET ORAL EVERY 6 HOURS PRN
Qty: 40 TABLET | Refills: 0 | Status: SHIPPED | OUTPATIENT
Start: 2019-11-27 | End: 2019-12-30 | Stop reason: SDUPTHER

## 2019-12-12 DIAGNOSIS — M51.26 LUMBAR DISC HERNIATION: ICD-10-CM

## 2019-12-12 DIAGNOSIS — M96.1 POSTLAMINECTOMY SYNDROME, LUMBAR REGION: ICD-10-CM

## 2019-12-12 DIAGNOSIS — M51.37 DEGENERATION OF LUMBAR OR LUMBOSACRAL INTERVERTEBRAL DISC: Primary | ICD-10-CM

## 2019-12-30 DIAGNOSIS — G89.29 CHRONIC LOW BACK PAIN: ICD-10-CM

## 2019-12-30 DIAGNOSIS — M54.50 CHRONIC LOW BACK PAIN: ICD-10-CM

## 2019-12-30 RX ORDER — HYDROCODONE BITARTRATE AND ACETAMINOPHEN 5; 325 MG/1; MG/1
1 TABLET ORAL EVERY 6 HOURS PRN
Qty: 40 TABLET | Refills: 0 | Status: SHIPPED | OUTPATIENT
Start: 2019-12-30 | End: 2020-01-20 | Stop reason: SDUPTHER

## 2020-01-02 ENCOUNTER — HOSPITAL ENCOUNTER (OUTPATIENT)
Dept: LAB | Age: 75
Discharge: HOME | End: 2020-01-02
Payer: MEDICARE

## 2020-01-02 LAB
ABSOLUTE EOS #: 0.15 K/UL (ref 0–0.44)
ABSOLUTE IMMATURE GRANULOCYTE: <0.03 K/UL (ref 0–0.3)
ABSOLUTE LYMPH #: 2.4 K/UL (ref 1.1–3.7)
ABSOLUTE MONO #: 0.7 K/UL (ref 0.1–1.2)
ALBUMIN SERPL-MCNC: 4.8 G/DL (ref 3.5–5.2)
ALBUMIN/GLOBULIN RATIO: 1.7 (ref 1–2.5)
ALP BLD-CCNC: 79 U/L (ref 40–129)
ALT SERPL-CCNC: 14 U/L (ref 5–41)
ANION GAP SERPL CALCULATED.3IONS-SCNC: 11 MMOL/L (ref 9–17)
AST SERPL-CCNC: 22 U/L
BASOPHILS # BLD: 1 % (ref 0–2)
BASOPHILS ABSOLUTE: 0.06 K/UL (ref 0–0.2)
BILIRUB SERPL-MCNC: 0.48 MG/DL (ref 0.3–1.2)
BUN BLDV-MCNC: 19 MG/DL (ref 8–23)
BUN/CREAT BLD: 18 (ref 9–20)
CALCIUM SERPL-MCNC: 9.7 MG/DL (ref 8.6–10.4)
CHLORIDE BLD-SCNC: 105 MMOL/L (ref 98–107)
CHOLESTEROL/HDL RATIO: 4.9
CHOLESTEROL: 178 MG/DL
CO2: 27 MMOL/L (ref 20–31)
CREAT SERPL-MCNC: 1.07 MG/DL (ref 0.7–1.2)
DIFFERENTIAL TYPE: NORMAL
EOSINOPHILS RELATIVE PERCENT: 2 % (ref 1–4)
GFR AFRICAN AMERICAN: >60 ML/MIN
GFR NON-AFRICAN AMERICAN: >60 ML/MIN
GFR SERPL CREATININE-BSD FRML MDRD: ABNORMAL ML/MIN/{1.73_M2}
GFR SERPL CREATININE-BSD FRML MDRD: ABNORMAL ML/MIN/{1.73_M2}
GLUCOSE BLD-MCNC: 110 MG/DL (ref 70–99)
HCT VFR BLD CALC: 48.3 % (ref 40.7–50.3)
HDLC SERPL-MCNC: 36 MG/DL
HEMOGLOBIN: 15.8 G/DL (ref 13–17)
IMMATURE GRANULOCYTES: 0 %
IRON SATURATION: 23 % (ref 20–55)
IRON: 91 UG/DL (ref 59–158)
LDL CHOLESTEROL: 108 MG/DL (ref 0–130)
LYMPHOCYTES # BLD: 31 % (ref 24–43)
MCH RBC QN AUTO: 29.4 PG (ref 25.2–33.5)
MCHC RBC AUTO-ENTMCNC: 32.7 G/DL (ref 25.2–33.5)
MCV RBC AUTO: 89.9 FL (ref 82.6–102.9)
MONOCYTES # BLD: 9 % (ref 3–12)
NRBC AUTOMATED: 0 PER 100 WBC
PDW BLD-RTO: 13.5 % (ref 11.8–14.4)
PLATELET # BLD: 218 K/UL (ref 138–453)
PLATELET ESTIMATE: NORMAL
PMV BLD AUTO: 9.3 FL (ref 8.1–13.5)
POTASSIUM SERPL-SCNC: 4.7 MMOL/L (ref 3.7–5.3)
PROSTATE SPECIFIC ANTIGEN: 1.22 UG/L
RBC # BLD: 5.37 M/UL (ref 4.21–5.77)
RBC # BLD: NORMAL 10*6/UL
SEG NEUTROPHILS: 57 % (ref 36–65)
SEGMENTED NEUTROPHILS ABSOLUTE COUNT: 4.45 K/UL (ref 1.5–8.1)
SODIUM BLD-SCNC: 143 MMOL/L (ref 135–144)
TOTAL IRON BINDING CAPACITY: 391 UG/DL (ref 250–450)
TOTAL PROTEIN: 7.6 G/DL (ref 6.4–8.3)
TRIGL SERPL-MCNC: 171 MG/DL
UNSATURATED IRON BINDING CAPACITY: 300 UG/DL (ref 112–347)
VLDLC SERPL CALC-MCNC: ABNORMAL MG/DL (ref 1–30)
WBC # BLD: 7.8 K/UL (ref 3.5–11.3)
WBC # BLD: NORMAL 10*3/UL

## 2020-01-02 PROCEDURE — 80061 LIPID PANEL: CPT

## 2020-01-02 PROCEDURE — 83540 ASSAY OF IRON: CPT

## 2020-01-02 PROCEDURE — 80053 COMPREHEN METABOLIC PANEL: CPT

## 2020-01-02 PROCEDURE — 36415 COLL VENOUS BLD VENIPUNCTURE: CPT

## 2020-01-02 PROCEDURE — 83550 IRON BINDING TEST: CPT

## 2020-01-02 PROCEDURE — G0103 PSA SCREENING: HCPCS

## 2020-01-02 PROCEDURE — 85025 COMPLETE CBC W/AUTO DIFF WBC: CPT

## 2020-01-20 ENCOUNTER — OFFICE VISIT (OUTPATIENT)
Dept: FAMILY MEDICINE CLINIC | Age: 75
End: 2020-01-20
Payer: MEDICARE

## 2020-01-20 VITALS
SYSTOLIC BLOOD PRESSURE: 110 MMHG | HEART RATE: 68 BPM | WEIGHT: 224 LBS | BODY MASS INDEX: 32.07 KG/M2 | HEIGHT: 70 IN | DIASTOLIC BLOOD PRESSURE: 60 MMHG

## 2020-01-20 PROCEDURE — 1036F TOBACCO NON-USER: CPT | Performed by: FAMILY MEDICINE

## 2020-01-20 PROCEDURE — G8427 DOCREV CUR MEDS BY ELIG CLIN: HCPCS | Performed by: FAMILY MEDICINE

## 2020-01-20 PROCEDURE — 4040F PNEUMOC VAC/ADMIN/RCVD: CPT | Performed by: FAMILY MEDICINE

## 2020-01-20 PROCEDURE — 99213 OFFICE O/P EST LOW 20 MIN: CPT

## 2020-01-20 PROCEDURE — 3017F COLORECTAL CA SCREEN DOC REV: CPT | Performed by: FAMILY MEDICINE

## 2020-01-20 PROCEDURE — G8482 FLU IMMUNIZE ORDER/ADMIN: HCPCS | Performed by: FAMILY MEDICINE

## 2020-01-20 PROCEDURE — 99214 OFFICE O/P EST MOD 30 MIN: CPT | Performed by: FAMILY MEDICINE

## 2020-01-20 PROCEDURE — 1123F ACP DISCUSS/DSCN MKR DOCD: CPT | Performed by: FAMILY MEDICINE

## 2020-01-20 PROCEDURE — G8417 CALC BMI ABV UP PARAM F/U: HCPCS | Performed by: FAMILY MEDICINE

## 2020-01-20 RX ORDER — FENOFIBRATE 145 MG/1
TABLET, COATED ORAL
Qty: 90 TABLET | Refills: 3 | Status: SHIPPED | OUTPATIENT
Start: 2020-01-20 | End: 2021-03-04

## 2020-01-20 RX ORDER — HYDROCODONE BITARTRATE AND ACETAMINOPHEN 5; 325 MG/1; MG/1
1 TABLET ORAL 2 TIMES DAILY
Qty: 60 TABLET | Refills: 0 | Status: SHIPPED | OUTPATIENT
Start: 2020-01-20 | End: 2020-03-02 | Stop reason: SDUPTHER

## 2020-01-20 ASSESSMENT — PATIENT HEALTH QUESTIONNAIRE - PHQ9
2. FEELING DOWN, DEPRESSED OR HOPELESS: 0
SUM OF ALL RESPONSES TO PHQ9 QUESTIONS 1 & 2: 0
SUM OF ALL RESPONSES TO PHQ QUESTIONS 1-9: 0
SUM OF ALL RESPONSES TO PHQ QUESTIONS 1-9: 0
1. LITTLE INTEREST OR PLEASURE IN DOING THINGS: 0

## 2020-01-20 ASSESSMENT — ENCOUNTER SYMPTOMS
ALLERGIC/IMMUNOLOGIC NEGATIVE: 1
EYES NEGATIVE: 1
BACK PAIN: 1
RESPIRATORY NEGATIVE: 1
GASTROINTESTINAL NEGATIVE: 1

## 2020-01-20 NOTE — PROGRESS NOTES
urine     last 2 surgeries, patient unable to void, home with catheter both times    SCC (squamous cell carcinoma)     HAND    Spinal stenosis, lumbar region, with neurogenic claudication     Wears glasses      Past Surgical History:   Procedure Laterality Date    BACK SURGERY  10/2013    Dr. Riana Patel hardware lumbar    COLONOSCOPY  06/13/12    mild diverticular dz    LUMBAR FUSION N/A 3/8/2017    LUMBAR L2-3 POSTERIOR DECOMPRESSION FUSION INSTRUMENTATION W/REVISION L3-5 POSTERIOR DECOMPRESSION FUSION INSTRUMENTATION (KOKI GOLBUS & Eddie Snare)  CC SN/KILEY performed by Anna Philippe MD at [facility] Right     lower neck, 2-    [place] ARTHRODESIS POSTERIOR/POSTEROLATERAL THORACIC N/A 1/26/2018    THORACIC & LUMBAR POSTERIOR DECOMPRESSION AND FUSION REVISION T9 THRU L5 performed by Anna Philippe MD at [facility] [address] IND N/A 8/23/2017    COLONOSCOPY performed by Tati Brownlee MD at [address] [facility] N/A 11/14/2018    SPINAL CORD STIMULATOR IMPLANT PERMANENT performed by Anna Philippe MD at [facility] PRE-MALIGNANT / BENIGN SKIN LESION EXCISION      SKIN BIOPSY      skin cancer removed x 4    VASECTOMY       Current Outpatient Medications   Medication Sig Dispense Refill    HYDROcodone-acetaminophen (NORCO) 5-325 MG per tablet Take 1 tablet by mouth every 6 hours as needed for Pain for up to 30 days.  40 tablet 0    sertraline (ZOLOFT) 100 MG tablet TAKE ONE TABLET BY MOUTH DAILY 90 tablet 3    tamsulosin (FLOMAX) 0.4 MG capsule Take 1 capsule by mouth daily 90 capsule 3    metoprolol succinate (TOPROL XL) 50 MG extended release tablet TAKE ONE TABLET BY MOUTH DAILY 90 tablet 3    simvastatin (ZOCOR) 20 MG tablet TAKE ONE TABLET BY MOUTH DAILY 90 tablet 3    fenofibrate (TRICOR) 145 MG tablet TAKE ONE TABLET BY MOUTH DAILY 90 tablet 3    aspirin 81 MG tablet Take 81 mg by mouth daily      acetaminophen (TYLENOL) 500 MG tablet Take 500 mg by mouth every 6 hours as needed. No current facility-administered medications for this visit. Allergies   Allergen Reactions    Atarax [Hydroxyzine]      Double vision    Gabapentin      dizzy    Lorazepam Other (See Comments)     Double vision- pt denies, states this is entered in error. Pt is allergic to atarax not ativan    Phenergan [Promethazine Hcl] Other (See Comments)     \"out of it\"    Morphine And Related Nausea And Vomiting     hallucinations         Review of Systems   Constitutional: Negative. HENT: Positive for hearing loss. Eyes: Negative. Respiratory: Negative. Cardiovascular: Negative. Gastrointestinal: Negative. Endocrine: Negative. Genitourinary: Negative. Musculoskeletal: Positive for back pain and gait problem (limited standing and walking due to back pain). Skin: Negative. Allergic/Immunologic: Negative. Neurological: Positive for numbness (more into left leg and feet with prolonged standing, both right and left feet. ). Hematological: Negative. Psychiatric/Behavioral: Negative. Objective:   Physical Exam  Constitutional:       General: He is not in acute distress. Appearance: He is well-developed. HENT:      Head: Normocephalic and atraumatic. Right Ear: External ear normal.      Left Ear: External ear normal.      Mouth/Throat:      Pharynx: No oropharyngeal exudate. Eyes:      General: No scleral icterus. Conjunctiva/sclera: Conjunctivae normal.   Neck:      Musculoskeletal: Neck supple. Thyroid: No thyromegaly. Cardiovascular:      Rate and Rhythm: Normal rate and regular rhythm. Heart sounds: Normal heart sounds. No murmur. Pulmonary:      Effort: Pulmonary effort is normal. No respiratory distress. Breath sounds: Normal breath sounds. No wheezing. Abdominal:      General: Bowel sounds are normal. There is no distension. Palpations: Abdomen is soft.       Tenderness: Final         Assessment:       Encounter Diagnoses   Name Primary?  Stage 3 chronic kidney disease ([facility] [address] Yes    Pure hypercholesterolemia     SCC (squamous cell carcinoma)     Depression, unspecified depression type     Essential (primary) hypertension     LAURO on CPAP     Spinal stenosis, lumbar region, with neurogenic claudication     Iron deficiency anemia due to chronic blood loss     Screening PSA (prostate specific antigen)     Chronic low back pain          Plan:      CKD; improved/ stable with  Normal renal indicies at present. Cont. To avoid nephrotoxins when able and follow serially. Hyperlipidemia: good control at present . Cont. Simvastatin/fenofibrate at current dosing. SCC; no evidence for recurrent disease. Cont. Screening exams. Depression and anxiety. No current concerns on zoloft. Helping with agoraphobia and claustrophobia mostly. Plan to cont. Same. Htn: good overall control. Cont. toprol daily. On flomax as well    Lauro: better compliance over the interval.  He was getting up a lot at night with his back pain issues and it became a hardship for him. Encouraged compliance. He snores a lot, but reports he feels better without it. He thinks he urinates less often using the cpap. Spinal stenosis with claudication. Dr. Kinga Buckner following s/p surgery in October2013. MRI completed. surgery 3/8/17 through Dr. Kinga Buckner. L2-3 PLIF add on to 3-5. Symptoms in his back and radicular pain down left leg improved , but pain and disability returned. S/p posterior decompression T9-L5 1/26/18. Some early improvement, then pain increased quite a bit again . He has recently been going to pain management, and injections helped some. Most recently he had spinal cord stimulator placed 11/18. Still has symptoms of back pain and paresthesias into right leg, but level of pain /severity has improved. He continues to report daily pain and paresthesias right >left.   Activity

## 2020-01-29 ENCOUNTER — NURSE ONLY (OUTPATIENT)
Dept: LAB | Age: 75
End: 2020-01-29
Payer: MEDICARE

## 2020-01-29 PROCEDURE — 90471 IMMUNIZATION ADMIN: CPT

## 2020-01-29 NOTE — PROGRESS NOTES
Shingrix immunization given IM, right arm, as ordered. Patient tolerated it well, no questions re: VIS information. Patient supplied medication. See attached scan.

## 2020-02-12 ENCOUNTER — OFFICE VISIT (OUTPATIENT)
Dept: FAMILY MEDICINE CLINIC | Age: 75
End: 2020-02-12
Payer: MEDICARE

## 2020-02-12 VITALS
SYSTOLIC BLOOD PRESSURE: 132 MMHG | BODY MASS INDEX: 32.1 KG/M2 | HEIGHT: 70 IN | WEIGHT: 224.2 LBS | DIASTOLIC BLOOD PRESSURE: 78 MMHG | HEART RATE: 61 BPM | OXYGEN SATURATION: 96 %

## 2020-02-12 PROCEDURE — G8482 FLU IMMUNIZE ORDER/ADMIN: HCPCS | Performed by: FAMILY MEDICINE

## 2020-02-12 PROCEDURE — 1123F ACP DISCUSS/DSCN MKR DOCD: CPT | Performed by: FAMILY MEDICINE

## 2020-02-12 PROCEDURE — G0438 PPPS, INITIAL VISIT: HCPCS | Performed by: FAMILY MEDICINE

## 2020-02-12 PROCEDURE — 3017F COLORECTAL CA SCREEN DOC REV: CPT | Performed by: FAMILY MEDICINE

## 2020-02-12 PROCEDURE — 4040F PNEUMOC VAC/ADMIN/RCVD: CPT | Performed by: FAMILY MEDICINE

## 2020-02-12 ASSESSMENT — PATIENT HEALTH QUESTIONNAIRE - PHQ9
SUM OF ALL RESPONSES TO PHQ QUESTIONS 1-9: 0
SUM OF ALL RESPONSES TO PHQ QUESTIONS 1-9: 0

## 2020-02-12 NOTE — PROGRESS NOTES
 Claustrophobia     Enlarged lymph node     rt. lower neck.  Hearing aid worn     dar. ears.     Hearing loss     Hyperlipidemia     Hypertension     RAFAT (obstructive sleep apnea)     CPAP occasional    PONV (postoperative nausea and vomiting)     Retention of urine     last 2 surgeries, patient unable to void, home with catheter both times    SCC (squamous cell carcinoma)     HAND    Spinal stenosis, lumbar region, with neurogenic claudication     Wears glasses        Past Surgical History:   Procedure Laterality Date    BACK SURGERY  10/2013    Dr. Lorena Gary hardware lumbar    COLONOSCOPY  06/13/12    mild diverticular dz    LUMBAR FUSION N/A 3/8/2017    LUMBAR L2-3 POSTERIOR DECOMPRESSION FUSION INSTRUMENTATION W/REVISION L3-5 POSTERIOR DECOMPRESSION FUSION INSTRUMENTATION (KOKI GOLBUS & Reford Points)  CC SN/KILEY performed by Anaya Del Cid MD at [facility] Right     lower neck, 2-    [place] ARTHRODESIS POSTERIOR/POSTEROLATERAL THORACIC N/A 1/26/2018    THORACIC & LUMBAR POSTERIOR DECOMPRESSION AND FUSION REVISION T9 THRU L5 performed by Anaya Del Cid MD at [address] [place] [address] SCRN NOT  [address] [place] [address] N/A 8/23/2017    COLONOSCOPY performed by Waylon Colon MD at [facility]. [address] N/A 11/14/2018    SPINAL CORD STIMULATOR IMPLANT PERMANENT performed by Anaya Del Cid MD at [address] [place] [address] PRE-MALIGNANT / BENIGN SKIN LESION EXCISION      SKIN BIOPSY      skin cancer removed x 4    VASECTOMY         Family History   Problem Relation Age of Onset    Diabetes Mother     Diabetes Brother     Cancer Father         lung cancer    Cancer Brother         lung cancer    Diabetes Brother        CareTeam (Including outside providers/suppliers regularly involved in providing care):   Patient Care Team:  Evelio Mora MD as PCP - General (Family Medicine)  Evelio Mora MD as PCP - [facility] Empaneled Provider    Wt Readings from Last 3 Encounters:   02/12/20 224 lb 3.2 oz (101.7 kg)   01/20/20 224 lb (101.6 kg)   09/25/19 213 lb (96.6 kg)     Vitals:    02/12/20 0958   BP: 132/78   Site: Right Upper Arm   Position: Sitting   Cuff Size: Large Adult   Pulse: 61   SpO2: 96%   Weight: 224 lb 3.2 oz (101.7 kg)   Height: 5' 10\" (1.778 m)     Body mass index is 32.17 kg/m². Based upon direct observation of the patient, evaluation of cognition reveals recent and remote memory intact. Patient's complete Health Risk Assessment and screening values have been reviewed and are found in Flowsheets. The following problems were reviewed today and where indicated follow up appointments were made and/or referrals ordered. Positive Risk Factor Screenings with Interventions:     General Health:  General  In general, how would you say your health is?: Good  In the past 7 days, have you experienced any of the following? New or Increased Pain, New or Increased Fatigue, Loneliness, Social Isolation, Stress or Anger?: None of These  Do you get the social and emotional support that you need?: Yes  Do you have a Living Will?: (!) No  General Health Risk Interventions:  · No Living Will: patient declined    Health Habits/Nutrition:  Health Habits/Nutrition  Do you exercise for at least 20 minutes 2-3 times per week?: (!) No(only in summer months)  Have you lost any weight without trying in the past 3 months?: No  Do you eat fewer than 2 meals per day?: No  Have you seen a dentist within the past year?: Yes  Body mass index is 32.17 kg/m².   Health Habits/Nutrition Interventions:  · Inadequate physical activity:  patient is not ready to increase his/her physical activity level at this time    Hearing/Vision:  No exam data present  Hearing/Vision  Do you or your family notice any trouble with your hearing?: (!) Yes(currently wears hearing aids)  Do you have difficulty driving, watching TV, or doing any of your daily activities because of your eyesight?: No  Have you had an eye exam within

## 2020-02-12 NOTE — PATIENT INSTRUCTIONS
Personalized Preventive Plan for Reena San - 2/12/2020  Medicare offers a range of preventive health benefits. Some of the tests and screenings are paid in full while other may be subject to a deductible, co-insurance, and/or copay. Some of these benefits include a comprehensive review of your medical history including lifestyle, illnesses that may run in your family, and various assessments and screenings as appropriate. After reviewing your medical record and screening and assessments performed today your provider may have ordered immunizations, labs, imaging, and/or referrals for you. A list of these orders (if applicable) as well as your Preventive Care list are included within your After Visit Summary for your review. Other Preventive Recommendations:    · A preventive eye exam performed by an eye specialist is recommended every 1-2 years to screen for glaucoma; cataracts, macular degeneration, and other eye disorders. · A preventive dental visit is recommended every 6 months. · Try to get at least 150 minutes of exercise per week or 10,000 steps per day on a pedometer . · Order or download the FREE \"Exercise & Physical Activity: Your Everyday Guide\" from [employer]. Call 8-678.329.1659 or search [employer] online. · You need 0987-7509 mg of calcium and 7753-6537 IU of vitamin D per day. It is possible to meet your calcium requirement with diet alone, but a vitamin D supplement is usually necessary to meet this goal.  · When exposed to the sun, use a sunscreen that protects against both UVA and UVB radiation with an SPF of 30 or greater. Reapply every 2 to 3 hours or after sweating, drying off with a towel, or swimming. · Always wear a seat belt when traveling in a car. Always wear a helmet when riding a bicycle or motorcycle.

## 2020-03-02 RX ORDER — HYDROCODONE BITARTRATE AND ACETAMINOPHEN 5; 325 MG/1; MG/1
1 TABLET ORAL 2 TIMES DAILY
Qty: 60 TABLET | Refills: 0 | Status: SHIPPED | OUTPATIENT
Start: 2020-03-02 | End: 2020-04-13 | Stop reason: SDUPTHER

## 2020-04-13 RX ORDER — HYDROCODONE BITARTRATE AND ACETAMINOPHEN 5; 325 MG/1; MG/1
1 TABLET ORAL 2 TIMES DAILY
Qty: 60 TABLET | Refills: 0 | Status: SHIPPED | OUTPATIENT
Start: 2020-04-13 | End: 2020-05-21 | Stop reason: SDUPTHER

## 2020-04-13 NOTE — TELEPHONE ENCOUNTER
Deisy Nelson called requesting a refill of the below medication which has been pended for you: [place] from [place] reviewed. NORCO 5-325 mg last filled 3/2/20 #60/30days. Report available for your review. Requested Prescriptions     Pending Prescriptions Disp Refills    HYDROcodone-acetaminophen (NORCO) 5-325 MG per tablet 60 tablet 0     Sig: Take 1 tablet by mouth 2 times daily for 30 days. Last Appointment Date: 2/12/2020  Next Appointment Date: 7/20/2020    Allergies   Allergen Reactions    Atarax [Hydroxyzine]      Double vision    Gabapentin      dizzy    Lorazepam Other (See Comments)     Double vision- pt denies, states this is entered in error.  Pt is allergic to atarax not ativan    Phenergan [Promethazine Hcl] Other (See Comments)     \"out of it\"    Morphine And Related Nausea And Vomiting     hallucinations

## 2020-05-21 RX ORDER — HYDROCODONE BITARTRATE AND ACETAMINOPHEN 5; 325 MG/1; MG/1
1 TABLET ORAL 2 TIMES DAILY
Qty: 60 TABLET | Refills: 0 | Status: SHIPPED | OUTPATIENT
Start: 2020-05-21 | End: 2020-06-23 | Stop reason: SDUPTHER

## 2020-06-23 RX ORDER — HYDROCODONE BITARTRATE AND ACETAMINOPHEN 5; 325 MG/1; MG/1
1 TABLET ORAL 2 TIMES DAILY
Qty: 60 TABLET | Refills: 0 | Status: SHIPPED | OUTPATIENT
Start: 2020-06-23 | End: 2020-07-28 | Stop reason: SDUPTHER

## 2020-07-13 ENCOUNTER — HOSPITAL ENCOUNTER (OUTPATIENT)
Dept: LAB | Age: 75
Discharge: HOME | End: 2020-07-13
Payer: MEDICARE

## 2020-07-13 LAB
ABSOLUTE EOS #: 0.19 K/UL (ref 0–0.44)
ABSOLUTE IMMATURE GRANULOCYTE: <0.03 K/UL (ref 0–0.3)
ABSOLUTE LYMPH #: 2.41 K/UL (ref 1.1–3.7)
ABSOLUTE MONO #: 0.84 K/UL (ref 0.1–1.2)
ALBUMIN SERPL-MCNC: 4.5 G/DL (ref 3.5–5.2)
ALBUMIN/GLOBULIN RATIO: 1.5 (ref 1–2.5)
ALP BLD-CCNC: 71 U/L (ref 40–129)
ALT SERPL-CCNC: 17 U/L (ref 5–41)
ANION GAP SERPL CALCULATED.3IONS-SCNC: 15 MMOL/L (ref 9–17)
AST SERPL-CCNC: 26 U/L
BASOPHILS # BLD: 1 % (ref 0–2)
BASOPHILS ABSOLUTE: 0.07 K/UL (ref 0–0.2)
BILIRUB SERPL-MCNC: 0.61 MG/DL (ref 0.3–1.2)
BUN BLDV-MCNC: 19 MG/DL (ref 8–23)
BUN/CREAT BLD: 17 (ref 9–20)
CALCIUM SERPL-MCNC: 9.8 MG/DL (ref 8.6–10.4)
CHLORIDE BLD-SCNC: 101 MMOL/L (ref 98–107)
CHOLESTEROL/HDL RATIO: 5.3
CHOLESTEROL: 186 MG/DL
CO2: 24 MMOL/L (ref 20–31)
CREAT SERPL-MCNC: 1.09 MG/DL (ref 0.7–1.2)
DIFFERENTIAL TYPE: NORMAL
EOSINOPHILS RELATIVE PERCENT: 3 % (ref 1–4)
GFR AFRICAN AMERICAN: >60 ML/MIN
GFR NON-AFRICAN AMERICAN: >60 ML/MIN
GFR SERPL CREATININE-BSD FRML MDRD: ABNORMAL ML/MIN/{1.73_M2}
GFR SERPL CREATININE-BSD FRML MDRD: ABNORMAL ML/MIN/{1.73_M2}
GLUCOSE BLD-MCNC: 100 MG/DL (ref 70–99)
HCT VFR BLD CALC: 47.9 % (ref 40.7–50.3)
HDLC SERPL-MCNC: 35 MG/DL
HEMOGLOBIN: 15.8 G/DL (ref 13–17)
IMMATURE GRANULOCYTES: 0 %
LDL CHOLESTEROL: 116 MG/DL (ref 0–130)
LYMPHOCYTES # BLD: 32 % (ref 24–43)
MCH RBC QN AUTO: 28.8 PG (ref 25.2–33.5)
MCHC RBC AUTO-ENTMCNC: 33 G/DL (ref 25.2–33.5)
MCV RBC AUTO: 87.2 FL (ref 82.6–102.9)
MONOCYTES # BLD: 11 % (ref 3–12)
NRBC AUTOMATED: 0 PER 100 WBC
PDW BLD-RTO: 14 % (ref 11.8–14.4)
PLATELET # BLD: 205 K/UL (ref 138–453)
PLATELET ESTIMATE: NORMAL
PMV BLD AUTO: 9.1 FL (ref 8.1–13.5)
POTASSIUM SERPL-SCNC: 4.2 MMOL/L (ref 3.7–5.3)
PROSTATE SPECIFIC ANTIGEN: 1.2 UG/L
RBC # BLD: 5.49 M/UL (ref 4.21–5.77)
RBC # BLD: NORMAL 10*6/UL
SEG NEUTROPHILS: 53 % (ref 36–65)
SEGMENTED NEUTROPHILS ABSOLUTE COUNT: 4.05 K/UL (ref 1.5–8.1)
SODIUM BLD-SCNC: 140 MMOL/L (ref 135–144)
TOTAL PROTEIN: 7.5 G/DL (ref 6.4–8.3)
TRIGL SERPL-MCNC: 175 MG/DL
VLDLC SERPL CALC-MCNC: ABNORMAL MG/DL (ref 1–30)
WBC # BLD: 7.6 K/UL (ref 3.5–11.3)
WBC # BLD: NORMAL 10*3/UL

## 2020-07-13 PROCEDURE — 80053 COMPREHEN METABOLIC PANEL: CPT

## 2020-07-13 PROCEDURE — 80061 LIPID PANEL: CPT

## 2020-07-13 PROCEDURE — 36415 COLL VENOUS BLD VENIPUNCTURE: CPT

## 2020-07-13 PROCEDURE — 85025 COMPLETE CBC W/AUTO DIFF WBC: CPT

## 2020-07-13 PROCEDURE — G0103 PSA SCREENING: HCPCS

## 2020-07-20 ENCOUNTER — OFFICE VISIT (OUTPATIENT)
Dept: FAMILY MEDICINE CLINIC | Age: 75
End: 2020-07-20
Payer: MEDICARE

## 2020-07-20 VITALS
WEIGHT: 219 LBS | DIASTOLIC BLOOD PRESSURE: 64 MMHG | TEMPERATURE: 97.2 F | HEART RATE: 68 BPM | HEIGHT: 70 IN | BODY MASS INDEX: 31.35 KG/M2 | SYSTOLIC BLOOD PRESSURE: 110 MMHG

## 2020-07-20 PROBLEM — F32.5 MAJOR DEPRESSIVE DISORDER IN FULL REMISSION (HCC): Status: ACTIVE | Noted: 2020-07-20

## 2020-07-20 PROCEDURE — 1036F TOBACCO NON-USER: CPT | Performed by: FAMILY MEDICINE

## 2020-07-20 PROCEDURE — 99214 OFFICE O/P EST MOD 30 MIN: CPT | Performed by: FAMILY MEDICINE

## 2020-07-20 PROCEDURE — 3017F COLORECTAL CA SCREEN DOC REV: CPT | Performed by: FAMILY MEDICINE

## 2020-07-20 PROCEDURE — 99213 OFFICE O/P EST LOW 20 MIN: CPT

## 2020-07-20 PROCEDURE — G8417 CALC BMI ABV UP PARAM F/U: HCPCS | Performed by: FAMILY MEDICINE

## 2020-07-20 PROCEDURE — 1123F ACP DISCUSS/DSCN MKR DOCD: CPT | Performed by: FAMILY MEDICINE

## 2020-07-20 PROCEDURE — 4040F PNEUMOC VAC/ADMIN/RCVD: CPT | Performed by: FAMILY MEDICINE

## 2020-07-20 PROCEDURE — G8427 DOCREV CUR MEDS BY ELIG CLIN: HCPCS | Performed by: FAMILY MEDICINE

## 2020-07-20 ASSESSMENT — PATIENT HEALTH QUESTIONNAIRE - PHQ9
SUM OF ALL RESPONSES TO PHQ9 QUESTIONS 1 & 2: 0
1. LITTLE INTEREST OR PLEASURE IN DOING THINGS: 0
SUM OF ALL RESPONSES TO PHQ QUESTIONS 1-9: 0
SUM OF ALL RESPONSES TO PHQ QUESTIONS 1-9: 0
2. FEELING DOWN, DEPRESSED OR HOPELESS: 0

## 2020-07-20 ASSESSMENT — ENCOUNTER SYMPTOMS
RESPIRATORY NEGATIVE: 1
EYES NEGATIVE: 1
BACK PAIN: 1
ALLERGIC/IMMUNOLOGIC NEGATIVE: 1
GASTROINTESTINAL NEGATIVE: 1

## 2020-07-20 NOTE — PROGRESS NOTES
Claustrophobia     Enlarged lymph node     rt. lower neck.  Hearing aid worn     dar. ears.  Hearing loss     Hyperlipidemia     Hypertension     RAFAT (obstructive sleep apnea)     CPAP occasional    PONV (postoperative nausea and vomiting)     Retention of urine     last 2 surgeries, patient unable to void, home with catheter both times    SCC (squamous cell carcinoma)     HAND    Spinal stenosis, lumbar region, with neurogenic claudication     Wears glasses      Past Surgical History:   Procedure Laterality Date    BACK SURGERY  10/2013    Dr. Washington Lucas hardware lumbar    COLONOSCOPY  06/13/12    mild diverticular dz    LUMBAR FUSION N/A 3/8/2017    LUMBAR L2-3 POSTERIOR DECOMPRESSION FUSION INSTRUMENTATION W/REVISION L3-5 POSTERIOR DECOMPRESSION FUSION INSTRUMENTATION (KOKILUIS MCCANNBUS & Christie Cuadra)  CC SN/KILEY performed by Geri Garcia MD at [address] [place] [address] Right     lower neck, 2-    [place] ARTHRODESIS POSTERIOR/POSTEROLATERAL THORACIC N/A 1/26/2018    THORACIC & LUMBAR POSTERIOR DECOMPRESSION AND FUSION REVISION T9 THRU L5 performed by Geri Garcia MD at [facility] [address] [place] [address] N/A 8/23/2017    COLONOSCOPY performed by Shannan Tran MD at [facility] [address] N/A 11/14/2018    SPINAL CORD STIMULATOR IMPLANT PERMANENT performed by Geri Garcia MD at [address] PRE-MALIGNANT / BENIGN SKIN LESION EXCISION      SKIN BIOPSY      skin cancer removed x 4    VASECTOMY       Current Outpatient Medications   Medication Sig Dispense Refill    HYDROcodone-acetaminophen (NORCO) 5-325 MG per tablet Take 1 tablet by mouth 2 times daily for 30 days.  60 tablet 0    fenofibrate (TRICOR) 145 MG tablet TAKE ONE TABLET BY MOUTH DAILY 90 tablet 3    sertraline (ZOLOFT) 100 MG tablet TAKE ONE TABLET BY MOUTH DAILY 90 tablet 3    tamsulosin (FLOMAX) 0.4 MG capsule Take 1 capsule by mouth daily 90 capsule 3    metoprolol succinate (TOPROL XL) 50 MG extended release tablet TAKE ONE TABLET BY MOUTH DAILY 90 tablet 3    simvastatin (ZOCOR) 20 MG tablet TAKE ONE TABLET BY MOUTH DAILY 90 tablet 3    aspirin 81 MG tablet Take 81 mg by mouth daily      acetaminophen (TYLENOL) 500 MG tablet Take 500 mg by mouth every 6 hours as needed. No current facility-administered medications for this visit. Allergies   Allergen Reactions    Atarax [Hydroxyzine]      Double vision    Gabapentin      dizzy    Lorazepam Other (See Comments)     Double vision- pt denies, states this is entered in error. Pt is allergic to atarax not ativan    Phenergan [Promethazine Hcl] Other (See Comments)     \"out of it\"    Morphine And Related Nausea And Vomiting     hallucinations         Review of Systems   Constitutional: Negative. HENT: Positive for hearing loss. Eyes: Negative. Respiratory: Negative. Cardiovascular: Negative. Gastrointestinal: Negative. Endocrine: Negative. Genitourinary: Negative. Musculoskeletal: Positive for back pain and gait problem (limited standing and walking due to back pain). Skin: Negative. Allergic/Immunologic: Negative. Neurological: Positive for numbness (more into left leg and feet with prolonged standing, both right and left feet. ). Hematological: Negative. Psychiatric/Behavioral: Negative. Objective:   Physical Exam  Constitutional:       General: He is not in acute distress. Appearance: He is well-developed. HENT:      Head: Normocephalic and atraumatic. Right Ear: External ear normal.      Left Ear: External ear normal.      Mouth/Throat:      Pharynx: No oropharyngeal exudate. Eyes:      General: No scleral icterus. Conjunctiva/sclera: Conjunctivae normal.   Neck:      Musculoskeletal: Neck supple. Thyroid: No thyromegaly. Cardiovascular:      Rate and Rhythm: Normal rate and regular rhythm. Heart sounds: Normal heart sounds. No murmur.    Pulmonary: Effort: Pulmonary effort is normal. No respiratory distress. Breath sounds: Normal breath sounds. No wheezing. Abdominal:      General: Bowel sounds are normal. There is no distension. Palpations: Abdomen is soft. Tenderness: There is no abdominal tenderness. There is no rebound. Genitourinary:     Prostate: Not enlarged and not tender. Rectum: Normal. No mass. Normal anal tone. Musculoskeletal: Normal range of motion. General: No tenderness. Skin:     General: Skin is warm and dry. Findings: Lesion (multiple seb. keratosis lesions. 2 on scalp, 6 on trunk. 2 on left leg) present. No erythema or rash. Neurological:      Mental Status: He is alert and oriented to person, place, and time. Sensory: Sensory deficit (left leg at present.) present. Psychiatric:         Behavior: Behavior normal.         Thought Content:  Thought content normal.         Judgment: Judgment normal.       /64 (Site: Right Upper Arm, Position: Sitting, Cuff Size: Large Adult)   Pulse 68   Temp 97.2 °F (36.2 °C) (Temporal)   Ht 5' 10\" (1.778 m)   Wt 219 lb (99.3 kg)   BMI 31.42 kg/m²     Hospital Outpatient Visit on 07/13/2020   Component Date Value Ref Range Status    Glucose 07/13/2020 100* 70 - 99 mg/dL Final    BUN 07/13/2020 19  8 - 23 mg/dL Final    CREATININE 07/13/2020 1.09  0.70 - 1.20 mg/dL Final    Bun/Cre Ratio 07/13/2020 17  9 - 20 Final    Calcium 07/13/2020 9.8  8.6 - 10.4 mg/dL Final    Sodium 07/13/2020 140  135 - 144 mmol/L Final    Potassium 07/13/2020 4.2  3.7 - 5.3 mmol/L Final    Chloride 07/13/2020 101  98 - 107 mmol/L Final    CO2 07/13/2020 24  20 - 31 mmol/L Final    Anion Gap 07/13/2020 15  9 - 17 mmol/L Final    Alkaline Phosphatase 07/13/2020 71  40 - 129 U/L Final    ALT 07/13/2020 17  5 - 41 U/L Final    AST 07/13/2020 26  <40 U/L Final    Total Bilirubin 07/13/2020 0.61  0.3 - 1.2 mg/dL Final    Total Protein 07/13/2020 7.5  6.4 - 8.3 g/dL Final    Alb 07/13/2020 4.5  3.5 - 5.2 g/dL Final    Albumin/Globulin Ratio 07/13/2020 1.5  1.0 - 2.5 Final    GFR Non- 07/13/2020 >60  >60 mL/min Final    GFR  07/13/2020 >60  >60 mL/min Final    GFR Comment 07/13/2020        Final    GFR Staging 07/13/2020 NOT REPORTED   Final    WBC 07/13/2020 7.6  3.5 - 11.3 k/uL Final    RBC 07/13/2020 5.49  4.21 - 5.77 m/uL Final    Hemoglobin 07/13/2020 15.8  13.0 - 17.0 g/dL Final    Hematocrit 07/13/2020 47.9  40.7 - 50.3 % Final    MCV 07/13/2020 87.2  82.6 - 102.9 fL Final    MCH 07/13/2020 28.8  25.2 - 33.5 pg Final    MCHC 07/13/2020 33.0  25.2 - 33.5 g/dL Final    RDW 07/13/2020 14.0  11.8 - 14.4 % Final    Platelets 29/77/8656 205  138 - 453 k/uL Final    MPV 07/13/2020 9.1  8.1 - 13.5 fL Final    NRBC Automated 07/13/2020 0.0  0.0 per 100 WBC Final    Differential Type 07/13/2020 NOT REPORTED   Final    Seg Neutrophils 07/13/2020 53  36 - 65 % Final    Lymphocytes 07/13/2020 32  24 - 43 % Final    Monocytes 07/13/2020 11  3 - 12 % Final    Eosinophils % 07/13/2020 3  1 - 4 % Final    Basophils 07/13/2020 1  0 - 2 % Final    Immature Granulocytes 07/13/2020 0  0 % Final    Segs Absolute 07/13/2020 4.05  1.50 - 8.10 k/uL Final    Absolute Lymph # 07/13/2020 2.41  1.10 - 3.70 k/uL Final    Absolute Mono # 07/13/2020 0.84  0.10 - 1.20 k/uL Final    Absolute Eos # 07/13/2020 0.19  0.00 - 0.44 k/uL Final    Basophils Absolute 07/13/2020 0.07  0.00 - 0.20 k/uL Final    Absolute Immature Granulocyte 07/13/2020 <0.03  0.00 - 0.30 k/uL Final    WBC Morphology 07/13/2020 NOT REPORTED   Final    RBC Morphology 07/13/2020 NOT REPORTED   Final    Platelet Estimate 54/75/5668 NOT REPORTED   Final    PSA 07/13/2020 1.20  <4.1 ug/L Final    Cholesterol 07/13/2020 186  <200 mg/dL Final    HDL 07/13/2020 35* >40 mg/dL Final    LDL Cholesterol 07/13/2020 116  0 - 130 mg/dL Final    Chol/HDL Ratio 07/13/2020 5.3* <5 Final    Triglycerides 07/13/2020 175* <150 mg/dL Final    VLDL 07/13/2020 NOT REPORTED* 1 - 30 mg/dL Final         Assessment:       Encounter Diagnoses   Name Primary?  Stage 3 chronic kidney disease ([facility] [address] Yes    Pure hypercholesterolemia     SCC (squamous cell carcinoma)     [facility] depressive [facility] in full remission, unspecified whether [facility]     Essential (primary) hypertension     LAURO on CPAP     Spinal stenosis, lumbar region, with neurogenic claudication     Screening PSA (prostate specific antigen)     Mixed conductive and sensorineural hearing loss of both ears            Plan:      CKD; improved/ stable with  Normal renal indicies at present. Cont. To avoid nephrotoxins when able and follow serially. Hyperlipidemia: good control at present . Cont. Simvastatin/fenofibrate at current dosing. SCC; no evidence for recurrent disease. Cont. Screening exams. Depression and anxiety. No current concerns on zoloft. Helping with agoraphobia and claustrophobia mostly. Plan to cont. Same. Htn: good overall control. Cont. toprol daily. On flomax as well    Lauro: better compliance over the interval.  He was getting up a lot at night with his back pain issues and it became a hardship for him. Encouraged compliance. He snores a lot, but reports he feels better without it. He thinks he urinates less often using the cpap. Spinal stenosis with claudication. Dr. Asher Bose following s/p surgery in October2013. MRI completed. surgery 3/8/17 through Dr. Asher Bose. L2-3 PLIF add on to 3-5. Symptoms in his back and radicular pain down left leg improved , but pain and disability returned. S/p posterior decompression T9-L5 1/26/18. Some early improvement, then pain increased quite a bit again . He has recently been going to pain management, and injections helped some. Most recently he had spinal cord stimulator placed 11/18.   Still has symptoms of back pain and paresthesias

## 2020-07-20 NOTE — PATIENT INSTRUCTIONS
Hospital Outpatient Visit on 07/13/2020   Component Date Value Ref Range Status    Glucose 07/13/2020 100* 70 - 99 mg/dL Final    BUN 07/13/2020 19  8 - 23 mg/dL Final    CREATININE 07/13/2020 1.09  0.70 - 1.20 mg/dL Final    Bun/Cre Ratio 07/13/2020 17  9 - 20 Final    Calcium 07/13/2020 9.8  8.6 - 10.4 mg/dL Final    Sodium 07/13/2020 140  135 - 144 mmol/L Final    Potassium 07/13/2020 4.2  3.7 - 5.3 mmol/L Final    Chloride 07/13/2020 101  98 - 107 mmol/L Final    CO2 07/13/2020 24  20 - 31 mmol/L Final    Anion Gap 07/13/2020 15  9 - 17 mmol/L Final    Alkaline Phosphatase 07/13/2020 71  40 - 129 U/L Final    ALT 07/13/2020 17  5 - 41 U/L Final    AST 07/13/2020 26  <40 U/L Final    Total Bilirubin 07/13/2020 0.61  0.3 - 1.2 mg/dL Final    Total Protein 07/13/2020 7.5  6.4 - 8.3 g/dL Final    Alb 07/13/2020 4.5  3.5 - 5.2 g/dL Final    Albumin/Globulin Ratio 07/13/2020 1.5  1.0 - 2.5 Final    GFR Non- 07/13/2020 >60  >60 mL/min Final    GFR  07/13/2020 >60  >60 mL/min Final    GFR Comment 07/13/2020        Final    Comment: Average GFR for 79or more years old:   76 mL/min/1.73sq m  Chronic Kidney Disease:   <60 mL/min/1.73sq m  Kidney failure:   <15 mL/min/1.73sq m              eGFR calculated using average adult body mass.  Additional eGFR calculator available at:        [employer]            GFR Staging 07/13/2020 NOT REPORTED   Final    WBC 07/13/2020 7.6  3.5 - 11.3 k/uL Final    RBC 07/13/2020 5.49  4.21 - 5.77 m/uL Final    Hemoglobin 07/13/2020 15.8  13.0 - 17.0 g/dL Final    Hematocrit 07/13/2020 47.9  40.7 - 50.3 % Final    MCV 07/13/2020 87.2  82.6 - 102.9 fL Final    MCH 07/13/2020 28.8  25.2 - 33.5 pg Final    MCHC 07/13/2020 33.0  25.2 - 33.5 g/dL Final    RDW 07/13/2020 14.0  11.8 - 14.4 % Final    Platelets 63/20/7689 205  138 - 453 k/uL Final    MPV 07/13/2020 9.1  8.1 - 13.5 fL Final    NRBC Automated 07/13/2020 0.0  0.0 per 100 WBC Final    Differential Type 07/13/2020 NOT REPORTED   Final    Seg Neutrophils 07/13/2020 53  36 - 65 % Final    Lymphocytes 07/13/2020 32  24 - 43 % Final    Monocytes 07/13/2020 11  3 - 12 % Final    Eosinophils % 07/13/2020 3  1 - 4 % Final    Basophils 07/13/2020 1  0 - 2 % Final    Immature Granulocytes 07/13/2020 0  0 % Final    Segs Absolute 07/13/2020 4.05  1.50 - 8.10 k/uL Final    Absolute Lymph # 07/13/2020 2.41  1.10 - 3.70 k/uL Final    Absolute Mono # 07/13/2020 0.84  0.10 - 1.20 k/uL Final    Absolute Eos # 07/13/2020 0.19  0.00 - 0.44 k/uL Final    Basophils Absolute 07/13/2020 0.07  0.00 - 0.20 k/uL Final    Absolute Immature Granulocyte 07/13/2020 <0.03  0.00 - 0.30 k/uL Final    WBC Morphology 07/13/2020 NOT REPORTED   Final    RBC Morphology 07/13/2020 NOT REPORTED   Final    Platelet Estimate 43/04/2257 NOT REPORTED   Final    PSA 07/13/2020 1.20  <4.1 ug/L Final    Comment: The Roche \"ECLIA\" assay is used. Results obtained with different assay methods cannot be   used interchangeably.  Cholesterol 07/13/2020 186  <200 mg/dL Final    Comment:    Cholesterol Guidelines:      <200  Desirable   200-240  Borderline      >240  Undesirable         HDL 07/13/2020 35* >40 mg/dL Final    Comment:    HDL Guidelines:    <40     Undesirable   40-59    Borderline    >59     Desirable         LDL Cholesterol 07/13/2020 116  0 - 130 mg/dL Final    Comment:    LDL Guidelines:     <100    Desirable   100-129   Near to/above Desirable   130-159   Borderline      >159   Undesirable     Direct (measured) LDL and calculated LDL are not interchangeable tests.  Chol/HDL Ratio 07/13/2020 5.3* <5 Final            Triglycerides 07/13/2020 175* <150 mg/dL Final    Comment:    Triglyceride Guidelines:     <150   Desirable   150-199  Borderline   200-499  High     >499   Very high   Based on AHA Guidelines for fasting triglyceride, October 2012.  VLDL 07/13/2020 NOT REPORTED* 1 - 30 mg/dL Final

## 2020-07-28 RX ORDER — HYDROCODONE BITARTRATE AND ACETAMINOPHEN 5; 325 MG/1; MG/1
1 TABLET ORAL 2 TIMES DAILY
Qty: 60 TABLET | Refills: 0 | Status: SHIPPED | OUTPATIENT
Start: 2020-07-28 | End: 2020-09-10 | Stop reason: SDUPTHER

## 2020-07-28 NOTE — TELEPHONE ENCOUNTER
Eusebio Dixon called requesting a refill of the below medication which has been pended for you: O[place] from [place] reviewed. NORCO 5-325 mg last filled 6/24/20 #60/30 days. Report available for your review. Requested Prescriptions     Pending Prescriptions Disp Refills    HYDROcodone-acetaminophen (NORCO) 5-325 MG per tablet 60 tablet 0     Sig: Take 1 tablet by mouth 2 times daily for 30 days. Last Appointment Date: 7/20/2020  Next Appointment Date: 1/20/2021    Allergies   Allergen Reactions    Atarax [Hydroxyzine]      Double vision    Gabapentin      dizzy    Lorazepam Other (See Comments)     Double vision- pt denies, states this is entered in error.  Pt is allergic to atarax not ativan    Phenergan [Promethazine Hcl] Other (See Comments)     \"out of it\"    Morphine And Related Nausea And Vomiting     hallucinations

## 2020-08-06 ENCOUNTER — NURSE ONLY (OUTPATIENT)
Dept: LAB | Age: 75
End: 2020-08-06
Payer: MEDICARE

## 2020-08-06 PROCEDURE — 90750 HZV VACC RECOMBINANT IM: CPT

## 2020-08-06 PROCEDURE — 90471 IMMUNIZATION ADMIN: CPT

## 2020-08-14 ENCOUNTER — OFFICE VISIT (OUTPATIENT)
Dept: PRIMARY CARE CLINIC | Age: 75
End: 2020-08-14
Payer: MEDICARE

## 2020-08-14 VITALS
OXYGEN SATURATION: 97 % | SYSTOLIC BLOOD PRESSURE: 130 MMHG | DIASTOLIC BLOOD PRESSURE: 70 MMHG | HEART RATE: 60 BPM | TEMPERATURE: 98.5 F | WEIGHT: 217 LBS | BODY MASS INDEX: 31.14 KG/M2

## 2020-08-14 PROCEDURE — 3017F COLORECTAL CA SCREEN DOC REV: CPT | Performed by: PHYSICIAN ASSISTANT

## 2020-08-14 PROCEDURE — 4040F PNEUMOC VAC/ADMIN/RCVD: CPT | Performed by: PHYSICIAN ASSISTANT

## 2020-08-14 PROCEDURE — G8417 CALC BMI ABV UP PARAM F/U: HCPCS | Performed by: PHYSICIAN ASSISTANT

## 2020-08-14 PROCEDURE — 1123F ACP DISCUSS/DSCN MKR DOCD: CPT | Performed by: PHYSICIAN ASSISTANT

## 2020-08-14 PROCEDURE — 1036F TOBACCO NON-USER: CPT | Performed by: PHYSICIAN ASSISTANT

## 2020-08-14 PROCEDURE — 99214 OFFICE O/P EST MOD 30 MIN: CPT | Performed by: PHYSICIAN ASSISTANT

## 2020-08-14 PROCEDURE — G8427 DOCREV CUR MEDS BY ELIG CLIN: HCPCS | Performed by: PHYSICIAN ASSISTANT

## 2020-08-14 ASSESSMENT — ENCOUNTER SYMPTOMS
VOMITING: 0
COLOR CHANGE: 0
NAUSEA: 0
DIARRHEA: 0

## 2020-08-14 NOTE — PROGRESS NOTES
[facility]    Subjective:      Patient ID: Jean-Paul Joya is a 76 y.o. y.o. male. Patient is seen due to swelling in the left elbow. No trauma did not see anything bite him. No changes. Did not treat it at all. It has not changed at all . This started six days ago. He thought he better come in and have evaluated. Past Medical History:   Diagnosis Date    Agoraphobia     Anxiety     Arthritis     Basal cell carcinoma     Chronic pain     back    CKD (chronic kidney disease)     Claustrophobia     Enlarged lymph node     rt. lower neck.  Hearing aid worn     dar. ears.     Hearing loss     Hyperlipidemia     Hypertension     RAFAT (obstructive sleep apnea)     CPAP occasional    PONV (postoperative nausea and vomiting)     Retention of urine     last 2 surgeries, patient unable to void, home with catheter both times    SCC (squamous cell carcinoma)     HAND    Spinal stenosis, lumbar region, with neurogenic claudication     Wears glasses        Past Surgical History:   Procedure Laterality Date    BACK SURGERY  10/2013    Dr. Cornelia Garcia hardware lumbar    COLONOSCOPY  06/13/12    mild diverticular dz    LUMBAR FUSION N/A 3/8/2017    LUMBAR L2-3 POSTERIOR DECOMPRESSION FUSION INSTRUMENTATION W/REVISION L3-5 POSTERIOR DECOMPRESSION FUSION INSTRUMENTATION (KOKI GOLBUS & Bharathi Cowper)  CC SN/KILEY performed by Mildred Hobson MD at [facility] Right     lower neck, 2-    ND ARTHRODESIS POSTERIOR/POSTEROLATERAL THORACIC N/A 1/26/2018    THORACIC & LUMBAR POSTERIOR DECOMPRESSION AND FUSION REVISION T9 THRU L5 performed by Mildred Hobson MD at [facility]  [address] IND N/A 8/23/2017    COLONOSCOPY performed by Caro Quinones MD at [address] [facility] N/A 11/14/2018    SPINAL CORD STIMULATOR IMPLANT PERMANENT performed by Mildred Hobson MD at [address] [facility] PRE-MALIGNANT / [id number]  SKIN BIOPSY      skin cancer removed x 4    VASECTOMY         Family History   Problem Relation Age of Onset    Diabetes Mother     Diabetes Brother     Cancer Father         lung cancer    Cancer Brother         lung cancer    Diabetes Brother        Allergies   Allergen Reactions    Atarax [Hydroxyzine]      Double vision    Gabapentin      dizzy    Lorazepam Other (See Comments)     Double vision- pt denies, states this is entered in error. Pt is allergic to atarax not ativan    Phenergan [Promethazine Hcl] Other (See Comments)     \"out of it\"    Morphine And Related Nausea And Vomiting     hallucinations       Current Outpatient Medications   Medication Sig Dispense Refill    HYDROcodone-acetaminophen (NORCO) 5-325 MG per tablet Take 1 tablet by mouth 2 times daily for 30 days. 60 tablet 0    fenofibrate (TRICOR) 145 MG tablet TAKE ONE TABLET BY MOUTH DAILY 90 tablet 3    sertraline (ZOLOFT) 100 MG tablet TAKE ONE TABLET BY MOUTH DAILY 90 tablet 3    tamsulosin (FLOMAX) 0.4 MG capsule Take 1 capsule by mouth daily 90 capsule 3    simvastatin (ZOCOR) 20 MG tablet TAKE ONE TABLET BY MOUTH DAILY 90 tablet 3    aspirin 81 MG tablet Take 81 mg by mouth daily      acetaminophen (TYLENOL) 500 MG tablet Take 500 mg by mouth every 6 hours as needed.  metoprolol succinate (TOPROL XL) 50 MG extended release tablet TAKE ONE TABLET BY MOUTH DAILY 90 tablet 2     No current facility-administered medications for this visit. Review of Systems   Constitutional: Negative for appetite change, chills, fatigue and fever. Cardiovascular: Negative for chest pain and palpitations. Gastrointestinal: Negative for diarrhea, nausea and vomiting. Musculoskeletal: Negative for myalgias. Skin: Negative for color change and rash. Neurological: Negative. Negative for light-headedness, numbness and headaches. Psychiatric/Behavioral: Negative for sleep disturbance. The patient is not nervous/anxious. Objective:      /70 (Site: Right Upper Arm, Position: Sitting, Cuff Size: Large Adult)   Pulse 60   Temp 98.5 °F (36.9 °C) (Tympanic)   Wt 217 lb (98.4 kg)   SpO2 97%   BMI 31.14 kg/m²     Physical Exam  Vitals signs and nursing note reviewed. Constitutional:       General: He is not in acute distress. Appearance: Normal appearance. He is normal weight. He is not ill-appearing. HENT:      Head: Normocephalic and atraumatic. Right Ear: External ear normal.      Left Ear: External ear normal.      Nose: Nose normal.   Eyes:      General: No scleral icterus. Cardiovascular:      Pulses: Normal pulses. Pulmonary:      Effort: Pulmonary effort is normal.   Musculoskeletal: Normal range of motion. General: Swelling and deformity present. No tenderness or signs of injury. Right lower leg: No edema. Left lower leg: No edema. Comments: Swelling and fluid noted in the left olecranon bursa. It is fluctuant. No redness or warmth noted. Non tender. Skin:     General: Skin is warm and dry. Capillary Refill: Capillary refill takes less than 2 seconds. Findings: No erythema or rash. Neurological:      General: No focal deficit present. Mental Status: He is alert and oriented to person, place, and time. Motor: No weakness. Gait: Gait normal.   Psychiatric:         Mood and Affect: Mood normal.         Behavior: Behavior normal.         Thought Content: Thought content normal.         Judgment: Judgment normal.           Assessment & Plan:     1. Olecranon bursitis of left elbow        The area was numbed with ethyl chloride and then I removed 2.5 cc of serosanguinous fluid from the bursa. I then injected 1cc of celestone into the bursa. He tolerated this very well.   A bandage placed over the area and then a 2 inch ace wrap was used around the elbow   He is to use the ace wrap the next 3 days  They are to contact me in 3 days on status if I

## 2020-08-17 ENCOUNTER — TELEPHONE (OUTPATIENT)
Dept: FAMILY MEDICINE CLINIC | Age: 75
End: 2020-08-17

## 2020-08-18 NOTE — TELEPHONE ENCOUNTER
Patient's wife states it has gone down more since yesterday. They will wait it out this week and see how it does. Will call if it gets worse. No pain, no redness.

## 2020-08-21 RX ORDER — METOPROLOL SUCCINATE 50 MG/1
TABLET, EXTENDED RELEASE ORAL
Qty: 90 TABLET | Refills: 2 | Status: SHIPPED | OUTPATIENT
Start: 2020-08-21 | End: 2021-05-12

## 2020-08-26 ENCOUNTER — TELEPHONE (OUTPATIENT)
Dept: FAMILY MEDICINE CLINIC | Age: 75
End: 2020-08-26

## 2020-08-26 NOTE — TELEPHONE ENCOUNTER
Wife calling back in follow up to last week's phone call, wife states pt's elbow is still swollen, it's still there, and she can tell there is still some fluid, no pain, no redness, please call above number with recommendations.

## 2020-09-08 RX ORDER — SIMVASTATIN 20 MG
TABLET ORAL
Qty: 90 TABLET | Refills: 2 | Status: SHIPPED | OUTPATIENT
Start: 2020-09-08 | End: 2021-06-03

## 2020-09-10 RX ORDER — HYDROCODONE BITARTRATE AND ACETAMINOPHEN 5; 325 MG/1; MG/1
1 TABLET ORAL 2 TIMES DAILY
Qty: 60 TABLET | Refills: 0 | Status: SHIPPED | OUTPATIENT
Start: 2020-09-10 | End: 2020-10-22 | Stop reason: SDUPTHER

## 2020-09-29 ENCOUNTER — OFFICE VISIT (OUTPATIENT)
Dept: UROLOGY | Age: 75
End: 2020-09-29
Payer: MEDICARE

## 2020-09-29 VITALS — DIASTOLIC BLOOD PRESSURE: 78 MMHG | TEMPERATURE: 97.6 F | SYSTOLIC BLOOD PRESSURE: 126 MMHG

## 2020-09-29 PROCEDURE — 3017F COLORECTAL CA SCREEN DOC REV: CPT | Performed by: NURSE PRACTITIONER

## 2020-09-29 PROCEDURE — 4040F PNEUMOC VAC/ADMIN/RCVD: CPT | Performed by: NURSE PRACTITIONER

## 2020-09-29 PROCEDURE — 99213 OFFICE O/P EST LOW 20 MIN: CPT | Performed by: NURSE PRACTITIONER

## 2020-09-29 PROCEDURE — G8427 DOCREV CUR MEDS BY ELIG CLIN: HCPCS | Performed by: NURSE PRACTITIONER

## 2020-09-29 PROCEDURE — 1036F TOBACCO NON-USER: CPT | Performed by: NURSE PRACTITIONER

## 2020-09-29 PROCEDURE — G8417 CALC BMI ABV UP PARAM F/U: HCPCS | Performed by: NURSE PRACTITIONER

## 2020-09-29 PROCEDURE — 1123F ACP DISCUSS/DSCN MKR DOCD: CPT | Performed by: NURSE PRACTITIONER

## 2020-09-29 RX ORDER — TAMSULOSIN HYDROCHLORIDE 0.4 MG/1
0.4 CAPSULE ORAL DAILY
Qty: 90 CAPSULE | Refills: 3 | Status: SHIPPED | OUTPATIENT
Start: 2020-09-29 | End: 2021-08-05 | Stop reason: SDUPTHER

## 2020-09-29 ASSESSMENT — ENCOUNTER SYMPTOMS
SHORTNESS OF BREATH: 0
WHEEZING: 0
COLOR CHANGE: 0
NAUSEA: 0
EYE REDNESS: 0
VOMITING: 0
EYE PAIN: 0
COUGH: 0
ABDOMINAL PAIN: 0
BACK PAIN: 0

## 2020-09-29 NOTE — PATIENT INSTRUCTIONS
PSA is normal, no additional screening is needed     Continue Flomax daily     Report any worsening urinary symptoms, blood in the urine, burning or other concerns

## 2020-09-29 NOTE — LETTER
[address] [address]  Dept: [phone]  Dept Fax: [phone]        9/29/20    Patient: Dg Bone  YOB: 1945    Dear Jake Schrader MD,    I had the pleasure of seeing one of your patients, Moses Lenz today in the office today. Below are the relevant portions of my assessment and plan of care. IMPRESSION:  1. Hypertrophy of prostate with urinary obstruction    2. Nocturia        PLAN:  PSA is normal, no additional screening is needed    Continue Flomax daily    Report any worsening urinary symptoms, blood in the urine, burning or other concerns   No follow-ups on file. Prescriptions Ordered:  Orders Placed This Encounter   Medications    tamsulosin (FLOMAX) 0.4 MG capsule     Sig: Take 1 capsule by mouth daily     Dispense:  90 capsule     Refill:  3     Orders Placed:  No orders of the defined types were placed in this encounter. Thank you for allowing me to participate in the care of this patient. I will keep you updated on this patient's follow up and I look forward to serving you and your patients again in the future.     KM Broussard - CNP

## 2020-09-29 NOTE — PROGRESS NOTES
[address] [address]  Dept: [address] [facility] Urology Office Note - Established    Patient:  Dano Ramírez  YOB: 1945  Date: 9/29/2020    The patient is a 76 y.o. male who presents todayfor evaluation of the following problems:   Chief Complaint   Patient presents with    Urinary Retention     year med check        HPI  Here for follow up on BPH. He had two episodes of urinary retention both  post op. Once cath is removed he does well. Feels he empties well, steady stream, nocturia 1-2. No dysuria, no hematuria, no UTI. Summary of old records: N/A    Additional History: N/A    Procedures Today: N/A    Urinalysis today:  No results found for this visit on 09/29/20.   Last several PSA's:  Lab Results   Component Value Date    PSA 1.20 07/13/2020    PSA 1.22 01/02/2020    PSA 1.19 08/09/2018     Last total testosterone:  No results found for: TESTOSTERONE    AUA Symptom Score (9/29/2020):  INCOMPLETE EMPTYING: How often have you had the sensation of not emptying your bladder?: Not at all  FREQUENCY: How often do you have to urinate less than every two hours?: Not at all  INTERMITTENCY: How often have you found you stopped and started again several times when you urinated?: Not at all  URGENCY: How often have you found it difficult to postpone urination?: Not at all  WEAK STREAM: How often have you had a weak urinary stream?: Not at all  STRAINING: How often have you had to strain to start  urination?: Not at all  NOCTURIA: How many times did you typically get up at night to uriniate?: 2 Times  TOTAL I-PSS SCORE[de-identified] 2  How would you feel if you were to spend the rest of your life with your urinary condition?: Pleased    Last BUN and creatinine:  Lab Results   Component Value Date    BUN 19 07/13/2020     Lab Results   Component Value Date    CREATININE 1.09 07/13/2020       Additional Lab/Culture results:     Imaging Reviewed during this Office Visit:   (results were independently reviewed by physician and radiology report verified)    PAST MEDICAL, FAMILY AND SOCIAL HISTORY UPDATE:  Past Medical History:   Diagnosis Date    Agoraphobia     Anxiety     Arthritis     Basal cell carcinoma     Chronic pain     back    CKD (chronic kidney disease)     Claustrophobia     Enlarged lymph node     rt. lower neck.  Hearing aid worn     dar. ears.     Hearing loss     Hyperlipidemia     Hypertension     RAFAT (obstructive sleep apnea)     CPAP occasional    PONV (postoperative nausea and vomiting)     Retention of urine     last 2 surgeries, patient unable to void, home with catheter both times    SCC (squamous cell carcinoma)     HAND    Spinal stenosis, lumbar region, with neurogenic claudication     Wears glasses      Past Surgical History:   Procedure Laterality Date    BACK SURGERY  10/2013    Dr. Amanda Crouch hardware lumbar    COLONOSCOPY  06/13/12    mild diverticular dz    LUMBAR FUSION N/A 3/8/2017    LUMBAR L2-3 POSTERIOR DECOMPRESSION FUSION INSTRUMENTATION W/REVISION L3-5 POSTERIOR DECOMPRESSION FUSION INSTRUMENTATION (KOKI GOLBUS & Regenia Clipper)  CC SN/KILEY performed by Radha Gustafson MD at [facility] Right     lower neck, 2-    ND ARTHRODESIS POSTERIOR/POSTEROLATERAL THORACIC N/A 1/26/2018    THORACIC & LUMBAR POSTERIOR DECOMPRESSION AND FUSION REVISION T9 THRU L5 performed by Radha Gustafson MD at [address] [place] [address] NOT  [address] [place] [address] N/A 8/23/2017    COLONOSCOPY performed by Alba Barboza MD at [facility]. [address] N/A 11/14/2018    SPINAL CORD STIMULATOR IMPLANT PERMANENT performed by Radha Gustafson MD at [address] [place] [address] PRE-MALIGNANT / BENIGN SKIN LESION EXCISION      SKIN BIOPSY      skin cancer removed x 4    VASECTOMY       Family History   Problem Relation Age of Onset    Diabetes Mother     Diabetes Brother     Cancer Father         lung cancer    Cancer Brother         lung cancer    Diabetes Brother      Outpatient Medications Marked as Taking for the 9/29/20 encounter (Office Visit) with KM Hollingsworth CNP   Medication Sig Dispense Refill    tamsulosin (FLOMAX) 0.4 MG capsule Take 1 capsule by mouth daily 90 capsule 3    HYDROcodone-acetaminophen (NORCO) 5-325 MG per tablet Take 1 tablet by mouth 2 times daily for 30 days. 60 tablet 0    simvastatin (ZOCOR) 20 MG tablet TAKE ONE TABLET BY MOUTH DAILY 90 tablet 2    metoprolol succinate (TOPROL XL) 50 MG extended release tablet TAKE ONE TABLET BY MOUTH DAILY 90 tablet 2    fenofibrate (TRICOR) 145 MG tablet TAKE ONE TABLET BY MOUTH DAILY 90 tablet 3    sertraline (ZOLOFT) 100 MG tablet TAKE ONE TABLET BY MOUTH DAILY 90 tablet 3    aspirin 81 MG tablet Take 81 mg by mouth daily      acetaminophen (TYLENOL) 500 MG tablet Take 500 mg by mouth every 6 hours as needed. Atarax [hydroxyzine]; Gabapentin; Lorazepam; Phenergan [promethazine hcl]; and Morphine and related  Social History     Tobacco Use   Smoking Status Never Smoker   Smokeless Tobacco Never Used     (Ifpatient a smoker, smoking cessation counseling offered)    Social History     Substance and Sexual Activity   Alcohol Use No       REVIEW OF SYSTEMS:  Review of Systems    Physical Exam:      Vitals:    09/29/20 0945   BP: 126/78   Temp: 97.6 °F (36.4 °C)     There is no height or weight on file to calculate BMI. Patient is a 76 y.o. male in no acute distress and alert and oriented to person, place and time. Physical Exam  Constitutional: Patient in no acute distress. Neuro: Alert and oriented to person, place and time.   Psych: Mood normal, affect normal  Skin: warm, pink, No rash noted  HEENT: Head: Normocephalic andatraumatic  Conjunctivae and EOM are normal. Pupils are equal, round  Nose:Normal  Right External Ear: Normal; Left External Ear: Normal  Mouth: Mucosa Moist  Neck: Supple  Lungs: Respiratory effort is normal  Cardiovascular: strong and reg, no edema   Abdomen: Soft, non-tender, non-distended  Bladder non-tender and not distended. Musculoskeletal: Normal gait and station      Assessment and Plan      1. Hypertrophy of prostate with urinary obstruction    2. Nocturia           Plan:     PSA is normal, no additional screening is needed    Continue Flomax daily    Report any worsening urinary symptoms, blood in the urine, burning or other concerns   No follow-ups on file. Prescriptions Ordered:  Orders Placed This Encounter   Medications    tamsulosin (FLOMAX) 0.4 MG capsule     Sig: Take 1 capsule by mouth daily     Dispense:  90 capsule     Refill:  3     Orders Placed:  No orders of the defined types were placed in this encounter. KM Jones CNP    reviewed and Agree with the ROS entered by the MA.

## 2020-10-14 ENCOUNTER — IMMUNIZATION (OUTPATIENT)
Dept: LAB | Age: 75
End: 2020-10-14
Payer: MEDICARE

## 2020-10-14 PROCEDURE — 90694 VACC AIIV4 NO PRSRV 0.5ML IM: CPT | Performed by: FAMILY MEDICINE

## 2020-10-14 PROCEDURE — PBSHW INFLUENZA, QUADV, ADJUVANTED, 65 YRS +, IM, PF, PREFILL SYR, 0.5ML (FLUAD): Performed by: FAMILY MEDICINE

## 2020-10-22 RX ORDER — HYDROCODONE BITARTRATE AND ACETAMINOPHEN 5; 325 MG/1; MG/1
1 TABLET ORAL 2 TIMES DAILY
Qty: 60 TABLET | Refills: 0 | Status: SHIPPED | OUTPATIENT
Start: 2020-10-22 | End: 2020-11-23 | Stop reason: SDUPTHER

## 2020-10-22 NOTE — TELEPHONE ENCOUNTER
[place] called requesting a refill of the below medication which has been pended for you: OAR[place] from [place], and [place] reviewed. NORCO 5-325 mg last filled 9/11/20 #60/30 days. Report available for your review. Requested Prescriptions     Pending Prescriptions Disp Refills    HYDROcodone-acetaminophen (NORCO) 5-325 MG per tablet 60 tablet 0     Sig: Take 1 tablet by mouth 2 times daily for 30 days. Last Appointment Date: 7/20/2020  Next Appointment Date: 1/20/2021    Allergies   Allergen Reactions    Atarax [Hydroxyzine]      Double vision    Gabapentin      dizzy    Lorazepam Other (See Comments)     Double vision- pt denies, states this is entered in error.  Pt is allergic to atarax not ativan    Phenergan [Promethazine Hcl] Other (See Comments)     \"out of it\"    Morphine And Related Nausea And Vomiting     hallucinations

## 2020-11-16 RX ORDER — SERTRALINE HYDROCHLORIDE 100 MG/1
TABLET, FILM COATED ORAL
Qty: 90 TABLET | Refills: 2 | Status: SHIPPED | OUTPATIENT
Start: 2020-11-16 | End: 2021-08-05 | Stop reason: SDUPTHER

## 2020-11-23 RX ORDER — HYDROCODONE BITARTRATE AND ACETAMINOPHEN 5; 325 MG/1; MG/1
1 TABLET ORAL 2 TIMES DAILY
Qty: 60 TABLET | Refills: 0 | Status: SHIPPED | OUTPATIENT
Start: 2020-11-23 | End: 2020-12-23 | Stop reason: SDUPTHER

## 2020-12-23 RX ORDER — HYDROCODONE BITARTRATE AND ACETAMINOPHEN 5; 325 MG/1; MG/1
1 TABLET ORAL 2 TIMES DAILY
Qty: 60 TABLET | Refills: 0 | Status: SHIPPED | OUTPATIENT
Start: 2020-12-23 | End: 2021-01-27 | Stop reason: SDUPTHER

## 2020-12-23 NOTE — TELEPHONE ENCOUNTER
[facility] Bull[facility] called requesting a refill of the below medication which has been pended for you:     Requested Prescriptions     Pending Prescriptions Disp Refills    HYDROcodone-acetaminophen (NORCO) 5-325 MG per tablet 60 tablet 0     Sig: Take 1 tablet by mouth 2 times daily for 30 days. Last Appointment Date: 7/20/2020  Next Appointment Date: 1/20/2021    Allergies   Allergen Reactions    Atarax [Hydroxyzine]      Double vision    Gabapentin      dizzy    Lorazepam Other (See Comments)     Double vision- pt denies, states this is entered in error.  Pt is allergic to atarax not ativan    Phenergan [Promethazine Hcl] Other (See Comments)     \"out of it\"    Morphine And Related Nausea And Vomiting     hallucinations

## 2020-12-23 NOTE — TELEPHONE ENCOUNTER
Corina Sherman called requesting a refill of the below medication which has been pended for you: O[place] from [place] reviewed. NORCO 5-325 mg last filled 11/24/20 #60/30 days. Report available for your review. Requested Prescriptions     Pending Prescriptions Disp Refills    HYDROcodone-acetaminophen (NORCO) 5-325 MG per tablet 60 tablet 0     Sig: Take 1 tablet by mouth 2 times daily for 30 days. Last Appointment Date: 7/20/2020  Next Appointment Date: 1/20/2021    Allergies   Allergen Reactions    Atarax [Hydroxyzine]      Double vision    Gabapentin      dizzy    Lorazepam Other (See Comments)     Double vision- pt denies, states this is entered in error.  Pt is allergic to atarax not ativan    Phenergan [Promethazine Hcl] Other (See Comments)     \"out of it\"    Morphine And Related Nausea And Vomiting     hallucinations

## 2021-01-13 ENCOUNTER — HOSPITAL ENCOUNTER (OUTPATIENT)
Dept: LAB | Age: 76
Discharge: HOME | End: 2021-01-13
Payer: MEDICARE

## 2021-01-13 DIAGNOSIS — E78.00 PURE HYPERCHOLESTEROLEMIA: ICD-10-CM

## 2021-01-13 DIAGNOSIS — I10 ESSENTIAL (PRIMARY) HYPERTENSION: ICD-10-CM

## 2021-01-13 DIAGNOSIS — N18.30 STAGE 3 CHRONIC KIDNEY DISEASE (HCC): ICD-10-CM

## 2021-01-13 LAB
ABSOLUTE EOS #: 0.16 K/UL (ref 0–0.44)
ABSOLUTE IMMATURE GRANULOCYTE: <0.03 K/UL (ref 0–0.3)
ABSOLUTE LYMPH #: 2.3 K/UL (ref 1.1–3.7)
ABSOLUTE MONO #: 0.7 K/UL (ref 0.1–1.2)
ALBUMIN SERPL-MCNC: 4.6 G/DL (ref 3.5–5.2)
ALBUMIN/GLOBULIN RATIO: 1.6 (ref 1–2.5)
ALP BLD-CCNC: 80 U/L (ref 40–129)
ALT SERPL-CCNC: 17 U/L (ref 5–41)
ANION GAP SERPL CALCULATED.3IONS-SCNC: 12 MMOL/L (ref 9–17)
AST SERPL-CCNC: 26 U/L
BASOPHILS # BLD: 1 % (ref 0–2)
BASOPHILS ABSOLUTE: 0.06 K/UL (ref 0–0.2)
BILIRUB SERPL-MCNC: 0.59 MG/DL (ref 0.3–1.2)
BUN BLDV-MCNC: 18 MG/DL (ref 8–23)
BUN/CREAT BLD: 19 (ref 9–20)
CALCIUM SERPL-MCNC: 9.9 MG/DL (ref 8.6–10.4)
CHLORIDE BLD-SCNC: 107 MMOL/L (ref 98–107)
CHOLESTEROL/HDL RATIO: 5.5
CHOLESTEROL: 192 MG/DL
CO2: 23 MMOL/L (ref 20–31)
CREAT SERPL-MCNC: 0.95 MG/DL (ref 0.7–1.2)
DIFFERENTIAL TYPE: ABNORMAL
EOSINOPHILS RELATIVE PERCENT: 2 % (ref 1–4)
GFR AFRICAN AMERICAN: >60 ML/MIN
GFR NON-AFRICAN AMERICAN: >60 ML/MIN
GFR SERPL CREATININE-BSD FRML MDRD: NORMAL ML/MIN/{1.73_M2}
GFR SERPL CREATININE-BSD FRML MDRD: NORMAL ML/MIN/{1.73_M2}
GLUCOSE BLD-MCNC: 99 MG/DL (ref 70–99)
HCT VFR BLD CALC: 50.6 % (ref 40.7–50.3)
HDLC SERPL-MCNC: 35 MG/DL
HEMOGLOBIN: 16.4 G/DL (ref 13–17)
IMMATURE GRANULOCYTES: 0 %
LDL CHOLESTEROL: 119 MG/DL (ref 0–130)
LYMPHOCYTES # BLD: 31 % (ref 24–43)
MCH RBC QN AUTO: 29.1 PG (ref 25.2–33.5)
MCHC RBC AUTO-ENTMCNC: 32.4 G/DL (ref 25.2–33.5)
MCV RBC AUTO: 89.7 FL (ref 82.6–102.9)
MONOCYTES # BLD: 9 % (ref 3–12)
NRBC AUTOMATED: 0 PER 100 WBC
PDW BLD-RTO: 13.9 % (ref 11.8–14.4)
PLATELET # BLD: 215 K/UL (ref 138–453)
PLATELET ESTIMATE: ABNORMAL
PMV BLD AUTO: 9.6 FL (ref 8.1–13.5)
POTASSIUM SERPL-SCNC: 4.2 MMOL/L (ref 3.7–5.3)
RBC # BLD: 5.64 M/UL (ref 4.21–5.77)
RBC # BLD: ABNORMAL 10*6/UL
SEG NEUTROPHILS: 57 % (ref 36–65)
SEGMENTED NEUTROPHILS ABSOLUTE COUNT: 4.31 K/UL (ref 1.5–8.1)
SODIUM BLD-SCNC: 142 MMOL/L (ref 135–144)
TOTAL PROTEIN: 7.5 G/DL (ref 6.4–8.3)
TRIGL SERPL-MCNC: 192 MG/DL
VLDLC SERPL CALC-MCNC: ABNORMAL MG/DL (ref 1–30)
WBC # BLD: 7.6 K/UL (ref 3.5–11.3)
WBC # BLD: ABNORMAL 10*3/UL

## 2021-01-13 PROCEDURE — 80061 LIPID PANEL: CPT

## 2021-01-13 PROCEDURE — 36415 COLL VENOUS BLD VENIPUNCTURE: CPT

## 2021-01-13 PROCEDURE — 80053 COMPREHEN METABOLIC PANEL: CPT

## 2021-01-13 PROCEDURE — 85025 COMPLETE CBC W/AUTO DIFF WBC: CPT

## 2021-01-20 ENCOUNTER — OFFICE VISIT (OUTPATIENT)
Dept: FAMILY MEDICINE CLINIC | Age: 76
End: 2021-01-20
Payer: MEDICARE

## 2021-01-20 VITALS
HEART RATE: 72 BPM | BODY MASS INDEX: 31.92 KG/M2 | SYSTOLIC BLOOD PRESSURE: 128 MMHG | DIASTOLIC BLOOD PRESSURE: 64 MMHG | WEIGHT: 223 LBS | HEIGHT: 70 IN

## 2021-01-20 DIAGNOSIS — H90.6 MIXED CONDUCTIVE AND SENSORINEURAL HEARING LOSS OF BOTH EARS: ICD-10-CM

## 2021-01-20 DIAGNOSIS — Z12.5 SCREENING PSA (PROSTATE SPECIFIC ANTIGEN): ICD-10-CM

## 2021-01-20 DIAGNOSIS — N18.30 STAGE 3 CHRONIC KIDNEY DISEASE, UNSPECIFIED WHETHER STAGE 3A OR 3B CKD (HCC): Primary | ICD-10-CM

## 2021-01-20 DIAGNOSIS — Z99.89 OSA ON CPAP: ICD-10-CM

## 2021-01-20 DIAGNOSIS — G47.33 OSA ON CPAP: ICD-10-CM

## 2021-01-20 DIAGNOSIS — F32.5 MAJOR DEPRESSIVE DISORDER IN FULL REMISSION, UNSPECIFIED WHETHER RECURRENT (HCC): ICD-10-CM

## 2021-01-20 DIAGNOSIS — D50.0 IRON DEFICIENCY ANEMIA DUE TO CHRONIC BLOOD LOSS: ICD-10-CM

## 2021-01-20 DIAGNOSIS — C44.92 SCC (SQUAMOUS CELL CARCINOMA): ICD-10-CM

## 2021-01-20 DIAGNOSIS — E78.00 PURE HYPERCHOLESTEROLEMIA: ICD-10-CM

## 2021-01-20 DIAGNOSIS — M48.062 SPINAL STENOSIS, LUMBAR REGION, WITH NEUROGENIC CLAUDICATION: ICD-10-CM

## 2021-01-20 DIAGNOSIS — I10 ESSENTIAL (PRIMARY) HYPERTENSION: ICD-10-CM

## 2021-01-20 PROCEDURE — 1036F TOBACCO NON-USER: CPT | Performed by: FAMILY MEDICINE

## 2021-01-20 PROCEDURE — 1123F ACP DISCUSS/DSCN MKR DOCD: CPT | Performed by: FAMILY MEDICINE

## 2021-01-20 PROCEDURE — 99211 OFF/OP EST MAY X REQ PHY/QHP: CPT

## 2021-01-20 PROCEDURE — G8427 DOCREV CUR MEDS BY ELIG CLIN: HCPCS | Performed by: FAMILY MEDICINE

## 2021-01-20 PROCEDURE — G8417 CALC BMI ABV UP PARAM F/U: HCPCS | Performed by: FAMILY MEDICINE

## 2021-01-20 PROCEDURE — G8484 FLU IMMUNIZE NO ADMIN: HCPCS | Performed by: FAMILY MEDICINE

## 2021-01-20 PROCEDURE — 99214 OFFICE O/P EST MOD 30 MIN: CPT | Performed by: FAMILY MEDICINE

## 2021-01-20 PROCEDURE — 3017F COLORECTAL CA SCREEN DOC REV: CPT | Performed by: FAMILY MEDICINE

## 2021-01-20 PROCEDURE — 4040F PNEUMOC VAC/ADMIN/RCVD: CPT | Performed by: FAMILY MEDICINE

## 2021-01-20 ASSESSMENT — PATIENT HEALTH QUESTIONNAIRE - PHQ9
SUM OF ALL RESPONSES TO PHQ QUESTIONS 1-9: 0
1. LITTLE INTEREST OR PLEASURE IN DOING THINGS: 0

## 2021-01-20 ASSESSMENT — ENCOUNTER SYMPTOMS
BACK PAIN: 1
GASTROINTESTINAL NEGATIVE: 1
ALLERGIC/IMMUNOLOGIC NEGATIVE: 1
EYES NEGATIVE: 1
RESPIRATORY NEGATIVE: 1

## 2021-01-20 NOTE — PROGRESS NOTES
Subjective:      Patient ID: Alesha Yang is a 76 y.o. male. Other  Associated symptoms include numbness (more into left leg and feet with prolonged standing, both right and left feet. ). Hypertension    Hyperlipidemia    Back Pain  Associated symptoms include numbness (more into left leg and feet with prolonged standing, both right and left feet. ). Foot Pain   Associated symptoms include numbness (more into left leg and feet with prolonged standing, both right and left feet. ). Routine follow up on chronic medical conditions, refills, and review of updated labs. I have reviewed the patient's medical history in detail and updated the computerized patient record. He had back surgery 1/26/18 with thoracic and lumbar posterior decompression T9-L5. He had gradual, increasing pain in the months after the surgery, actually more painful than prior to surgery. More extensive lumbar fusion in 2017 with instrumentation. Most recently with spinal cord stimulator placement 11/18. Back pain not as severe and more tolerable. Still with pain that comes and goes. Still has some burning paresthesias and numbness into the right leg off and on, but less severe than prior. Currently with bilateral radicular symptoms described. Still reporting daily pain, limiting his activity. Not following with Dr. Shyanne Martel at present. Describing a lot more muscle cramps at present, usually the legs. More often at present. Daily/at night. Compliant with medications . Mood stable. No anxiety of concern. No significant other joint arthritis of concern. Hearing loss better with hearing aides. Currently they are in the shop. Fatigues easily and somewhat sob with walking. No urination or bowel issues of concern on review today. cpap compliant this week. Thinks he urinates less overnight. Left lower abdominal pain . Couple of weeks or more.   Pain comes almost exclusively at night when he lays down, within a couple of minutes. Laying on his back makes the pain worse, seems to go away if he holds still on his back for 10 minutes. Past Medical History:   Diagnosis Date    Agoraphobia     Anxiety     Arthritis     Basal cell carcinoma     Chronic pain     back    CKD (chronic kidney disease)     Claustrophobia     Enlarged lymph node     rt. lower neck.  Hearing aid worn     dar. ears.     Hearing loss     Hyperlipidemia     Hypertension     RAFAT (obstructive sleep apnea)     CPAP occasional    PONV (postoperative nausea and vomiting)     Retention of urine     last 2 surgeries, patient unable to void, home with catheter both times    SCC (squamous cell carcinoma)     HAND    Spinal stenosis, lumbar region, with neurogenic claudication     Wears glasses      Past Surgical History:   Procedure Laterality Date    BACK SURGERY  10/2013    Dr. Philly Ambrocio hardware lumbar    COLONOSCOPY  06/13/12    mild diverticular dz    LUMBAR FUSION N/A 3/8/2017    LUMBAR L2-3 POSTERIOR DECOMPRESSION FUSION INSTRUMENTATION W/REVISION L3-5 POSTERIOR DECOMPRESSION FUSION INSTRUMENTATION (KOKI GOLBUS & Hoang Franciss)  CC SN/KILEY performed by León Knowles MD at [address] [place] [address] Right     lower neck, 2-    AK ARTHRODESIS POSTERIOR/POSTEROLATERAL THORACIC N/A 1/26/2018    THORACIC & LUMBAR POSTERIOR DECOMPRESSION AND FUSION REVISION T9 THRU L5 performed by León Knowles MD at [facility]  [address] IND N/A 8/23/2017    COLONOSCOPY performed by Evy Torres MD at [facility]. [address] N/A 11/14/2018    SPINAL CORD STIMULATOR IMPLANT PERMANENT performed by León Knowles MD at [address] [place] [address] PRE-MALIGNANT / BENIGN SKIN LESION EXCISION      SKIN BIOPSY      skin cancer removed x 4    VASECTOMY       Current Outpatient Medications   Medication Sig Dispense Refill    HYDROcodone-acetaminophen (NORCO) 5-325 MG per tablet Take 1 tablet by mouth 2 times daily for 30 days. 60 tablet 0    sertraline (ZOLOFT) 100 MG tablet TAKE ONE TABLET BY MOUTH DAILY 90 tablet 2    tamsulosin (FLOMAX) 0.4 MG capsule Take 1 capsule by mouth daily 90 capsule 3    simvastatin (ZOCOR) 20 MG tablet TAKE ONE TABLET BY MOUTH DAILY 90 tablet 2    metoprolol succinate (TOPROL XL) 50 MG extended release tablet TAKE ONE TABLET BY MOUTH DAILY 90 tablet 2    fenofibrate (TRICOR) 145 MG tablet TAKE ONE TABLET BY MOUTH DAILY 90 tablet 3    aspirin 81 MG tablet Take 81 mg by mouth daily      acetaminophen (TYLENOL) 500 MG tablet Take 500 mg by mouth every 6 hours as needed. No current facility-administered medications for this visit. Allergies   Allergen Reactions    Atarax [Hydroxyzine]      Double vision    Gabapentin      dizzy    Lorazepam Other (See Comments)     Double vision- pt denies, states this is entered in error. Pt is allergic to atarax not ativan    Phenergan [Promethazine Hcl] Other (See Comments)     \"out of it\"    Morphine And Related Nausea And Vomiting     hallucinations         Review of Systems   Constitutional: Negative. HENT: Positive for hearing loss. Eyes: Negative. Respiratory: Negative. Cardiovascular: Negative. Gastrointestinal: Negative. Endocrine: Negative. Genitourinary: Negative. Musculoskeletal: Positive for back pain and gait problem (limited standing and walking due to back pain). Skin: Negative. Allergic/Immunologic: Negative. Neurological: Positive for numbness (more into left leg and feet with prolonged standing, both right and left feet. ). Hematological: Negative. Psychiatric/Behavioral: Negative. Objective:   Physical Exam  Constitutional:       General: He is not in acute distress. Appearance: He is well-developed. HENT:      Head: Normocephalic and atraumatic. Right Ear: External ear normal.      Left Ear: External ear normal.      Mouth/Throat:      Pharynx: No oropharyngeal exudate. Eyes:      General: No scleral icterus. Conjunctiva/sclera: Conjunctivae normal.   Neck:      Musculoskeletal: Neck supple. Thyroid: No thyromegaly. Cardiovascular:      Rate and Rhythm: Normal rate and regular rhythm. Heart sounds: Normal heart sounds. No murmur. Pulmonary:      Effort: Pulmonary effort is normal. No respiratory distress. Breath sounds: Normal breath sounds. No wheezing. Abdominal:      General: Bowel sounds are normal. There is no distension. Palpations: Abdomen is soft. Tenderness: There is no abdominal tenderness. There is no rebound. Genitourinary:     Prostate: Not enlarged and not tender. Rectum: Normal. No mass. Normal anal tone. Musculoskeletal: Normal range of motion. General: No tenderness. Feet:    Skin:     General: Skin is warm and dry. Findings: Lesion (multiple seb. keratosis lesions. 2 on scalp, 6 on trunk. 2 on left leg) present. No erythema or rash. Neurological:      Mental Status: He is alert and oriented to person, place, and time. Sensory: Sensory deficit (left leg at present.) present. Psychiatric:         Behavior: Behavior normal.         Thought Content:  Thought content normal.         Judgment: Judgment normal.       /64 (Site: Right Upper Arm, Position: Sitting, Cuff Size: Large Adult)   Pulse 72   Ht 5' 10\" (1.778 m)   Wt 223 lb (101.2 kg)   BMI 32.00 kg/m²     Hospital Outpatient Visit on 01/13/2021   Component Date Value Ref Range Status    WBC 01/13/2021 7.6  3.5 - 11.3 k/uL Final    RBC 01/13/2021 5.64  4.21 - 5.77 m/uL Final    Hemoglobin 01/13/2021 16.4  13.0 - 17.0 g/dL Final    Hematocrit 01/13/2021 50.6* 40.7 - 50.3 % Final    MCV 01/13/2021 89.7  82.6 - 102.9 fL Final    MCH 01/13/2021 29.1  25.2 - 33.5 pg Final    MCHC 01/13/2021 32.4  25.2 - 33.5 g/dL Final    RDW 01/13/2021 13.9  11.8 - 14.4 % Final    Platelets 23/07/3831 215  138 - 453 k/uL Final    MPV 01/13/2021 9.6  8.1 - 13.5 fL Final    NRBC Automated 01/13/2021 0.0  0.0 per 100 WBC Final    Differential Type 01/13/2021 NOT REPORTED   Final    Seg Neutrophils 01/13/2021 57  36 - 65 % Final    Lymphocytes 01/13/2021 31  24 - 43 % Final    Monocytes 01/13/2021 9  3 - 12 % Final    Eosinophils % 01/13/2021 2  1 - 4 % Final    Basophils 01/13/2021 1  0 - 2 % Final    Immature Granulocytes 01/13/2021 0  0 % Final    Segs Absolute 01/13/2021 4.31  1.50 - 8.10 k/uL Final    Absolute Lymph # 01/13/2021 2.30  1.10 - 3.70 k/uL Final    Absolute Mono # 01/13/2021 0.70  0.10 - 1.20 k/uL Final    Absolute Eos # 01/13/2021 0.16  0.00 - 0.44 k/uL Final    Basophils Absolute 01/13/2021 0.06  0.00 - 0.20 k/uL Final    Absolute Immature Granulocyte 01/13/2021 <0.03  0.00 - 0.30 k/uL Final    WBC Morphology 01/13/2021 NOT REPORTED   Final    RBC Morphology 01/13/2021 NOT REPORTED   Final    Platelet Estimate 96/36/5351 NOT REPORTED   Final    Glucose 01/13/2021 99  70 - 99 mg/dL Final    BUN 01/13/2021 18  8 - 23 mg/dL Final    CREATININE 01/13/2021 0.95  0.70 - 1.20 mg/dL Final    Bun/Cre Ratio 01/13/2021 19  9 - 20 Final    Calcium 01/13/2021 9.9  8.6 - 10.4 mg/dL Final    Sodium 01/13/2021 142  135 - 144 mmol/L Final    Potassium 01/13/2021 4.2  3.7 - 5.3 mmol/L Final    Chloride 01/13/2021 107  98 - 107 mmol/L Final    CO2 01/13/2021 23  20 - 31 mmol/L Final    Anion Gap 01/13/2021 12  9 - 17 mmol/L Final    Alkaline Phosphatase 01/13/2021 80  40 - 129 U/L Final    ALT 01/13/2021 17  5 - 41 U/L Final    AST 01/13/2021 26  <40 U/L Final    Total Bilirubin 01/13/2021 0.59  0.3 - 1.2 mg/dL Final    Total Protein 01/13/2021 7.5  6.4 - 8.3 g/dL Final    Alb 01/13/2021 4.6  3.5 - 5.2 g/dL Final    Albumin/Globulin Ratio 01/13/2021 1.6  1.0 - 2.5 Final    GFR Non- 01/13/2021 >60  >60 mL/min Final    GFR  01/13/2021 >60  >60 mL/min Final    GFR Comment 01/13/2021 Final    GFR Staging 01/13/2021 NOT REPORTED   Final    Cholesterol 01/13/2021 192  <200 mg/dL Final    HDL 01/13/2021 35* >40 mg/dL Final    LDL Cholesterol 01/13/2021 119  0 - 130 mg/dL Final    Chol/HDL Ratio 01/13/2021 5.5* <5 Final    Triglycerides 01/13/2021 192* <150 mg/dL Final    VLDL 01/13/2021 NOT REPORTED* 1 - 30 mg/dL Final         Assessment:       Encounter Diagnoses   Name Primary?  Stage 3 chronic kidney disease, unspecified whether stage 3a or 3b CKD Yes    Pure hypercholesterolemia     SCC (squamous cell carcinoma)     Major depressive disorder in full remission, unspecified whether recurrent ([facility] [address]     Essential (primary) hypertension     LAURO on CPAP     Spinal stenosis, lumbar region, with neurogenic claudication     Iron deficiency anemia due to chronic blood loss     Screening PSA (prostate specific antigen)     Mixed conductive and sensorineural hearing loss of both ears            Plan:      CKD; improved/ stable with  Normal renal indicies at present. Cont. To avoid nephrotoxins when able and follow serially. Hyperlipidemia: good control at present . Cont. Simvastatin/fenofibrate at current dosing. SCC; no evidence for recurrent disease. Cont. Screening exams. Depression and anxiety. No current concerns on zoloft. Helping with agoraphobia and claustrophobia mostly. Plan to cont. Same. Htn: good overall control. Cont. toprol daily. On flomax as well    Lauro: better compliance over the interval.  He was getting up a lot at night with his back pain issues and it became a hardship for him. Encouraged compliance. He snores a lot, but reports he feels better without it. He thinks he urinates less often using the cpap. Spinal stenosis with claudication. Dr. Lin Chamorro following s/p surgery in October2013. MRI completed. surgery 3/8/17 through Dr. Lin Chamorro. L2-3 PLIF add on to 3-5.   Symptoms in his back and radicular pain down left leg improved , but pain and disability returned. S/p posterior decompression T9-L5 1/26/18. Some early improvement, then pain increased quite a bit again . He has recently been going to pain management, and injections helped some. Most recently he had spinal cord stimulator placed 11/18. Still has symptoms of back pain and paresthesias into right leg, but level of pain /severity has improved. He continues to report daily pain and paresthesias right >left. Activity limiting pain at times. Using norco prn. Needs one in the am before getting out of bed. A lot of pain and stiffness in the morning. No signs of misuse or diversion. He and spouse relate he could use more medication to help keep him active during the day. Current supply at 40/month. Changing to bid dosing / #60. Did not tolerate gabapentin in the past.  Drowsy and confused some. Currently with bilateral radicular symptoms. Plan to re start pain management. Anemia: normalized last 3 visits. Has had some recurrent anemia, microcytic, with surgery in October and again in march, and again in January 2018. Iron low in January. Microcytosis evident. Started ferrous sulfate 325mg bid, then decreased to once daily, now off the iron supplement. Iron level normal.  Cont. Serial follow up. Colonoscopy up to date 2012. Cont. Serial checks. Psa normal, cont. Annual screening. Hearing loss. New hearing aides. Helps some. Hearing back in the shop at present. Leg cramps. Increasing frequency at present. Some sweating and outdoor work. Remains active. Electrolytes normal at present. Discussed dill pickles as a trial .  Not helping much. Frequency once a week or so at present. Ingrown right medial great toe. Healing at present after pulling off the ingrown portion recently. Mild granulation tissue, now dry. He prefers to let this grow out again. Nail hygiene discussed. Rechec, prn. Left sided abdominal pain.   Almost exclusively at night after laying down. Worse lying on his back. Seems to improve after 10 minutes. No significant abdominal surgery to consider adhesions. Last colonoscopy ok 2017. Offered surgical consultation. Problem seems positional /mechanical .    He opts to observe and follow up for concerns.

## 2021-01-20 NOTE — PATIENT INSTRUCTIONS
Hospital Outpatient Visit on 01/13/2021   Component Date Value Ref Range Status    WBC 01/13/2021 7.6  3.5 - 11.3 k/uL Final    RBC 01/13/2021 5.64  4.21 - 5.77 m/uL Final    Hemoglobin 01/13/2021 16.4  13.0 - 17.0 g/dL Final    Hematocrit 01/13/2021 50.6* 40.7 - 50.3 % Final    MCV 01/13/2021 89.7  82.6 - 102.9 fL Final    MCH 01/13/2021 29.1  25.2 - 33.5 pg Final    MCHC 01/13/2021 32.4  25.2 - 33.5 g/dL Final    RDW 01/13/2021 13.9  11.8 - 14.4 % Final    Platelets 28/37/9542 215  138 - 453 k/uL Final    MPV 01/13/2021 9.6  8.1 - 13.5 fL Final    NRBC Automated 01/13/2021 0.0  0.0 per 100 WBC Final    Differential Type 01/13/2021 NOT REPORTED   Final    Seg Neutrophils 01/13/2021 57  36 - 65 % Final    Lymphocytes 01/13/2021 31  24 - 43 % Final    Monocytes 01/13/2021 9  3 - 12 % Final    Eosinophils % 01/13/2021 2  1 - 4 % Final    Basophils 01/13/2021 1  0 - 2 % Final    Immature Granulocytes 01/13/2021 0  0 % Final    Segs Absolute 01/13/2021 4.31  1.50 - 8.10 k/uL Final    Absolute Lymph # 01/13/2021 2.30  1.10 - 3.70 k/uL Final    Absolute Mono # 01/13/2021 0.70  0.10 - 1.20 k/uL Final    Absolute Eos # 01/13/2021 0.16  0.00 - 0.44 k/uL Final    Basophils Absolute 01/13/2021 0.06  0.00 - 0.20 k/uL Final    Absolute Immature Granulocyte 01/13/2021 <0.03  0.00 - 0.30 k/uL Final    WBC Morphology 01/13/2021 NOT REPORTED   Final    RBC Morphology 01/13/2021 NOT REPORTED   Final    Platelet Estimate 57/65/0172 NOT REPORTED   Final    Glucose 01/13/2021 99  70 - 99 mg/dL Final    BUN 01/13/2021 18  8 - 23 mg/dL Final    CREATININE 01/13/2021 0.95  0.70 - 1.20 mg/dL Final    Bun/Cre Ratio 01/13/2021 19  9 - 20 Final    Calcium 01/13/2021 9.9  8.6 - 10.4 mg/dL Final    Sodium 01/13/2021 142  135 - 144 mmol/L Final    Potassium 01/13/2021 4.2  3.7 - 5.3 mmol/L Final    Chloride 01/13/2021 107  98 - 107 mmol/L Final    CO2 01/13/2021 23  20 - 31 mmol/L Final  Anion Gap 01/13/2021 12  9 - 17 mmol/L Final    Alkaline Phosphatase 01/13/2021 80  40 - 129 U/L Final    ALT 01/13/2021 17  5 - 41 U/L Final    AST 01/13/2021 26  <40 U/L Final    Total Bilirubin 01/13/2021 0.59  0.3 - 1.2 mg/dL Final    Total Protein 01/13/2021 7.5  6.4 - 8.3 g/dL Final    Alb 01/13/2021 4.6  3.5 - 5.2 g/dL Final    Albumin/Globulin Ratio 01/13/2021 1.6  1.0 - 2.5 Final    GFR Non- 01/13/2021 >60  >60 mL/min Final    GFR  01/13/2021 >60  >60 mL/min Final    GFR Comment 01/13/2021        Final    Comment: Average GFR for 79or more years old:   76 mL/min/1.73sq m  Chronic Kidney Disease:   <60 mL/min/1.73sq m  Kidney failure:   <15 mL/min/1.73sq m        The equation has not been validated in patients older than 79, but an MDRD-derived eGFR may   still be a useful tool for providers caring for patients older than 70. GFR is a calculated value that has proven clinically to  be a more effective measure of   kidney function when reported with serum creatinine.  GFR Staging 01/13/2021 NOT REPORTED   Final    Cholesterol 01/13/2021 192  <200 mg/dL Final    Comment:    Cholesterol Guidelines:      <200  Desirable   200-240  Borderline      >240  Undesirable         HDL 01/13/2021 35* >40 mg/dL Final    Comment:    HDL Guidelines:    <40     Undesirable   40-59    Borderline    >59     Desirable         LDL Cholesterol 01/13/2021 119  0 - 130 mg/dL Final    Comment:    LDL Guidelines:     <100    Desirable   100-129   Near to/above Desirable   130-159   Borderline      >159   Undesirable     Direct (measured) LDL and calculated LDL are not interchangeable tests.       Chol/HDL Ratio 01/13/2021 5.5* <5 Final            Triglycerides 01/13/2021 192* <150 mg/dL Final    Comment:    Triglyceride Guidelines:     <150   Desirable   150-199  Borderline   200-499  High     >499   Very high Based on AHA Guidelines for fasting triglyceride, October 2012.          VLDL 01/13/2021 NOT REPORTED* 1 - 30 mg/dL Final

## 2021-01-27 DIAGNOSIS — M54.42 CHRONIC BILATERAL LOW BACK PAIN WITH LEFT-SIDED SCIATICA: ICD-10-CM

## 2021-01-27 DIAGNOSIS — G89.29 CHRONIC BILATERAL LOW BACK PAIN WITH LEFT-SIDED SCIATICA: ICD-10-CM

## 2021-01-27 RX ORDER — HYDROCODONE BITARTRATE AND ACETAMINOPHEN 5; 325 MG/1; MG/1
1 TABLET ORAL 2 TIMES DAILY
Qty: 60 TABLET | Refills: 0 | Status: SHIPPED | OUTPATIENT
Start: 2021-01-27 | End: 2021-03-19 | Stop reason: SDUPTHER

## 2021-01-27 NOTE — TELEPHONE ENCOUNTER
Shira Owens called requesting a refill of the below medication which has been pended for you:     Requested Prescriptions     Pending Prescriptions Disp Refills    HYDROcodone-acetaminophen (NORCO) 5-325 MG per tablet 60 tablet 0     Sig: Take 1 tablet by mouth 2 times daily for 30 days. Last Appointment Date: 1/20/2021  Next Appointment Date: 7/20/2021    Allergies   Allergen Reactions    Atarax [Hydroxyzine]      Double vision    Gabapentin      dizzy    Lorazepam Other (See Comments)     Double vision- pt denies, states this is entered in error.  Pt is allergic to atarax not ativan    Phenergan [Promethazine Hcl] Other (See Comments)     \"out of it\"    Morphine And Related Nausea And Vomiting     hallucinations

## 2021-02-03 ENCOUNTER — IMMUNIZATION (OUTPATIENT)
Dept: LAB | Age: 76
End: 2021-02-03
Payer: MEDICARE

## 2021-02-03 PROCEDURE — 91301 COVID-19, MODERNA VACCINE 100MCG/0.5ML DOSE: CPT

## 2021-02-12 ENCOUNTER — OFFICE VISIT (OUTPATIENT)
Dept: PAIN MANAGEMENT | Age: 76
End: 2021-02-12
Payer: MEDICARE

## 2021-02-12 VITALS
RESPIRATION RATE: 16 BRPM | HEIGHT: 67 IN | SYSTOLIC BLOOD PRESSURE: 128 MMHG | BODY MASS INDEX: 35.16 KG/M2 | WEIGHT: 224 LBS | DIASTOLIC BLOOD PRESSURE: 80 MMHG

## 2021-02-12 DIAGNOSIS — M48.062 SPINAL STENOSIS, LUMBAR REGION, WITH NEUROGENIC CLAUDICATION: ICD-10-CM

## 2021-02-12 DIAGNOSIS — M54.16 LUMBAR RADICULOPATHY: Primary | ICD-10-CM

## 2021-02-12 DIAGNOSIS — G95.19 NEUROGENIC CLAUDICATION (HCC): ICD-10-CM

## 2021-02-12 DIAGNOSIS — M47.817 LUMBOSACRAL SPONDYLOSIS WITHOUT MYELOPATHY: ICD-10-CM

## 2021-02-12 DIAGNOSIS — Z91.81 AT HIGH RISK FOR FALLS: ICD-10-CM

## 2021-02-12 PROCEDURE — 99204 OFFICE O/P NEW MOD 45 MIN: CPT | Performed by: PHYSICAL MEDICINE & REHABILITATION

## 2021-02-12 PROCEDURE — 1036F TOBACCO NON-USER: CPT | Performed by: PHYSICAL MEDICINE & REHABILITATION

## 2021-02-12 PROCEDURE — 4040F PNEUMOC VAC/ADMIN/RCVD: CPT | Performed by: PHYSICAL MEDICINE & REHABILITATION

## 2021-02-12 PROCEDURE — G8427 DOCREV CUR MEDS BY ELIG CLIN: HCPCS | Performed by: PHYSICAL MEDICINE & REHABILITATION

## 2021-02-12 PROCEDURE — 99215 OFFICE O/P EST HI 40 MIN: CPT | Performed by: PHYSICAL MEDICINE & REHABILITATION

## 2021-02-12 PROCEDURE — 1123F ACP DISCUSS/DSCN MKR DOCD: CPT | Performed by: PHYSICAL MEDICINE & REHABILITATION

## 2021-02-12 PROCEDURE — G8417 CALC BMI ABV UP PARAM F/U: HCPCS | Performed by: PHYSICAL MEDICINE & REHABILITATION

## 2021-02-12 PROCEDURE — G8484 FLU IMMUNIZE NO ADMIN: HCPCS | Performed by: PHYSICAL MEDICINE & REHABILITATION

## 2021-02-12 ASSESSMENT — ENCOUNTER SYMPTOMS
NAUSEA: 0
CONSTIPATION: 0
ALLERGIC/IMMUNOLOGIC NEGATIVE: 1
EYES NEGATIVE: 1
RESPIRATORY NEGATIVE: 1
BACK PAIN: 1

## 2021-02-12 NOTE — PATIENT INSTRUCTIONS
[facility]  [address]  Telephone [phone]  Fax [phone]      PROCEDURE INSTRUCTIONS FOR  PAIN MANAGEMENT PROCEDURES WITHOUT IV SEDATION    Tena See scheduled to see Dr. Chrissy Vieira to undergo the following procedure:  Bilateral S1 TFE    Procedure Date: ____2/26/2021 and repeat on 3/12/2021____  You will receive a phone call the day prior to your procedure to confirm a time or arrival.    Report to the [place] [id number], Registration office on the 1st floor in the hospital, after check in and signing of paper work you will then go to the second floor to the surgery center. 1. Stop the following medications prior to the procedure:   Aspirin  Date__2/22/2021 and again on 3/08/2021__ : Stop blood thinners as directed before the injection, with permission from your cardiologist or primary care physician. We will send a letter to them requesting permission to hold the blood thinners. · Medication___Aspirin___ held for __3__ days    If you take Warfarin (Coumadin), you must have your blood drawn for an INR the day before the procedure. INR must be less than 1.5. if not complete prior to check in the procedure this will be drawn at the procedure center prior to having the procedure completed. 2.  Take all routine medications unless otherwise instructed. Ok to take vitamins and antiinflammatory medications    3. EATING & DRINKING:  Ok to eat or drink before the injection - no IV sedation will be used. 4. If you are allergic to contrast or iodine, you must take benadryl and prednisone prior to the injection to prevent an allergic reaction. Follow the directions on the prescription for the times to take the medication. 5. Oral valium can be prescribed if your are anxious about the injections or feel that you can not lay still during the injection. If you take valium, you must have a . Make arrangements for a family member or friend to drive you to the surgery center. Your ride must stay in the hospital while you are having the injection done. If they cannot stay, the injection will be rescheduled. The valium may affect your judgment following the procedure and driving a vehicle within 24 hours after the sedation could be dangerous. 6.  Wear simple loose clothing, which can be easily changed. 7.  Leave jewelry (including rings) and other valuables at home. 8.  You will be asked to sign several forms prior to surgery; patients under the age of 25 must have a parent or legal guardian sign the permit to be able to do the procedure. 9.  You must have finished any antibiotic prescribed for recent infections. If required, please take pre-procedure antibiotic or other pre-procedure medications as instructed. 10. Bring inhalers and pain medications with you to your procedure. 11. Bring your MRI/CT films if they were done outside of the [facility]. 12. If you should develop a cold, sore throat, cough, fever or other new indication of illness or infection, or are started on antibiotics within 2 weeks of the scheduled procedure, please notify the [facility] office as early as possible at ([phone]. If calling after 4:30pm the day prior to your scheduled procedure please contact [phone] and Leave a Voice Message.

## 2021-02-12 NOTE — PROGRESS NOTES
PAIN MANAGEMENTNEW PATIENT CONSULTATION  2/12/21    Yocasta May  1945    ReferringProvider:  Ila Quinones MD  [address] [facility],  [address] [facility]    Primary Care Physician:  Ila Quinones MD  [facility]    HPIinformation obtained from the patient as well as the Comprehensive Pain ManagementHealth Questionnaire filled out by the patient. The questionnaire will be scannedinto the electronic chart and PMH, PSH, meds, and allergies will be updates accordingly. Subjective:       CHIEF COMPLAINT:  This is a72 y.o. male patientwho presents with a complaint of New Patient (back, leg pain), Back Pain, and Other (gets charley horse while sleeping and it wakes him up)      PAIN HPI:    Patient  Had   T9-L5 Fusion in 2018  Which helped relieve some of his B lumbar and B posterolateral leg pain. Notes cramping and numbness   Day and night  At B  Calves. Is intermittent and  Is better if  Takes [place] 5 BID     Location: Back entire  B calf tender  Location Modifier: -  Severity of Pain: 6  Duration of Pain: Intermittent  Frequency of Pain: Intermittent  Aggravating Factors: -, Stretching, Bending, Standing, Walking, Stairs, Exercise, Kneeling and Squatting  Limiting Behavior: no  Relieving Factors: Heat, Cold and Medication -  Result of Injury: no  Work Related Injury: no  [place] of Worker Compensation Case: no      Prior evaluation:    Previous lower back problems:Yes    Previous workup: Yes   History of surgery in painful area:Yes  2018- T9-L5 fusion  2017  SCS   Previous pain medication trials have included:       Opioids: hydrocodone/acetaminophen (Lorcet, Lortab, Norco, Vicodin)     NSAID's:-     Muscle relaxants: -     Neuropathicpain meds: -    Previous Pain Management Physician: No   Nerve blocks, spinal injections:No   Typeof Pain Intervention . Lysbeth Carrel Date of last Pain Intervention  .  Was there pain relief from Pain Intervention: No. How long was your pain relief from the Pain Intervention 1days. Sleep:    Sleep disturbance: Yes   Difficultyfalling asleep:  No   Feels fatiguedmuch of the time: Yes   Wakes uprested: No   Awakens in themiddle of the nighno   Takes sleepingmedication: No    Mental health:    Patient feels - secondary to their current pain problems as described above. H/O depression and anxiety: No   Patient is not seeing psychologist orpsychiatrist   Abuse history? No    Employed? No  Alcohol use?: No  Tobacco use?: No  Marijuana use?: No  Illicit drug use?: No    Imaging: Reviewed available imagingin our system with the patient. No results found. Referring physician records reviewed. Review of Systems   Constitutional: Positive for fatigue. HENT: Negative. Eyes: Negative. Respiratory: Negative. Cardiovascular: Negative. Gastrointestinal: Negative for constipation and nausea. Endocrine: Negative. Genitourinary: Negative for difficulty urinating. Musculoskeletal: Positive for arthralgias, back pain and myalgias. Skin: Negative. Allergic/Immunologic: Negative. Neurological: Positive for weakness and numbness. Hematological: Negative. Psychiatric/Behavioral: Positive for sleep disturbance. All other systems reviewed and are negative. Allergies   Allergen Reactions    Atarax [Hydroxyzine]      Double vision    Gabapentin      dizzy    Lorazepam Other (See Comments)     Double vision- pt denies, states this is entered in error. Pt is allergic to atarax not ativan    Phenergan [Promethazine Hcl] Other (See Comments)     \"out of it\"    Morphine And Related Nausea And Vomiting     hallucinations       Outpatient Medications Prior to Visit   Medication Sig Dispense Refill    Multiple Vitamin (MULTI VITAMIN DAILY PO) Take by mouth      HYDROcodone-acetaminophen (NORCO) 5-325 MG per tablet Take 1 tablet by mouth 2 times daily for 30 days.  60 tablet 0  sertraline (ZOLOFT) 100 MG tablet TAKE ONE TABLET BY MOUTH DAILY 90 tablet 2    tamsulosin (FLOMAX) 0.4 MG capsule Take 1 capsule by mouth daily 90 capsule 3    simvastatin (ZOCOR) 20 MG tablet TAKE ONE TABLET BY MOUTH DAILY 90 tablet 2    metoprolol succinate (TOPROL XL) 50 MG extended release tablet TAKE ONE TABLET BY MOUTH DAILY 90 tablet 2    fenofibrate (TRICOR) 145 MG tablet TAKE ONE TABLET BY MOUTH DAILY 90 tablet 3    aspirin 81 MG tablet Take 81 mg by mouth daily      acetaminophen (TYLENOL) 500 MG tablet Take 500 mg by mouth every 6 hours as needed. No facility-administered medications prior to visit. Past Medical History:   Diagnosis Date    Agoraphobia     Anxiety     Arthritis     Basal cell carcinoma     Chronic pain     back    CKD (chronic kidney disease)     Claustrophobia     Enlarged lymph node     rt. lower neck.  Hearing aid worn     dar. ears.     Hearing loss     Hyperlipidemia     Hypertension     RAFAT (obstructive sleep apnea)     CPAP occasional    PONV (postoperative nausea and vomiting)     Retention of urine     last 2 surgeries, patient unable to void, home with catheter both times    SCC (squamous cell carcinoma)     HAND    Spinal stenosis, lumbar region, with neurogenic claudication     Wears glasses        Past Surgical History:   Procedure Laterality Date    BACK SURGERY  10/2013    Dr. Jigna Cuevas hardware lumbar    COLONOSCOPY  06/13/12    mild diverticular dz    LUMBAR FUSION N/A 3/8/2017    LUMBAR L2-3 POSTERIOR DECOMPRESSION FUSION INSTRUMENTATION W/REVISION L3-5 POSTERIOR DECOMPRESSION FUSION INSTRUMENTATION (KOKI TOWNSEND & Claudy Butler)  CC SN/KILEY performed by Joseph López MD at [address] [facility] Right     lower neck, 2-    [place] ARTHRODESIS POSTERIOR/POSTEROLATERAL THORACIC N/A 1/26/2018    THORACIC & LUMBAR POSTERIOR DECOMPRESSION AND FUSION REVISION T9 THRU L5 performed by Joseph López MD at [address] [facility]  Physical Exam  Constitutional:       Appearance: He is well-developed. HENT:      Head: Normocephalic and atraumatic. Cardiovascular:      Pulses: Normal pulses. Comments: Warm extremities. Normal capillary refill. Pulmonary:      Effort: Pulmonary effort is normal.   Abdominal:      General: Abdomen is flat. Palpations: Abdomen is soft. Skin:     General: Skin is warm and dry. Neurological:      General: No focal deficit present. Mental Status: He is alert and oriented to person, place, and time. Cranial Nerves: No cranial nerve deficit. Sensory: No sensory deficit. Motor: No atrophy or abnormal muscle tone. Deep Tendon Reflexes: Reflexes are normal and symmetric. Psychiatric:         Mood and Affect: Mood normal.         Speech: Speech normal.         Behavior: Behavior normal.         Back Exam     Tenderness   The patient is experiencing tenderness in the lumbar.     Range of Motion   Extension: normal   Flexion: normal   Lateral bend right: normal   Lateral bend left: normal   Rotation right: normal   Rotation left: normal     Muscle Strength   Right Quadriceps:  5/5   Left Quadriceps:  5/5   Right Hamstrings:  5/5   Left Hamstrings:  5/5     Tests   Straight leg raise right: positive  Straight leg raise left: positive    Other   Toe walk: normal  Heel walk: normal  Sensation: normal  Gait: abnormal     Comments:  Surgical scar mid line upper thoracic to  Sacrum  +slump  Mild  Kemps  Fabers negative                Labs:   Lab Results   Component Value Date    WBC 7.6 01/13/2021    HGB 16.4 01/13/2021    HCT 50.6 (H) 01/13/2021     01/13/2021    CHOL 192 01/13/2021    TRIG 192 (H) 01/13/2021    HDL 35 (L) 01/13/2021    ALT 17 01/13/2021    AST 26 01/13/2021     01/13/2021    K 4.2 01/13/2021     01/13/2021    CREATININE 0.95 01/13/2021    BUN 18 01/13/2021    CO2 23 01/13/2021    PSA 1.20 07/13/2020    INR 1.1 11/22/2017       Assessment:

## 2021-03-03 ENCOUNTER — IMMUNIZATION (OUTPATIENT)
Dept: LAB | Age: 76
End: 2021-03-03
Payer: MEDICARE

## 2021-03-03 PROCEDURE — 91301 COVID-19, MODERNA VACCINE 100MCG/0.5ML DOSE: CPT

## 2021-03-04 RX ORDER — FENOFIBRATE 145 MG/1
TABLET, COATED ORAL
Qty: 90 TABLET | Refills: 1 | Status: SHIPPED | OUTPATIENT
Start: 2021-03-04 | End: 2021-08-05 | Stop reason: SDUPTHER

## 2021-03-04 NOTE — TELEPHONE ENCOUNTER
Felix Perez called requesting a refill of the below medication which has been pended for you:     Requested Prescriptions     Pending Prescriptions Disp Refills    fenofibrate (TRICOR) 145 MG tablet [Pharmacy Med Name: FENOFIBRATE 145 MG TABLET] 90 tablet 1     Sig: TAKE ONE TABLET BY MOUTH DAILY       Last Appointment Date: 1/20/2021  Next Appointment Date: 7/20/2021    Allergies   Allergen Reactions    Atarax [Hydroxyzine]      Double vision    Gabapentin      dizzy    Lorazepam Other (See Comments)     Double vision- pt denies, states this is entered in error.  Pt is allergic to atarax not ativan    Phenergan [Promethazine Hcl] Other (See Comments)     \"out of it\"    Morphine And Related Nausea And Vomiting     hallucinations

## 2021-03-12 ENCOUNTER — APPOINTMENT (OUTPATIENT)
Dept: GENERAL RADIOLOGY | Age: 76
End: 2021-03-12
Attending: PHYSICAL MEDICINE & REHABILITATION
Payer: MEDICARE

## 2021-03-12 ENCOUNTER — HOSPITAL ENCOUNTER (OUTPATIENT)
Age: 76
Setting detail: OUTPATIENT SURGERY
Discharge: HOME | End: 2021-03-12
Attending: PHYSICAL MEDICINE & REHABILITATION | Admitting: PHYSICAL MEDICINE & REHABILITATION
Payer: MEDICARE

## 2021-03-12 VITALS
OXYGEN SATURATION: 98 % | WEIGHT: 223 LBS | SYSTOLIC BLOOD PRESSURE: 160 MMHG | HEIGHT: 69 IN | HEART RATE: 49 BPM | TEMPERATURE: 97 F | RESPIRATION RATE: 14 BRPM | BODY MASS INDEX: 33.03 KG/M2 | DIASTOLIC BLOOD PRESSURE: 77 MMHG

## 2021-03-12 PROCEDURE — 2500000003 HC RX 250 WO HCPCS: Performed by: PHYSICAL MEDICINE & REHABILITATION

## 2021-03-12 PROCEDURE — 7100000010 HC PHASE II RECOVERY - FIRST 15 MIN: Performed by: PHYSICAL MEDICINE & REHABILITATION

## 2021-03-12 PROCEDURE — 3600000057 HC PAIN LEVEL 4 ADDL 15 MIN: Performed by: PHYSICAL MEDICINE & REHABILITATION

## 2021-03-12 PROCEDURE — 3209999900 FLUORO FOR SURGICAL PROCEDURES

## 2021-03-12 PROCEDURE — 2709999900 HC NON-CHARGEABLE SUPPLY: Performed by: PHYSICAL MEDICINE & REHABILITATION

## 2021-03-12 PROCEDURE — 3600000056 HC PAIN LEVEL 4 BASE: Performed by: PHYSICAL MEDICINE & REHABILITATION

## 2021-03-12 PROCEDURE — 6360000004 HC RX CONTRAST MEDICATION: Performed by: PHYSICAL MEDICINE & REHABILITATION

## 2021-03-12 PROCEDURE — 64483 NJX AA&/STRD TFRM EPI L/S 1: CPT | Performed by: PHYSICAL MEDICINE & REHABILITATION

## 2021-03-12 PROCEDURE — 6360000002 HC RX W HCPCS: Performed by: PHYSICAL MEDICINE & REHABILITATION

## 2021-03-12 PROCEDURE — 2580000003 HC RX 258: Performed by: PHYSICAL MEDICINE & REHABILITATION

## 2021-03-12 RX ORDER — SODIUM CHLORIDE 9 MG/ML
INJECTION INTRAVENOUS PRN
Status: DISCONTINUED | OUTPATIENT
Start: 2021-03-12 | End: 2021-03-12 | Stop reason: ALTCHOICE

## 2021-03-12 RX ORDER — BUPIVACAINE HYDROCHLORIDE 5 MG/ML
INJECTION, SOLUTION EPIDURAL; INTRACAUDAL PRN
Status: DISCONTINUED | OUTPATIENT
Start: 2021-03-12 | End: 2021-03-12 | Stop reason: ALTCHOICE

## 2021-03-12 RX ORDER — DEXAMETHASONE SODIUM PHOSPHATE 10 MG/ML
INJECTION INTRAMUSCULAR; INTRAVENOUS PRN
Status: DISCONTINUED | OUTPATIENT
Start: 2021-03-12 | End: 2021-03-12 | Stop reason: ALTCHOICE

## 2021-03-12 ASSESSMENT — PAIN SCALES - GENERAL: PAINLEVEL_OUTOF10: 0

## 2021-03-12 NOTE — H&P
.            Signed        Expand AllCollapse All     Show:Clear all  [x]Manual[x]Template[]Copied    Added by:  [x]Kun Ennis MD    []Stefan for details  PAIN MANAGEMENTNEW PATIENT CONSULTATION  2/12/21     [facility]  1945     ReferringProvider:  Michaelyn Seip, MD  [address] [facility],  [address] [facility]     Primary Care Physician:  Michaelyn Seip, MD  [facility]     HPIinformation obtained from the patient as well as the Comprehensive Pain ManagementHealth Questionnaire filled out by the patient. The questionnaire will be scannedinto the electronic chart and PMH, PSH, meds, and allergies will be updates accordingly. Subjective:       CHIEF COMPLAINT:  This is a72 y.o. male patientwho presents with a complaint of New Patient (back, leg pain), Back Pain, and Other (gets charley horse while sleeping and it wakes him up)        PAIN HPI:     Patient  Had   T9-L5 Fusion in 2018  Which helped relieve some of his B lumbar and B posterolateral leg pain. Notes cramping and numbness   Day and night  At B  Calves.  Is intermittent and  Is better if  Takes [place] 5 BID     Location: Back entire  B calf tender  Location Modifier: -  Severity of Pain: 6  Duration of Pain: Intermittent  Frequency of Pain: Intermittent  Aggravating Factors: -, Stretching, Bending, Standing, Walking, Stairs, Exercise, Kneeling and Squatting  Limiting Behavior: no  Relieving Factors: Heat, Cold and Medication -  Result of Injury: no  Work Related Injury: no  [place] of Worker Compensation Case: no        Prior evaluation:               Previous lower back problems:Yes               Previous workup: Yes              History of surgery in painful area:Yes  2018- T9-L5 fusion  2017  [facility]   Previous pain medication trials have included:                             Opioids: hydrocodone/acetaminophen (Lorcet, Lortab, Norco, Vicodin)                 NSAID's:-                 Muscle relaxants: - Neuropathicpain meds: -     Previous Pain Management Physician: No              Nerve blocks, spinal injections:No              Typeof Pain Intervention . Shant Murphy Date of last Pain Intervention  . Was there pain relief from Pain Intervention: No.               How long was your pain relief from the Pain Intervention 1days. Sleep:               Sleep disturbance: Yes              Difficultyfalling asleep:  No              Feels fatiguedmuch of the time: Yes              Wakes uprested: No              Awakens in themiddle of the nighno              Takes sleepingmedication: No     Mental health:               Patient feels - secondary to their current pain problems as described above. H/O depression and anxiety: No              Patient is not seeing psychologist orpsychiatrist              Abuse history? No     Employed? No  Alcohol use?: No  Tobacco use?: No  Marijuana use?: No  Illicit drug use?: No     Imaging: Reviewed available imagingin our system with the patient. No results found. Referring physician records reviewed. Review of Systems   Constitutional: Positive for fatigue. HENT: Negative. Eyes: Negative. Respiratory: Negative. Cardiovascular: Negative. Gastrointestinal: Negative for constipation and nausea. Endocrine: Negative. Genitourinary: Negative for difficulty urinating. Musculoskeletal: Positive for arthralgias, back pain and myalgias. Skin: Negative. Allergic/Immunologic: Negative. Neurological: Positive for weakness and numbness. Hematological: Negative. Psychiatric/Behavioral: Positive for sleep disturbance. All other systems reviewed and are negative. Allergies   Allergen Reactions    Atarax [Hydroxyzine]         Double vision    Gabapentin         dizzy    Lorazepam Other (See Comments)       Double vision- pt denies, states this is entered in error.  Pt is allergic to atarax not ativan    Phenergan [Promethazine Hcl] Other (See Comments)       \"out of it\"    Morphine And Related Nausea And Vomiting       hallucinations         Medications Prior to Visit          Outpatient Medications Prior to Visit   Medication Sig Dispense Refill    Multiple Vitamin (MULTI VITAMIN DAILY PO) Take by mouth        HYDROcodone-acetaminophen (NORCO) 5-325 MG per tablet Take 1 tablet by mouth 2 times daily for 30 days. 60 tablet 0    sertraline (ZOLOFT) 100 MG tablet TAKE ONE TABLET BY MOUTH DAILY 90 tablet 2    tamsulosin (FLOMAX) 0.4 MG capsule Take 1 capsule by mouth daily 90 capsule 3    simvastatin (ZOCOR) 20 MG tablet TAKE ONE TABLET BY MOUTH DAILY 90 tablet 2    metoprolol succinate (TOPROL XL) 50 MG extended release tablet TAKE ONE TABLET BY MOUTH DAILY 90 tablet 2    fenofibrate (TRICOR) 145 MG tablet TAKE ONE TABLET BY MOUTH DAILY 90 tablet 3    aspirin 81 MG tablet Take 81 mg by mouth daily        acetaminophen (TYLENOL) 500 MG tablet Take 500 mg by mouth every 6 hours as needed. No facility-administered medications prior to visit. Past Medical History        Past Medical History:   Diagnosis Date    Agoraphobia      Anxiety      Arthritis      Basal cell carcinoma      Chronic pain       back    CKD (chronic kidney disease)      Claustrophobia      Enlarged lymph node       rt. lower neck.  Hearing aid worn       dar. ears.     Hearing loss      Hyperlipidemia      Hypertension      RAFAT (obstructive sleep apnea)       CPAP occasional    PONV (postoperative nausea and vomiting)      Retention of urine       last 2 surgeries, patient unable to void, home with catheter both times    SCC (squamous cell carcinoma)       HAND    Spinal stenosis, lumbar region, with neurogenic claudication      Wears glasses              Past Surgical History         Past Surgical History:   Procedure Laterality Date    BACK SURGERY   10/2013     Dr. Madi Mesa hardware lumbar   Jose Angel Oliveira COLONOSCOPY   06/13/12     mild diverticular dz    LUMBAR FUSION N/A 3/8/2017     LUMBAR L2-3 POSTERIOR DECOMPRESSION FUSION INSTRUMENTATION W/REVISION L3-5 POSTERIOR DECOMPRESSION FUSION INSTRUMENTATION ([address])  CC SN/KILEY performed by Lara Sanchez MD at [facility] Right       lower neck, 2-    [place] ARTHRODESIS POSTERIOR/POSTEROLATERAL THORACIC N/A 1/26/2018     THORACIC & LUMBAR POSTERIOR DECOMPRESSION AND FUSION REVISION T9 THRU L5 performed by Lara Sanchez MD at [address] [facility] [place] [address]CR[address] NOT  [address] IND N/A 8/23/2017     COLONOSCOPY performed by Aniyah Cortez MD at [address] [facility] N/A 11/14/2018     SPINAL CORD STIMULATOR IMPLANT PERMANENT performed by Lara Sanchez MD at [address] PRE-MALIGNANT / BENIGN SKIN LESION EXCISION        SKIN BIOPSY         skin cancer removed x 4    VASECTOMY                Family History         Family History   Problem Relation Age of Onset    Diabetes Mother      Diabetes Brother      Cancer Father           lung cancer    Cancer Brother           lung cancer    Diabetes Brother           Social History   Social History            Socioeconomic History    Marital status:        Spouse name: None    Number of children: None    Years of education: None    Highest education level: None   Occupational History    None   Social Needs    Financial resource strain: None    Food insecurity       Worry: None       Inability: None    Transportation needs       Medical: None       Non-medical: None   Tobacco Use    Smoking status: Never Smoker    Smokeless tobacco: Never Used   Substance and Sexual Activity    Alcohol use: No    Drug use: No    Sexual activity: None   Lifestyle    Physical activity       Days per week: None       Minutes per session: None    Stress: None   Relationships    Social connections       Talks on phone: None       Gets together: None       Attends Christian service: None       Active member of club or organization: None       Attends meetings of clubs or organizations: None       Relationship status: None    Intimate partner violence       Fear of current or ex partner: None       Emotionally abused: None       Physically abused: None       Forced sexual activity: None   Other Topics Concern    None   Social History Narrative    None               Objective:      Physical Exam:  Vitals       Vitals:     02/12/21 1345   BP: 128/80   Site: Right Upper Arm   Position: Sitting   Cuff Size: Large Adult   Resp: 16   Weight: 224 lb (101.6 kg)   Height: 5' 7\" (1.702 m)         Pain Score:   2     Physical Exam  Constitutional:       Appearance: He is well-developed. HENT:      Head: Normocephalic and atraumatic. Cardiovascular:      Pulses: Normal pulses. Comments: Warm extremities. Normal capillary refill. Pulmonary:      Effort: Pulmonary effort is normal.   Abdominal:      General: Abdomen is flat. Palpations: Abdomen is soft. Skin:     General: Skin is warm and dry. Neurological:      General: No focal deficit present. Mental Status: He is alert and oriented to person, place, and time. Cranial Nerves: No cranial nerve deficit. Sensory: No sensory deficit. Motor: No atrophy or abnormal muscle tone. Deep Tendon Reflexes: Reflexes are normal and symmetric. Psychiatric:         Mood and Affect: Mood normal.         Speech: Speech normal.         Behavior: Behavior normal.            Back Exam      Tenderness   The patient is experiencing tenderness in the lumbar.      Range of Motion   Extension: normal   Flexion: normal   Lateral bend right: normal   Lateral bend left: normal   Rotation right: normal   Rotation left: normal      Muscle Strength   Right Quadriceps:  5/5   Left Quadriceps:  5/5   Right Hamstrings:  5/5   Left Hamstrings:  5/5      Tests   Straight leg raise right: positive  Straight leg raise left: positive     Other   Toe walk: normal  Heel walk: normal  Sensation: normal  Gait: abnormal      Comments:  Surgical scar mid line upper thoracic to  Sacrum  +slump  Mild  Kemps  Fabers negative                     Labs:         Lab Results   Component Value Date     WBC 7.6 01/13/2021     HGB 16.4 01/13/2021     HCT 50.6 (H) 01/13/2021      01/13/2021     CHOL 192 01/13/2021     TRIG 192 (H) 01/13/2021     HDL 35 (L) 01/13/2021     ALT 17 01/13/2021     AST 26 01/13/2021      01/13/2021     K 4.2 01/13/2021      01/13/2021     CREATININE 0.95 01/13/2021     BUN 18 01/13/2021     CO2 23 01/13/2021     PSA 1.20 07/13/2020     INR 1.1 11/22/2017         Assessment: This is a 68 y.o. male with the following diagnosis:      Pain Diagnoses:  1. Lumbar radiculopathy    2. Lumbosacral spondylosis without myelopathy    3. Spinal stenosis, lumbar region, with neurogenic claudication    4. Neurogenic claudication          Medical/ Psychological Comorbidities:  As listed in the past medical and surgical history     Functional Limitations secondary to the above problems:. Chronic painlimits function and quality of life     Plan:   BS1  TFE  Off ASA as below distal  Spine fusion    could take Norco 5 BID asper Dr. Dio Martin. I  1. Meds:       New Prescriptions     No medications on file        Encounter Medications    No orders of the defined types were placed in this encounter. Controlled Substances Monitoring:    OARRS report was reviewed for [place], [place]. Pt educated about the risks of taking opiates, including increasedsedation, constipation, slowed breathing, tolerance, dependence, and addiction. No orders of the defined types were placed in this encounter. No follow-ups on file. The patient expressed understanding of the above assessment and plan.      Totaltime spent face to face with patient was 30 minutes inwhich  50% or more of the time was spent in counseling, education about risk andbenefits of the above plan, and coordination of care. On the basis of positive falls risk screening, assessment and plan is as follows: -.

## 2021-03-12 NOTE — INTERVAL H&P NOTE
I have interviewed and examined the patient and reviewed the recent History and Physical.  There have been no changes to the recent H&P documentation. The surgical consent form has been signed. Last anticoagulant medication use was:na    Premedication taken for contrast allergy? No    Valium taken for oral sedation? No    Outpatient Medications Marked as Taking for the 3/12/21 encounter [facility] Encounter)   Medication Sig Dispense Refill    fenofibrate (TRICOR) 145 MG tablet TAKE ONE TABLET BY MOUTH DAILY 90 tablet 1    Multiple Vitamin (MULTI VITAMIN DAILY PO) Take by mouth      sertraline (ZOLOFT) 100 MG tablet TAKE ONE TABLET BY MOUTH DAILY 90 tablet 2    tamsulosin (FLOMAX) 0.4 MG capsule Take 1 capsule by mouth daily 90 capsule 3    simvastatin (ZOCOR) 20 MG tablet TAKE ONE TABLET BY MOUTH DAILY 90 tablet 2    metoprolol succinate (TOPROL XL) 50 MG extended release tablet TAKE ONE TABLET BY MOUTH DAILY 90 tablet 2       The patient understands the planned operation and its associated risks and benefits and agrees to proceed.         Electronically signed by Lucila Rachel MD on 3/12/2021 at 11:06 AM

## 2021-03-12 NOTE — OP NOTE
TRANSFORAMINAL EPIDURAL STEROID INJECTION    3/12/21  The patient was counseled at length about the risks of litzy Covid-19 during their perioperative period and any recovery window from their procedure. The patient was made aware that litzy Covid-19  may worsen their prognosis for recovering from their procedure  and lend to a higher morbidity and/or mortality risk. All material risks, benefits, and reasonable alternatives including postponing the procedure were discussed. The patient does wish to proceed with the procedure at this time. Surgeon: Cecilia Weinstein MD    Pre-operative Diagnosis:   Patient Active Problem List   Diagnosis Code    Hyperlipemia E78.5    RAFAT on CPAP G47.33, Z99.89    Anxiety F41.9    Spinal stenosis, lumbar region, with neurogenic claudication M48.062    Degeneration of lumbar or lumbosacral intervertebral disc M51.37    CKD (chronic kidney disease) N18.9    SCC (squamous cell carcinoma) C44.92    Claustrophobia F40.240    Depression F32.9    Hearing loss H91.90    Essential (primary) hypertension I10    Agoraphobia F40.00    Mass in neck R22.1    Left foot drop M21.372    Lumbar disc herniation M51.26    Flat back syndrome, postprocedural M40.30    Back pain M54.9    Postlaminectomy syndrome, lumbar region M96.1    Major depressive disorder in full remission ([facility] [address] F32.5       Post-operative Diagnosis: Same    Assistants: none    This is a 68 y.o. male patient with pain in the Back, left leg, right leg. Previous treatment and examination findings are noted in the H&P.BS1 transforaminal epidural injection has been requested for diagnostic and therapeutic reasons. Conservative treatment was ineffective i.e.: ice, NSAIDS, rest, narcotic medication, chiropractic care, physical therapy and message therapy.     Patient is unable to perform the following ADL's: ambulating and grooming     Pain Assessment: 0-10  Pain Level: 5        Pain Location: Back Pain Descriptors: Stabbing, Sharp    Last Plain films: 2020      EXAMINATION:  1. BS1 transforaminal radiculogram/epidurogram.   2. BS1 transforaminal epidural anesthetic injection. 3. BS1 transforaminal epidural steroid injection. CONSENT: Written consent was obtained from the patient on preprinted consent form after explaining the procedure, indications, potential complications and outcomes. Alternative treatments were also discussed. DISCUSSION: The patient was sterilely prepped and draped in the usual fashion in the prone position. Time out was verified for correct patient, side, level and procedure. SEDATION:   No conscious sedation was performed during the procedure. The patient remained awake and conversed throughout the procedure. The patient underwent pulse oximetry and blood pressure monitoring independently by a trained observer, as well as by a physician. PROCEDURE:  Under image-intensifier control, a 22 gauge needle x 5 inch spinal needle was guided successfully into the epidural space employing a posterior lateral/oblique approach. Needle aspiration was negative for heme or CSF. Instillation of .5 mL of Omnipaque 240 contrast medium opacified the spinal nerve and demonstrated contiguous flow into the epidural space. No vascular spread was noted. Digital subtraction was not employed to evaluate for vascular spread. The patient was monitored for any untoward reaction to contrast medium before proceeding with procedure #2. The patient did not report pain reproduction in a concordant distribution. Following needle position verification, a test dose of 1 mL of sterile lidocaine 0.5% was administered and patient monitored for any adverse effects. Then, .5 mL of   was instilled into the epidural space and the patient's response was again monitored. Finally, Dexamethasone (Decadron 10 mg/mL) 1 ml of 0.5% bupivacaine was then instilled. The patient's response was again monitored.  The

## 2021-03-16 DIAGNOSIS — G89.29 CHRONIC BILATERAL LOW BACK PAIN WITH LEFT-SIDED SCIATICA: ICD-10-CM

## 2021-03-16 DIAGNOSIS — M54.42 CHRONIC BILATERAL LOW BACK PAIN WITH LEFT-SIDED SCIATICA: ICD-10-CM

## 2021-03-16 RX ORDER — HYDROCODONE BITARTRATE AND ACETAMINOPHEN 5; 325 MG/1; MG/1
1 TABLET ORAL 2 TIMES DAILY
Qty: 60 TABLET | Refills: 0 | OUTPATIENT
Start: 2021-03-16 | End: 2021-04-15

## 2021-03-16 NOTE — TELEPHONE ENCOUNTER
Wife called requesting a refill on patient   Requested Prescriptions     Pending Prescriptions Disp Refills    HYDROcodone-acetaminophen (NORCO) 5-325 MG per tablet 60 tablet 0     Sig: Take 1 tablet by mouth 2 times daily for 30 days.      Last Appt:  1/20/2021  Next Appt:   7/20/2021  Med verified in [address] [place] [address] Rd

## 2021-03-19 DIAGNOSIS — G89.29 CHRONIC BILATERAL LOW BACK PAIN WITH LEFT-SIDED SCIATICA: ICD-10-CM

## 2021-03-19 DIAGNOSIS — M54.42 CHRONIC BILATERAL LOW BACK PAIN WITH LEFT-SIDED SCIATICA: ICD-10-CM

## 2021-03-19 RX ORDER — HYDROCODONE BITARTRATE AND ACETAMINOPHEN 5; 325 MG/1; MG/1
1 TABLET ORAL 2 TIMES DAILY
Qty: 60 TABLET | Refills: 0 | Status: SHIPPED | OUTPATIENT
Start: 2021-03-19 | End: 2021-04-28 | Stop reason: SDUPTHER

## 2021-03-26 ENCOUNTER — APPOINTMENT (OUTPATIENT)
Dept: GENERAL RADIOLOGY | Age: 76
End: 2021-03-26
Attending: PHYSICAL MEDICINE & REHABILITATION
Payer: MEDICARE

## 2021-03-26 ENCOUNTER — HOSPITAL ENCOUNTER (OUTPATIENT)
Age: 76
Setting detail: OUTPATIENT SURGERY
Discharge: HOME | End: 2021-03-26
Attending: PHYSICAL MEDICINE & REHABILITATION | Admitting: PHYSICAL MEDICINE & REHABILITATION
Payer: MEDICARE

## 2021-03-26 VITALS
WEIGHT: 221 LBS | DIASTOLIC BLOOD PRESSURE: 90 MMHG | OXYGEN SATURATION: 97 % | HEART RATE: 60 BPM | HEIGHT: 69 IN | BODY MASS INDEX: 32.73 KG/M2 | RESPIRATION RATE: 18 BRPM | TEMPERATURE: 98.1 F | SYSTOLIC BLOOD PRESSURE: 164 MMHG

## 2021-03-26 PROCEDURE — 64483 NJX AA&/STRD TFRM EPI L/S 1: CPT | Performed by: PHYSICAL MEDICINE & REHABILITATION

## 2021-03-26 PROCEDURE — 2709999900 HC NON-CHARGEABLE SUPPLY: Performed by: PHYSICAL MEDICINE & REHABILITATION

## 2021-03-26 PROCEDURE — 2500000003 HC RX 250 WO HCPCS: Performed by: PHYSICAL MEDICINE & REHABILITATION

## 2021-03-26 PROCEDURE — 3600000056 HC PAIN LEVEL 4 BASE: Performed by: PHYSICAL MEDICINE & REHABILITATION

## 2021-03-26 PROCEDURE — 6360000004 HC RX CONTRAST MEDICATION: Performed by: PHYSICAL MEDICINE & REHABILITATION

## 2021-03-26 PROCEDURE — 7100000011 HC PHASE II RECOVERY - ADDTL 15 MIN: Performed by: PHYSICAL MEDICINE & REHABILITATION

## 2021-03-26 PROCEDURE — 7100000010 HC PHASE II RECOVERY - FIRST 15 MIN: Performed by: PHYSICAL MEDICINE & REHABILITATION

## 2021-03-26 PROCEDURE — 2580000003 HC RX 258: Performed by: PHYSICAL MEDICINE & REHABILITATION

## 2021-03-26 PROCEDURE — 3209999900 FLUORO FOR SURGICAL PROCEDURES

## 2021-03-26 PROCEDURE — 6360000002 HC RX W HCPCS: Performed by: PHYSICAL MEDICINE & REHABILITATION

## 2021-03-26 RX ORDER — SODIUM CHLORIDE 9 MG/ML
INJECTION INTRAVENOUS PRN
Status: DISCONTINUED | OUTPATIENT
Start: 2021-03-26 | End: 2021-03-26 | Stop reason: ALTCHOICE

## 2021-03-26 RX ORDER — BUPIVACAINE HYDROCHLORIDE 5 MG/ML
INJECTION, SOLUTION EPIDURAL; INTRACAUDAL PRN
Status: DISCONTINUED | OUTPATIENT
Start: 2021-03-26 | End: 2021-03-26 | Stop reason: ALTCHOICE

## 2021-03-26 RX ORDER — DEXAMETHASONE SODIUM PHOSPHATE 10 MG/ML
INJECTION INTRAMUSCULAR; INTRAVENOUS PRN
Status: DISCONTINUED | OUTPATIENT
Start: 2021-03-26 | End: 2021-03-26 | Stop reason: ALTCHOICE

## 2021-03-26 ASSESSMENT — PAIN DESCRIPTION - PAIN TYPE: TYPE: CHRONIC PAIN

## 2021-03-26 ASSESSMENT — PAIN - FUNCTIONAL ASSESSMENT: PAIN_FUNCTIONAL_ASSESSMENT: 0-10

## 2021-03-26 NOTE — INTERVAL H&P NOTE
I have interviewed and examined the patient and reviewed the recent History and Physical.  There have been no changes to the recent H&P documentation. The surgical consent form has been signed. Last anticoagulant medication use was:3-5 days    Premedication taken for contrast allergy? NA    Valium taken for oral sedation? NA    Outpatient Medications Marked as Taking for the 3/26/21 encounter [facility] Encounter)   Medication Sig Dispense Refill    fenofibrate (TRICOR) 145 MG tablet TAKE ONE TABLET BY MOUTH DAILY 90 tablet 1    Multiple Vitamin (MULTI VITAMIN DAILY PO) Take by mouth      sertraline (ZOLOFT) 100 MG tablet TAKE ONE TABLET BY MOUTH DAILY 90 tablet 2    tamsulosin (FLOMAX) 0.4 MG capsule Take 1 capsule by mouth daily 90 capsule 3    simvastatin (ZOCOR) 20 MG tablet TAKE ONE TABLET BY MOUTH DAILY 90 tablet 2    metoprolol succinate (TOPROL XL) 50 MG extended release tablet TAKE ONE TABLET BY MOUTH DAILY 90 tablet 2       The patient understands the planned operation and its associated risks and benefits and agrees to proceed.         Electronically signed by Bijal Haas MD on 3/26/2021 at 8:47 AM

## 2021-03-26 NOTE — H&P
.            Signed            Show:Clear all  [x]Manual[]Template[x]Copied    Added by:  [x]Kun Julian MD    []Stefan for details  . Signed         Expand AllCollapse All     Show:Clear all  [x]? Manual[x]? Template[]? Copied     Added by:  [x]? Phil Flores MD     []? Stefan for details  PAIN MANAGEMENTNEW PATIENT CONSULTATION  2/12/21     Kacy Jasmine  1945     ReferringProvider:  Rom Miner MD  [address] [facility],  [address] [facility] [address] [facility]     Primary Care Physician:  Rom Miner MD  [facility]     HPIinformation obtained from the patient as well as the Comprehensive Pain ManagementHealth Questionnaire filled out by the patient. The questionnaire will be scannedinto the electronic chart and PMH, PSH, meds, and allergies will be updates accordingly. Subjective:       CHIEF COMPLAINT:  This is a72 y.o. male patientwho presents with a complaint of New Patient (back, leg pain), Back Pain, and Other (gets charley horse while sleeping and it wakes him up)        PAIN HPI:     Patient  Had   T9-L5 Fusion in 2018  Which helped relieve some of his B lumbar and B posterolateral leg pain. Notes cramping and numbness   Day and night  At B  Calves.  Is intermittent and  Is better if  Takes [place] 5 BID     Location: Back entire  B calf tender  Location Modifier: -  Severity of Pain: 6  Duration of Pain: Intermittent  Frequency of Pain: Intermittent  Aggravating Factors: -, Stretching, Bending, Standing, Walking, Stairs, Exercise, Kneeling and Squatting  Limiting Behavior: no  Relieving Factors: Heat, Cold and Medication -  Result of Injury: no  Work Related Injury: no  [place] of Worker Compensation Case: no        Prior evaluation:               Previous lower back problems:Yes               Previous workup: Yes              History of surgery in painful area:Yes  2018- T9-L5 fusion  2017  SCS   Previous pain medication trials have included: Opioids: hydrocodone/acetaminophen (Lorcet, Lortab, Norco, Vicodin)                 NSAID's:-                 Muscle relaxants: -                 Neuropathicpain meds: -     Previous Pain Management Physician: No              Nerve blocks, spinal injections:No              Typeof Pain Intervention . Darrel Martel Date of last Pain Intervention  . Was there pain relief from Pain Intervention: No.               How long was your pain relief from the Pain Intervention 1days. Sleep:               Sleep disturbance: Yes              Difficultyfalling asleep:  No              Feels fatiguedmuch of the time: Yes              Wakes uprested: No              Awakens in themiddle of the nighno              Takes sleepingmedication: No     Mental health:               Patient feels - secondary to their current pain problems as described above. H/O depression and anxiety: No              Patient is not seeing psychologist orpsychiatrist              Abuse history? No     Employed? No  Alcohol use?: No  Tobacco use?: No  Marijuana use?: No  Illicit drug use?: No     Imaging: Reviewed available imagingin our system with the patient. No results found. Referring physician records reviewed. Review of Systems   Constitutional: Positive for fatigue. HENT: Negative. Eyes: Negative. Respiratory: Negative. Cardiovascular: Negative. Gastrointestinal: Negative for constipation and nausea. Endocrine: Negative. Genitourinary: Negative for difficulty urinating. Musculoskeletal: Positive for arthralgias, back pain and myalgias. Skin: Negative. Allergic/Immunologic: Negative. Neurological: Positive for weakness and numbness. Hematological: Negative. Psychiatric/Behavioral: Positive for sleep disturbance. All other systems reviewed and are negative.                   Allergies   Allergen Reactions    Atarax [Hydroxyzine]         Double vision    Gabapentin dizzy    Lorazepam Other (See Comments)       Double vision- pt denies, states this is entered in error. Pt is allergic to atarax not ativan    Phenergan [Promethazine Hcl] Other (See Comments)       \"out of it\"    Morphine And Related Nausea And Vomiting       hallucinations         Medications Prior to Visit               Outpatient Medications Prior to Visit   Medication Sig Dispense Refill    Multiple Vitamin (MULTI VITAMIN DAILY PO) Take by mouth        HYDROcodone-acetaminophen (NORCO) 5-325 MG per tablet Take 1 tablet by mouth 2 times daily for 30 days. 60 tablet 0    sertraline (ZOLOFT) 100 MG tablet TAKE ONE TABLET BY MOUTH DAILY 90 tablet 2    tamsulosin (FLOMAX) 0.4 MG capsule Take 1 capsule by mouth daily 90 capsule 3    simvastatin (ZOCOR) 20 MG tablet TAKE ONE TABLET BY MOUTH DAILY 90 tablet 2    metoprolol succinate (TOPROL XL) 50 MG extended release tablet TAKE ONE TABLET BY MOUTH DAILY 90 tablet 2    fenofibrate (TRICOR) 145 MG tablet TAKE ONE TABLET BY MOUTH DAILY 90 tablet 3    aspirin 81 MG tablet Take 81 mg by mouth daily        acetaminophen (TYLENOL) 500 MG tablet Take 500 mg by mouth every 6 hours as needed. No facility-administered medications prior to visit. Past Medical History           Past Medical History:   Diagnosis Date    Agoraphobia      Anxiety      Arthritis      Basal cell carcinoma      Chronic pain       back    CKD (chronic kidney disease)      Claustrophobia      Enlarged lymph node       rt. lower neck.  Hearing aid worn       dar. ears.     Hearing loss      Hyperlipidemia      Hypertension      RAFAT (obstructive sleep apnea)       CPAP occasional    PONV (postoperative nausea and vomiting)      Retention of urine       last 2 surgeries, patient unable to void, home with catheter both times    SCC (squamous cell carcinoma)       HAND    Spinal stenosis, lumbar region, with neurogenic claudication      Wears glasses Past Surgical History             Past Surgical History:   Procedure Laterality Date    BACK SURGERY   10/2013     Dr. Spencer Avila hardware lumbar    COLONOSCOPY   06/13/12     mild diverticular dz    LUMBAR FUSION N/A 3/8/2017     LUMBAR L2-3 POSTERIOR DECOMPRESSION FUSION INSTRUMENTATION W/REVISION L3-5 POSTERIOR DECOMPRESSION FUSION INSTRUMENTATION ([address] [place] [address])  CC SN/KILEY performed by Garfield Mendoza MD at [place] [address] Right       lower neck, 2-    TN ARTHRODESIS POSTERIOR/POSTEROLATERAL THORACIC N/A 1/26/2018     THORACIC & LUMBAR POSTERIOR DECOMPRESSION AND FUSION REVISION T9 THRU L5 performed by Garfield Mendoza MD at [facility] [place] SCRN NOT  [address]  IND N/A 8/23/2017     COLONOSCOPY performed by Mimi Gamez MD at [address] [facility] N/A 11/14/2018     SPINAL CORD STIMULATOR IMPLANT PERMANENT performed by Garfield Mendoza MD at [address] [place] [address] PRE-MALIGNANT / BENIGN SKIN LESION EXCISION        SKIN BIOPSY         skin cancer removed x 4    VASECTOMY                Family History             Family History   Problem Relation Age of Onset    Diabetes Mother      Diabetes Brother      Cancer Father           lung cancer    Cancer Brother           lung cancer    Diabetes Brother           Social History   Social History                Socioeconomic History    Marital status:        Spouse name: None    Number of children: None    Years of education: None    Highest education level: None   Occupational History    None   Social Needs    Financial resource strain: None    Food insecurity       Worry: None       Inability: None    Transportation needs       Medical: None       Non-medical: None   Tobacco Use    Smoking status: Never Smoker    Smokeless tobacco: Never Used   Substance and Sexual Activity    Alcohol use: No    Drug use: No    Sexual activity: None   Lifestyle    Physical activity       Days per week: None       Minutes per session: None    Stress: None   Relationships    Social connections       Talks on phone: None       Gets together: None       Attends Cheondoism service: None       Active member of club or organization: None       Attends meetings of clubs or organizations: None       Relationship status: None    Intimate partner violence       Fear of current or ex partner: None       Emotionally abused: None       Physically abused: None       Forced sexual activity: None   Other Topics Concern    None   Social History Narrative    None               Objective:      Physical Exam:  Vitals         Vitals:     02/12/21 1345   BP: 128/80   Site: Right Upper Arm   Position: Sitting   Cuff Size: Large Adult   Resp: 16   Weight: 224 lb (101.6 kg)   Height: 5' 7\" (1.702 m)         Pain Score:   2     Physical Exam  Constitutional:       Appearance: He is well-developed. HENT:      Head: Normocephalic and atraumatic. Cardiovascular:      Pulses: Normal pulses. Comments: Warm extremities. Normal capillary refill. Pulmonary:      Effort: Pulmonary effort is normal.   Abdominal:      General: Abdomen is flat. Palpations: Abdomen is soft. Skin:     General: Skin is warm and dry. Neurological:      General: No focal deficit present. Mental Status: He is alert and oriented to person, place, and time. Cranial Nerves: No cranial nerve deficit. Sensory: No sensory deficit. Motor: No atrophy or abnormal muscle tone. Deep Tendon Reflexes: Reflexes are normal and symmetric. Psychiatric:         Mood and Affect: Mood normal.         Speech: Speech normal.         Behavior: Behavior normal.            Back Exam      Tenderness   The patient is experiencing tenderness in the lumbar.      Range of Motion   Extension: normal   Flexion: normal   Lateral bend right: normal   Lateral bend left: normal   Rotation right: normal   Rotation left: normal      Muscle Strength   Right Quadriceps:  5/5   Left Quadriceps:  5/5   Right Hamstrings:  5/5   Left Hamstrings:  5/5      Tests   Straight leg raise right: positive  Straight leg raise left: positive     Other   Toe walk: normal  Heel walk: normal  Sensation: normal  Gait: abnormal      Comments:  Surgical scar mid line upper thoracic to  Sacrum  +slump  Mild  Kemps  Fabers negative                     Labs:             Lab Results   Component Value Date     WBC 7.6 01/13/2021     HGB 16.4 01/13/2021     HCT 50.6 (H) 01/13/2021      01/13/2021     CHOL 192 01/13/2021     TRIG 192 (H) 01/13/2021     HDL 35 (L) 01/13/2021     ALT 17 01/13/2021     AST 26 01/13/2021      01/13/2021     K 4.2 01/13/2021      01/13/2021     CREATININE 0.95 01/13/2021     BUN 18 01/13/2021     CO2 23 01/13/2021     PSA 1.20 07/13/2020     INR 1.1 11/22/2017         Assessment: This is a 68 y.o. male with the following diagnosis:      Pain Diagnoses:  1. Lumbar radiculopathy    2. Lumbosacral spondylosis without myelopathy    3. Spinal stenosis, lumbar region, with neurogenic claudication    4. Neurogenic claudication          Medical/ Psychological Comorbidities:  As listed in the past medical and surgical history     Functional Limitations secondary to the above problems:. Chronic painlimits function and quality of life     Plan:   BS1  TFE  Off ASA as below distal  Spine fusion    could take Norco 5 BID asper Dr. Kaye Mahoney. I  1. Meds:         New Prescriptions     No medications on file         Encounter Medications    No orders of the defined types were placed in this encounter. Controlled Substances Monitoring:    [place]AR[place] report was reviewed for [place], [place]. Pt educated about the risks of taking opiates, including increasedsedation, constipation, slowed breathing, tolerance, dependence, and addiction. No orders of the defined types were placed in this encounter. No follow-ups on file.      The patient expressed understanding of the above assessment and plan. Totaltime spent face to face with patient was 30 minutes inwhich  50% or more of the time was spent in counseling, education about risk andbenefits of the above plan, and coordination of care.   On the basis of positive falls risk screening, assessment and plan is as follows: -.                            Routing History

## 2021-03-26 NOTE — OP NOTE
TRANSFORAMINAL EPIDURAL STEROID INJECTION    3/26/21  The patient was counseled at length about the risks of litzy Covid-19 during their perioperative period and any recovery window from their procedure. The patient was made aware that litzy Covid-19  may worsen their prognosis for recovering from their procedure  and lend to a higher morbidity and/or mortality risk. All material risks, benefits, and reasonable alternatives including postponing the procedure were discussed. The patient does wish to proceed with the procedure at this time. Surgeon: Stephani Carlisle MD    Pre-operative Diagnosis:   Patient Active Problem List   Diagnosis Code    Hyperlipemia E78.5    RAFAT on CPAP G47.33, Z99.89    Anxiety F41.9    Spinal stenosis, lumbar region, with neurogenic claudication M48.062    Degeneration of lumbar or lumbosacral intervertebral disc M51.37    CKD (chronic kidney disease) N18.9    SCC (squamous cell carcinoma) C44.92    Claustrophobia F40.240    Depression F32.9    Hearing loss H91.90    Essential (primary) hypertension I10    Agoraphobia F40.00    Mass in neck R22.1    Left foot drop M21.372    Lumbar disc herniation M51.26    Flat back syndrome, postprocedural M40.30    Back pain M54.9    Postlaminectomy syndrome, lumbar region M96.1    Major depressive disorder in full remission ([facility] [address] F32.5       Post-operative Diagnosis: Same    Assistants: none    This is a 68 y.o. male patient with pain in the Back, left leg, right leg. Previous treatment and examination findings are noted in the H&P.BS1 transforaminal epidural injection has been requested for diagnostic and therapeutic reasons. Conservative treatment was ineffective i.e.: ice, NSAIDS, rest, narcotic medication, chiropractic care, physical therapy and message therapy.     Patient is unable to perform the following ADL's: ambulating     Pain Assessment: 0-10  Pain Level: 2     Pain Orientation: Left, Right  Pain Location: Back       Last Plain films: 2020      EXAMINATION:  1. BS1 transforaminal radiculogram/epidurogram.   2. Bs1 transforaminal epidural anesthetic injection. 3. bs1 transforaminal epidural steroid injection. CONSENT: Written consent was obtained from the patient on preprinted consent form after explaining the procedure, indications, potential complications and outcomes. Alternative treatments were also discussed. DISCUSSION: The patient was sterilely prepped and draped in the usual fashion in the prone position. Time out was verified for correct patient, side, level and procedure. SEDATION:   No conscious sedation was performed during the procedure. The patient remained awake and conversed throughout the procedure. The patient underwent pulse oximetry and blood pressure monitoring independently by a trained observer, as well as by a physician. PROCEDURE:  Under image-intensifier control, a 22 gauge needle x 5 inch spinal needle was guided successfully into the epidural space employing a posterior lateral/oblique approach. Needle aspiration was negative for heme or CSF. Instillation of  .5 mL of Omnipaque 240 contrast medium opacified the spinal nerve and demonstrated contiguous flow into the epidural space. No vascular spread was noted. Digital subtraction was not employed to evaluate for vascular spread. The patient was monitored for any untoward reaction to contrast medium before proceeding with procedure #2. The patient did not report pain reproduction in a concordant distribution. Following needle position verification, a test dose of .5 mL of sterile lidocaine 0.5% was administered and patient monitored for any adverse effects. Then, 1 mL of   was instilled into the epidural space and the patient's response was again monitored. Finally, Dexamethasone (Decadron 10 mg/mL) 1 ml of 0.5% bupivacaine was then instilled. The patient's response was again monitored.  The spinal needle was removed. The patient tolerated the procedure well and without complications and was noted to be in stable condition prior to discharge from the procedure center with discharge instructions. EBL: no blood loss    SPECIMEN: none    IMPRESSIONS:  1. bs1 transforaminal epidurogram, epidural anesthesia and epidural steroid injection procedures accomplished without incident. RECOMMENDATIONS:  1. Complete and return Post-Procedure Pain and Activity Diary.   2. Contact the [address] for symptom exacerbation, fever or unusual symptoms. 3. Post-procedure care according to verbal and written discharge instructions    POST-PROCEDURE EPIDUROGRAPHY INTERPRETATION:    EXAMINATION: AP, lateral, and oblique views    FLUORO TIME: 21 seconds    DISCUSSION: Spot views of the spine reveal normal alignment and segmentation. Spinal needle is positioned at the bs1 neuroforamin. Contrast spreads and outlines the bs1 nerve/ neuroforamin and epidural space. The epidurogram reveals excellent contrast flow. Visualized spine reveals See radiology report. Soft tissues reveal no abnormalities. IMPRESSION: bs1 transforaminal epidurogram/epineurogram reveals satisfactory needle position and contrast spread.      Electronically signed by Dell Flores MD on 3/26/2021 at 8:51 AM

## 2021-04-12 ENCOUNTER — OFFICE VISIT (OUTPATIENT)
Dept: PAIN MANAGEMENT | Age: 76
End: 2021-04-12
Payer: MEDICARE

## 2021-04-12 VITALS — SYSTOLIC BLOOD PRESSURE: 115 MMHG | DIASTOLIC BLOOD PRESSURE: 78 MMHG

## 2021-04-12 DIAGNOSIS — M96.1 LUMBAR POST-LAMINECTOMY SYNDROME: ICD-10-CM

## 2021-04-12 DIAGNOSIS — M47.817 LUMBOSACRAL SPONDYLOSIS WITHOUT MYELOPATHY: ICD-10-CM

## 2021-04-12 DIAGNOSIS — M54.18 SACRAL RADICULITIS: ICD-10-CM

## 2021-04-12 DIAGNOSIS — M54.16 LUMBAR RADICULOPATHY: Primary | ICD-10-CM

## 2021-04-12 PROCEDURE — 99213 OFFICE O/P EST LOW 20 MIN: CPT | Performed by: PHYSICAL MEDICINE & REHABILITATION

## 2021-04-12 PROCEDURE — 4040F PNEUMOC VAC/ADMIN/RCVD: CPT | Performed by: PHYSICAL MEDICINE & REHABILITATION

## 2021-04-12 PROCEDURE — 1123F ACP DISCUSS/DSCN MKR DOCD: CPT | Performed by: PHYSICAL MEDICINE & REHABILITATION

## 2021-04-12 PROCEDURE — G8417 CALC BMI ABV UP PARAM F/U: HCPCS | Performed by: PHYSICAL MEDICINE & REHABILITATION

## 2021-04-12 PROCEDURE — 99214 OFFICE O/P EST MOD 30 MIN: CPT | Performed by: PHYSICAL MEDICINE & REHABILITATION

## 2021-04-12 PROCEDURE — G8427 DOCREV CUR MEDS BY ELIG CLIN: HCPCS | Performed by: PHYSICAL MEDICINE & REHABILITATION

## 2021-04-12 PROCEDURE — 1036F TOBACCO NON-USER: CPT | Performed by: PHYSICAL MEDICINE & REHABILITATION

## 2021-04-12 ASSESSMENT — ENCOUNTER SYMPTOMS
BACK PAIN: 1
EYES NEGATIVE: 1
RESPIRATORY NEGATIVE: 1
ALLERGIC/IMMUNOLOGIC NEGATIVE: 1
NAUSEA: 0
CONSTIPATION: 0

## 2021-04-12 NOTE — PROGRESS NOTES
PAIN MANAGEMENT FOLLOW-UP NOTE  4/12/21    CHIEF COMPLAINT: This is a72 y.o. male patientwho returns to the [facility] with a history of Injections (had 2 sets of TFE, heavness and sharp pain has gotten better from the bilateral leg area) and Back Pain      PAIN HPI:Max Pino returns today for  reevaluation. Since the visit, the patient reports that the pain is not changed. Moderately bett erwith  Pain   When sleeping and  Moderate less sharp pain  Down legs since B S1 TFE few weeks ago     Location: Back  Location Modifier: entire back  Severity of Pain: 3  Duration of Pain: Intermittent  Frequency of Pain: Intermittent  Aggravating Factors: walking  Limiting Behavior: Twisting  Relieving Factors: relaxation        Previous pain medication trials have included:          Mental health:    Patient feels - secondary to their current pain problems as described above. H/O depression and anxiety: No   Patient is not seeing psychologist orpsychiatrist   Abuse history? No    Employed? No    ANALGESIA:   Are your Current Pain medication (s) helping to decrease pain? No.   Current Pain score: Pain Score:   2    ADVERSE AFFECTS:   Medication Side Effects: No.    ACTIVITY:  Are you able to be more active with your pain medications? No      ABERRANT BEHAVIORS SINCE LAST VISIT? No    Review of Systems   Constitutional: Positive for fatigue. HENT: Negative. Eyes: Negative. Respiratory: Negative. Cardiovascular: Negative. Gastrointestinal: Negative for constipation and nausea. Endocrine: Negative. Genitourinary: Negative for difficulty urinating. Musculoskeletal: Positive for arthralgias, back pain and myalgias. Skin: Negative. Allergic/Immunologic: Negative. Neurological: Positive for weakness and numbness. Hematological: Negative. Psychiatric/Behavioral: Positive for sleep disturbance. All other systems reviewed and are negative.        Allergies   Allergen Reactions    Atarax [Hydroxyzine]      Double vision    Gabapentin      dizzy    Phenergan [Promethazine Hcl] Other (See Comments)     \"out of it\"    Morphine And Related Nausea And Vomiting     hallucinations       Outpatient Medications Prior to Visit   Medication Sig Dispense Refill    HYDROcodone-acetaminophen (NORCO) 5-325 MG per tablet Take 1 tablet by mouth 2 times daily for 30 days. 60 tablet 0    fenofibrate (TRICOR) 145 MG tablet TAKE ONE TABLET BY MOUTH DAILY 90 tablet 1    Multiple Vitamin (MULTI VITAMIN DAILY PO) Take by mouth      sertraline (ZOLOFT) 100 MG tablet TAKE ONE TABLET BY MOUTH DAILY 90 tablet 2    tamsulosin (FLOMAX) 0.4 MG capsule Take 1 capsule by mouth daily 90 capsule 3    simvastatin (ZOCOR) 20 MG tablet TAKE ONE TABLET BY MOUTH DAILY 90 tablet 2    metoprolol succinate (TOPROL XL) 50 MG extended release tablet TAKE ONE TABLET BY MOUTH DAILY 90 tablet 2    aspirin 81 MG tablet Take 81 mg by mouth daily      acetaminophen (TYLENOL) 500 MG tablet Take 500 mg by mouth every 6 hours as needed. No facility-administered medications prior to visit. Past Medical History:   Diagnosis Date    Agoraphobia     Anxiety     Arthritis     Basal cell carcinoma     Chronic pain     back    CKD (chronic kidney disease)     Claustrophobia     Enlarged lymph node     rt. lower neck.  Hearing aid worn     dar. ears.     Hearing loss     Hyperlipidemia     Hypertension     RAFAT (obstructive sleep apnea)     CPAP occasional    PONV (postoperative nausea and vomiting)     Retention of urine     last 2 surgeries, patient unable to void, home with catheter both times    SCC (squamous cell carcinoma)     HAND    Spinal stenosis, lumbar region, with neurogenic claudication     Wears glasses        Past Surgical History:   Procedure Laterality Date    BACK SURGERY  10/2013    Dr. Blank Garcia hardware lumbar    COLONOSCOPY  06/13/12    mild diverticular dz    LUMBAR FUSION N/A 3/8/2017 LUMBAR L2-3 POSTERIOR DECOMPRESSION FUSION INSTRUMENTATION W/REVISION L3-5 POSTERIOR DECOMPRESSION FUSION INSTRUMENTATION ([address])  CC SN/KILEY performed by Wilene Skiff, MD at [facility] Right     lower neck, 2-    PAIN MANAGEMENT PROCEDURE Bilateral 3/12/2021    Bilateral S1 TRANSFORAMINAL performed by Jarret Youngblood MD at [address] Bilateral 3/26/2021    Bilateral S1 TRANSFORAMINAL performed by Jarret Youngblood MD at [facility] N/A 1/26/2018    THORACIC & LUMBAR POSTERIOR DECOMPRESSION AND FUSION REVISION T9 THRU L5 performed by Wilene Skiff, MD at [address] [facility] [place] SCRN NOT  [address] IND N/A 8/23/2017    COLONOSCOPY performed by Nahed Navarrete MD at [address] [facility] N/A 11/14/2018    SPINAL CORD STIMULATOR IMPLANT PERMANENT performed by Wilene Skiff, MD at [facility] PRE-MALIGNANT / BENIGN SKIN LESION EXCISION      SKIN BIOPSY      skin cancer removed x 4    VASECTOMY       Family History   Problem Relation Age of Onset    Diabetes Mother     Diabetes Brother     Cancer Father         lung cancer    Cancer Brother         lung cancer    Diabetes Brother      Social History     Socioeconomic History    Marital status:      Spouse name: None    Number of children: None    Years of education: None    Highest education level: None   Occupational History    None   Social Needs    Financial resource strain: None    Food insecurity     Worry: None     Inability: None    Transportation needs     Medical: None     Non-medical: None   Tobacco Use    Smoking status: Never Smoker    Smokeless tobacco: Never Used   Substance and Sexual Activity    Alcohol use: No    Drug use: No    Sexual activity: None   Lifestyle    Physical activity     Days per week: None     Minutes per session: None    Stress: None   Relationships    Social connections     Talks on phone: None     Gets together: None     Attends Baptism service: None     Active member of club or organization: None     Attends meetings of clubs or organizations: None     Relationship status: None    Intimate partner violence     Fear of current or ex partner: None     Emotionally abused: None     Physically abused: None     Forced sexual activity: None   Other Topics Concern    None   Social History Narrative    None         Family and Social Historyreviewed in the electronic medical record. Imaging:Reviewed available imaging in our system with the patient. No results found. Objective:     Physical Exam:  Vitals:    04/12/21 1421   BP: 115/78   Site: Left Upper Arm   Position: Sitting   Cuff Size: Large Adult     Pain Score:   2    Physical Exam  Constitutional:       Appearance: He is well-developed. HENT:      Head: Normocephalic and atraumatic. Cardiovascular:      Pulses: Normal pulses. Comments: Warm extremities. Normal capillary refill. Pulmonary:      Effort: Pulmonary effort is normal.   Abdominal:      General: Abdomen is flat. Palpations: Abdomen is soft. Skin:     General: Skin is warm and dry. Neurological:      General: No focal deficit present. Mental Status: He is alert and oriented to person, place, and time. Cranial Nerves: No cranial nerve deficit. Sensory: No sensory deficit. Motor: No atrophy or abnormal muscle tone. Deep Tendon Reflexes: Reflexes are normal and symmetric. Psychiatric:         Mood and Affect: Mood normal.         Speech: Speech normal.         Behavior: Behavior normal.       Back Exam     Tenderness   The patient is experiencing tenderness in the lumbar.     Range of Motion   Extension: normal   Flexion: normal   Lateral bend right: normal   Lateral bend left: normal   Rotation right: normal   Rotation left: normal     Muscle Strength   Right Quadriceps:  5/5   Left Quadriceps:  5/5   Right Hamstrings:  5/5   Left Hamstrings:  5/5     Tests   Straight leg raise right: positive  Straight leg raise left: positive    Other   Toe walk: normal  Heel walk: normal  Sensation: normal  Gait: normal     Comments:  Mild kemps  Moderate slump  -fabers                                   Research  has found that  Spine injections    reduce pain and  give  better functional  outcomes. Assessment: This is a 68 y.o. male patient with:    Diagnosis:   Diagnosis Orders   1. Lumbar radiculopathy     2. Lumbosacral spondylosis without myelopathy     3. Sacral radiculitis     4. Lumbar post-laminectomy syndrome         Medical Comorbidities:  As listed in the patient's past medical and surgical history    Functional Limitations:   Pain limits function and quality of life. Plan:    wait on another B S1  TFE to call if  Radiating pain down back of legs worsen       Meds:   Controlled Substances Monitoring: Pt educated about the risks of taking opiates,including increased sedation, constipation, slowed breathing, tolerance, dependence,and addiction. New Prescriptions    No medications on file      No orders of the defined types were placed in this encounter. No orders of the defined types were placed in this encounter. No follow-ups on file. Opioid medication has  significant  risk  benefit concerns. We  instruct    our patients that  a Random Urine Drug Screen is required  along with a  an Opioid assessment questionaire such as ORT  or SOAPP  The patient expressed understanding of the above assessment and plan. Totaltime spent face to face with patient was 25 minutes inwhich  50% or more of the time was spent in counseling, education about risk andbenefits of the above plan, and coordination of care.

## 2021-04-28 DIAGNOSIS — G89.29 CHRONIC BILATERAL LOW BACK PAIN WITH LEFT-SIDED SCIATICA: ICD-10-CM

## 2021-04-28 DIAGNOSIS — M54.42 CHRONIC BILATERAL LOW BACK PAIN WITH LEFT-SIDED SCIATICA: ICD-10-CM

## 2021-04-28 RX ORDER — HYDROCODONE BITARTRATE AND ACETAMINOPHEN 5; 325 MG/1; MG/1
1 TABLET ORAL 2 TIMES DAILY
Qty: 60 TABLET | Refills: 0 | Status: SHIPPED | OUTPATIENT
Start: 2021-04-28 | End: 2021-06-03 | Stop reason: SDUPTHER

## 2021-04-28 NOTE — TELEPHONE ENCOUNTER
Edith Cantor called requesting a refill of the below medication which has been pended for you:     Requested Prescriptions     Pending Prescriptions Disp Refills    HYDROcodone-acetaminophen (NORCO) 5-325 MG per tablet 60 tablet 0     Sig: Take 1 tablet by mouth 2 times daily for 30 days.        Last Appointment Date: 1/20/2021  Next Appointment Date: 7/20/2021    Allergies   Allergen Reactions    Atarax [Hydroxyzine]      Double vision    Gabapentin      dizzy    Phenergan [Promethazine Hcl] Other (See Comments)     \"out of it\"    Morphine And Related Nausea And Vomiting     hallucinations

## 2021-05-12 RX ORDER — METOPROLOL SUCCINATE 50 MG/1
TABLET, EXTENDED RELEASE ORAL
Qty: 90 TABLET | Refills: 1 | Status: SHIPPED | OUTPATIENT
Start: 2021-05-12 | End: 2021-08-05 | Stop reason: SDUPTHER

## 2021-06-03 DIAGNOSIS — M54.42 CHRONIC BILATERAL LOW BACK PAIN WITH LEFT-SIDED SCIATICA: ICD-10-CM

## 2021-06-03 DIAGNOSIS — G89.29 CHRONIC BILATERAL LOW BACK PAIN WITH LEFT-SIDED SCIATICA: ICD-10-CM

## 2021-06-03 RX ORDER — HYDROCODONE BITARTRATE AND ACETAMINOPHEN 5; 325 MG/1; MG/1
1 TABLET ORAL 2 TIMES DAILY
Qty: 60 TABLET | Refills: 0 | Status: SHIPPED | OUTPATIENT
Start: 2021-06-03 | End: 2021-07-07 | Stop reason: SDUPTHER

## 2021-06-03 RX ORDER — SIMVASTATIN 20 MG
TABLET ORAL
Qty: 90 TABLET | Refills: 1 | Status: SHIPPED | OUTPATIENT
Start: 2021-06-03 | End: 2021-08-05 | Stop reason: SDUPTHER

## 2021-06-14 ENCOUNTER — OFFICE VISIT (OUTPATIENT)
Dept: PAIN MANAGEMENT | Age: 76
End: 2021-06-14
Payer: MEDICARE

## 2021-06-14 VITALS
SYSTOLIC BLOOD PRESSURE: 115 MMHG | BODY MASS INDEX: 32.14 KG/M2 | HEART RATE: 62 BPM | HEIGHT: 69 IN | DIASTOLIC BLOOD PRESSURE: 82 MMHG | OXYGEN SATURATION: 91 % | WEIGHT: 217 LBS

## 2021-06-14 DIAGNOSIS — M47.817 LUMBOSACRAL SPONDYLOSIS WITHOUT MYELOPATHY: ICD-10-CM

## 2021-06-14 DIAGNOSIS — M47.816 LUMBAR FACET JOINT SYNDROME: Primary | ICD-10-CM

## 2021-06-14 DIAGNOSIS — M51.36 DDD (DEGENERATIVE DISC DISEASE), LUMBAR: ICD-10-CM

## 2021-06-14 PROCEDURE — 99214 OFFICE O/P EST MOD 30 MIN: CPT | Performed by: PHYSICAL MEDICINE & REHABILITATION

## 2021-06-14 PROCEDURE — 99215 OFFICE O/P EST HI 40 MIN: CPT | Performed by: PHYSICAL MEDICINE & REHABILITATION

## 2021-06-14 PROCEDURE — 1036F TOBACCO NON-USER: CPT | Performed by: PHYSICAL MEDICINE & REHABILITATION

## 2021-06-14 PROCEDURE — 1123F ACP DISCUSS/DSCN MKR DOCD: CPT | Performed by: PHYSICAL MEDICINE & REHABILITATION

## 2021-06-14 PROCEDURE — G8417 CALC BMI ABV UP PARAM F/U: HCPCS | Performed by: PHYSICAL MEDICINE & REHABILITATION

## 2021-06-14 PROCEDURE — G8427 DOCREV CUR MEDS BY ELIG CLIN: HCPCS | Performed by: PHYSICAL MEDICINE & REHABILITATION

## 2021-06-14 PROCEDURE — 4040F PNEUMOC VAC/ADMIN/RCVD: CPT | Performed by: PHYSICAL MEDICINE & REHABILITATION

## 2021-06-14 RX ORDER — IBUPROFEN 200 MG
200 TABLET ORAL EVERY 6 HOURS PRN
COMMUNITY
End: 2021-07-27

## 2021-06-14 ASSESSMENT — ENCOUNTER SYMPTOMS
EYES NEGATIVE: 1
NAUSEA: 0
RESPIRATORY NEGATIVE: 1
BACK PAIN: 1
CONSTIPATION: 0
ALLERGIC/IMMUNOLOGIC NEGATIVE: 1

## 2021-06-14 NOTE — PATIENT INSTRUCTIONS
[address]  Telephone [phone]  Fax [phone]      PROCEDURE INSTRUCTIONS FOR  PAIN MANAGEMENT PROCEDURES WITHOUT IV SEDATION    Pricila Presume scheduled to see Dr. Barber Cardoza to undergo the following procedure:  Right L4-5 and L5-S1 Facet joint injection     Procedure Date: ____7/8/21____  Jennifer Bernal will receive a phone call the day prior to your procedure to confirm a time of arrival.    Report to the [place] [id number], Registration office on the 1st floor in the hospital, after check in and signing of paper work you will then go to the second floor to the surgery center. 1. Stop the following medications prior to the procedure:    NONE    2. Take all routine medications unless otherwise instructed. Ok to take vitamins and antiinflammatory medications    3. EATING & DRINKING:  Ok to eat or drink before the injection - no IV sedation will be used. 4. If you are allergic to contrast or iodine, you must take benadryl and prednisone prior to the injection to prevent an allergic reaction. Follow the directions on the prescription for the times to take the medication. 5. Oral valium can be prescribed if your are anxious about the injections or feel that you can not lay still during the injection. If you take valium, you must have a . Make arrangements for a family member or friend to drive you to the surgery center. Your ride must stay in the hospital while you are having the injection done. If they cannot stay, the injection will be rescheduled. The valium may affect your judgment following the procedure and driving a vehicle within 24 hours after the sedation could be dangerous. 6.  Wear simple loose clothing, which can be easily changed. 7.  Leave jewelry (including rings) and other valuables at home.      8.  You will be asked to sign several forms prior to surgery; patients under the age of 25 must have a parent or legal guardian sign the permit to be able to do the procedure. 9.  You must have finished any antibiotic prescribed for recent infections. If required, please take pre-procedure antibiotic or other pre-procedure medications as instructed. 10. Bring inhalers and pain medications with you to your procedure. 11. Bring your MRI/CT films if they were done outside of the [facility]. 12. If you should develop a cold, sore throat, cough, fever or other new indication of illness or infection, or are started on antibiotics within 2 weeks of the scheduled procedure, please notify the [facility] office as early as possible at [phone]. If calling after 4:30pm the day prior to your scheduled procedure please contact [phone] and Leave a Voice Message.

## 2021-07-07 DIAGNOSIS — G89.29 CHRONIC BILATERAL LOW BACK PAIN WITH LEFT-SIDED SCIATICA: ICD-10-CM

## 2021-07-07 DIAGNOSIS — M54.42 CHRONIC BILATERAL LOW BACK PAIN WITH LEFT-SIDED SCIATICA: ICD-10-CM

## 2021-07-07 RX ORDER — HYDROCODONE BITARTRATE AND ACETAMINOPHEN 5; 325 MG/1; MG/1
1 TABLET ORAL 2 TIMES DAILY
Qty: 60 TABLET | Refills: 0 | Status: SHIPPED | OUTPATIENT
Start: 2021-07-07 | End: 2021-08-13 | Stop reason: SDUPTHER

## 2021-07-08 ENCOUNTER — HOSPITAL ENCOUNTER (OUTPATIENT)
Age: 76
Setting detail: OUTPATIENT SURGERY
Discharge: HOME | End: 2021-07-08
Attending: PHYSICAL MEDICINE & REHABILITATION | Admitting: PHYSICAL MEDICINE & REHABILITATION
Payer: MEDICARE

## 2021-07-08 ENCOUNTER — APPOINTMENT (OUTPATIENT)
Dept: GENERAL RADIOLOGY | Age: 76
End: 2021-07-08
Attending: PHYSICAL MEDICINE & REHABILITATION
Payer: MEDICARE

## 2021-07-08 VITALS
TEMPERATURE: 98.1 F | HEART RATE: 62 BPM | OXYGEN SATURATION: 96 % | DIASTOLIC BLOOD PRESSURE: 77 MMHG | RESPIRATION RATE: 18 BRPM | SYSTOLIC BLOOD PRESSURE: 159 MMHG | WEIGHT: 214 LBS | BODY MASS INDEX: 31.7 KG/M2 | HEIGHT: 69 IN

## 2021-07-08 PROBLEM — M54.06 PANNICULITIS INVOLVING LUMBAR REGION: Status: ACTIVE | Noted: 2021-07-08

## 2021-07-08 PROBLEM — M47.816 LUMBAR FACET JOINT SYNDROME: Status: ACTIVE | Noted: 2021-07-08

## 2021-07-08 PROCEDURE — 2709999900 HC NON-CHARGEABLE SUPPLY: Performed by: PHYSICAL MEDICINE & REHABILITATION

## 2021-07-08 PROCEDURE — 2500000003 HC RX 250 WO HCPCS: Performed by: PHYSICAL MEDICINE & REHABILITATION

## 2021-07-08 PROCEDURE — 7100000010 HC PHASE II RECOVERY - FIRST 15 MIN: Performed by: PHYSICAL MEDICINE & REHABILITATION

## 2021-07-08 PROCEDURE — 3209999900 FLUORO FOR SURGICAL PROCEDURES

## 2021-07-08 PROCEDURE — 64493 INJ PARAVERT F JNT L/S 1 LEV: CPT | Performed by: PHYSICAL MEDICINE & REHABILITATION

## 2021-07-08 PROCEDURE — 64494 INJ PARAVERT F JNT L/S 2 LEV: CPT | Performed by: PHYSICAL MEDICINE & REHABILITATION

## 2021-07-08 PROCEDURE — 3600000056 HC PAIN LEVEL 4 BASE: Performed by: PHYSICAL MEDICINE & REHABILITATION

## 2021-07-08 PROCEDURE — 6360000002 HC RX W HCPCS: Performed by: PHYSICAL MEDICINE & REHABILITATION

## 2021-07-08 PROCEDURE — 3600000057 HC PAIN LEVEL 4 ADDL 15 MIN: Performed by: PHYSICAL MEDICINE & REHABILITATION

## 2021-07-08 PROCEDURE — 6360000004 HC RX CONTRAST MEDICATION: Performed by: PHYSICAL MEDICINE & REHABILITATION

## 2021-07-08 RX ORDER — DEXAMETHASONE SODIUM PHOSPHATE 10 MG/ML
INJECTION INTRAMUSCULAR; INTRAVENOUS PRN
Status: DISCONTINUED | OUTPATIENT
Start: 2021-07-08 | End: 2021-07-08 | Stop reason: ALTCHOICE

## 2021-07-08 RX ORDER — LIDOCAINE HYDROCHLORIDE 20 MG/ML
INJECTION, SOLUTION EPIDURAL; INFILTRATION; INTRACAUDAL; PERINEURAL PRN
Status: DISCONTINUED | OUTPATIENT
Start: 2021-07-08 | End: 2021-07-08 | Stop reason: ALTCHOICE

## 2021-07-08 ASSESSMENT — PAIN SCALES - GENERAL: PAINLEVEL_OUTOF10: 0

## 2021-07-08 ASSESSMENT — PAIN - FUNCTIONAL ASSESSMENT: PAIN_FUNCTIONAL_ASSESSMENT: 0-10

## 2021-07-08 NOTE — OP NOTE
ZYGAPOPHYSEAL JOINT INJECTION    7/8/21  The patient was counseled at length about the risks of litzy Covid-19 during their perioperative period and any recovery window from their procedure. The patient was made aware that litzy Covid-19  may worsen their prognosis for recovering from their procedure  and lend to a higher morbidity and/or mortality risk. All material risks, benefits, and reasonable alternatives including postponing the procedure were discussed. The patient does wish to proceed with the procedure at this time. Surgeon: Lamont Jacobs MD    Pre-operative Diagnosis:   Patient Active Problem List   Diagnosis Code    Hyperlipemia E78.5    RAFAT on CPAP G47.33, Z99.89    Anxiety F41.9    Spinal stenosis, lumbar region, with neurogenic claudication M48.062    Degeneration of lumbar or lumbosacral intervertebral disc M51.37    CKD (chronic kidney disease) N18.9    SCC (squamous cell carcinoma) C44.92    Claustrophobia F40.240    Depression F32.9    Hearing loss H91.90    Essential (primary) hypertension I10    Agoraphobia F40.00    Mass in neck R22.1    Left foot drop M21.372    Lumbar disc herniation M51.26    Flat back syndrome, postprocedural M40.30    Back pain M54.9    Postlaminectomy syndrome, lumbar region M96.1    Major depressive disorder in full remission ([address] F32.5    Lumbar facet joint syndrome M47.816    Panniculitis involving lumbar region M54.06       Post-operative Diagnosis: Same    INDICATION:  Previous treatment and examination findings are noted in the H&P and previous clinic notes.  Bilateral L4-5 5-S1 facet joint injections have been requested for diagnostic and therapeutic reasons. Conservative treatment was ineffective i.e.: ice, NSAIDS, rest, narcotic medication, chiropractic care, physical therapy and message therapy.     Patient is unable to perform the following ADL's: ambulating, grooming, sitting and standing    Pain Assessment: 0-10  Pain Level: 7        Pain Location: Back       Last Plain films: 2020      EXAMINATION:   B L4-5 5-S1 facet joint injections with arthrography. CONSENT:  Written consent was obtained from the patient on preprinted consent form after explaining the procedure, indications, potential complications and outcomes.  Alternative treatments were also discussed. DISCUSSION:  The patient was sterilely prepped and draped in the usual fashion in the prone position.  Time out\" was verified for correct patient, side, level and procedure. SEDATION:  No conscious sedation was performed during the procedure.  The patient remained awake and conversed throughout the procedure.  The patient underwent pulse oximetry and blood pressure monitoring independently by a trained observer, as well as by a physician. PROCEDURE[de-identified]  Under image-intensifier control, a standard technique was employed, a 22 gauge needle x 5 inch spinal needle was guided successfully to cannulate the BL4-5 5-S1 zygapophyseal joint via a posterior lateral approach. Instillation of .5 mL of Omnipaque 240 contrast medium demonstrated contiguous flow into the joint and the joint capsule. No vascular spread was noted. Digital subtraction was not employed to evaluate for vascular spread.] The patient was monitored for any untoward reaction to contrast medium before proceeding with procedure #2. Dexamethasone (Decadron 10 mg/mL) was injected into each joint. A total of 4 joints were injected. PROVOCATION: The patient did not report pain reproduction in a concordant distribution upon capsular distention of the lumbar facet joints from the contrast injection. ARTHROGRAPHY: The lumbar facet arthrogram revealed contrast in the joint space in the superior and inferior recesses; no contrast extravasation.     The patient tolerated the procedure well and without complications and was noted to be in stable condition prior to discharge from the procedure

## 2021-07-08 NOTE — H&P
<table width=\"672\" cellpadding=\"0\" cellspacing=\"0\" style=\"border-collapse: collapse; border: none; \"><tr><td align=\"left\" valign=\"top\" width=\"672\" style=\"border: solid #[id number] 0px;\"><div align=\"left\"><table width=\"281\" cellpadding=\"0\" cellspacing=\"0\" style=\"border-collapse: collapse; border: none; \"><tr><td colspan=\"3\" align=\"left\" valign=\"top\" width=\"281\" style=\"border: solid #[id number] 0px;\"><div align=\"left\"><div align=\"left\"><font face=\"English\"><span style=\" font-size:11pt\">Kun Bradley MD</span></font></div></div></td></tr><tr><td colspan=\"3\" align=\"left\" valign=\"top\" width=\"281\" style=\"border: solid #[id number] 0px;\"><div align=\"left\"><font face=\"English\"><span style=\" font-size:11pt\">Physician</span></font></div></td></tr><tr><td colspan=\"3\" align=\"left\" valign=\"top\" width=\"281\" style=\"border: solid #[id number] 0px;\"><div align=\"left\"><font face=\"English\"><span style=\" font-size:11pt\">Specialty:&nbsp; Physical Medicine and Rehab</span></font></div></td></tr></table><table width=\"180\" cellpadding=\"0\" cellspacing=\"0\" style=\"border-collapse: collapse; border: none; \"><tr><td align=\"left\" valign=\"top\" width=\"180\" style=\"border: solid #[id number] 0px;\"><div align=\"left\"><font face=\"English\"><span style=\" font-size:11pt\">Progress Notes &nbsp; </span></font></div></td></tr><tr><td align=\"left\" valign=\"top\" width=\"180\" style=\"border: solid #[id number] 0px;\"><div align=\"left\"><font face=\"English\"><span style=\" font-size:11pt\">Signed</span></font></div></td></tr></table><table width=\"230\" cellpadding=\"0\" cellspacing=\"0\" style=\"border-collapse: collapse; border: none; \"><tr><td colspan=\"2\" align=\"left\" valign=\"top\" width=\"230\" style=\"border: solid #[id number] 0px;\"><div align=\"left\"><font face=\"English\"><span style=\" font-size:11pt\">Encounter Date:&nbsp; 6/14/2021</span></font></div></td></tr><tr><td colspan=\"2\" align=\"left\" valign=\"top\" width=\"230\" style=\"border: solid #[id number] 0px;\"><div align=\"left\"><span style=\"font-size: 11pt;\">&nbsp;</span></div></td></tr></table><div align=\"left\">&nbsp;</div></div></td></tr><tr><td align=\"left\" valign=\"top\" width=\"672\" style=\"border: solid #[id number] 0px;\"><div align=\"left\"><font face=\"[place]\"><span style=\" font-size:11pt\">Related encounter: Office Visit from 6/14/2021 in [address]  [facility] of [facility]</span></font></div></td></tr><tr><td align=\"left\" valign=\"top\" width=\"672\" style=\"border: solid #[id number] 0px;\"><div align=\"left\"><span style=\"font-size: 11pt;\">&nbsp;</span></div></td></tr><tr><td align=\"left\" valign=\"top\" width=\"672\" style=\"border: solid #[id number] 0px;\"><div align=\"left\"><span style=\"font-size: 11pt;\">&nbsp;</span></div></td></tr></table><table width=\"677\" cellpadding=\"0\" cellspacing=\"0\" style=\"border-collapse: collapse; border: none; \"><tr><td colspan=\"2\" align=\"left\" valign=\"top\" width=\"677\" style=\"border: solid #[id number] 0px;\"><div align=\"left\"><table width=\"675\" cellpadding=\"0\" cellspacing=\"0\" style=\"border-collapse: collapse; border: none; \"><tr><td align=\"left\" valign=\"top\" width=\"675\" style=\"border: solid #[id number] 0px;\"><div align=\"left\"><table width=\"76\" cellpadding=\"0\" cellspacing=\"0\" style=\"border-collapse: collapse; border: none; \"><tr><td align=\"left\" valign=\"top\" width=\"69\" style=\"border: solid #[id number] 0px;\"><div align=\"left\"><font face=\"[place]\"><span style=\" font-size:11pt\"><b>Signed </b></span></font></div></td><td align=\"left\" valign=\"top\" width=\"7\" style=\"border: solid #[id number] 0px;\"><div align=\"left\"><span style=\"font-size: 11pt;\">&nbsp;</span></div></td></tr></table><div align=\"left\">&nbsp;</div></div></td></tr><tr><td align=\"left\" valign=\"top\" width=\"677\" style=\"border: solid #885486 0px;\"><div align=\"left\"><table width=\"672\" cellpadding=\"0\" cellspacing=\"0\" style=\"border-collapse: collapse; border: none; \"><tr><td align=\"left\" valign=\"top\" width=\"672\" style=\"border: solid #[id number] 0px;\"><a href=\"\"><font face=\"[place]\" color=\"#0000ff\"><span style=\" font-size:11pt\"><u>Expand AllCollapse All</u></span></font></a><font face=\"[place]\"><span style=\" font-size:11pt\"> </span></font><table width=\"672\" cellpadding=\"0\" cellspacing=\"0\" style=\"border-collapse: collapse; border: none; \"><tr><td align=\"left\" valign=\"top\" width=\"672\" style=\"border: solid #[id number] 0px;\"><div style=\"border-top: solid 1px #[id number]; border-bottom: solid 1px #[id number]; border-left: solid 1px #[id number]; border-right: solid 1px #[id number]; \"><div align=\"left\" style=\"margin-left:1mm; margin-right:0mm; text-indent:0mm; margin-top:0.00mm; margin-bottom:0.00mm;\"><div style=\"border-top: solid 1px #[id number]; border-bottom: solid 1px #486492; border-left: solid 1px #178569; border-right: solid 1px #[id number]; \"><div align=\"left\" style=\"margin-left:1mm; margin-right:0mm; text-indent:0mm; margin-top:0.00mm; margin-bottom:0.00mm;\"><img src=\"[email or web address]-[email or web address]. [email or web address]/Assets/Common/Base/Images/Controls/ReportViewer/AttributionExpanded. png\" width=\"1\" height=\"1\" alt=\"Expand widget button\"/><img src=\"[email or web address]. [email or web address]/Common/Base/Images/Controls/ReportViewer/AttributionCollapsed. png\" width=\"1\" height=\"1\" alt=\"Collapse widget button\"/></div></div><div align=\"left\" style=\"margin-left:4mm; margin-right:0mm; text-indent:0mm; margin-top:0.00mm; margin-bottom:0.00mm;\"><font face=\"'[place]\" color=\"#[id number]\" size=\"2\"><span style=\" font-size:10pt\">Show:</span></font><a href=\"\"><font face=\"'[place]\" color=\"#0000ff\" size=\"2\"><span style=\" font-size:10pt\"><u>Clear all</u></span></font></a></div><div align=\"left\" style=\"margin-left:19mm; margin-right:0mm; text-indent:0mm; margin-top:4.75mm; margin-bottom:4.75mm;\"><input type=\"checkbox\" checked=\"checked\"/><font face=\"'[place]\" size=\"2\"><span style=\" font-size:10pt\">Manual</span></font><input type=\"checkbox\" checked=\"checked\"/><font face=\"'[place]\" size=\"2\"><span style=\" font-size:10pt\">Template</span></font><input type=\"checkbox\"/><font face=\"'[place]\" size=\"2\"><span style=\" font-size:10pt\">Copied</span></font></div><div align=\"left\" style=\"margin-left:3mm; margin-right:0mm; text-indent:0mm; margin-top:0.00mm; margin-bottom:0.00mm;\"><font face=\"'[place]\" size=\"2\"><span style=\" font-size:10pt\"><br /></span></font></div><div align=\"left\" style=\"margin-left:4mm; margin-right:0mm; text-indent:0mm; margin-top:2.11mm; margin-bottom:0.00mm;\"><font face=\"'[place]\" color=\"#[id number]\" size=\"2\"><span style=\" font-size:10pt\">Added by:</span></font></div><div align=\"left\" style=\"margin-left:5mm; margin-right:0mm; text-indent:0mm; margin-top:0.00mm; margin-bottom:0.00mm;\"><input type=\"checkbox\" checked=\"checked\"/><font face=\"'[place]\" size=\"2\"><span style=\" font-size:10pt\">Kun Bradley MD</span></font></div><div align=\"left\" style=\"margin-left:3mm; margin-right:0mm; text-indent:0mm; margin-top:4.75mm; margin-bottom:4.75mm;\"><font face=\"'[place]\" size=\"2\"><span style=\" font-size:10pt\"><br /></span></font></div><div align=\"left\" style=\"margin-left:8mm; margin-right:0mm; text-indent:0mm; margin-top:0.00mm; margin-bottom:0.00mm;\"><input type=\"checkbox\"/><font face=\"'[place]\" size=\"2\"><span style=\" font-size:10pt\">[place] for details</span></font><[facility] src=\"[email or web address]. org/[email or web address]/Assets/Common/Base/Images/Controls/ReportViewer/tool. png\" width=\"1\" height=\"1\" alt=\"customization button\"/></div><div align=\"left\">t /14/2021 2:16 PM<font face=\"[place]\"><span style=\" font-size:11pt\"><b>PAIN MANAGEMENT FOLLOW-UP NOTE</b></span></font></div><div align=\"left\"><font face=\"[place]\"><span style=\" font-size:11pt\"><b>6/14/21</b></span></font></div><div align=\"left\"><font face=\"[place]\"><span style=\" font-size:11pt\">&nbsp;</span></font></div><div align=\"left\"><font face=\"[place]\"><span style=\" font-size:11pt\"><b>CHIEF COMPLAINT: </b></span></font><font face=\"[place]\"><span style=\" font-size:11pt\">This is a76 y.o.&nbsp;male&nbsp;patientwho returns [facility] the [facility] with a history of Back Pain (2 month, follow up from injection, radiate to dar legs)</span></font></div><div align=\"left\"><font face=\"[place]\"><span style=\" font-size:11pt\">&nbsp;</span></font></div><div align=\"left\"><font face=\"[place]\"><span style=\" font-size:11pt\">&nbsp;</span></font></div><div align=\"left\"><font face=\"[place]\"><span style=\" font-size:11pt\"><b>PAIN HPI:</b></span></font><font face=\"[place]\"><span style=\" font-size:11pt\">Max Candelaria&nbsp;returns today for &nbsp;reevaluation. &nbsp;Since the visit, the patient reports that the pain is not changed. &nbsp;</span></font></div><div align=\"left\"><font face=\"[place]\"><span style=\" font-size:11pt\">Still &nbsp;Pain in B lumbar &nbsp; Not better</span></font></div><div align=\"left\"><font face=\"[place]\"><span style=\" font-size:11pt\">&nbsp;worse when wakes up &nbsp;From sleep </span></font></div><div align=\"left\"><font face=\"[place]\"><span style=\" font-size:11pt\">&nbsp;can &nbsp;Ride a bicycle &nbsp; But cant walk or stand </span></font></div><div align=\"left\"><font face=\"[place]\"><span style=\" font-size:11pt\"><b>&nbsp;</b></span></font></div><div align=\"left\"><font face=\"[place]\"><span style=\" font-size:11pt\"><b>Location:</b></span></font><font face=\"[place]\"><span style=\" font-size:11pt\">&nbsp;Back</span></font></div><div align=\"left\"><font face=\"[place]\"><span style=\" font-size:11pt\"><b>Location Modifier:&nbsp;</b></span></font><font face=\"[place]\"><span style=\" font-size:11pt\">entire back</span></font></div><div align=\"left\"><font face=\"[place]\"><span style=\" font-size:11pt\"><b>Severity of Pain:</b></span></font><font face=\"[place]\"><span style=\" font-size:11pt\">&nbsp;3</span></font></div><div align=\"left\"><font face=\"[place]\"><span style=\" font-size:11pt\"><b>Duration of Pain:</b></span></font><font face=\"[place]\"><span style=\" font-size:11pt\">&nbsp; Intermittent</span></font></div><div align=\"left\"><font face=\"[place]\"><span style=\" font-size:11pt\"><b>Frequency of Pain:&nbsp;</b></span></font><font face=\"[place]\"><span style=\" font-size:11pt\">Intermittent</span></font></div><div align=\"left\"><font face=\"[place]\"><span style=\" font-size:11pt\"><b>Aggravating Factors:</b></span></font><font face=\"[place]\"><span style=\" font-size:11pt\">&nbsp;-</span></font></div><div align=\"left\"><font face=\"[place]\"><span style=\" font-size:11pt\"><b>Limiting Behavior:</b></span></font><font face=\"[place]\"><span style=\" font-size:11pt\">&nbsp;Standing&nbsp;</span></font></div><div align=\"left\"><font face=\"[place]\"><span style=\" font-size:11pt\"><b>Relieving Factors:</b></span></font><font face=\"[place]\"><span style=\" font-size:11pt\">&nbsp;relaxation</span></font></div><div align=\"left\"><font face=\"[place]\"><span style=\" font-size:11pt\"><b>&nbsp;</b></span></font></div><div align=\"left\"><font face=\"[place]\"><span style=\" font-size:11pt\">&nbsp;</span></font></div><div align=\"left\"><font face=\"[place]\"><span style=\" font-size:11pt\"><b>&nbsp;</b></span></font></div><div align=\"left\"><font face=\"[place]\"><span style=\" font-size:11pt\"><b>Previous pain medication trials have included: </b></span></font></div><div align=\"left\"><font face=\"[place]\"><span style=\" font-size:11pt\">&nbsp;&nbsp;&nbsp;&nbsp;&nbsp;&nbsp;&nbsp;&nbsp;&nbsp;&nbsp;&nbsp;&nbsp;</span></font></div><div align=\"left\"><font face=\"[place]\"><span style=\" font-size:11pt\"><br />&nbsp;</span></font></div><div align=\"left\"><font face=\"[place]\"><span style=\" font-size:11pt\"><b>Mental health: </b></span></font></div><div align=\"left\"><font face=\"[place]\"><span style=\" font-size:11pt\">&nbsp;&nbsp;&nbsp;&nbsp;&nbsp;&nbsp;&nbsp;&nbsp;&nbsp;&nbsp;&nbsp;&nbsp;Patient feels -&nbsp;secondary to their current pain problems as described above. </span></font></div><div align=\"left\"><font face=\"[place]\"><span style=\" font-size:11pt\">&nbsp;&nbsp;&nbsp;&nbsp;&nbsp;&nbsp;&nbsp;&nbsp;&nbsp;&nbsp;&nbsp;&nbsp;H/O depression and anxiety: No</span></font></div><div align=\"left\"><font face=\"[place]\"><span style=\" font-size:11pt\">&nbsp;&nbsp;&nbsp;&nbsp;&nbsp;&nbsp;&nbsp;&nbsp;&nbsp;&nbsp;&nbsp;&nbsp;Patient is not&nbsp;seeing psychologist orpsychiatrist</span></font></div><div align=\"left\"><font face=\"[place]\"><span style=\" font-size:11pt\">&nbsp;&nbsp;&nbsp;&nbsp;&nbsp;&nbsp;&nbsp;&nbsp;&nbsp;&nbsp;&nbsp;&nbsp;Abuse history? No</span></font></div><div align=\"left\"><font face=\"[place]\"><span style=\" font-size:11pt\">&nbsp;</span></font></div><div align=\"left\"><font face=\"[place]\"><span style=\" font-size:11pt\">Employed? No</span></font></div><div align=\"left\"><font face=\"[place]\"><span style=\" font-size:11pt\"><b>&nbsp;</b></span></font></div><div align=\"left\"><font face=\"arial\"><span style=\" font-size:11pt\"><b><u>ANALGESIA: </u></b></span></font></div><div align=\"left\"><font face=\"[place]\"><span style=\" font-size:11pt\"><b>Are your Current Pain medication (s) helping to decrease pain?</b></span></font><font face=\"[place]\"><span style=\" font-size:11pt\">&nbsp;&nbsp;No. </span></font></div><div align=\"left\"><font face=\"[place]\"><span style=\" font-size:11pt\"><b>Current Pain score:</b></span></font><font face=\"[place]\"><span style=\" font-size:11pt\">&nbsp;Pain Score: &nbsp;&nbsp;8</span></font></div><div align=\"left\"><font face=\"[place]\"><span style=\" font-size:11pt\">&nbsp;</span></font></div><div align=\"left\"><font face=\"arial\"><span style=\" font-size:11pt\"><b><u>ADVERSE AFFECTS: </u></b></span></font></div><div align=\"left\"><font face=\"[place]\"><span style=\" font-size:11pt\"><b>Medication Side Effects</b></span></font><font face=\"[place]\"><span style=\" font-size:11pt\">:&nbsp;No.</span></font></div><div align=\"left\"><font face=\"[place]\"><span style=\" font-size:11pt\">&nbsp;</span></font></div><div align=\"left\"><font face=\"arial\"><span style=\" font-size:11pt\"><b><u>ACTIVITY:</u></b></span></font></div><div align=\"left\"><font face=\"[place]\"><span style=\" font-size:11pt\"><b>Are you able to be more active with your pain medications?</b></span></font><font face=\"[place]\"><span style=\" font-size:11pt\">No</span></font><font face=\"[place]\"><span style=\" font-size:11pt\"><b>&nbsp;</b></span></font></div><div align=\"left\"><font face=\"[place]\"><span style=\" font-size:11pt\">&nbsp;</span></font></div><div align=\"left\"><font face=\"arial\"><span style=\" font-size:11pt\"><b><u>ABERRANT BEHAVIORS SINCE LAST VISIT?&nbsp;</u></b></span></font><font face=\"[place]\"><span style=\" font-size:11pt\">No</span></font></div><div align=\"left\"><font face=\"[place]\"><span style=\" font-size:11pt\">&nbsp;</span></font></div><div align=\"left\"><font face=\"[place]\"><span style=\" font-size:11pt\">Review of Systems </span></font></div><div align=\"left\"><font face=\"[place]\"><span style=\" font-size:11pt\">Constitutional: Positive for&nbsp;</span></font><font face=\"arial\" color=\"#[id number]\"><span style=\" font-size:11pt\">fatigue</span></font><font face=\"[place]\"><span style=\" font-size:11pt\">. </span></font></div><div align=\"left\"><font face=\"[place]\"><span style=\" font-size:11pt\">HENT:&nbsp;Negative. &nbsp;</span></font></div><div align=\"left\"><font face=\"[place]\"><span style=\" font-size:11pt\">Eyes:&nbsp;Negative. &nbsp;</span></font></div><div align=\"left\"><font face=\"[place]\"><span style=\" font-size:11pt\">Respiratory:&nbsp;Negative. &nbsp;</span></font></div><div align=\"left\"><font face=\"[place]\"><span style=\" font-size:11pt\">Cardiovascular:&nbsp;Negative. &nbsp;</span></font></div><div align=\"left\"><font face=\"[place]\"><span style=\" font-size:11pt\">Gastrointestinal: Negative for&nbsp;constipation&nbsp;and nausea.  </span></font></div><div align=\"left\"><font face=\"[place]\"><span style=\" font-size:11pt\">Endocrine:&nbsp;Negative. &nbsp;</span></font></div><div align=\"left\"><font face=\"[place]\"><span style=\" font-size:11pt\">Genitourinary: Negative for&nbsp;difficulty urinating. </span></font></div><div align=\"left\"><font face=\"[place]\"><span style=\" font-size:11pt\">Musculoskeletal: Positive for&nbsp;</span></font><font face=\"arial\" color=\"#[id number]\"><span style=\" font-size:11pt\">arthralgias</span></font><font face=\"[place]\"><span style=\" font-size:11pt\">,&nbsp;</span></font><font face=\"arial\" color=\"#[id number]\"><span style=\" font-size:11pt\">back pain</span></font><font face=\"[place]\"><span style=\" font-size:11pt\">&nbsp;and </span></font><font face=\"arial\" color=\"#[id number]\"><span style=\" font-size:11pt\">myalgias</span></font><font face=\"[place]\"><span style=\" font-size:11pt\">. </span></font></div><div align=\"left\"><font face=\"[place]\"><span style=\" font-size:11pt\">Skin:&nbsp;Negative. &nbsp;</span></font></div><div align=\"left\"><font face=\"[place]\"><span style=\" font-size:11pt\">Allergic/Immunologic:&nbsp;Negative. &nbsp;</span></font></div><div align=\"left\"><font face=\"[place]\"><span style=\" font-size:11pt\">Neurological: Positive for&nbsp;</span></font><font face=\"arial\" color=\"#[id number]\"><span style=\" font-size:11pt\">weakness</span></font><font face=\"[place]\"><span style=\" font-size:11pt\">&nbsp;and </span></font><font face=\"arial\" color=\"#[id number]\"><span style=\" font-size:11pt\">numbness</span></font><font face=\"[place]\"><span style=\" font-size:11pt\">. </span></font></div><div align=\"left\"><font face=\"[place]\"><span style=\" font-size:11pt\">Hematological:&nbsp;Negative. &nbsp;</span></font></div><div align=\"left\"><font face=\"[place]\"><span style=\" font-size:11pt\">Psychiatric/Behavioral: Positive for&nbsp;</span></font><font face=\"arial\" color=\"#[id number]\"><span style=\" font-size:11pt\">sleep disturbance</span></font><font face=\"[place]\"><span style=\" font-size:11pt\">.&nbsp;</span></font></div><div align=\"left\"><font face=\"[place]\"><span style=\" font-size:11pt\">All other systems reviewed and are negative. </span></font></div><div align=\"left\"><font face=\"[place]\"><span style=\" font-size:11pt\">&nbsp;</span></font></div><div align=\"left\"><font face=\"[place]\"><span style=\" font-size:11pt\">&nbsp;</span></font></div><table width=\"672\" cellpadding=\"0\" cellspacing=\"0\" style=\"border-collapse: collapse; border: none; \"><tr><td align=\"left\" valign=\"middle\" width=\"20\" style=\"border: solid #[id number] 0px;\"><div align=\"left\"><span style=\"font-size: 11pt;\">&nbsp;</span><div align=\"left\">&nbsp;</div></div></td><td align=\"left\" valign=\"middle\" width=\"20\" style=\"border: solid #[id number] 0px;\"><div align=\"left\"><span style=\"font-size: 11pt;\">&nbsp;</span></div></td><td align=\"left\" valign=\"middle\" width=\"316\" style=\"border: solid #[id number] 0px;\"><div align=\"left\"><span style=\"font-size: 11pt;\">&nbsp;</span></div></td><td align=\"left\" valign=\"middle\" width=\"316\" style=\"border: solid #[id number] 0px;\"><div align=\"left\"><span style=\"font-size: 11pt;\">&nbsp;</span></div></td></tr><tr><td colspan=\"4\" align=\"left\" valign=\"top\" width=\"672\" style=\"border: solid #[id number] 0px;\"><div align=\"left\" style=\"margin-left:0mm; margin-right:2mm; text-indent:0mm; margin-top:0.00mm; margin-bottom:0.00mm;\"><font face=\"arial\" color=\"#[id number]\"><span style=\" font-size:11pt\"><b>Allergies</b></span></font></div></td></tr><tr><td colspan=\"3\" align=\"left\" valign=\"top\" width=\"356\" bgcolor=\"#eeeeee\" style=\"border: solid #[id number] 0px;\"><div align=\"left\" style=\"margin-left:0mm; margin-right:2mm; text-indent:0mm; margin-top:0.00mm; margin-bottom:0.00mm;\"><font face=\"arial\" color=\"#[id number]\"><span style=\" font-size:11pt\">Allergen</span></font></div></td><td align=\"left\" valign=\"top\" width=\"316\" bgcolor=\"#eeeeee\" style=\"border: solid #[id number] 0px;\"><div align=\"left\" style=\"margin-left:0mm; margin-right:2mm; text-indent:0mm; margin-top:0.00mm; margin-bottom:0.00mm;\"><font face=\"arial\" color=\"#[id number]\"><span style=\" font-size:11pt\">Reactions</span></font></div></td></tr><tr><td align=\"center\" valign=\"top\" width=\"20\" style=\"border: solid #[id number] 0px;\"><div align=\"center\"><font face=\"[place]\"><span style=\" font-size:11pt\">&bull;</span></font></div></td><td colspan=\"2\" align=\"left\" valign=\"top\" width=\"336\" style=\"border: solid #[id number] 0px;\"><div align=\"left\" style=\"margin-left:0mm; margin-right:2mm; text-indent:0mm; margin-top:0.00mm; margin-bottom:0.00mm;\"><font face=\"[place]\"><span style=\" font-size:11pt\">Atarax [Hydroxyzine]</span></font></div></td><td align=\"left\" valign=\"top\" width=\"316\" style=\"border: solid #[id number] 0px;\"><div align=\"left\"><font face=\"[place]\"><span style=\" font-size:11pt\">&nbsp;</span></font></div></td></tr><tr><td align=\"center\" valign=\"top\" width=\"20\" style=\"border: solid #[id number] 0px;\"><div align=\"center\"><font face=\"[place]\"><span style=\" font-size:11pt\">&nbsp;</span></font></div></td><td align=\"center\" valign=\"top\" width=\"20\" style=\"border: solid #[id number] 0px;\"><div align=\"center\"><font face=\"[place]\"><span style=\" font-size:11pt\">&nbsp;</span></font></div></td><td colspan=\"2\" align=\"left\" valign=\"top\" width=\"632\" style=\"border: solid #[id number] 0px;\"><div align=\"left\" style=\"margin-left:0mm; margin-right:2mm; text-indent:0mm; margin-top:0.00mm; margin-bottom:0.00mm;\"><font face=\"arial\"><span style=\" font-size:11pt\"><i>Double vision</i></span></font></div></td></tr><tr><td align=\"center\" valign=\"top\" width=\"20\" style=\"border: solid #[id number] 0px;\"><div align=\"center\"><font face=\"[place]\"><span style=\" font-size:11pt\">&bull;</span></font></div></td><td colspan=\"2\" align=\"left\" valign=\"top\" width=\"336\" style=\"border: solid #[id number] 0px;\"><div align=\"left\" style=\"margin-left:0mm; margin-right:2mm; text-indent:0mm; margin-top:0.00mm; margin-bottom:0.00mm;\"><font face=\"[place]\"><span style=\" font-size:11pt\">Gabapentin</span></font></div></td><td align=\"left\" valign=\"top\" width=\"316\" style=\"border: solid #[id number] 0px;\"><div align=\"left\"><font face=\"[place]\"><span style=\" font-size:11pt\">&nbsp;</span></font></div></td></tr><tr><td align=\"center\" valign=\"top\" width=\"20\" style=\"border: solid #[id number] 0px;\"><div align=\"center\"><font face=\"[place]\"><span style=\" font-size:11pt\">&nbsp;</span></font></div></td><td align=\"center\" valign=\"top\" width=\"20\" style=\"border: solid #[id number] 0px;\"><div align=\"center\"><font face=\"[place]\"><span style=\" font-size:11pt\">&nbsp;</span></font></div></td><td colspan=\"2\" align=\"left\" valign=\"top\" width=\"632\" style=\"border: solid #[id number] 0px;\"><div align=\"left\" style=\"margin-left:0mm; margin-right:2mm; text-indent:0mm; margin-top:0.00mm; margin-bottom:0.00mm;\"><font face=\"arial\"><span style=\" font-size:11pt\"><i>dizzy</i></span></font></div></td></tr><tr><td align=\"center\" valign=\"top\" width=\"20\" style=\"border: solid #[id number] 0px;\"><div align=\"center\"><font face=\"[place]\"><span style=\" font-size:11pt\">&bull;</span></font></div></td><td colspan=\"2\" align=\"left\" valign=\"top\" width=\"336\" style=\"border: solid #[id number] 0px;\"><div align=\"left\" style=\"margin-left:0mm; margin-right:2mm; text-indent:0mm; margin-top:0.00mm; margin-bottom:0.00mm;\"><font face=\"[place]\"><span style=\" font-size:11pt\">Phenergan [Promethazine Hcl]</span></font></div></td><td align=\"left\" valign=\"top\" width=\"316\" style=\"border: solid #[id number] 0px;\"><div align=\"left\" style=\"margin-left:0mm; margin-right:2mm; text-indent:0mm; margin-top:0.00mm; margin-bottom:0.00mm;\"><font face=\"[place]\"><span style=\" font-size:11pt\">Other (See Comments)</span></font></div></td></tr><tr><td align=\"center\" valign=\"top\" width=\"20\" style=\"border: solid #[id number] 0px;\"><div align=\"center\"><font face=\"[place]\"><span style=\" font-size:11pt\">&nbsp;</span></font></div></td><td align=\"center\" valign=\"top\" width=\"20\" style=\"border: solid #[id number] 0px;\"><div align=\"center\"><font face=\"[place]\"><span style=\" font-size:11pt\">&nbsp;</span></font></div></td><td colspan=\"2\" align=\"left\" valign=\"top\" width=\"632\" style=\"border: solid #[id number] 0px;\"><div align=\"left\" style=\"margin-left:0mm; margin-right:2mm; text-indent:0mm; margin-top:0.00mm; margin-bottom:0.00mm;\"><font face=\"arial\"><span style=\" font-size:11pt\"><i>&quot;out of it&quot;</i></span></font></div></td></tr><tr><td align=\"center\" valign=\"top\" width=\"20\" style=\"border: solid #[id number] 0px;\"><div align=\"center\"><font face=\"[place]\"><span style=\" font-size:11pt\">&bull;</span></font></div></td><td colspan=\"2\" align=\"left\" valign=\"top\" width=\"336\" style=\"border: solid #[id number] 0px;\"><div align=\"left\" style=\"margin-left:0mm; margin-right:2mm; text-indent:0mm; margin-top:0.00mm; margin-bottom:0.00mm;\"><font face=\"[place]\"><span style=\" font-size:11pt\">Morphine And Related</span></font></div></td><td align=\"left\" valign=\"top\" width=\"316\" style=\"border: solid #[id number] 0px;\"><div align=\"left\" style=\"margin-left:0mm; margin-right:2mm; text-indent:0mm; margin-top:0.00mm; margin-bottom:0.00mm;\"><font face=\"[place]\"><span style=\" font-size:11pt\">Nausea And Vomiting</span></font></div></td></tr><tr><td align=\"center\" valign=\"top\" width=\"20\" style=\"border: solid #[id number] 0px;\"><div align=\"center\"><font face=\"[place]\"><span style=\" font-size:11pt\">&nbsp;</span></font></div></td><td align=\"center\" valign=\"top\" width=\"20\" style=\"border: solid #[id number] 0px;\"><div align=\"center\"><font face=\"[place]\"><span style=\" font-size:11pt\">&nbsp;</span></font></div></td><td colspan=\"2\" align=\"left\" valign=\"top\" width=\"632\" style=\"border: solid #[id number] 0px;\"><div align=\"left\" style=\"margin-left:0mm; margin-right:2mm; text-indent:0mm; margin-top:0.00mm; margin-bottom:0.00mm;\"><font face=\"arial\"><span style=\" font-size:11pt\"><i>hallucinations</i></span></font></div></td></tr></table><div align=\"left\"><font face=\"[place]\"><span style=\" font-size:11pt\">&nbsp;</span></font></div><div align=\"left\"><font face=\"[place]\"><span style=\" font-size:11pt\">&nbsp;</span></font></div><table width=\"336\" cellpadding=\"0\" cellspacing=\"0\" style=\"border-collapse: collapse; border: none; \"><tr><td align=\"left\" valign=\"middle\" width=\"336\" style=\"border: solid #[id number] 0px;\"><div align=\"left\"><div align=\"left\"><font face=\"[place]\"><span style=\" font-size:11pt\">Medications Prior to Visit</span></font><img src=\"C:\ProgramData\Epic\95\TempData\[id number]-[id number]\[id number]_1_1. PNG\" width=\"1\" height=\"1\" alt=\"graphic\"/></div></div></td></tr><tr><td align=\"left\" valign=\"middle\" width=\"336\" style=\"border: solid #[id number] 0px;\"><div align=\"left\"><span style=\"font-size: 11pt;\">&nbsp;</span><table width=\"336\" cellpadding=\"0\" cellspacing=\"0\" style=\"border-collapse: collapse; border: none; \"><tr><td align=\"left\" valign=\"middle\" width=\"15\" style=\"border: solid #[id number] 0px;\"><div align=\"left\"><span style=\"font-size: 11pt;\">&nbsp;</span></div></td><td align=\"left\" valign=\"middle\" width=\"125\" style=\"border: solid #[id number] 0px;\"><div align=\"left\"><span style=\"font-size: 11pt;\">&nbsp;</span></div></td><td align=\"left\" valign=\"middle\" width=\"73\" style=\"border: solid #[id number] 0px;\"><div align=\"left\"><span style=\"font-size: 11pt;\">&nbsp;</span></div></td><td align=\"left\" valign=\"middle\" width=\"76\" style=\"border: solid #[id number] 0px;\"><div align=\"left\"><span style=\"font-size: 11pt;\">&nbsp;</span></div></td><td align=\"left\" valign=\"middle\" width=\"47\" style=\"border: solid #[id number] 0px;\"><div align=\"left\"><span style=\"font-size: 11pt;\">&nbsp;</span></div></td></tr><tr><td colspan=\"5\" align=\"left\" valign=\"top\" width=\"336\" style=\"border: solid #[id number] 0px;\"><div align=\"left\" style=\"margin-left:0mm; margin-right:2mm; text-indent:0mm; margin-top:0.00mm; margin-bottom:0.00mm;\"><font face=\"arial\" color=\"#[id number]\"><span style=\" font-size:11pt\"><b>Outpatient Medications Prior to Visit</b></span></font></div></td></tr><tr><td colspan=\"2\" align=\"left\" valign=\"top\" width=\"140\" bgcolor=\"#eeeeee\" style=\"border: solid #930887 0px;\"><div align=\"left\" style=\"margin-left:0mm; margin-right:2mm; text-indent:0mm; margin-top:0.00mm; margin-bottom:0.00mm;\"><font face=\"arial\" color=\"#[id number]\"><span style=\" font-size:11pt\">Medication</span></font></div></td><td align=\"left\" valign=\"top\" width=\"73\" bgcolor=\"#eeeeee\" style=\"border: solid #921361 0px;\"><div align=\"left\" style=\"margin-left:0mm; margin-right:2mm; text-indent:0mm; margin-top:0.00mm; margin-bottom:0.00mm;\"><font face=\"arial\" color=\"#092770\"><span style=\" font-size:11pt\">Sig</span></font></div></td><td align=\"left\" valign=\"top\" width=\"76\" bgcolor=\"#eeeeee\" style=\"border: solid #101790 0px;\"><div align=\"left\" style=\"margin-left:0mm; margin-right:2mm; text-indent:0mm; margin-top:0.00mm; margin-bottom:0.00mm;\"><font face=\"arial\" color=\"#[id number]\"><span style=\" font-size:11pt\">Dispense</span></font></div></td><td align=\"left\" valign=\"top\" width=\"47\" bgcolor=\"#eeeeee\" style=\"border: solid #778146 0px;\"><div align=\"left\" style=\"margin-left:0mm; margin-right:2mm; text-indent:0mm; margin-top:0.00mm; margin-bottom:0.00mm;\"><font face=\"arial\" color=\"#[id number]\"><span style=\" font-size:11pt\">Refill</span></font></div></td></tr><tr><td align=\"center\" valign=\"top\" width=\"15\" style=\"border: solid #033770 0px;\"><div align=\"center\"><font face=\"[place]\"><span style=\" font-size:11pt\">&bull;</span></font></div></td><td align=\"left\" valign=\"top\" width=\"125\" style=\"border: solid #972794 0px;\"><div align=\"left\" style=\"margin-left:0mm; margin-right:2mm; text-indent:0mm; margin-top:0.00mm; margin-bottom:0.00mm;\"><font face=\"[place]\"><span style=\" font-size:11pt\">ibuprofen (ADVIL;MOTRIN) 200 MG tablet</span></font></div></td><td align=\"left\" valign=\"top\" width=\"73\" style=\"border: solid #[id number] 0px;\"><div align=\"left\" style=\"margin-left:0mm; margin-right:2mm; text-indent:0mm; margin-top:0.00mm; margin-bottom:0.00mm;\"><font face=\"[place]\"><span style=\" font-size:11pt\">Take 200 mg by mouth every 6 hours as needed for Pain</span></font></div></td><td align=\"left\" valign=\"top\" width=\"76\" style=\"border: solid #419419 0px;\"><div align=\"left\"><font face=\"[place]\"><span style=\" font-size:11pt\">&nbsp;</span></font></div></td><td align=\"left\" valign=\"top\" width=\"47\" style=\"border: solid #547968 0px;\"><div align=\"left\"><font face=\"[place]\"><span style=\" font-size:11pt\">&nbsp;</span></font></div></td></tr><tr><td align=\"center\" valign=\"top\" width=\"15\" style=\"border: solid #[id number] 0px;\"><div align=\"center\"><font face=\"[place]\"><span style=\" font-size:11pt\">&bull;</span></font></div></td><td align=\"left\" valign=\"top\" width=\"125\" style=\"border: solid #[id number] 0px;\"><div align=\"left\" style=\"margin-left:0mm; margin-right:2mm; text-indent:0mm; margin-top:0.00mm; margin-bottom:0.00mm;\"><font face=\"[place]\"><span style=\" font-size:11pt\">simvastatin (ZOCOR) 20 MG tablet</span></font></div></td><td align=\"left\" valign=\"top\" width=\"73\" style=\"border: solid #[id number] 0px;\"><div align=\"left\" style=\"margin-left:0mm; margin-right:2mm; text-indent:0mm; margin-top:0.00mm; margin-bottom:0.00mm;\"><font face=\"[place]\"><span style=\" font-size:11pt\">TAKE ONE TABLET BY MOUTH DAILY</span></font></div></td><td align=\"left\" valign=\"top\" width=\"76\" style=\"border: solid #[id number] 0px;\"><div align=\"left\" style=\"margin-left:0mm; margin-right:2mm; text-indent:0mm; margin-top:0.00mm; margin-bottom:0.00mm;\"><font face=\"[place]\"><span style=\" font-size:11pt\">90 tablet</span></font></div></td><td align=\"left\" valign=\"top\" width=\"47\" style=\"border: solid #422753 0px;\"><div align=\"left\" style=\"margin-left:0mm; margin-right:2mm; text-indent:0mm; margin-top:0.00mm; margin-bottom:0.00mm;\"><font face=\"[place]\"><span style=\" font-size:11pt\">1</span></font></div></td></tr><tr><td align=\"center\" valign=\"top\" width=\"15\" style=\"border: solid #[id number] 0px;\"><div align=\"center\"><font face=\"[place]\"><span style=\" font-size:11pt\">&bull;</span></font></div></td><td align=\"left\" valign=\"top\" width=\"125\" style=\"border: solid #[id number] 0px;\"><div align=\"left\" style=\"margin-left:0mm; margin-right:2mm; text-indent:0mm; margin-top:0.00mm; margin-bottom:0.00mm;\"><font face=\"[place]\"><span style=\" font-size:11pt\">HYDROcodone-acetaminophen (NORCO) 5-325 MG per tablet</span></font></div></td><td tablet</span></font></div></td><td align=\"left\" valign=\"top\" width=\"47\" style=\"border: solid #876945 0px;\"><div align=\"left\" style=\"margin-left:0mm; margin-right:2mm; text-indent:0mm; margin-top:0.00mm; margin-bottom:0.00mm;\"><font face=\"[place]\"><span style=\" font-size:11pt\">1</span></font></div></td></tr><tr><td align=\"center\" valign=\"top\" width=\"15\" style=\"border: solid #408392 0px;\"><div align=\"center\"><font face=\"[place]\"><span style=\" font-size:11pt\">&bull;</span></font></div></td><td align=\"left\" valign=\"top\" width=\"125\" style=\"border: solid #067739 0px;\"><div align=\"left\" style=\"margin-left:0mm; margin-right:2mm; text-indent:0mm; margin-top:0.00mm; margin-bottom:0.00mm;\"><font face=\"[place]\"><span style=\" font-size:11pt\">fenofibrate (TRICOR) 145 MG tablet</span></font></div></td><td align=\"left\" valign=\"top\" width=\"73\" style=\"border: solid #[id number] 0px;\"><div align=\"left\" style=\"margin-left:0mm; margin-right:2mm; text-indent:0mm; margin-top:0.00mm; margin-bottom:0.00mm;\"><font face=\"[place]\"><span style=\" font-size:11pt\">TAKE ONE TABLET BY MOUTH DAILY</span></font></div></td><td align=\"left\" valign=\"top\" width=\"76\" style=\"border: solid #407542 0px;\"><div align=\"left\" style=\"margin-left:0mm; margin-right:2mm; text-indent:0mm; margin-top:0.00mm; margin-bottom:0.00mm;\"><font face=\"[place]\"><span style=\" font-size:11pt\">90 tablet</span></font></div></td><td align=\"left\" valign=\"top\" width=\"47\" style=\"border: solid #413234 0px;\"><div align=\"left\" style=\"margin-left:0mm; margin-right:2mm; text-indent:0mm; margin-top:0.00mm; margin-bottom:0.00mm;\"><font face=\"[place]\"><span style=\" font-size:11pt\">1</span></font></div></td></tr><tr><td align=\"center\" valign=\"top\" width=\"15\" style=\"border: solid #856501 0px;\"><div align=\"center\"><font face=\"[place]\"><span style=\" font-size:11pt\">&bull;</span></font></div></td><td align=\"left\" valign=\"top\" width=\"125\" style=\"border: solid #[id number] 0px;\"><div align=\"left\" style=\"margin-left:0mm; margin-right:2mm; text-indent:0mm; margin-top:0.00mm; margin-bottom:0.00mm;\"><font face=\"[place]\"><span style=\" font-size:11pt\">Multiple Vitamin (MULTI VITAMIN DAILY PO)</span></font></div></td><td align=\"left\" valign=\"top\" width=\"73\" style=\"border: solid #091752 0px;\"><div align=\"left\" style=\"margin-left:0mm; margin-right:2mm; text-indent:0mm; margin-top:0.00mm; margin-bottom:0.00mm;\"><font face=\"[place]\"><span style=\" font-size:11pt\">Take by mouth</span></font></div></td><td align=\"left\" valign=\"top\" width=\"76\" style=\"border: solid #[id number] 0px;\"><div align=\"left\"><font face=\"[place]\"><span style=\" font-size:11pt\">&nbsp;</span></font></div></td><td align=\"left\" valign=\"top\" width=\"47\" style=\"border: solid #649056 0px;\"><div align=\"left\"><font face=\"[place]\"><span style=\" font-size:11pt\">&nbsp;</span></font></div></td></tr><tr><td align=\"center\" valign=\"top\" width=\"15\" style=\"border: solid #300859 0px;\"><div align=\"center\"><font face=\"[place]\"><span style=\" font-size:11pt\">&bull;</span></font></div></td><td align=\"left\" valign=\"top\" width=\"125\" style=\"border: solid #641906 0px;\"><div align=\"left\" style=\"margin-left:0mm; margin-right:2mm; text-indent:0mm; margin-top:0.00mm; margin-bottom:0.00mm;\"><font face=\"[place]\"><span style=\" font-size:11pt\">sertraline (ZOLOFT) 100 MG tablet</span></font></div></td><td align=\"left\" valign=\"top\" width=\"73\" style=\"border: solid #[id number] 0px;\"><div align=\"left\" style=\"margin-left:0mm; margin-right:2mm; text-indent:0mm; margin-top:0.00mm; margin-bottom:0.00mm;\"><font face=\"[place]\"><span style=\" font-size:11pt\">TAKE ONE TABLET BY MOUTH DAILY</span></font></div></td><td align=\"left\" valign=\"top\" width=\"76\" style=\"border: solid #[id number] 0px;\"><div align=\"left\" style=\"margin-left:0mm; margin-right:2mm; text-indent:0mm; margin-top:0.00mm; margin-bottom:0.00mm;\"><font face=\"[place]\"><span style=\" font-size:11pt\">90 tablet</span></font></div></td><td align=\"left\" valign=\"top\" width=\"47\" style=\"border: solid #566948 0px;\"><div align=\"left\" style=\"margin-left:0mm; margin-right:2mm; text-indent:0mm; margin-top:0.00mm; margin-bottom:0.00mm;\"><font face=\"[place]\"><span style=\" font-size:11pt\">2</span></font></div></td></tr><tr><td align=\"center\" valign=\"top\" width=\"15\" style=\"border: solid #[id number] 0px;\"><div align=\"center\"><font face=\"[place]\"><span style=\" font-size:11pt\">&bull;</span></font></div></td><td align=\"left\" valign=\"top\" width=\"125\" style=\"border: solid #[id number] 0px;\"><div align=\"left\" style=\"margin-left:0mm; margin-right:2mm; text-indent:0mm; margin-top:0.00mm; margin-bottom:0.00mm;\"><font face=\"[place]\"><span style=\" font-size:11pt\">tamsulosin (FLOMAX) 0.4 MG capsule</span></font></div></td><td align=\"left\" valign=\"top\" width=\"73\" style=\"border: solid #100702 0px;\"><div align=\"left\" style=\"margin-left:0mm; margin-right:2mm; text-indent:0mm; margin-top:0.00mm; margin-bottom:0.00mm;\"><font face=\"[place]\"><span style=\" font-size:11pt\">Take 1 capsule by mouth daily</span></font></div></td><td align=\"left\" valign=\"top\" width=\"76\" style=\"border: solid #621315 0px;\"><div align=\"left\" style=\"margin-left:0mm; margin-right:2mm; text-indent:0mm; margin-top:0.00mm; margin-bottom:0.00mm;\"><font face=\"[place]\"><span style=\" font-size:11pt\">90 capsule</span></font></div></td><td align=\"left\" valign=\"top\" width=\"47\" style=\"border: solid #[id number] 0px;\"><div align=\"left\" style=\"margin-left:0mm; margin-right:2mm; text-indent:0mm; margin-top:0.00mm; margin-bottom:0.00mm;\"><font face=\"[place]\"><span style=\" font-size:11pt\">3</span></font></div></td></tr><tr><td align=\"center\" valign=\"top\" width=\"15\" style=\"border: solid #[id number] 0px;\"><div align=\"center\"><font face=\"[place]\"><span style=\" font-size:11pt\">&bull;</span></font></div></td><td align=\"left\" valign=\"top\" width=\"125\" style=\"border: solid #681983 0px;\"><div align=\"left\" style=\"margin-left:0mm; margin-right:2mm; text-indent:0mm; margin-top:0.00mm; margin-bottom:0.00mm;\"><font face=\"[place]\"><span style=\" font-size:11pt\">aspirin 81 MG tablet</span></font></div></td><td align=\"left\" valign=\"top\" width=\"73\" style=\"border: solid #354934 0px;\"><div align=\"left\" style=\"margin-left:0mm; margin-right:2mm; text-indent:0mm; margin-top:0.00mm; margin-bottom:0.00mm;\"><font face=\"[place]\"><span style=\" font-size:11pt\">Take 81 mg by mouth daily</span></font></div></td><td align=\"left\" valign=\"top\" width=\"76\" style=\"border: solid #465158 0px;\"><div align=\"left\"><font face=\"[place]\"><span style=\" font-size:11pt\">&nbsp;</span></font></div></td><td align=\"left\" valign=\"top\" width=\"47\" style=\"border: solid #[id number] 0px;\"><div align=\"left\"><font face=\"[place]\"><span style=\" font-size:11pt\">&nbsp;</span></font></div></td></tr><tr><td align=\"center\" valign=\"top\" width=\"15\" style=\"border: solid #[id number] 0px;\"><div align=\"center\"><font face=\"[place]\"><span style=\" font-size:11pt\">&bull;</span></font></div></td><td align=\"left\" valign=\"top\" width=\"125\" style=\"border: solid #663265 0px;\"><div align=\"left\" style=\"margin-left:0mm; margin-right:2mm; text-indent:0mm; margin-top:0.00mm; margin-bottom:0.00mm;\"><font face=\"[place]\"><span style=\" font-size:11pt\">acetaminophen (TYLENOL) 500 MG tablet</span></font></div></td><td align=\"left\" valign=\"top\" width=\"73\" style=\"border: solid #760959 0px;\"><div align=\"left\" style=\"margin-left:0mm; margin-right:2mm; text-indent:0mm; margin-top:0.00mm; margin-bottom:0.00mm;\"><font face=\"[place]\"><span style=\" font-size:11pt\">Take 500 mg by mouth every 6 hours as needed.  (Patient not taking: Reported on 6/14/2021)</span></font></div></td><td align=\"left\" valign=\"top\" width=\"76\" style=\"border: solid #[id number] 0px;\"><div align=\"left\"><font face=\"[place]\"><span style=\" font-size:11pt\">&nbsp;</span></font></div></td><td align=\"left\" valign=\"top\" width=\"47\" style=\"border: solid #[id number] 0px;\"><div align=\"left\"><font face=\"[place]\"><span style=\" font-size:11pt\">&nbsp;</span></font></div></td></tr></table><div align=\"left\"><font face=\"[place]\"><span style=\" font-size:11pt\">&nbsp;</span></font></div><table width=\"336\" cellpadding=\"0\" cellspacing=\"0\" style=\"border-collapse: collapse; border: none; \"><tr><td align=\"left\" valign=\"top\" width=\"336\" style=\"border: solid #[id number] 0px;\"><div align=\"left\" style=\"margin-left:0mm; margin-right:2mm; text-indent:0mm; margin-top:0.00mm; margin-bottom:0.00mm;\"><div align=\"left\" style=\"margin-left:0mm; margin-right:2mm; text-indent:0mm; margin-top:0.00mm; margin-bottom:0.00mm;\"><font face=\"[place]\"><span style=\" font-size:11pt\">No facility-administered medications prior to visit. </span></font></div></div></td></tr></table><div align=\"left\">&nbsp;</div></div></td></tr></table><div align=\"left\"><font face=\"[place]\"><span style=\" font-size:11pt\">&nbsp;</span></font></div><div align=\"left\"><font face=\"[place]\"><span style=\" font-size:11pt\">&nbsp;</span></font></div><table width=\"177\" cellpadding=\"0\" cellspacing=\"0\" style=\"border-collapse: collapse; border: none; \"><tr><td align=\"left\" valign=\"middle\" width=\"177\" style=\"border: solid #[id number] 0px;\"><div align=\"left\"><div align=\"left\"><font face=\"[place]\"><span style=\" font-size:11pt\">Past Medical History</span></font><img src=\"C:\ProgramData\Epic\95\TempData\[id number]-[id number]\HTS_1_2. PNG\" width=\"1\" height=\"1\" alt=\"graphic\"/></div></div></td></tr><tr><td align=\"left\" valign=\"middle\" width=\"177\" style=\"border: solid #[id number] 0px;\"><div align=\"left\"><span style=\"font-size: 11pt;\">&nbsp;</span><table width=\"177\" cellpadding=\"0\" cellspacing=\"0\" style=\"border-collapse: collapse; border: none; \"><tr><td align=\"left\" valign=\"middle\" width=\"15\" style=\"border: solid #[id number] 0px;\"><div align=\"left\"><span style=\"font-size: 11pt;\">&nbsp;</span></div></td><td align=\"left\" valign=\"middle\" width=\"116\" style=\"border: solid #[id number] 0px;\"><div align=\"left\"><span style=\"font-size: 11pt;\">&nbsp;</span></div></td><td align=\"left\" valign=\"middle\" width=\"45\" style=\"border: solid #023778 0px;\"><div align=\"left\"><span style=\"font-size: 11pt;\">&nbsp;</span></div></td></tr><tr><td colspan=\"3\" align=\"left\" valign=\"top\" width=\"177\" style=\"border: solid #998396 0px;\"><div align=\"left\" style=\"margin-left:0mm; margin-right:2mm; text-indent:0mm; margin-top:0.00mm; margin-bottom:0.00mm;\"><font face=\"arial\" color=\"#[id number]\"><span style=\" font-size:11pt\"><b>Past Medical History:</b></span></font></div></td></tr><tr><td colspan=\"2\" align=\"left\" valign=\"top\" width=\"132\" bgcolor=\"#eeeeee\" style=\"border: solid #994379 0px;\"><div align=\"left\" style=\"margin-left:0mm; margin-right:2mm; text-indent:0mm; margin-top:0.00mm; margin-bottom:0.00mm;\"><font face=\"arial\" color=\"#093571\"><span style=\" font-size:11pt\">Diagnosis</span></font></div></td><td align=\"left\" valign=\"top\" width=\"45\" bgcolor=\"#eeeeee\" style=\"border: solid #614841 0px;\"><div align=\"left\" style=\"margin-left:0mm; margin-right:2mm; text-indent:0mm; margin-top:0.00mm; margin-bottom:0.00mm;\"><font face=\"arial\" color=\"#[id number]\"><span style=\" font-size:11pt\">Date</span></font></div></td></tr><tr><td align=\"center\" valign=\"top\" width=\"15\" style=\"border: solid #[id number] 0px;\"><div align=\"center\"><font face=\"[place]\"><span style=\" font-size:11pt\">&bull;</span></font></div></td><td align=\"left\" valign=\"top\" width=\"116\" style=\"border: solid #[id number] 0px;\"><div align=\"left\" style=\"margin-left:0mm; margin-right:2mm; text-indent:0mm; margin-top:0.00mm; margin-bottom:0.00mm;\"><font face=\"[place]\"><span style=\" font-size:11pt\">Agoraphobia</span></font></div></td><td align=\"left\" valign=\"top\" width=\"45\" style=\"border: solid #778685 0px;\"><div align=\"left\"><font face=\"[place]\"><span style=\" font-size:11pt\">&nbsp;</span></font></div></td></tr><tr><td align=\"center\" valign=\"top\" width=\"15\" style=\"border: solid #[id number] 0px;\"><div align=\"center\"><font face=\"[place]\"><span style=\" font-size:11pt\">&bull;</span></font></div></td><td align=\"left\" valign=\"top\" width=\"116\" style=\"border: solid #199482 0px;\"><div align=\"left\" style=\"margin-left:0mm; margin-right:2mm; text-indent:0mm; margin-top:0.00mm; margin-bottom:0.00mm;\"><font face=\"[place]\"><span style=\" font-size:11pt\">Anxiety</span></font></div></td><td align=\"left\" valign=\"top\" width=\"45\" style=\"border: solid #760594 0px;\"><div align=\"left\"><font face=\"[place]\"><span style=\" font-size:11pt\">&nbsp;</span></font></div></td></tr><tr><td align=\"center\" valign=\"top\" width=\"15\" style=\"border: solid #157945 0px;\"><div align=\"center\"><font face=\"[place]\"><span style=\" font-size:11pt\">&bull;</span></font></div></td><td align=\"left\" valign=\"top\" width=\"116\" style=\"border: solid #832852 0px;\"><div align=\"left\" style=\"margin-left:0mm; margin-right:2mm; text-indent:0mm; margin-top:0.00mm; margin-bottom:0.00mm;\"><font face=\"[place]\"><span style=\" font-size:11pt\">Arthritis</span></font></div></td><td align=\"left\" valign=\"top\" width=\"45\" style=\"border: solid #804829 0px;\"><div align=\"left\"><font face=\"[place]\"><span style=\" font-size:11pt\">&nbsp;</span></font></div></td></tr><tr><td align=\"center\" valign=\"top\" width=\"15\" style=\"border: solid #888913 0px;\"><div align=\"center\"><font face=\"[place]\"><span style=\" font-size:11pt\">&bull;</span></font></div></td><td align=\"left\" valign=\"top\" width=\"116\" style=\"border: solid #778580 0px;\"><div align=\"left\" style=\"margin-left:0mm; margin-right:2mm; text-indent:0mm; margin-top:0.00mm; margin-bottom:0.00mm;\"><font face=\"[place]\"><span style=\" font-size:11pt\">Basal cell carcinoma</span></font></div></td><td align=\"left\" valign=\"top\" width=\"45\" style=\"border: solid #852956 0px;\"><div align=\"left\"><font face=\"[place]\"><span style=\" font-size:11pt\">&nbsp;</span></font></div></td></tr><tr><td align=\"center\" valign=\"top\" width=\"15\" style=\"border: solid #[id number] 0px;\"><div align=\"center\"><font face=\"[place]\"><span style=\" font-size:11pt\">&bull;</span></font></div></td><td align=\"left\" valign=\"top\" width=\"116\" style=\"border: solid #[id number] 0px;\"><div align=\"left\" style=\"margin-left:0mm; margin-right:2mm; text-indent:0mm; margin-top:0.00mm; margin-bottom:0.00mm;\"><font face=\"[place]\"><span style=\" font-size:11pt\">Chronic pain</span></font></div></td><td align=\"left\" valign=\"top\" width=\"45\" style=\"border: solid #[id number] 0px;\"><div align=\"left\"><font face=\"[place]\"><span style=\" font-size:11pt\">&nbsp;</span></font></div></td></tr><tr><td align=\"center\" valign=\"top\" width=\"15\" style=\"border: solid #[id number] 0px;\"><div align=\"center\"><font face=\"[place]\"><span style=\" font-size:11pt\">&nbsp;</span></font></div></td><td colspan=\"2\" align=\"left\" valign=\"top\" width=\"162\" style=\"border: solid #[id number] 0px;\"><div align=\"left\" style=\"margin-left:0mm; margin-right:2mm; text-indent:0mm; margin-top:0.00mm; margin-bottom:0.00mm;\"><font face=\"arial\"><span style=\" font-size:11pt\"><i>back</i></span></font></div></td></tr><tr><td align=\"center\" valign=\"top\" width=\"15\" style=\"border: solid #[id number] 0px;\"><div align=\"center\"><font face=\"[place]\"><span style=\" font-size:11pt\">&bull;</span></font></div></td><td align=\"left\" valign=\"top\" width=\"116\" style=\"border: solid #[id number] 0px;\"><div align=\"left\" style=\"margin-left:0mm; margin-right:2mm; text-indent:0mm; margin-top:0.00mm; margin-bottom:0.00mm;\"><font face=\"[place]\"><span style=\" font-size:11pt\">CKD (chronic kidney disease)</span></font></div></td><td align=\"left\" valign=\"top\" width=\"45\" style=\"border: solid #[id number] 0px;\"><div align=\"left\"><font face=\"[place]\"><span style=\" font-size:11pt\">&nbsp;</span></font></div></td></tr><tr><td align=\"center\" valign=\"top\" width=\"15\" style=\"border: solid #[id number] 0px;\"><div align=\"center\"><font face=\"[place]\"><span style=\" font-size:11pt\">&bull;</span></font></div></td><td align=\"left\" valign=\"top\" width=\"116\" style=\"border: solid #[id number] 0px;\"><div align=\"left\" style=\"margin-left:0mm; margin-right:2mm; text-indent:0mm; margin-top:0.00mm; margin-bottom:0.00mm;\"><font face=\"[place]\"><span style=\" font-size:11pt\">Claustrophobia</span></font></div></td><td align=\"left\" valign=\"top\" width=\"45\" style=\"border: solid #[id number] 0px;\"><div align=\"left\"><font face=\"[place]\"><span style=\" font-size:11pt\">&nbsp;</span></font></div></td></tr><tr><td align=\"center\" valign=\"top\" width=\"15\" style=\"border: solid #[id number] 0px;\"><div align=\"center\"><font face=\"[place]\"><span style=\" font-size:11pt\">&bull;</span></font></div></td><td align=\"left\" valign=\"top\" width=\"116\" style=\"border: solid #[id number] 0px;\"><div align=\"left\" style=\"margin-left:0mm; margin-right:2mm; text-indent:0mm; margin-top:0.00mm; margin-bottom:0.00mm;\"><font face=\"[place]\"><span style=\" font-size:11pt\">Enlarged lymph node</span></font></div></td><td align=\"left\" valign=\"top\" width=\"45\" style=\"border: solid #[id number] 0px;\"><div align=\"left\"><font face=\"[place]\"><span style=\" font-size:11pt\">&nbsp;</span></font></div></td></tr><tr><td align=\"center\" valign=\"top\" width=\"15\" style=\"border: solid #[id number] 0px;\"><div align=\"center\"><font face=\"[place]\"><span style=\" font-size:11pt\">&nbsp;</span></font></div></td><td colspan=\"2\" align=\"left\" valign=\"top\" width=\"162\" style=\"border: solid #[id number] 0px;\"><div align=\"left\" style=\"margin-left:0mm; margin-right:2mm; text-indent:0mm; margin-top:0.00mm; margin-bottom:0.00mm;\"><font face=\"arial\"><span style=\" font-size:11pt\"><i>rt.  lower neck.</i></span></font></div></td></tr><tr><td align=\"center\" valign=\"top\" width=\"15\" style=\"border: solid #[id number] 0px;\"><div align=\"center\"><font face=\"[place]\"><span style=\" font-size:11pt\">&bull;</span></font></div></td><td align=\"left\" valign=\"top\" width=\"116\" style=\"border: solid #[id number] 0px;\"><div align=\"left\" style=\"margin-left:0mm; margin-right:2mm; text-indent:0mm; margin-top:0.00mm; margin-bottom:0.00mm;\"><font face=\"[place]\"><span style=\" font-size:11pt\">Hearing aid worn</span></font></div></td><td align=\"left\" valign=\"top\" width=\"45\" style=\"border: solid #[id number] 0px;\"><div align=\"left\"><font face=\"[place]\"><span style=\" font-size:11pt\">&nbsp;</span></font></div></td></tr><tr><td align=\"center\" valign=\"top\" width=\"15\" style=\"border: solid #[id number] 0px;\"><div align=\"center\"><font face=\"[place]\"><span style=\" font-size:11pt\">&nbsp;</span></font></div></td><td colspan=\"2\" align=\"left\" valign=\"top\" width=\"162\" style=\"border: solid #[id number] 0px;\"><div align=\"left\" style=\"margin-left:0mm; margin-right:2mm; text-indent:0mm; margin-top:0.00mm; margin-bottom:0.00mm;\"><font face=\"arial\"><span style=\" font-size:11pt\"><i>dar. ears. </i></span></font></div></td></tr><tr><td align=\"center\" valign=\"top\" width=\"15\" style=\"border: solid #[id number] 0px;\"><div align=\"center\"><font face=\"[place]\"><span style=\" font-size:11pt\">&bull;</span></font></div></td><td align=\"left\" valign=\"top\" width=\"116\" style=\"border: solid #[id number] 0px;\"><div align=\"left\" style=\"margin-left:0mm; margin-right:2mm; text-indent:0mm; margin-top:0.00mm; margin-bottom:0.00mm;\"><font face=\"[place]\"><span style=\" font-size:11pt\">Hearing loss</span></font></div></td><td align=\"left\" valign=\"top\" width=\"45\" style=\"border: solid #[id number] 0px;\"><div align=\"left\"><font face=\"[place]\"><span style=\" font-size:11pt\">&nbsp;</span></font></div></td></tr><tr><td align=\"center\" valign=\"top\" width=\"15\" style=\"border: solid #[id number] 0px;\"><div align=\"center\"><font face=\"[place]\"><span style=\" font-size:11pt\">&bull;</span></font></div></td><td align=\"left\" valign=\"top\" width=\"116\" style=\"border: solid #[id number] 0px;\"><div align=\"left\" style=\"margin-left:0mm; margin-right:2mm; text-indent:0mm; margin-top:0.00mm; margin-bottom:0.00mm;\"><font face=\"[place]\"><span style=\" font-size:11pt\">Hyperlipidemia</span></font></div></td><td align=\"left\" valign=\"top\" width=\"45\" style=\"border: solid #[id number] 0px;\"><div align=\"left\"><font face=\"[place]\"><span style=\" font-size:11pt\">&nbsp;</span></font></div></td></tr><tr><td align=\"center\" valign=\"top\" width=\"15\" style=\"border: solid #[id number] 0px;\"><div align=\"center\"><font face=\"[place]\"><span style=\" font-size:11pt\">&bull;</span></font></div></td><td align=\"left\" valign=\"top\" width=\"116\" style=\"border: solid #[id number] 0px;\"><div align=\"left\" style=\"margin-left:0mm; margin-right:2mm; text-indent:0mm; margin-top:0.00mm; margin-bottom:0.00mm;\"><font face=\"[place]\"><span style=\" font-size:11pt\">Hypertension</span></font></div></td><td align=\"left\" valign=\"top\" width=\"45\" style=\"border: solid #[id number] 0px;\"><div align=\"left\"><font face=\"[place]\"><span style=\" font-size:11pt\">&nbsp;</span></font></div></td></tr><tr><td align=\"center\" valign=\"top\" width=\"15\" style=\"border: solid #[id number] 0px;\"><div align=\"center\"><font face=\"[place]\"><span style=\" font-size:11pt\">&bull;</span></font></div></td><td align=\"left\" valign=\"top\" width=\"116\" style=\"border: solid #[id number] 0px;\"><div align=\"left\" style=\"margin-left:0mm; margin-right:2mm; text-indent:0mm; margin-top:0.00mm; margin-bottom:0.00mm;\"><font face=\"[place]\"><span style=\" font-size:11pt\">RAFAT (obstructive sleep apnea)</span></font></div></td><td align=\"left\" valign=\"top\" width=\"45\" style=\"border: solid #[id number] 0px;\"><div align=\"left\"><font face=\"[place]\"><span style=\" font-size:11pt\">&nbsp;</span></font></div></td></tr><tr><td align=\"center\" valign=\"top\" width=\"15\" style=\"border: solid #903401 0px;\"><div align=\"center\"><font face=\"[place]\"><span style=\" font-size:11pt\">&nbsp;</span></font></div></td><td colspan=\"2\" align=\"left\" valign=\"top\" width=\"162\" style=\"border: solid #[id number] 0px;\"><div align=\"left\" style=\"margin-left:0mm; margin-right:2mm; text-indent:0mm; margin-top:0.00mm; margin-bottom:0.00mm;\"><font face=\"arial\"><span style=\" font-size:11pt\"><i>CPAP occasional</i></span></font></div></td></tr><tr><td align=\"center\" valign=\"top\" width=\"15\" style=\"border: solid #[id number] 0px;\"><div align=\"center\"><font face=\"[place]\"><span style=\" font-size:11pt\">&bull;</span></font></div></td><td align=\"left\" valign=\"top\" width=\"116\" style=\"border: solid #[id number] 0px;\"><div align=\"left\" style=\"margin-left:0mm; margin-right:2mm; text-indent:0mm; margin-top:0.00mm; margin-bottom:0.00mm;\"><font face=\"[place]\"><span style=\" font-size:11pt\">PONV (postoperative nausea and vomiting)</span></font></div></td><td align=\"left\" valign=\"top\" width=\"45\" style=\"border: solid #[id number] 0px;\"><div align=\"left\"><font face=\"[place]\"><span style=\" font-size:11pt\">&nbsp;</span></font></div></td></tr><tr><td align=\"center\" valign=\"top\" width=\"15\" style=\"border: solid #[id number] 0px;\"><div align=\"center\"><font face=\"[place]\"><span style=\" font-size:11pt\">&bull;</span></font></div></td><td align=\"left\" valign=\"top\" width=\"116\" style=\"border: solid #[id number] 0px;\"><div align=\"left\" style=\"margin-left:0mm; margin-right:2mm; text-indent:0mm; margin-top:0.00mm; margin-bottom:0.00mm;\"><font face=\"[place]\"><span style=\" font-size:11pt\">Retention of urine</span></font></div></td><td align=\"left\" valign=\"top\" width=\"45\" style=\"border: solid #011510 0px;\"><div align=\"left\"><font face=\"[place]\"><span style=\" font-size:11pt\">&nbsp;</span></font></div></td></tr><tr><td align=\"center\" valign=\"top\" width=\"15\" style=\"border: solid #061030 0px;\"><div align=\"center\"><font face=\"[place]\"><span style=\" font-size:11pt\">&nbsp;</span></font></div></td><td colspan=\"2\" align=\"left\" valign=\"top\" width=\"162\" style=\"border: solid #135942 0px;\"><div align=\"left\" style=\"margin-left:0mm; margin-right:2mm; text-indent:0mm; margin-top:0.00mm; margin-bottom:0.00mm;\"><font face=\"arial\"><span style=\" font-size:11pt\"><i>last 2 surgeries, patient unable to void, home with catheter both times</i></span></font></div></td></tr><tr><td align=\"center\" valign=\"top\" width=\"15\" style=\"border: solid #[id number] 0px;\"><div align=\"center\"><font face=\"[place]\"><span style=\" font-size:11pt\">&bull;</span></font></div></td><td align=\"left\" valign=\"top\" width=\"116\" style=\"border: solid #[id number] 0px;\"><div align=\"left\" style=\"margin-left:0mm; margin-right:2mm; text-indent:0mm; margin-top:0.00mm; margin-bottom:0.00mm;\"><font face=\"[place]\"><span style=\" font-size:11pt\">SCC (squamous cell carcinoma)</span></font></div></td><td align=\"left\" valign=\"top\" width=\"45\" style=\"border: solid #[id number] 0px;\"><div align=\"left\"><font face=\"[place]\"><span style=\" font-size:11pt\">&nbsp;</span></font></div></td></tr><tr><td align=\"center\" valign=\"top\" width=\"15\" style=\"border: solid #[id number] 0px;\"><div align=\"center\"><font face=\"[place]\"><span style=\" font-size:11pt\">&nbsp;</span></font></div></td><td colspan=\"2\" align=\"left\" valign=\"top\" width=\"162\" style=\"border: solid #[id number] 0px;\"><div align=\"left\" style=\"margin-left:0mm; margin-right:2mm; text-indent:0mm; margin-top:0.00mm; margin-bottom:0.00mm;\"><font face=\"arial\"><span style=\" font-size:11pt\"><i>HAND</i></span></font></div></td></tr><tr><td align=\"center\" valign=\"top\" width=\"15\" style=\"border: solid #[id number] 0px;\"><div align=\"center\"><font face=\"[place]\"><span style=\" font-size:11pt\">&bull;</span></font></div></td><td align=\"left\" valign=\"top\" width=\"116\" style=\"border: solid #[id number] 0px;\"><div align=\"left\" style=\"margin-left:0mm; margin-right:2mm; text-indent:0mm; margin-top:0.00mm; margin-bottom:0.00mm;\"><font face=\"[place]\"><span style=\" font-size:11pt\">Spinal stenosis, lumbar region, with neurogenic claudication</span></font></div></td><td align=\"left\" valign=\"top\" width=\"45\" style=\"border: solid #[id number] 0px;\"><div align=\"left\"><font face=\"[place]\"><span style=\" font-size:11pt\">&nbsp;</span></font></div></td></tr><tr><td align=\"center\" valign=\"top\" width=\"15\" style=\"border: solid #[id number] 0px;\"><div align=\"center\"><font face=\"[place]\"><span style=\" font-size:11pt\">&bull;</span></font></div></td><td align=\"left\" valign=\"top\" width=\"116\" style=\"border: solid #935745 0px;\"><div align=\"left\" style=\"margin-left:0mm; margin-right:2mm; text-indent:0mm; margin-top:0.00mm; margin-bottom:0.00mm;\"><font face=\"[place]\"><span style=\" font-size:11pt\">Wears glasses</span></font></div></td><td align=\"left\" valign=\"top\" width=\"45\" style=\"border: solid #126066 0px;\"><div align=\"left\"><font face=\"[place]\"><span style=\" font-size:11pt\">&nbsp;</span></font></div></td></tr></table><div align=\"left\">&nbsp;</div></div></td></tr></table><div align=\"left\"><font face=\"[place]\"><span style=\" font-size:11pt\">&nbsp;</span></font></div><div align=\"left\"><font face=\"[place]\"><span style=\" font-size:11pt\">&nbsp;</span></font></div><table width=\"326\" cellpadding=\"0\" cellspacing=\"0\" style=\"border-collapse: collapse; border: none; \"><tr><td align=\"left\" valign=\"middle\" width=\"326\" style=\"border: solid #[id number] 0px;\"><div align=\"left\"><div align=\"left\"><font face=\"[place]\"><span style=\" font-size:11pt\">Past Surgical History</span></font><img src=\"C:\ProgramData\Epic\95\[id number]. PNG\" width=\"1\" height=\"1\" alt=\"graphic\"/></div></div></td></tr><tr><td align=\"left\" valign=\"middle\" width=\"326\" style=\"border: solid #126804 0px;\"><div align=\"left\"><span style=\"font-size: 11pt;\">&nbsp;</span><table width=\"326\" cellpadding=\"0\" cellspacing=\"0\" style=\"border-collapse: collapse; border: none; \"><tr><td align=\"left\" valign=\"middle\" width=\"15\" style=\"border: solid #277060 0px;\"><div align=\"left\"><span style=\"font-size: 11pt;\">&nbsp;</span></div></td><td align=\"left\" valign=\"middle\" width=\"190\" style=\"border: solid #[id number] 0px;\"><div align=\"left\"><span style=\"font-size: 11pt;\">&nbsp;</span></div></td><td align=\"left\" valign=\"middle\" width=\"74\" style=\"border: solid #582239 0px;\"><div align=\"left\"><span style=\"font-size: 11pt;\">&nbsp;</span></div></td><td align=\"left\" valign=\"middle\" width=\"47\" style=\"border: solid #471252 0px;\"><div align=\"left\"><span style=\"font-size: 11pt;\">&nbsp;</span></div></td></tr><tr><td colspan=\"4\" align=\"left\" valign=\"top\" width=\"326\" style=\"border: solid #989163 0px;\"><div align=\"left\" style=\"margin-left:0mm; margin-right:2mm; text-indent:0mm; margin-top:0.00mm; margin-bottom:0.00mm;\"><font face=\"arial\" color=\"#[id number]\"><span style=\" font-size:11pt\"><b>Past Surgical History:</b></span></font></div></td></tr><tr><td colspan=\"2\" align=\"left\" valign=\"top\" width=\"205\" bgcolor=\"#eeeeee\" style=\"border: solid #[id number] 0px;\"><div align=\"left\" style=\"margin-left:0mm; margin-right:2mm; text-indent:0mm; margin-top:0.00mm; margin-bottom:0.00mm;\"><font face=\"arial\" color=\"#[id number]\"><span style=\" font-size:11pt\">Procedure</span></font></div></td><td align=\"left\" valign=\"top\" width=\"74\" bgcolor=\"#eeeeee\" style=\"border: solid #[id number] 0px;\"><div align=\"left\" style=\"margin-left:0mm; margin-right:2mm; text-indent:0mm; margin-top:0.00mm; margin-bottom:0.00mm;\"><font face=\"arial\" color=\"#[id number]\"><span style=\" font-size:11pt\">Laterality</span></font></div></td><td align=\"left\" valign=\"top\" width=\"47\" bgcolor=\"#eeeeee\" style=\"border: solid #[id number] 0px;\"><div align=\"left\" style=\"margin-left:0mm; margin-right:2mm; text-indent:0mm; margin-top:0.00mm; margin-bottom:0.00mm;\"><font face=\"arial\" color=\"#[id number]\"><span style=\" font-size:11pt\">Date</span></font></div></td></tr><tr><td align=\"center\" valign=\"top\" width=\"15\" style=\"border: solid #736450 0px;\"><div align=\"center\"><font face=\"[place]\"><span style=\" font-size:11pt\">&bull;</span></font></div></td><td align=\"left\" valign=\"top\" width=\"190\" style=\"border: solid #[id number] 0px;\"><div align=\"left\" style=\"margin-left:0mm; margin-right:2mm; text-indent:0mm; margin-top:0.00mm; margin-bottom:0.00mm;\"><font face=\"[place]\"><span style=\" font-size:11pt\">BACK SURGERY</span></font></div></td><td align=\"left\" valign=\"top\" width=\"74\" style=\"border: solid #133692 0px;\"><div align=\"left\"><font face=\"[place]\"><span style=\" font-size:11pt\">&nbsp;</span></font></div></td><td align=\"left\" valign=\"top\" width=\"47\" style=\"border: solid #275414 0px;\"><div align=\"left\" style=\"margin-left:0mm; margin-right:2mm; text-indent:0mm; margin-top:0.00mm; margin-bottom:0.00mm;\"><font face=\"[place]\"><span style=\" font-size:11pt\">10/2013</span></font></div></td></tr><tr><td align=\"center\" valign=\"top\" width=\"15\" style=\"border: solid #141993 0px;\"><div align=\"center\"><font face=\"[place]\"><span style=\" font-size:11pt\">&nbsp;</span></font></div></td><td colspan=\"3\" align=\"left\" valign=\"top\" width=\"310\" style=\"border: solid #257982 0px;\"><div align=\"left\" style=\"margin-left:0mm; margin-right:2mm; text-indent:0mm; margin-top:0.00mm; margin-bottom:0.00mm;\"><font face=\"arial\"><span style=\" font-size:11pt\"><i>Dr. Keenan velazquez</i></span></font></div></td></tr><tr><td align=\"center\" valign=\"top\" width=\"15\" style=\"border: solid #823572 0px;\"><div align=\"center\"><font face=\"[place]\"><span style=\" font-size:11pt\">&bull;</span></font></div></td><td align=\"left\" valign=\"top\" width=\"190\" style=\"border: solid #648984 0px;\"><div align=\"left\" style=\"margin-left:0mm; margin-right:2mm; text-indent:0mm; margin-top:0.00mm; margin-bottom:0.00mm;\"><font face=\"[place]\"><span style=\" font-size:11pt\">COLONOSCOPY</span></font></div></td><td align=\"left\" valign=\"top\" width=\"74\" style=\"border: solid #022614 0px;\"><div align=\"left\"><font face=\"[place]\"><span style=\" font-size:11pt\">&nbsp;</span></font></div></td><td align=\"left\" valign=\"top\" width=\"47\" style=\"border: solid #984253 0px;\"><div align=\"left\" style=\"margin-left:0mm; margin-right:2mm; text-indent:0mm; margin-top:0.00mm; margin-bottom:0.00mm;\"><font face=\"[place]\"><span style=\" font-size:11pt\">06/13/12</span></font></div></td></tr><tr><td align=\"center\" valign=\"top\" width=\"15\" style=\"border: solid #876061 0px;\"><div align=\"center\"><font face=\"[place]\"><span style=\" font-size:11pt\">&nbsp;</span></font></div></td><td colspan=\"3\" align=\"left\" valign=\"top\" width=\"310\" style=\"border: solid #639188 0px;\"><div align=\"left\" style=\"margin-left:0mm; margin-right:2mm; text-indent:0mm; margin-top:0.00mm; margin-bottom:0.00mm;\"><font face=\"arial\"><span style=\" font-size:11pt\"><i>mild diverticular dz</i></span></font></div></td></tr><tr><td align=\"center\" valign=\"top\" width=\"15\" style=\"border: solid #080793 0px;\"><div align=\"center\"><font face=\"[place]\"><span style=\" font-size:11pt\">&bull;</span></font></div></td><td align=\"left\" valign=\"top\" width=\"190\" style=\"border: solid #196390 0px;\"><div align=\"left\" style=\"margin-left:0mm; margin-right:2mm; text-indent:0mm; margin-top:0.00mm; margin-bottom:0.00mm;\"><font face=\"[place]\"><span style=\" font-size:11pt\">LUMBAR FUSION</span></font></div></td><td align=\"left\" valign=\"top\" width=\"74\" style=\"border: solid #457853 0px;\"><div align=\"left\" style=\"margin-left:0mm; margin-right:2mm; text-indent:0mm; margin-top:0.00mm; margin-bottom:0.00mm;\"><font face=\"[place]\"><span style=\" font-size:11pt\">N/A</span></font></div></td><td align=\"left\" valign=\"top\" width=\"47\" style=\"border: solid #866092 0px;\"><div align=\"left\" style=\"margin-left:0mm; margin-right:2mm; text-indent:0mm; margin-top:0.00mm; margin-bottom:0.00mm;\"><font face=\"[place]\"><span style=\" font-size:11pt\">3/8/2017</span></font></div></td></tr><tr><td align=\"center\" valign=\"top\" width=\"15\" style=\"border: solid #[id number] 0px;\"><div align=\"center\"><font face=\"[place]\"><span style=\" font-size:11pt\">&nbsp;</span></font></div></td><td colspan=\"3\" align=\"left\" valign=\"top\" width=\"310\" style=\"border: solid #278024 0px;\"><div align=\"left\" style=\"margin-left:0mm; margin-right:2mm; text-indent:0mm; margin-top:0.00mm; margin-bottom:0.00mm;\"><font face=\"arial\"><span style=\" font-size:11pt\"><i>LUMBAR L2-3 POSTERIOR DECOMPRESSION FUSION INSTRUMENTATION W/REVISION L3-5 POSTERIOR DECOMPRESSION FUSION INSTRUMENTATION (KOKI RAMYBUS &amp;&nbsp;NEUROMONITOR) &nbsp;CC SN/KILEY performed by Maria Luisa Dawson MD at [facility] OR</i></span></font></div></td></tr><tr><td align=\"center\" valign=\"top\" width=\"15\" style=\"border: solid #635687 0px;\"><div align=\"center\"><font face=\"[place]\"><span style=\" font-size:11pt\">&bull;</span></font></div></td><td align=\"left\" valign=\"top\" width=\"190\" style=\"border: solid #503634 0px;\"><div align=\"left\" style=\"margin-left:0mm; margin-right:2mm; text-indent:0mm; margin-top:0.00mm; margin-bottom:0.00mm;\"><font face=\"[place]\"><span style=\" font-size:11pt\">LYMPH NODE BIOPSY</span></font></div></td><td align=\"left\" valign=\"top\" width=\"74\" style=\"border: solid #464473 0px;\"><div align=\"left\" style=\"margin-left:0mm; margin-right:2mm; text-indent:0mm; margin-top:0.00mm; margin-bottom:0.00mm;\"><font face=\"[place]\"><span style=\" font-size:11pt\">Right</span></font></div></td><td align=\"left\" valign=\"top\" width=\"47\" style=\"border: solid #332273 0px;\"><div align=\"left\"><font face=\"[place]\"><span style=\" font-size:11pt\">&nbsp;</span></font></div></td></tr><tr><td align=\"center\" valign=\"top\" width=\"15\" style=\"border: solid #990437 0px;\"><div align=\"center\"><font face=\"[place]\"><span style=\" font-size:11pt\">&nbsp;</span></font></div></td><td colspan=\"3\" align=\"left\" valign=\"top\" width=\"310\" style=\"border: solid #719842 0px;\"><div align=\"left\" style=\"margin-left:0mm; margin-right:2mm; text-indent:0mm; margin-top:0.00mm; margin-bottom:0.00mm;\"><font face=\"arial\"><span style=\" font-size:11pt\"><i>lower neck, 2-</i></span></font></div></td></tr><tr><td align=\"center\" valign=\"top\" width=\"15\" style=\"border: solid #865936 0px;\"><div align=\"center\"><font face=\"[place]\"><span style=\" font-size:11pt\">&bull;</span></font></div></td><td align=\"left\" valign=\"top\" width=\"190\" style=\"border: solid #226904 0px;\"><div align=\"left\" style=\"margin-left:0mm; margin-right:2mm; text-indent:0mm; margin-top:0.00mm; margin-bottom:0.00mm;\"><font face=\"[place]\"><span style=\" font-size:11pt\">PAIN MANAGEMENT PROCEDURE</span></font></div></td><td align=\"left\" valign=\"top\" width=\"74\" style=\"border: solid #056989 0px;\"><div align=\"left\" style=\"margin-left:0mm; margin-right:2mm; text-indent:0mm; margin-top:0.00mm; margin-bottom:0.00mm;\"><font face=\"[place]\"><span style=\" font-size:11pt\">Bilateral</span></font></div></td><td align=\"left\" valign=\"top\" width=\"47\" style=\"border: solid #687167 0px;\"><div align=\"left\" style=\"margin-left:0mm; margin-right:2mm; text-indent:0mm; margin-top:0.00mm; margin-bottom:0.00mm;\"><font face=\"[place]\"><span style=\" font-size:11pt\">3/12/2021</span></font></div></td></tr><tr><td align=\"center\" valign=\"top\" width=\"15\" style=\"border: solid #[id number] 0px;\"><div align=\"center\"><font face=\"[place]\"><span style=\" font-size:11pt\">&nbsp;</span></font></div></td><td colspan=\"3\" align=\"left\" valign=\"top\" width=\"310\" style=\"border: solid #[id number] 0px;\"><div align=\"left\" style=\"margin-left:0mm; margin-right:2mm; text-indent:0mm; margin-top:0.00mm; margin-bottom:0.00mm;\"><font face=\"arial\"><span style=\" font-size:11pt\"><i>Bilateral S1 TRANSFORAMINAL performed by Irina Bettencourt MD at [address] OR</i></span></font></div></td></tr><tr><td align=\"center\" valign=\"top\" width=\"15\" style=\"border: solid #[id number] 0px;\"><div align=\"center\"><font face=\"[place]\"><span style=\" font-size:11pt\">&bull;</span></font></div></td><td align=\"left\" valign=\"top\" width=\"190\" style=\"border: solid #[id number] 0px;\"><div align=\"left\" style=\"margin-left:0mm; margin-right:2mm; text-indent:0mm; margin-top:0.00mm; margin-bottom:0.00mm;\"><font face=\"[place]\"><span style=\" font-size:11pt\">PAIN MANAGEMENT PROCEDURE</span></font></div></td><td align=\"left\" valign=\"top\" width=\"74\" style=\"border: solid #[id number] 0px;\"><div align=\"left\" style=\"margin-left:0mm; margin-right:2mm; text-indent:0mm; margin-top:0.00mm; margin-bottom:0.00mm;\"><font face=\"[place]\"><span style=\" font-size:11pt\">Bilateral</span></font></div></td><td align=\"left\" valign=\"top\" width=\"47\" style=\"border: solid #150398 0px;\"><div align=\"left\" style=\"margin-left:0mm; margin-right:2mm; text-indent:0mm; margin-top:0.00mm; margin-bottom:0.00mm;\"><font face=\"[place]\"><span style=\" font-size:11pt\">3/26/2021</span></font></div></td></tr><tr><td align=\"center\" valign=\"top\" width=\"15\" style=\"border: solid #[id number] 0px;\"><div align=\"center\"><font face=\"[place]\"><span style=\" font-size:11pt\">&nbsp;</span></font></div></td><td colspan=\"3\" align=\"left\" valign=\"top\" width=\"310\" style=\"border: solid #[id number] 0px;\"><div align=\"left\" style=\"margin-left:0mm; margin-right:2mm; text-indent:0mm; margin-top:0.00mm; margin-bottom:0.00mm;\"><font face=\"arial\"><span style=\" font-size:11pt\"><i>Bilateral S1 TRANSFORAMINAL performed by Kasi Doll MD at [facility] [place]</i></span></font></div></td></tr><tr><td align=\"center\" valign=\"top\" width=\"15\" style=\"border: solid #[id number] 0px;\"><div align=\"center\"><font face=\"[place]\"><span style=\" font-size:11pt\">&bull;</span></font></div></td><td align=\"left\" valign=\"top\" width=\"190\" style=\"border: solid #[id number] 0px;\"><div align=\"left\" style=\"margin-left:0mm; margin-right:2mm; text-indent:0mm; margin-top:0.00mm; margin-bottom:0.00mm;\"><font face=\"[place]\"><span style=\" font-size:11pt\">NE ARTHRODESIS POSTERIOR/POSTEROLATERAL THORACIC</span></font></div></td><td align=\"left\" valign=\"top\" width=\"74\" style=\"border: solid #[id number] 0px;\"><div align=\"left\" style=\"margin-left:0mm; margin-right:2mm; text-indent:0mm; margin-top:0.00mm; margin-bottom:0.00mm;\"><font face=\"[place]\"><span style=\" font-size:11pt\">N/A</span></font></div></td><td align=\"left\" valign=\"top\" width=\"47\" style=\"border: solid #[id number] 0px;\"><div align=\"left\" style=\"margin-left:0mm; margin-right:2mm; text-indent:0mm; margin-top:0.00mm; margin-bottom:0.00mm;\"><font face=\"[place]\"><span style=\" font-size:11pt\">1/26/2018</span></font></div></td></tr><tr><td align=\"center\" valign=\"top\" width=\"15\" style=\"border: solid #[id number] 0px;\"><div align=\"center\"><font face=\"[place]\"><span style=\" font-size:11pt\">&nbsp;</span></font></div></td><td colspan=\"3\" align=\"left\" valign=\"top\" width=\"310\" style=\"border: solid #[id number] 0px;\"><div align=\"left\" style=\"margin-left:0mm; margin-right:2mm; text-indent:0mm; margin-top:0.00mm; margin-bottom:0.00mm;\"><font face=\"arial\"><span style=\" font-size:11pt\"><i>THORACIC &amp; LUMBAR POSTERIOR DECOMPRESSION AND FUSION REVISION T9 THRU L5 performed by Arlette Burkitt, MD at [facility] [place]</i></span></font></div></td></tr><tr><td align=\"center\" valign=\"top\" width=\"15\" style=\"border: solid #[id number] 0px;\"><div align=\"center\"><font face=\"[place]\"><span style=\" font-size:11pt\">&bull;</span></font></div></td><td align=\"left\" valign=\"top\" width=\"190\" style=\"border: solid #[id number] 0px;\"><div align=\"left\" style=\"margin-left:0mm; margin-right:2mm; text-indent:0mm; margin-top:0.00mm; margin-bottom:0.00mm;\"><font face=\"[place]\"><span style=\" font-size:11pt\">TX COLON CA SCRN NOT HI RSK IND</span></font></div></td><td align=\"left\" valign=\"top\" width=\"74\" style=\"border: solid #040339 0px;\"><div align=\"left\" style=\"margin-left:0mm; margin-right:2mm; text-indent:0mm; margin-top:0.00mm; margin-bottom:0.00mm;\"><font face=\"[place]\"><span style=\" font-size:11pt\">N/A</span></font></div></td><td align=\"left\" valign=\"top\" width=\"47\" style=\"border: solid #[id number] 0px;\"><div align=\"left\" style=\"margin-left:0mm; margin-right:2mm; text-indent:0mm; margin-top:0.00mm; margin-bottom:0.00mm;\"><font face=\"[place]\"><span style=\" font-size:11pt\">8/23/2017</span></font></div></td></tr><tr><td align=\"center\" valign=\"top\" width=\"15\" style=\"border: solid #[id number] 0px;\"><div align=\"center\"><font face=\"[place]\"><span style=\" font-size:11pt\">&nbsp;</span></font></div></td><td colspan=\"3\" align=\"left\" valign=\"top\" width=\"310\" style=\"border: solid #[id number] 0px;\"><div align=\"left\" style=\"margin-left:0mm; margin-right:2mm; text-indent:0mm; margin-top:0.00mm; margin-bottom:0.00mm;\"><font face=\"arial\"><span style=\" font-size:11pt\"><i>COLONOSCOPY performed by Luigi Garcia MD at [facility] [place]</i></span></font></div></td></tr><tr><td align=\"center\" valign=\"top\" width=\"15\" style=\"border: solid #[id number] 0px;\"><div align=\"center\"><font face=\"[place]\"><span style=\" font-size:11pt\">&bull;</span></font></div></td><td align=\"left\" valign=\"top\" width=\"190\" style=\"border: solid #[id number] 0px;\"><div align=\"left\" style=\"margin-left:0mm; margin-right:2mm; text-indent:0mm; margin-top:0.00mm; margin-bottom:0.00mm;\"><font face=\"[place]\"><span style=\" font-size:11pt\">OH PERCUT IMPLNT NEUROELECT,EPIDURAL</span></font></div></td><td align=\"left\" valign=\"top\" width=\"74\" style=\"border: solid #[id number] 0px;\"><div align=\"left\" style=\"margin-left:0mm; margin-right:2mm; text-indent:0mm; margin-top:0.00mm; margin-bottom:0.00mm;\"><font face=\"[place]\"><span style=\" font-size:11pt\">N/A</span></font></div></td><td align=\"left\" valign=\"top\" width=\"47\" style=\"border: solid #[id number] 0px;\"><div align=\"left\" style=\"margin-left:0mm; margin-right:2mm; text-indent:0mm; margin-top:0.00mm; margin-bottom:0.00mm;\"><font face=\"[place]\"><span style=\" font-size:11pt\">11/14/2018</span></font></div></td></tr><tr><td align=\"center\" valign=\"top\" width=\"15\" style=\"border: solid #[id number] 0px;\"><div align=\"center\"><font face=\"[place]\"><span style=\" font-size:11pt\">&nbsp;</span></font></div></td><td colspan=\"3\" align=\"left\" valign=\"top\" width=\"310\" style=\"border: solid #[id number] 0px;\"><div align=\"left\" style=\"margin-left:0mm; margin-right:2mm; text-indent:0mm; margin-top:0.00mm; margin-bottom:0.00mm;\"><font face=\"arial\"><span style=\" font-size:11pt\"><i>SPINAL CORD STIMULATOR IMPLANT PERMANENT performed by Brock Hernandez MD at [facility] [place]</i></span></font></div></td></tr><tr><td align=\"center\" valign=\"top\" width=\"15\" style=\"border: solid #670842 0px;\"><div align=\"center\"><font face=\"[place]\"><span style=\" font-size:11pt\">&bull;</span></font></div></td><td align=\"left\" valign=\"top\" 4</i></span></font></div></td></tr><tr><td align=\"center\" valign=\"top\" width=\"15\" style=\"border: solid #[id number] 0px;\"><div align=\"center\"><font face=\"[place]\"><span style=\" font-size:11pt\">&bull;</span></font></div></td><td align=\"left\" valign=\"top\" width=\"190\" style=\"border: solid #[id number] 0px;\"><div align=\"left\" style=\"margin-left:0mm; margin-right:2mm; text-indent:0mm; margin-top:0.00mm; margin-bottom:0.00mm;\"><font face=\"[place]\"><span style=\" font-size:11pt\">VASECTOMY</span></font></div></td><td align=\"left\" valign=\"top\" width=\"74\" style=\"border: solid #[id number] 0px;\"><div align=\"left\"><font face=\"[place]\"><span style=\" font-size:11pt\">&nbsp;</span></font></div></td><td align=\"left\" valign=\"top\" width=\"47\" style=\"border: solid #[id number] 0px;\"><div align=\"left\"><font face=\"[place]\"><span style=\" font-size:11pt\">&nbsp;</span></font></div></td></tr></table><div align=\"left\">&nbsp;</div></div></td></tr></table><div align=\"left\"><font face=\"[place]\"><span style=\" font-size:11pt\">&nbsp;</span></font></div><table width=\"208\" cellpadding=\"0\" cellspacing=\"0\" style=\"border-collapse: collapse; border: none; \"><tr><td align=\"left\" valign=\"middle\" width=\"208\" style=\"border: solid #[id number] 0px;\"><div align=\"left\"><div align=\"left\"><font face=\"[place]\"><span style=\" font-size:11pt\">Family History</span></font><img src=\"C:\ProgramData\Epic\95\TempData\[id number]-MPIOK-[id number]\HTS_1_4. PNG\" width=\"1\" height=\"1\" alt=\"graphic\"/></div></div></td></tr><tr><td align=\"left\" valign=\"middle\" width=\"208\" style=\"border: solid #[id number] 0px;\"><div align=\"left\"><span style=\"font-size: 11pt;\">&nbsp;</span><table width=\"208\" cellpadding=\"0\" cellspacing=\"0\" style=\"border-collapse: collapse; border: none; \"><tr><td align=\"left\" valign=\"middle\" width=\"15\" style=\"border: solid #[id number] 0px;\"><div align=\"left\"><span style=\"font-size: 11pt;\">&nbsp;</span></div></td><td align=\"left\" valign=\"middle\" width=\"74\" style=\"border: solid #110558 0px;\"><div align=\"left\"><span style=\"font-size: 11pt;\">&nbsp;</span></div></td><td align=\"left\" valign=\"middle\" width=\"68\" style=\"border: solid #[id number] 0px;\"><div align=\"left\"><span style=\"font-size: 11pt;\">&nbsp;</span></div></td><td align=\"left\" valign=\"middle\" width=\"51\" style=\"border: solid #299182 0px;\"><div align=\"left\"><span style=\"font-size: 11pt;\">&nbsp;</span></div></td></tr><tr><td colspan=\"4\" align=\"left\" valign=\"top\" width=\"208\" style=\"border: solid #[id number] 0px;\"><div align=\"left\" style=\"margin-left:0mm; margin-right:2mm; text-indent:0mm; margin-top:0.00mm; margin-bottom:0.00mm;\"><font face=\"arial\" color=\"#[id number]\"><span style=\" font-size:11pt\"><b>Family History</b></span></font></div></td></tr><tr><td colspan=\"2\" align=\"left\" valign=\"top\" width=\"89\" bgcolor=\"#eeeeee\" style=\"border: solid #[id number] 0px;\"><div align=\"left\" style=\"margin-left:0mm; margin-right:2mm; text-indent:0mm; margin-top:0.00mm; margin-bottom:0.00mm;\"><font face=\"arial\" color=\"#[id number]\"><span style=\" font-size:11pt\">Problem</span></font></div></td><td align=\"left\" valign=\"top\" width=\"68\" bgcolor=\"#eeeeee\" style=\"border: solid #[id number] 0px;\"><div align=\"left\" style=\"margin-left:0mm; margin-right:2mm; text-indent:0mm; margin-top:0.00mm; margin-bottom:0.00mm;\"><font face=\"arial\" color=\"#[id number]\"><span style=\" font-size:11pt\">Relation</span></font></div></td><td align=\"left\" valign=\"top\" width=\"51\" bgcolor=\"#eeeeee\" style=\"border: solid #244807 0px;\"><div align=\"left\" style=\"margin-left:0mm; margin-right:2mm; text-indent:0mm; margin-top:0.00mm; margin-bottom:0.00mm;\"><font face=\"arial\" color=\"#[id number]\"><span style=\" font-size:11pt\">Age of Onset</span></font></div></td></tr><tr><td align=\"center\" valign=\"top\" width=\"15\" style=\"border: solid #[id number] 0px;\"><div align=\"center\"><font face=\"[place]\"><span style=\" font-size:11pt\">&bull;</span></font></div></td><td align=\"left\" valign=\"top\" width=\"74\" style=\"border: solid #[id number] 0px;\"><div align=\"left\" style=\"margin-left:0mm; margin-right:2mm; text-indent:0mm; margin-top:0.00mm; margin-bottom:0.00mm;\"><font face=\"[place]\"><span style=\" font-size:11pt\">Diabetes</span></font></div></td><td align=\"left\" valign=\"top\" width=\"68\" style=\"border: solid #358915 0px;\"><div align=\"left\" style=\"margin-left:0mm; margin-right:2mm; text-indent:0mm; margin-top:0.00mm; margin-bottom:0.00mm;\"><font face=\"[place]\"><span style=\" font-size:11pt\">Mother</span></font></div></td><td align=\"left\" valign=\"top\" width=\"51\" style=\"border: solid #360599 0px;\"><div align=\"left\"><font face=\"[place]\"><span style=\" font-size:11pt\">&nbsp;</span></font></div></td></tr><tr><td align=\"center\" valign=\"top\" width=\"15\" style=\"border: solid #656035 0px;\"><div align=\"center\"><font face=\"[place]\"><span style=\" font-size:11pt\">&bull;</span></font></div></td><td align=\"left\" valign=\"top\" width=\"74\" style=\"border: solid #[id number] 0px;\"><div align=\"left\" style=\"margin-left:0mm; margin-right:2mm; text-indent:0mm; margin-top:0.00mm; margin-bottom:0.00mm;\"><font face=\"[place]\"><span style=\" font-size:11pt\">Diabetes</span></font></div></td><td align=\"left\" valign=\"top\" width=\"68\" style=\"border: solid #778239 0px;\"><div align=\"left\" style=\"margin-left:0mm; margin-right:2mm; text-indent:0mm; margin-top:0.00mm; margin-bottom:0.00mm;\"><font face=\"[place]\"><span style=\" font-size:11pt\">Brother</span></font></div></td><td align=\"left\" valign=\"top\" width=\"51\" style=\"border: solid #095566 0px;\"><div align=\"left\"><font face=\"[place]\"><span style=\" font-size:11pt\">&nbsp;</span></font></div></td></tr><tr><td align=\"center\" valign=\"top\" width=\"15\" style=\"border: solid #072213 0px;\"><div align=\"center\"><font face=\"[place]\"><span style=\" font-size:11pt\">&bull;</span></font></div></td><td align=\"left\" valign=\"top\" width=\"74\" style=\"border: solid #487055 0px;\"><div align=\"left\" style=\"margin-left:0mm; margin-right:2mm; text-indent:0mm; margin-top:0.00mm; margin-bottom:0.00mm;\"><font face=\"[place]\"><span style=\" font-size:11pt\">Cancer</span></font></div></td><td align=\"left\" valign=\"top\" width=\"68\" style=\"border: solid #771216 0px;\"><div align=\"left\" style=\"margin-left:0mm; margin-right:2mm; text-indent:0mm; margin-top:0.00mm; margin-bottom:0.00mm;\"><font face=\"[place]\"><span style=\" font-size:11pt\">Father</span></font></div></td><td align=\"left\" valign=\"top\" width=\"51\" style=\"border: solid #270198 0px;\"><div align=\"left\"><font face=\"[place]\"><span style=\" font-size:11pt\">&nbsp;</span></font></div></td></tr><tr><td align=\"left\" valign=\"top\" width=\"15\" style=\"border: solid #068998 0px;\"><div align=\"left\"><font face=\"[place]\"><span style=\" font-size:11pt\">&nbsp;</span></font></div></td><td colspan=\"3\" align=\"left\" valign=\"top\" width=\"193\" style=\"border: solid #531442 0px;\"><div align=\"left\" style=\"margin-left:0mm; margin-right:2mm; text-indent:0mm; margin-top:0.00mm; margin-bottom:0.00mm;\"><font face=\"arial\"><span style=\" font-size:11pt\"><i>&nbsp;&nbsp;&nbsp;&nbsp;lung cancer</i></span></font></div></td></tr><tr><td align=\"center\" valign=\"top\" width=\"15\" style=\"border: solid #[id number] 0px;\"><div align=\"center\"><font face=\"[place]\"><span style=\" font-size:11pt\">&bull;</span></font></div></td><td align=\"left\" valign=\"top\" width=\"74\" style=\"border: solid #801171 0px;\"><div align=\"left\" style=\"margin-left:0mm; margin-right:2mm; text-indent:0mm; margin-top:0.00mm; margin-bottom:0.00mm;\"><font face=\"[place]\"><span style=\" font-size:11pt\">Cancer</span></font></div></td><td align=\"left\" valign=\"top\" width=\"68\" style=\"border: solid #[id number] 0px;\"><div align=\"left\" style=\"margin-left:0mm; margin-right:2mm; text-indent:0mm; margin-top:0.00mm; margin-bottom:0.00mm;\"><font face=\"[place]\"><span style=\" font-size:11pt\">Brother</span></font></div></td><td align=\"left\" valign=\"top\" width=\"51\" style=\"border: solid #170868 0px;\"><div align=\"left\"><font face=\"[place]\"><span style=\" font-size:11pt\">&nbsp;</span></font></div></td></tr><tr><td align=\"left\" valign=\"top\" width=\"15\" style=\"border: solid #089426 0px;\"><div align=\"left\"><font face=\"[place]\"><span style=\" font-size:11pt\">&nbsp;</span></font></div></td><td colspan=\"3\" align=\"left\" valign=\"top\" width=\"193\" style=\"border: solid #[id number] 0px;\"><div align=\"left\" style=\"margin-left:0mm; margin-right:2mm; text-indent:0mm; margin-top:0.00mm; margin-bottom:0.00mm;\"><font face=\"arial\"><span style=\" font-size:11pt\"><i>&nbsp;&nbsp;&nbsp;&nbsp;lung cancer</i></span></font></div></td></tr><tr><td align=\"center\" valign=\"top\" width=\"15\" style=\"border: solid #[id number] 0px;\"><div align=\"center\"><font face=\"[place]\"><span style=\" font-size:11pt\">&bull;</span></font></div></td><td align=\"left\" valign=\"top\" width=\"74\" style=\"border: solid #451057 0px;\"><div align=\"left\" style=\"margin-left:0mm; margin-right:2mm; text-indent:0mm; margin-top:0.00mm; margin-bottom:0.00mm;\"><font face=\"[place]\"><span style=\" font-size:11pt\">Diabetes</span></font></div></td><td align=\"left\" valign=\"top\" width=\"68\" style=\"border: solid #256149 0px;\"><div align=\"left\" style=\"margin-left:0mm; margin-right:2mm; text-indent:0mm; margin-top:0.00mm; margin-bottom:0.00mm;\"><font face=\"[place]\"><span style=\" font-size:11pt\">Brother</span></font></div></td><td align=\"left\" valign=\"top\" width=\"51\" style=\"border: solid #902007 0px;\"><div align=\"left\"><font face=\"[place]\"><span style=\" font-size:11pt\">&nbsp;</span></font></div></td></tr></table><div align=\"left\">&nbsp;</div></div></td></tr></table><div align=\"left\"><font face=\"[place]\"><span style=\" font-size:11pt\">&nbsp;</span></font></div><table width=\"201\" cellpadding=\"0\" cellspacing=\"0\" style=\"border-collapse: collapse; border: none; \"><tr><td align=\"left\" valign=\"middle\" width=\"201\" style=\"border: solid #[id number] 0px;\"><div align=\"left\"><div align=\"left\"><font face=\"[place]\"><span style=\" font-size:11pt\">Social History</span></font><img src=\"C:\ProgramData\Epic\95\TempData\[id number]\OtrrGZZgvwqThydUekGowd-UUXCK-[id number]\[facility]_1_5. PNG\" width=\"1\" height=\"1\" alt=\"graphic\"/></div></div></td></tr><tr><td align=\"left\" valign=\"middle\" width=\"201\" style=\"border: solid #[id number] 0px;\"><div align=\"left\" style=\"margin-left:0mm; margin-right:2mm; text-indent:0mm; margin-top:0.00mm; margin-bottom:0.00mm;\"><table width=\"191\" cellpadding=\"0\" cellspacing=\"0\" style=\"border-collapse: collapse; border: none; \"><tr><td align=\"left\" valign=\"top\" width=\"191\" style=\"border: solid #863946 0px;\"><div align=\"left\" style=\"margin-left:0mm; margin-right:2mm; text-indent:0mm; margin-top:0.00mm; margin-bottom:0.00mm;\"><font face=\"arial\" color=\"#[id number]\"><span style=\" font-size:11pt\"><b>Social History</b></span></font></div></td></tr></table><div align=\"left\"><font face=\"[place]\"><span style=\" font-size:11pt\">&nbsp;</span></font></div><table width=\"193\" cellpadding=\"0\" cellspacing=\"0\" style=\"border-collapse: collapse; border: none; \"><tr><td align=\"left\" valign=\"middle\" width=\"15\" style=\"border: solid #142734 0px;\"><div align=\"left\"><span style=\"font-size: 11pt;\">&nbsp;</span><div align=\"left\">&nbsp;</div></div></td><td align=\"left\" valign=\"middle\" width=\"19\" style=\"border: solid #612107 0px;\"><div align=\"left\"><span style=\"font-size: 11pt;\">&nbsp;</span></div></td><td align=\"left\" valign=\"middle\" width=\"67\" style=\"border: solid #515751 0px;\"><div align=\"left\"><span style=\"font-size: 11pt;\">&nbsp;</span></div></td><td align=\"left\" valign=\"middle\" width=\"91\" style=\"border: solid #490461 0px;\"><div align=\"left\"><span style=\"font-size: 11pt;\">&nbsp;</span></div></td></tr><tr><td colspan=\"4\" align=\"left\" valign=\"top\" width=\"193\" bgcolor=\"#eeeeee\" style=\"border: solid #676065 0px;\"><div align=\"left\" style=\"margin-left:0mm; margin-right:2mm; text-indent:0mm; margin-top:0.00mm; margin-bottom:0.00mm;\"><font face=\"arial\" color=\"#[id number]\"><span style=\" font-size:11pt\">Socioeconomic History</span></font></div></td></tr><tr><td align=\"center\" valign=\"top\" width=\"15\" style=\"border: solid #[id number] 0px;\"><div align=\"center\"><font face=\"[place]\"><span style=\" font-size:11pt\">&bull;</span></font></div></td><td colspan=\"2\" align=\"left\" valign=\"top\" width=\"86\" style=\"border: solid #079232 0px;\"><div align=\"left\" style=\"margin-left:0mm; margin-right:2mm; text-indent:0mm; margin-top:0.00mm; margin-bottom:0.00mm;\"><font face=\"[place]\"><span style=\" font-size:11pt\">Marital status:</span></font></div></td><td align=\"left\" valign=\"top\" width=\"91\" style=\"border: solid #315119 0px;\"><div align=\"left\" style=\"margin-left:0mm; margin-right:2mm; text-indent:0mm; margin-top:0.00mm; margin-bottom:0.00mm;\"><font face=\"[place]\"><span style=\" font-size:11pt\"></span></font></div></td></tr><tr><td align=\"center\" valign=\"top\" width=\"15\" style=\"border: solid #545149 0px;\"><div align=\"center\"><font face=\"[place]\"><span style=\" font-size:11pt\">&nbsp;</span></font></div></td><td align=\"center\" valign=\"top\" width=\"19\" style=\"border: solid #140240 0px;\"><div align=\"center\"><font face=\"[place]\"><span style=\" font-size:11pt\">&nbsp;</span></font></div></td><td align=\"left\" valign=\"top\" width=\"67\" style=\"border: solid #954791 0px;\"><div align=\"left\" style=\"margin-left:0mm; margin-right:2mm; text-indent:0mm; margin-top:0.00mm; margin-bottom:0.00mm;\"><font face=\"[place]\"><span style=\" font-size:11pt\">Spouse name:</span></font></div></td><td align=\"left\" valign=\"top\" width=\"91\" style=\"border: solid #427159 0px;\"><div align=\"left\" style=\"margin-left:0mm; margin-right:2mm; text-indent:0mm; margin-top:0.00mm; margin-bottom:0.00mm;\"><font face=\"[place]\"><span style=\" font-size:11pt\">None</span></font></div></td></tr><tr><td align=\"center\" valign=\"top\" width=\"15\" style=\"border: solid #[id number] 0px;\"><div align=\"center\"><font face=\"[place]\"><span style=\" font-size:11pt\">&bull;</span></font></div></td><td colspan=\"2\" align=\"left\" valign=\"top\" width=\"86\" style=\"border: solid #450890 0px;\"><div align=\"left\" style=\"margin-left:0mm; margin-right:2mm; text-indent:0mm; margin-top:0.00mm; margin-bottom:0.00mm;\"><font face=\"[place]\"><span style=\" font-size:11pt\">Number of children:</span></font></div></td><td align=\"left\" valign=\"top\" width=\"91\" style=\"border: solid #617359 0px;\"><div align=\"left\" style=\"margin-left:0mm; margin-right:2mm; text-indent:0mm; margin-top:0.00mm; margin-bottom:0.00mm;\"><font face=\"[place]\"><span style=\" font-size:11pt\">None</span></font></div></td></tr><tr><td align=\"center\" valign=\"top\" width=\"15\" style=\"border: solid #776967 0px;\"><div align=\"center\"><font face=\"[place]\"><span style=\" font-size:11pt\">&bull;</span></font></div></td><td colspan=\"2\" align=\"left\" valign=\"top\" width=\"86\" style=\"border: solid #[id number] 0px;\"><div align=\"left\" style=\"margin-left:0mm; margin-right:2mm; text-indent:0mm; margin-top:0.00mm; margin-bottom:0.00mm;\"><font face=\"[place]\"><span style=\" font-size:11pt\">Years of education:</span></font></div></td><td align=\"left\" valign=\"top\" width=\"91\" style=\"border: solid #703876 0px;\"><div align=\"left\" style=\"margin-left:0mm; margin-right:2mm; text-indent:0mm; margin-top:0.00mm; margin-bottom:0.00mm;\"><font face=\"[place]\"><span style=\" font-size:11pt\">None</span></font></div></td></tr><tr><td align=\"center\" valign=\"top\" width=\"15\" style=\"border: solid #[id number] 0px;\"><div align=\"center\"><font face=\"[place]\"><span style=\" font-size:11pt\">&bull;</span></font></div></td><td colspan=\"2\" align=\"left\" valign=\"top\" width=\"86\" style=\"border: solid #313236 0px;\"><div align=\"left\" style=\"margin-left:0mm; margin-right:2mm; text-indent:0mm; margin-top:0.00mm; margin-bottom:0.00mm;\"><font face=\"[place]\"><span style=\" font-size:11pt\">Highest education level:</span></font></div></td><td align=\"left\" valign=\"top\" width=\"91\" style=\"border: solid #680761 0px;\"><div align=\"left\" style=\"margin-left:0mm; margin-right:2mm; text-indent:0mm; margin-top:0.00mm; margin-bottom:0.00mm;\"><font face=\"[place]\"><span style=\" font-size:11pt\">None</span></font></div></td></tr><tr><td colspan=\"4\" align=\"left\" valign=\"top\" width=\"193\" bgcolor=\"#eeeeee\" style=\"border: solid #[id number] 0px;\"><div align=\"left\" style=\"margin-left:0mm; margin-right:2mm; text-indent:0mm; margin-top:0.00mm; margin-bottom:0.00mm;\"><font face=\"arial\" color=\"#[id number]\"><span style=\" font-size:11pt\">Occupational History</span></font></div></td></tr><tr><td align=\"center\" valign=\"top\" width=\"15\" style=\"border: solid #[id number] 0px;\"><div align=\"center\"><font face=\"[place]\"><span style=\" font-size:11pt\">&bull;</span></font></div></td><td colspan=\"3\" align=\"left\" valign=\"top\" width=\"177\" style=\"border: solid #[id number] 0px;\"><div align=\"left\" style=\"margin-left:0mm; margin-right:2mm; text-indent:0mm; margin-top:0.00mm; margin-bottom:0.00mm;\"><font face=\"[place]\"><span style=\" font-size:11pt\">None</span></font></div></td></tr><tr><td colspan=\"4\" align=\"left\" valign=\"top\" width=\"193\" bgcolor=\"#eeeeee\" style=\"border: solid #569110 0px;\"><div align=\"left\" style=\"margin-left:0mm; margin-right:2mm; text-indent:0mm; margin-top:0.00mm; margin-bottom:0.00mm;\"><font face=\"arial\" color=\"#[id number]\"><span style=\" font-size:11pt\">Tobacco Use</span></font></div></td></tr><tr><td align=\"center\" valign=\"top\" width=\"15\" style=\"border: solid #061635 0px;\"><div align=\"center\"><font face=\"[place]\"><span style=\" font-size:11pt\">&bull;</span></font></div></td><td colspan=\"2\" align=\"left\" valign=\"top\" width=\"86\" style=\"border: solid #483446 0px;\"><div align=\"left\" style=\"margin-left:0mm; margin-right:2mm; text-indent:0mm; margin-top:0.00mm; margin-bottom:0.00mm;\"><font face=\"[place]\"><span style=\" font-size:11pt\">Smoking status:</span></font></div></td><td align=\"left\" valign=\"top\" width=\"91\" style=\"border: solid #931759 0px;\"><div align=\"left\" style=\"margin-left:0mm; margin-right:2mm; text-indent:0mm; margin-top:0.00mm; margin-bottom:0.00mm;\"><font face=\"[place]\"><span style=\" font-size:11pt\">Never Smoker</span></font></div></td></tr><tr><td align=\"center\" valign=\"top\" width=\"15\" style=\"border: solid #739606 0px;\"><div align=\"center\"><font face=\"[place]\"><span style=\" font-size:11pt\">&bull;</span></font></div></td><td colspan=\"2\" align=\"left\" valign=\"top\" width=\"86\" style=\"border: solid #567731 0px;\"><div align=\"left\" style=\"margin-left:0mm; margin-right:2mm; text-indent:0mm; margin-top:0.00mm; margin-bottom:0.00mm;\"><font face=\"[place]\"><span style=\" font-size:11pt\">Smokeless tobacco:</span></font></div></td><td align=\"left\" valign=\"top\" width=\"91\" style=\"border: solid #491735 0px;\"><div align=\"left\" style=\"margin-left:0mm; margin-right:2mm; text-indent:0mm; margin-top:0.00mm; margin-bottom:0.00mm;\"><font face=\"[place]\"><span style=\" font-size:11pt\">Never Used</span></font></div></td></tr><tr><td colspan=\"4\" align=\"left\" valign=\"top\" width=\"193\" bgcolor=\"#eeeeee\" style=\"border: solid #498559 0px;\"><div align=\"left\" style=\"margin-left:0mm; margin-right:2mm; text-indent:0mm; margin-top:0.00mm; margin-bottom:0.00mm;\"><font face=\"arial\" color=\"#[id number]\"><span style=\" font-size:11pt\">Vaping Use</span></font></div></td></tr><tr><td align=\"center\" valign=\"top\" width=\"15\" style=\"border: solid #[id number] 0px;\"><div align=\"center\"><font face=\"[place]\"><span style=\" font-size:11pt\">&bull;</span></font></div></td><td colspan=\"2\" align=\"left\" valign=\"top\" width=\"86\" style=\"border: solid #483362 0px;\"><div align=\"left\" style=\"margin-left:0mm; margin-right:2mm; text-indent:0mm; margin-top:0.00mm; margin-bottom:0.00mm;\"><font face=\"[place]\"><span style=\" font-size:11pt\">Vaping Use:</span></font></div></td><td align=\"left\" valign=\"top\" width=\"91\" style=\"border: solid #629614 0px;\"><div align=\"left\" style=\"margin-left:0mm; margin-right:2mm; text-indent:0mm; margin-top:0.00mm; margin-bottom:0.00mm;\"><font face=\"[place]\"><span style=\" font-size:11pt\">Never used</span></font></div></td></tr><tr><td colspan=\"4\" align=\"left\" valign=\"top\" width=\"193\" bgcolor=\"#eeeeee\" style=\"border: solid #143575 0px;\"><div align=\"left\" style=\"margin-left:0mm; margin-right:2mm; text-indent:0mm; margin-top:0.00mm; margin-bottom:0.00mm;\"><font face=\"arial\" color=\"#[id number]\"><span style=\" font-size:11pt\">Substance and Sexual Activity</span></font></div></td></tr><tr><td align=\"center\" valign=\"top\" width=\"15\" style=\"border: solid #378054 0px;\"><div align=\"center\"><font face=\"[place]\"><span style=\" font-size:11pt\">&bull;</span></font></div></td><td colspan=\"2\" align=\"left\" valign=\"top\" width=\"86\" style=\"border: solid #085387 0px;\"><div align=\"left\" style=\"margin-left:0mm; margin-right:2mm; text-indent:0mm; margin-top:0.00mm; margin-bottom:0.00mm;\"><font face=\"[place]\"><span style=\" font-size:11pt\">Alcohol use:</span></font></div></td><td align=\"left\" valign=\"top\" width=\"91\" style=\"border: solid #184001 0px;\"><div align=\"left\" style=\"margin-left:0mm; margin-right:2mm; text-indent:0mm; margin-top:0.00mm; margin-bottom:0.00mm;\"><font face=\"[place]\"><span style=\" font-size:11pt\">No</span></font></div></td></tr><tr><td align=\"center\" valign=\"top\" width=\"15\" style=\"border: solid #613054 0px;\"><div align=\"center\"><font face=\"[place]\"><span style=\" font-size:11pt\">&bull;</span></font></div></td><td colspan=\"2\" align=\"left\" valign=\"top\" width=\"86\" style=\"border: solid #622422 0px;\"><div align=\"left\" style=\"margin-left:0mm; margin-right:2mm; text-indent:0mm; margin-top:0.00mm; margin-bottom:0.00mm;\"><font face=\"[place]\"><span style=\" font-size:11pt\">Drug use:</span></font></div></td><td align=\"left\" valign=\"top\" width=\"91\" style=\"border: solid #776778 0px;\"><div align=\"left\" style=\"margin-left:0mm; margin-right:2mm; text-indent:0mm; margin-top:0.00mm; margin-bottom:0.00mm;\"><font face=\"[place]\"><span style=\" font-size:11pt\">No</span></font></div></td></tr><tr><td align=\"center\" valign=\"top\" width=\"15\" style=\"border: solid #551561 0px;\"><div align=\"center\"><font face=\"[place]\"><span style=\" font-size:11pt\">&bull;</span></font></div></td><td colspan=\"2\" align=\"left\" valign=\"top\" width=\"86\" style=\"border: solid #405848 0px;\"><div align=\"left\" style=\"margin-left:0mm; margin-right:2mm; text-indent:0mm; margin-top:0.00mm; margin-bottom:0.00mm;\"><font face=\"[place]\"><span style=\" font-size:11pt\">Sexual activity:</span></font></div></td><td align=\"left\" valign=\"top\" width=\"91\" style=\"border: solid #889791 0px;\"><div align=\"left\" style=\"margin-left:0mm; margin-right:2mm; text-indent:0mm; margin-top:0.00mm; margin-bottom:0.00mm;\"><font face=\"[place]\"><span style=\" font-size:11pt\">None</span></font></div></td></tr><tr><td colspan=\"3\" align=\"left\" valign=\"top\" width=\"101\" bgcolor=\"#eeeeee\" style=\"border: solid #112369 0px;\"><div align=\"left\" style=\"margin-left:0mm; margin-right:2mm; text-indent:0mm; margin-top:0.00mm; margin-bottom:0.00mm;\"><font face=\"arial\" color=\"#974271\"><span style=\" font-size:11pt\">Other Topics</span></font></div></td><td align=\"left\" valign=\"top\" width=\"91\" bgcolor=\"#eeeeee\" style=\"border: solid #100920 0px;\"><div align=\"left\" style=\"margin-left:0mm; margin-right:2mm; text-indent:0mm; margin-top:0.00mm; margin-bottom:0.00mm;\"><font face=\"arial\" color=\"#860165\"><span style=\" font-size:11pt\">Concern</span></font></div></td></tr><tr><td align=\"center\" valign=\"top\" width=\"15\" style=\"border: solid #454056 0px;\"><div align=\"center\"><font face=\"[place]\"><span style=\" font-size:11pt\">&bull;</span></font></div></td><td colspan=\"3\" align=\"left\" valign=\"top\" width=\"177\" style=\"border: solid #851459 0px;\"><div align=\"left\" style=\"margin-left:0mm; margin-right:2mm; text-indent:0mm; margin-top:0.00mm; margin-bottom:0.00mm;\"><font face=\"[place]\"><span style=\" font-size:11pt\">None</span></font></div></td></tr><tr><td colspan=\"4\" align=\"left\" valign=\"top\" width=\"193\" bgcolor=\"#eeeeee\" style=\"border: solid #891823 0px;\"><div align=\"left\" width=\"191\" bgcolor=\"#eeeeee\" style=\"border: solid #077735 0px;\"><div align=\"left\" style=\"margin-left:0mm; margin-right:2mm; text-indent:0mm; margin-top:0.00mm; margin-bottom:0.00mm;\"><font face=\"arial\" color=\"#962478\"><span style=\" font-size:11pt\">Financial Resource Strain: </span></font></div></td></tr><tr><td align=\"center\" valign=\"top\" width=\"15\" style=\"border: solid #650401 0px;\"><div align=\"center\"><font face=\"[place]\"><span style=\" font-size:11pt\">&bull;</span></font></div></td><td align=\"left\" valign=\"top\" width=\"175\" style=\"border: solid #356724 0px;\"><div align=\"left\" style=\"margin-left:0mm; margin-right:2mm; text-indent:0mm; margin-top:0.00mm; margin-bottom:0.00mm;\"><font face=\"[place]\"><span style=\" font-size:11pt\">Difficulty of Paying Living Expenses: </span></font></div></td></tr><tr><td colspan=\"2\" align=\"left\" valign=\"top\" width=\"191\" bgcolor=\"#eeeeee\" style=\"border: solid #957382 0px;\"><div align=\"left\" style=\"margin-left:0mm; margin-right:2mm; text-indent:0mm; margin-top:0.00mm; margin-bottom:0.00mm;\"><font face=\"arial\" color=\"#240551\"><span style=\" font-size:11pt\">Food Insecurity: </span></font></div></td></tr><tr><td align=\"center\" valign=\"top\" width=\"15\" style=\"border: solid #081316 0px;\"><div align=\"center\"><font face=\"[place]\"><span style=\" font-size:11pt\">&bull;</span></font></div></td><td align=\"left\" valign=\"top\" width=\"175\" style=\"border: solid #164808 0px;\"><div align=\"left\" style=\"margin-left:0mm; margin-right:2mm; text-indent:0mm; margin-top:0.00mm; margin-bottom:0.00mm;\"><font face=\"[place]\"><span style=\" font-size:11pt\">Worried About Running Out of Food in the Last Year: </span></font></div></td></tr><tr><td align=\"center\" valign=\"top\" width=\"15\" style=\"border: solid #359109 0px;\"><div align=\"center\"><font face=\"[place]\"><span style=\" font-size:11pt\">&bull;</span></font></div></td><td align=\"left\" valign=\"top\" width=\"175\" style=\"border: solid #164029 0px;\"><div align=\"left\" style=\"margin-left:0mm; margin-right:2mm; text-indent:0mm; margin-top:0.00mm; margin-bottom:0.00mm;\"><font face=\"[place]\"><span style=\" font-size:11pt\">Ran Out of Food in the Last Year: </span></font></div></td></tr><tr><td colspan=\"2\" align=\"left\" valign=\"top\" width=\"191\" bgcolor=\"#eeeeee\" style=\"border: solid #052450 0px;\"><div align=\"left\" style=\"margin-left:0mm; margin-right:2mm; text-indent:0mm; margin-top:0.00mm; margin-bottom:0.00mm;\"><font face=\"arial\" color=\"#[id number]\"><span style=\" font-size:11pt\">Transportation Needs: </span></font></div></td></tr><tr><td align=\"center\" valign=\"top\" width=\"15\" style=\"border: solid #324769 0px;\"><div align=\"center\"><font face=\"[place]\"><span style=\" font-size:11pt\">&bull;</span></font></div></td><td align=\"left\" valign=\"top\" width=\"175\" style=\"border: solid #651617 0px;\"><div align=\"left\" style=\"margin-left:0mm; margin-right:2mm; text-indent:0mm; margin-top:0.00mm; margin-bottom:0.00mm;\"><font face=\"[place]\"><span style=\" font-size:11pt\">Lack of Transportation (Medical): </span></font></div></td></tr><tr><td align=\"center\" valign=\"top\" width=\"15\" style=\"border: solid #201156 0px;\"><div align=\"center\"><font face=\"[place]\"><span style=\" font-size:11pt\">&bull;</span></font></div></td><td align=\"left\" valign=\"top\" width=\"175\" style=\"border: solid #540000 0px;\"><div align=\"left\" style=\"margin-left:0mm; margin-right:2mm; text-indent:0mm; margin-top:0.00mm; margin-bottom:0.00mm;\"><font face=\"[place]\"><span style=\" font-size:11pt\">Lack of Transportation (Non-Medical): </span></font></div></td></tr><tr><td colspan=\"2\" align=\"left\" valign=\"top\" width=\"191\" bgcolor=\"#eeeeee\" style=\"border: solid #252832 0px;\"><div align=\"left\" style=\"margin-left:0mm; margin-right:2mm; text-indent:0mm; margin-top:0.00mm; margin-bottom:0.00mm;\"><font face=\"arial\" color=\"#422859\"><span style=\" font-size:11pt\">Physical Activity: </span></font></div></td></tr><tr><td align=\"center\" valign=\"top\" width=\"15\" style=\"border: solid #896938 0px;\"><div align=\"center\"><font face=\"[place]\"><span style=\" font-size:11pt\">&bull;</span></font></div></td><td align=\"left\" valign=\"top\" width=\"175\" style=\"border: solid #465562 0px;\"><div align=\"left\" style=\"margin-left:0mm; margin-right:2mm; text-indent:0mm; margin-top:0.00mm; margin-bottom:0.00mm;\"><font face=\"[place]\"><span style=\" font-size:11pt\">Days of Exercise per Week: </span></font></div></td></tr><tr><td align=\"center\" valign=\"top\" width=\"15\" style=\"border: solid #585333 0px;\"><div align=\"center\"><font face=\"[place]\"><span style=\" font-size:11pt\">&bull;</span></font></div></td><td align=\"left\" valign=\"top\" width=\"175\" style=\"border: solid #586835 0px;\"><div align=\"left\" style=\"margin-left:0mm; margin-right:2mm; text-indent:0mm; margin-top:0.00mm; margin-bottom:0.00mm;\"><font face=\"[place]\"><span style=\" font-size:11pt\">Minutes of Exercise per Session: </span></font></div></td></tr><tr><td colspan=\"2\" align=\"left\" valign=\"top\" width=\"191\" bgcolor=\"#eeeeee\" style=\"border: solid #876012 0px;\"><div align=\"left\" style=\"margin-left:0mm; margin-right:2mm; text-indent:0mm; margin-top:0.00mm; margin-bottom:0.00mm;\"><font face=\"arial\" color=\"#820742\"><span style=\" font-size:11pt\">Stress: </span></font></div></td></tr><tr><td align=\"center\" valign=\"top\" width=\"15\" style=\"border: solid #232733 0px;\"><div align=\"center\"><font face=\"[place]\"><span style=\" font-size:11pt\">&bull;</span></font></div></td><td align=\"left\" valign=\"top\" width=\"175\" style=\"border: solid #031899 0px;\"><div align=\"left\" style=\"margin-left:0mm; margin-right:2mm; text-indent:0mm; margin-top:0.00mm; margin-bottom:0.00mm;\"><font face=\"[place]\"><span style=\" font-size:11pt\">Feeling of Stress : </span></font></div></td></tr><tr><td colspan=\"2\" align=\"left\" valign=\"top\" width=\"191\" bgcolor=\"#eeeeee\" style=\"border: solid #561283 0px;\"><div align=\"left\" style=\"margin-left:0mm; margin-right:2mm; text-indent:0mm; margin-top:0.00mm; margin-right:2mm; text-indent:0mm; margin-top:0.00mm; margin-bottom:0.00mm;\"><font face=\"[employer]\"><span style=\" font-size:11pt\">Active Member of Clubs or Organizations: </span></font></div></td></tr><tr><td align=\"center\" valign=\"top\" width=\"15\" style=\"border: solid #084519 0px;\"><div align=\"center\"><font face=\"[employer]\"><span style=\" font-size:11pt\">&bull;</span></font></div></td><td align=\"left\" valign=\"top\" width=\"175\" style=\"border: solid #519409 0px;\"><div align=\"left\" style=\"margin-left:0mm; margin-right:2mm; text-indent:0mm; margin-top:0.00mm; margin-bottom:0.00mm;\"><font face=\"[employer]\"><span style=\" font-size:11pt\">Attends Club or Organization Meetings: </span></font></div></td></tr><tr><td align=\"center\" valign=\"top\" width=\"15\" style=\"border: solid #885434 0px;\"><div align=\"center\"><font face=\"[employer]\"><span style=\" font-size:11pt\">&bull;</span></font></div></td><td align=\"left\" valign=\"top\" width=\"175\" style=\"border: solid #464516 0px;\"><div align=\"left\" style=\"margin-left:0mm; margin-right:2mm; text-indent:0mm; margin-top:0.00mm; margin-bottom:0.00mm;\"><font face=\"[employer]\"><span style=\" font-size:11pt\">Marital Status: </span></font></div></td></tr><tr><td colspan=\"2\" align=\"left\" valign=\"top\" width=\"191\" bgcolor=\"#eeeeee\" style=\"border: solid #141436 0px;\"><div align=\"left\" style=\"margin-left:0mm; margin-right:2mm; text-indent:0mm; margin-top:0.00mm; margin-bottom:0.00mm;\"><font face=\"arial\" color=\"#468665\"><span style=\" font-size:11pt\">Intimate Partner Violence: </span></font></div></td></tr><tr><td align=\"center\" valign=\"top\" width=\"15\" style=\"border: solid #949179 0px;\"><div align=\"center\"><font face=\"[employer]\"><span style=\" font-size:11pt\">&bull;</span></font></div></td><td align=\"left\" valign=\"top\" width=\"175\" style=\"border: solid #812636 0px;\"><div align=\"left\" style=\"margin-left:0mm; margin-right:2mm; text-indent:0mm; margin-top:0.00mm; margin-bottom:0.00mm;\"><font face=\"[employer]\"><span style=\" font-size:11pt\">Fear of Current or Ex-Partner: </span></font></div></td></tr><tr><td align=\"center\" valign=\"top\" width=\"15\" style=\"border: solid #785241 0px;\"><div align=\"center\"><font face=\"[place]\"><span style=\" font-size:11pt\">&bull;</span></font></div></td><td align=\"left\" valign=\"top\" width=\"175\" style=\"border: solid #269826 0px;\"><div align=\"left\" style=\"margin-left:0mm; margin-right:2mm; text-indent:0mm; margin-top:0.00mm; margin-bottom:0.00mm;\"><font face=\"[place]\"><span style=\" font-size:11pt\">Emotionally Abused: </span></font></div></td></tr><tr><td align=\"center\" valign=\"top\" width=\"15\" style=\"border: solid #579579 0px;\"><div align=\"center\"><font face=\"[place]\"><span style=\" font-size:11pt\">&bull;</span></font></div></td><td align=\"left\" valign=\"top\" width=\"175\" style=\"border: solid #486150 0px;\"><div align=\"left\" style=\"margin-left:0mm; margin-right:2mm; text-indent:0mm; margin-top:0.00mm; margin-bottom:0.00mm;\"><font face=\"[place]\"><span style=\" font-size:11pt\">Physically Abused: </span></font></div></td></tr><tr><td align=\"center\" valign=\"top\" width=\"15\" style=\"border: solid #240179 0px;\"><div align=\"center\"><font face=\"[place]\"><span style=\" font-size:11pt\">&bull;</span></font></div></td><td align=\"left\" valign=\"top\" width=\"175\" style=\"border: solid #330851 0px;\"><div align=\"left\" style=\"margin-left:0mm; margin-right:2mm; text-indent:0mm; margin-top:0.00mm; margin-bottom:0.00mm;\"><font face=\"[place]\"><span style=\" font-size:11pt\">Sexually Abused:&nbsp;</span></font></div></td></tr></table><div align=\"left\">&nbsp;</div></div></td></tr></table><div align=\"left\"><font face=\"[place]\"><span style=\" font-size:11pt\">&nbsp;</span></font></div><div align=\"left\"><font face=\"[place]\"><span style=\" font-size:11pt\">&nbsp;</span></font></div><div align=\"left\"><font face=\"[place]\"><span style=\" font-size:11pt\">&nbsp;</span></font></div><div align=\"left\"><font face=\"[place]\"><span style=\" font-size:11pt\">Family and Social Historyreviewed in the electronic medical record. </span></font></div><div align=\"left\"><font face=\"[place]\"><span style=\" font-size:11pt\">&nbsp;</span></font></div><div align=\"left\"><font face=\"[place]\"><span style=\" font-size:11pt\"><b>Imaging:</b></span></font><font face=\"[place]\"><span style=\" font-size:11pt\">Reviewed available imaging in our system with the patient. </span></font></div><div align=\"left\"><font face=\"[place]\"><span style=\" font-size:11pt\">No results found. </span></font></div><div align=\"left\"><font face=\"[place]\"><span style=\" font-size:11pt\">&nbsp;</span></font></div><table width=\"672\" cellpadding=\"0\" cellspacing=\"0\" style=\"border-collapse: collapse; border: none; \"><tr><td align=\"left\" valign=\"top\" width=\"672\" height=\"16\" bgcolor=\"#dddddd\" style=\"border: solid #011184 0px;\"><div align=\"left\"><div align=\"left\"><font face=\"arial\" color=\"#0000a0\"><span style=\" font-size:11pt\"><b>Objective:</b></span></font></div></div></td></tr></table><div align=\"left\"><font face=\"[place]\"><span style=\" font-size:11pt\">&nbsp;</span></font></div><div align=\"left\"><font face=\"[place]\"><span style=\" font-size:11pt\"><b>Physical Exam:</b></span></font></div><table width=\"114\" cellpadding=\"0\" cellspacing=\"0\" style=\"border-collapse: collapse; border: none; \"><tr><td align=\"left\" valign=\"middle\" width=\"114\" style=\"border: solid #355668 0px;\"><div align=\"left\"><div align=\"left\"><font face=\"[place]\"><span style=\" font-size:11pt\">Vitals</span></font><img src=\"C:\ProgramData\Epic\95\TempData\[id number]. PNG\" width=\"1\" height=\"1\" alt=\"graphic\"/></div></div></td></tr><tr><td align=\"left\" valign=\"middle\" width=\"114\" style=\"border: solid #400118 0px;\"><div align=\"left\"><span style=\"font-size: 11pt;\">&nbsp;</span><table width=\"114\" cellpadding=\"0\" cellspacing=\"0\" style=\"border-collapse: collapse; border: none; \"><tr><td align=\"left\" valign=\"middle\" width=\"64\" style=\"border: solid #155424 0px;\"><div align=\"left\"><span style=\"font-size: 11pt;\">&nbsp;</span></div></td><td align=\"left\" valign=\"middle\" width=\"50\" style=\"border: solid #000593 0px;\"><div align=\"left\"><span style=\"font-size: 11pt;\">&nbsp;</span></div></td></tr><tr><td colspan=\"2\" align=\"left\" valign=\"top\" width=\"114\" style=\"border: solid #541239 0px;\"><div align=\"left\" style=\"margin-left:0mm; margin-right:2mm; text-indent:0mm; margin-top:0.00mm; margin-bottom:0.00mm;\"><font face=\"arial\" color=\"#092852\"><span style=\" font-size:11pt\"><b>Vitals:</b></span></font></div></td></tr><tr><td align=\"left\" valign=\"top\" width=\"64\" bgcolor=\"#eeeeee\" style=\"border: solid #051525 0px;\"><div align=\"left\"><font face=\"[employer]\"><span style=\" font-size:11pt\">&nbsp;</span></font></div></td><td align=\"left\" valign=\"top\" width=\"50\" bgcolor=\"#eeeeee\" style=\"border: solid #789704 0px;\"><div align=\"left\" style=\"margin-left:0mm; margin-right:2mm; text-indent:0mm; margin-top:0.00mm; margin-bottom:0.00mm;\"><font face=\"arial\" color=\"#789736\"><span style=\" font-size:11pt\">[birth year] 1406</span></font></div></td></tr><tr><td align=\"left\" valign=\"top\" width=\"64\" style=\"border: solid #361081 0px;\"><div align=\"left\" style=\"margin-left:0mm; margin-right:2mm; text-indent:0mm; margin-top:0.00mm; margin-bottom:0.00mm;\"><font face=\"[facility] [employer]\"><span style=\" font-size:11pt\">BP:</span></font></div></td><td align=\"left\" valign=\"top\" width=\"50\" style=\"border: solid #601524 0px;\"><div align=\"left\" style=\"margin-left:0mm; margin-right:2mm; text-indent:0mm; margin-top:0.00mm; margin-bottom:0.00mm;\"><font face=\"[facility]\"><span style=\" font-size:11pt\">115/82</span></font></div></td></tr><tr><td align=\"left\" valign=\"top\" width=\"64\" style=\"border: solid #743237 0px;\"><div align=\"left\" style=\"margin-left:0mm; margin-right:2mm; text-indent:0mm; margin-top:0.00mm; margin-bottom:0.00mm;\"><font face=\"[facility] [employer]\"><span style=\" font-size:11pt\">Pulse:</span></font></div></td><td align=\"left\" valign=\"top\" width=\"50\" style=\"border: solid #967762 0px;\"><div align=\"left\" style=\"margin-left:0mm; margin-right:2mm; text-indent:0mm; margin-top:0.00mm; margin-bottom:0.00mm;\"><font face=\"[place]\"><span style=\" font-size:11pt\">62</span></font></div></td></tr><tr><td align=\"left\" valign=\"top\" width=\"64\" style=\"border: solid #857494 0px;\"><div align=\"left\" style=\"margin-left:0mm; margin-right:2mm; text-indent:0mm; margin-top:0.00mm; margin-bottom:0.00mm;\"><font face=\"[place]\"><span style=\" font-size:11pt\">SpO2:</span></font></div></td><td align=\"left\" valign=\"top\" width=\"50\" style=\"border: solid #[id number] 0px;\"><div align=\"left\" style=\"margin-left:0mm; margin-right:2mm; text-indent:0mm; margin-top:0.00mm; margin-bottom:0.00mm;\"><font face=\"[place]\"><span style=\" font-size:11pt\">91%</span></font></div></td></tr><tr><td align=\"left\" valign=\"top\" width=\"64\" style=\"border: solid #[id number] 0px;\"><div align=\"left\" style=\"margin-left:0mm; margin-right:2mm; text-indent:0mm; margin-top:0.00mm; margin-bottom:0.00mm;\"><font face=\"[place]\"><span style=\" font-size:11pt\">Weight:</span></font></div></td><td align=\"left\" valign=\"top\" width=\"50\" style=\"border: solid #[id number] 0px;\"><div align=\"left\" style=\"margin-left:0mm; margin-right:2mm; text-indent:0mm; margin-top:0.00mm; margin-bottom:0.00mm;\"><font face=\"[place]\"><span style=\" font-size:11pt\">217 lb (98.4 kg)</span></font></div></td></tr><tr><td align=\"left\" valign=\"top\" width=\"64\" style=\"border: solid #[id number] 0px;\"><div align=\"left\" style=\"margin-left:0mm; margin-right:2mm; text-indent:0mm; margin-top:0.00mm; margin-bottom:0.00mm;\"><font face=\"[place]\"><span style=\" font-size:11pt\">Height:</span></font></div></td><td align=\"left\" valign=\"top\" width=\"50\" style=\"border: solid #[id number] 0px;\"><div align=\"left\" style=\"margin-left:0mm; margin-right:2mm; text-indent:0mm; margin-top:0.00mm; margin-bottom:0.00mm;\"><font face=\"[place]\"><span style=\" font-size:11pt\">5' 9&quot; (1.753 m)</span></font></div></td></tr></table><div align=\"left\">&nbsp;</div></div></td></tr></table><div align=\"left\"><font face=\"[place]\"><span style=\" font-size:11pt\">&nbsp;</span></font></div><div align=\"left\"><font face=\"[place]\"><span style=\" font-size:11pt\">Pain Score: &nbsp;&nbsp;8</span></font></div><div align=\"left\"><font face=\"[place]\"><span style=\" font-size:11pt\">&nbsp;</span></font></div><div align=\"left\"><font face=\"arial\" size=\"2\"><span style=\" font-size:10pt\"><b><u>Physical Exam</u></b></span></font></div><div align=\"left\"><font face=\"arial\"><span style=\" font-size:11pt\"><u>Constitutional</u></span></font><font face=\"[place]\"><span style=\" font-size:11pt\">: &nbsp;</span></font></div><div align=\"left\"><font face=\"[place]\"><span style=\" font-size:11pt\">&nbsp;&nbsp;&nbsp; Appearance: He is well-developed. <br /></span></font><font face=\"arial\"><span style=\" font-size:11pt\"><u>HENT</u></span></font><font face=\"[place]\"><span style=\" font-size:11pt\">: </span></font></div><div align=\"left\"><font face=\"[place]\"><span style=\" [id number]:60CW\">&ZIUX;&XJAD;&EMXZ;UXWA: Normocephalic&nbsp;and atraumatic. <br /></span></font><font face=\"arial\"><span style=\" font-size:11pt\"><u>Cardiovascular</u></span></font><font face=\"[place]\"><span style=\" font-size:11pt\">: </span></font></div><div align=\"left\"><font face=\"[place]\"><span style=\" font-size:11pt\">&nbsp;&nbsp;&nbsp;Pulses: Normal pulses. <br />&nbsp;&nbsp;&nbsp;Comments: </span></font><font face=\"[place]\"><span style=\" font-size:11pt\"><b>Warm extremities.  Normal capillary refill.</b></span></font><font face=\"[place]\"><span style=\" font-size:11pt\"><br /></span></font><font face=\"arial\"><span style=\" font-size:11pt\"><u>Pulmonary</u></span></font><font face=\"[place]\"><span style=\" font-size:11pt\">: </span></font></div><div align=\"left\"><font face=\"[place]\"><span style=\" font-size:11pt\">&nbsp;&nbsp;&nbsp;Effort: Pulmonary effort is normal. <br /></span></font><font face=\"arial\"><span style=\" font-size:11pt\"><u>Abdominal</u></span></font><font face=\"[place]\"><span style=\" font-size:11pt\">: <br />&nbsp;&nbsp;&nbsp;General: Abdomen is flat. <br />&nbsp;&nbsp;&nbsp;Palpations: Abdomen is soft. </span></font></div><div align=\"left\"><font face=\"arial\"><span style=\" font-size:11pt\"><u>Musculoskeletal</u></span></font><font face=\"[place]\"><span style=\" font-size:11pt\">: <br />&nbsp;&nbsp;&nbsp;Lumbar back: Negative right straight leg raise test&nbsp;and negative left straight leg raise test. </span></font></div><div align=\"left\"><font face=\"arial\"><span style=\" font-size:11pt\"><u>Skin</u></span></font><font face=\"[place]\"><span style=\" font-size:11pt\">:</span></font></div><div align=\"left\"><font face=\"[place]\"><span style=\" font-size:11pt\">&nbsp;&nbsp;&nbsp;General: Skin is warm&nbsp;and dry. <br /></span></font><font face=\"arial\"><span style=\" font-size:11pt\"><u>Neurological</u></span></font><font face=\"[place]\"><span style=\" font-size:11pt\">: </span></font></div><div align=\"left\"><font face=\"[place]\"><span style=\" font-size:11pt\">&nbsp;&nbsp;&nbsp;General: No focal deficit&nbsp;present. <br />&nbsp;&nbsp;&nbsp;Mental Status: He is alert&nbsp;and oriented to person, place, and time. <br />&nbsp;&nbsp;&nbsp;Cranial Nerves: No cranial nerve deficit. <br />&nbsp;&nbsp;&nbsp;Sensory: No sensory deficit. <br />&nbsp;&nbsp;&nbsp; Motor: No atrophy&nbsp;or abnormal muscle tone.  <br />&nbsp;&nbsp;&nbsp;Deep Tendon Reflexes: Reflexes are normal and symmetric. <br /></span></font><font face=\"arial\"><span style=\" font-size:11pt\"><u>Psychiatric</u></span></font><font face=\"[place]\"><span style=\" font-size:11pt\">: &nbsp;&nbsp;&nbsp;<br />&nbsp;&nbsp;&nbsp;Mood and Affect: Mood&nbsp;normal. &nbsp;&nbsp;&nbsp;<br />&nbsp;&nbsp;&nbsp;Speech: Speech&nbsp;normal. &nbsp;&nbsp;&nbsp;<br />&nbsp;&nbsp;&nbsp;Behavior: Behavior&nbsp;normal. </span></font></div><div align=\"left\"><font face=\"arial\" size=\"2\"><span style=\" font-size:10pt\">&nbsp;</span></font></div><div align=\"left\"><font face=\"[place]\"><span style=\" font-size:11pt\">&nbsp;</span></font></div><div align=\"left\"><font face=\"[place]\"><span style=\" font-size:11pt\"><b>Back Exam</b></span></font><font face=\"[place]\"><span style=\" font-size:11pt\">&nbsp;</span></font></div><div align=\"left\"><font face=\"[place]\"><span style=\" font-size:11pt\">&nbsp;</span></font></div><div align=\"left\"><font face=\"arial\" color=\"#[id number]\"><span style=\" font-size:11pt\"><b>Tenderness </b></span></font></div><div align=\"left\"><font face=\"[place]\"><span style=\" font-size:11pt\">The patient is experiencing tenderness in the&nbsp;lumbar. </span></font></div><div align=\"left\"><font face=\"[place]\"><span style=\" font-size:11pt\">&nbsp;</span></font></div><div align=\"left\"><font face=\"arial\" color=\"#[id number]\"><span style=\" font-size:11pt\"><b>Range of Motion </b></span></font></div><div align=\"left\"><font face=\"[place]\"><span style=\" font-size:11pt\">Extension:&nbsp;normal&nbsp;</span></font></div><div align=\"left\"><font face=\"[place]\"><span style=\" font-size:11pt\">Flexion:&nbsp;normal&nbsp;</span></font></div><div align=\"left\"><font face=\"[place]\"><span style=\" font-size:11pt\">Lateral bend right:&nbsp;normal&nbsp;</span></font></div><div align=\"left\"><font face=\"[place]\"><span style=\" font-size:11pt\">Lateral bend left:&nbsp;normal&nbsp;</span></font></div><div align=\"left\"><font face=\"[place]\"><span style=\" font-size:11pt\">Rotation right:&nbsp;normal&nbsp;</span></font></div><div align=\"left\"><font face=\"[place]\"><span style=\" font-size:11pt\">Rotation left:&nbsp;normal&nbsp;</span></font></div><div align=\"left\"><font face=\"[place]\"><span style=\" font-size:11pt\">&nbsp;</span></font></div><div align=\"left\"><font face=\"arial\" color=\"#[id number]\"><span style=\" font-size:11pt\"><b>Muscle Strength </b></span></font></div><div align=\"left\"><font face=\"[place]\"><span style=\" font-size:11pt\">Right Quadriceps:&nbsp;&nbsp;5/5 </span></font></div><div align=\"left\"><font face=\"[place]\"><span style=\" font-size:11pt\">Left Quadriceps:&nbsp;&nbsp;5/5 </span></font></div><div align=\"left\"><font face=\"[place]\"><span style=\" font-size:11pt\">Right Hamstrings:&nbsp;&nbsp;5/5 </span></font></div><div align=\"left\"><font face=\"[place]\"><span style=\" font-size:11pt\">Left Hamstrings:&nbsp;&nbsp;5/5 </span></font></div><div align=\"left\"><font face=\"[place]\"><span style=\" font-size:11pt\">&nbsp;</span></font></div><div align=\"left\"><font face=\"arial\" color=\"#[id number]\"><span style=\" font-size:11pt\"><b>Tests </b></span></font></div><div align=\"left\"><font face=\"[place]\"><span style=\" font-size:11pt\">Straight leg raise right:&nbsp;negative</span></font></div><div align=\"left\"><font face=\"[place]\"><span style=\" font-size:11pt\">Straight leg raise left:&nbsp;negative</span></font></div><div align=\"left\"><font face=\"arial\" color=\"#[id number]\"><span style=\" font-size:11pt\"><b>&nbsp;</b></span></font></div><div align=\"left\"><font face=\"arial\" color=\"#[id number]\"><span style=\" font-size:11pt\"><b>Other</b></span></font><font face=\"[place]\"><span style=\" font-size:11pt\"><b>&nbsp;</b></span></font></div><div align=\"left\"><font face=\"[place]\"><span style=\" font-size:11pt\">Toe walk:&nbsp;normal</span></font></div><div align=\"left\"><font face=\"[place]\"><span style=\" font-size:11pt\">Heel walk:&nbsp;normal</span></font></div><div align=\"left\"><font face=\"[place]\"><span style=\" font-size:11pt\">Sensation:&nbsp;normal</span></font></div><div align=\"left\"><font face=\"[place]\"><span style=\" font-size:11pt\">Gait:&nbsp;normal&nbsp;</span></font></div><div align=\"left\"><font face=\"[place]\"><span style=\" font-size:11pt\">&nbsp;</span></font></div><div align=\"left\"><font face=\"[place]\"><span style=\" font-size:11pt\">Comments:&nbsp;&nbsp;+kemps </span></font></div><div align=\"left\"><font face=\"[place]\"><span style=\" font-size:11pt\">Mild slump B</span></font></div><div align=\"left\"><font face=\"[place]\"><span style=\" font-size:11pt\">&nbsp;</span></font></div><div align=\"left\"><font face=\"[place]\"><span style=\" font-size:11pt\">&nbsp;</span></font></div><div align=\"left\"><font face=\"[place]\"><span style=\" font-size:11pt\">&nbsp;</span></font></div><div align=\"left\"><font face=\"[place]\"><span style=\" font-size:11pt\">&nbsp;</span></font></div><div align=\"left\"><font face=\"[place]\"><span style=\" font-size:11pt\"><b>&nbsp;&nbsp;&nbsp;&nbsp;&nbsp;&nbsp;&nbsp;&nbsp;&nbsp;&nbsp;&nbsp;&nbsp;&nbsp;&nbsp;&nbsp;&nbsp;&nbsp;&nbsp;&nbsp;&nbsp;</b></span></font></div><div align=\"left\"><font face=\"[place]\"><span style=\" font-size:11pt\"><b>&nbsp;</b></span></font></div><div align=\"left\"><font face=\"[place]\"><span style=\" font-size:11pt\"><b>Research &nbsp;has found that &nbsp;Spine injections &nbsp;&nbsp;&nbsp;reduce pain and &nbsp;give &nbsp;better functional &nbsp;outcomes.  &nbsp;&nbsp;&nbsp;&nbsp;</b></span></font></div><table width=\"672\" cellpadding=\"0\" cellspacing=\"0\" style=\"border-collapse: collapse; border: none; \"><tr><td align=\"left\" valign=\"top\" width=\"672\" height=\"16\" bgcolor=\"#[id number]\" style=\"border: solid #[id number] 0px;\"><div align=\"left\"><div align=\"left\"><font face=\"arial\" color=\"#0000a0\"><span style=\" font-size:11pt\"><b>Assessment:</b></span></font></div></div></td></tr></table><div align=\"left\"><font face=\"[place]\"><span style=\" font-size:11pt\"><b>This is a&nbsp;76 y.o.&nbsp;male&nbsp;patient with:</b></span></font></div><div align=\"left\"><font face=\"[place]\"><span style=\" font-size:11pt\"><b>&nbsp;</b></span></font></div><div align=\"left\"><font face=\"[place]\"><span style=\" font-size:11pt\"><b>Diagnosis:</b></span></font></div><table width=\"672\" cellpadding=\"0\" cellspacing=\"0\" style=\"border-collapse: collapse; border: none; \"><tr><td align=\"left\" valign=\"top\" width=\"34\" bgcolor=\"#eeeeee\" style=\"border: solid #[id number] 0px;\"><div align=\"left\" style=\"margin-left:0mm; margin-right:2mm; text-indent:0mm; margin-top:0.00mm; margin-bottom:0.00mm;\"><div align=\"left\" style=\"margin-left:0mm; margin-right:2mm; text-indent:0mm; margin-top:0.00mm; margin-bottom:0.00mm;\"><font face=\"[place]\"><span style=\" font-size:11pt\">&nbsp;</span></font></div></div></td><td align=\"left\" valign=\"top\" width=\"319\" bgcolor=\"#eeeeee\" style=\"border: solid #[id number] 0px;\"><div align=\"left\" style=\"margin-left:0mm; margin-right:2mm; text-indent:0mm; margin-top:0.00mm; margin-bottom:0.00mm;\"><font face=\"arial\" color=\"#[id number]\"><span style=\" font-size:11pt\">Diagnosis</span></font></div></td><td align=\"left\" valign=\"top\" width=\"319\" bgcolor=\"#eeeeee\" style=\"border: solid #[id number] 0px;\"><div align=\"left\" style=\"margin-left:0mm; margin-right:2mm; text-indent:0mm; margin-top:0.00mm; margin-bottom:0.00mm;\"><font face=\"arial\" color=\"#[id number]\"><span style=\" font-size:11pt\">Orders</span></font></div></td></tr><tr><td align=\"left\" valign=\"top\" width=\"34\" style=\"border: solid #[id number] 0px;\"><div align=\"left\" style=\"margin-left:0mm; margin-right:2mm; text-indent:0mm; margin-top:0.00mm; margin-bottom:0.00mm;\"><font face=\"[place]\"><span style=\" font-size:11pt\"><b>1.</b></span></font></div></td><td align=\"left\" valign=\"top\" width=\"319\" style=\"border: solid #[id number] 0px;\"><div align=\"left\" style=\"margin-left:0mm; margin-right:2mm; text-indent:0mm; margin-top:0.00mm; margin-bottom:0.00mm;\"><font face=\"[place]\"><span style=\" font-size:11pt\"><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 </b></span></font></div></td><td align=\"left\" valign=\"top\" width=\"319\" style=\"border: solid #[id number] 0px;\"><div align=\"left\"><font face=\"[place]\"><span style=\" font-size:11pt\">&nbsp;</span></font></div></td></tr><tr><td align=\"left\" valign=\"top\" width=\"34\" style=\"border: solid #[id number] 0px;\"><div align=\"left\" style=\"margin-left:0mm; margin-right:2mm; text-indent:0mm; margin-top:0.00mm; margin-bottom:0.00mm;\"><font face=\"[place]\"><span style=\" font-size:11pt\">2.</span></font></div></td><td align=\"left\" valign=\"top\" width=\"319\" style=\"border: solid #[id number] 0px;\"><div align=\"left\" style=\"margin-left:0mm; margin-right:2mm; text-indent:0mm; align=\"left\"><font face=\"[place]\"><span style=\" font-size:11pt\">&nbsp;</span></font></div><table width=\"672\" cellpadding=\"0\" cellspacing=\"0\" style=\"border-collapse: collapse; border: none; \"><tr><td align=\"left\" valign=\"top\" width=\"672\" height=\"14\" bgcolor=\"#dddddd\" style=\"border: solid #[id number] 0px;\"><div align=\"left\"><div align=\"left\"><font face=\"arial\" color=\"#0000a0\"><span style=\" font-size:11pt\"><b>Plan:</b></span></font></div></div></td></tr></table><div align=\"left\"><font face=\"[place]\"><span style=\" font-size:11pt\">&nbsp;B L4-5 5-S1 facet steroid injections&nbsp;</span></font></div><div align=\"left\"><font face=\"[place]\"><span style=\" font-size:11pt\">&nbsp;</span></font></div><div align=\"left\"><font face=\"arial\"><span style=\" font-size:11pt\"><b><u>Meds</u></b></span><span style=\" font-size:11pt\"><b>: </b></span></font></div><div align=\"left\"><font face=\"[place]\"><span style=\" font-size:11pt\"><b>Controlled Substances Monitoring</b></span></font><font face=\"[place]\"><span style=\" font-size:11pt\">: Pt educated about the risks of taking opiates,including increased sedation, constipation, slowed breathing, tolerance, dependence,and addiction. &nbsp;&nbsp;</span></font></div><div align=\"left\"><font face=\"[place]\"><span style=\" font-size:11pt\">&nbsp;</span></font></div><table width=\"672\" cellpadding=\"0\" cellspacing=\"0\" style=\"border-collapse: collapse; border: none; \"><tr><td align=\"left\" valign=\"middle\" width=\"13\" style=\"border: solid #[id number] 0px;\"><div align=\"left\"><span style=\"font-size: 11pt;\">&nbsp;</span><div align=\"left\">&nbsp;</div></div></td><td align=\"left\" valign=\"middle\" width=\"659\" style=\"border: solid #455365 0px;\"><div align=\"left\"><span style=\"font-size: 11pt;\">&nbsp;</span></div></td></tr><tr><td colspan=\"2\" align=\"left\" valign=\"top\" width=\"672\" bgcolor=\"#[id number]\" style=\"border: solid #856847 0px;\"><div align=\"left\"><font face=\"arial\"><span style=\" font-size:11pt\"><b><u>New &nbsp;significant &nbsp;risk &nbsp;benefit concerns.</b></span></font></div><div align=\"left\"><font face=\"[place]\"><span style=\" font-size:11pt\"><b>&nbsp; We &nbsp;instruct &nbsp;&nbsp;&nbsp;our patients that &nbsp;a Random Urine Drug Screen is required &nbsp;along with a &nbsp;an Opioid assessment questionaire such as ORT &nbsp;or SOAPP</b></span></font></div><div align=\"left\"><font face=\"[place]\"><span style=\" font-size:11pt\">The patient expressed understanding of the above assessment and plan. </span></font></div><div align=\"left\"><font face=\"[place]\"><span style=\" font-size:11pt\">&nbsp;</span></font></div><div align=\"left\"><font face=\"[place]\"><span style=\" font-size:11pt\">Totaltime spent face to face with patient was&nbsp;25&nbsp;minutes inwhich &nbsp;50% or more of the time was spent in counseling, education about risk andbenefits of the above plan, and coordination of care. </span></font></div></td></tr></table><div align=\"left\">&nbsp;</div></td></tr></table><div align=\"left\">&nbsp;</div></div></td></tr></table><div align=\"left\">&nbsp;</div></div></td></tr></table><div align=\"left\">&nbsp;&nbsp;</div>

## 2021-07-13 ENCOUNTER — HOSPITAL ENCOUNTER (OUTPATIENT)
Dept: LAB | Age: 76
Discharge: HOME | End: 2021-07-13
Payer: MEDICARE

## 2021-07-13 DIAGNOSIS — Z12.5 SCREENING PSA (PROSTATE SPECIFIC ANTIGEN): ICD-10-CM

## 2021-07-13 DIAGNOSIS — D50.0 IRON DEFICIENCY ANEMIA DUE TO CHRONIC BLOOD LOSS: ICD-10-CM

## 2021-07-13 DIAGNOSIS — E78.00 PURE HYPERCHOLESTEROLEMIA: ICD-10-CM

## 2021-07-13 DIAGNOSIS — N18.30 STAGE 3 CHRONIC KIDNEY DISEASE, UNSPECIFIED WHETHER STAGE 3A OR 3B CKD (HCC): ICD-10-CM

## 2021-07-13 LAB
ABSOLUTE EOS #: 0.25 K/UL (ref 0–0.44)
ABSOLUTE IMMATURE GRANULOCYTE: <0.03 K/UL (ref 0–0.3)
ABSOLUTE LYMPH #: 2.09 K/UL (ref 1.1–3.7)
ABSOLUTE MONO #: 0.79 K/UL (ref 0.1–1.2)
ALBUMIN SERPL-MCNC: 4.5 G/DL (ref 3.5–5.2)
ALBUMIN/GLOBULIN RATIO: 1.6 (ref 1–2.5)
ALP BLD-CCNC: 75 U/L (ref 40–129)
ALT SERPL-CCNC: 20 U/L (ref 5–41)
ANION GAP SERPL CALCULATED.3IONS-SCNC: 12 MMOL/L (ref 9–17)
AST SERPL-CCNC: 28 U/L
BASOPHILS # BLD: 1 % (ref 0–2)
BASOPHILS ABSOLUTE: 0.04 K/UL (ref 0–0.2)
BILIRUB SERPL-MCNC: 0.58 MG/DL (ref 0.3–1.2)
BUN BLDV-MCNC: 21 MG/DL (ref 8–23)
BUN/CREAT BLD: 22 (ref 9–20)
CALCIUM SERPL-MCNC: 9.5 MG/DL (ref 8.6–10.4)
CHLORIDE BLD-SCNC: 106 MMOL/L (ref 98–107)
CHOLESTEROL/HDL RATIO: 5.1
CHOLESTEROL: 179 MG/DL
CO2: 23 MMOL/L (ref 20–31)
CREAT SERPL-MCNC: 0.97 MG/DL (ref 0.7–1.2)
DIFFERENTIAL TYPE: NORMAL
EOSINOPHILS RELATIVE PERCENT: 3 % (ref 1–4)
GFR AFRICAN AMERICAN: >60 ML/MIN
GFR NON-AFRICAN AMERICAN: >60 ML/MIN
GFR SERPL CREATININE-BSD FRML MDRD: ABNORMAL ML/MIN/{1.73_M2}
GFR SERPL CREATININE-BSD FRML MDRD: ABNORMAL ML/MIN/{1.73_M2}
GLUCOSE BLD-MCNC: 93 MG/DL (ref 70–99)
HCT VFR BLD CALC: 47.4 % (ref 40.7–50.3)
HDLC SERPL-MCNC: 35 MG/DL
HEMOGLOBIN: 15.7 G/DL (ref 13–17)
IMMATURE GRANULOCYTES: 0 %
LDL CHOLESTEROL: 113 MG/DL (ref 0–130)
LYMPHOCYTES # BLD: 28 % (ref 24–43)
MCH RBC QN AUTO: 29.5 PG (ref 25.2–33.5)
MCHC RBC AUTO-ENTMCNC: 33.1 G/DL (ref 25.2–33.5)
MCV RBC AUTO: 89.1 FL (ref 82.6–102.9)
MONOCYTES # BLD: 11 % (ref 3–12)
NRBC AUTOMATED: 0 PER 100 WBC
PDW BLD-RTO: 13.7 % (ref 11.8–14.4)
PLATELET # BLD: 195 K/UL (ref 138–453)
PLATELET ESTIMATE: NORMAL
PMV BLD AUTO: 9.4 FL (ref 8.1–13.5)
POTASSIUM SERPL-SCNC: 4.2 MMOL/L (ref 3.7–5.3)
PROSTATE SPECIFIC ANTIGEN: 1.09 UG/L
RBC # BLD: 5.32 M/UL (ref 4.21–5.77)
RBC # BLD: NORMAL 10*6/UL
SEG NEUTROPHILS: 57 % (ref 36–65)
SEGMENTED NEUTROPHILS ABSOLUTE COUNT: 4.22 K/UL (ref 1.5–8.1)
SODIUM BLD-SCNC: 141 MMOL/L (ref 135–144)
TOTAL PROTEIN: 7.4 G/DL (ref 6.4–8.3)
TRIGL SERPL-MCNC: 157 MG/DL
VLDLC SERPL CALC-MCNC: ABNORMAL MG/DL (ref 1–30)
WBC # BLD: 7.4 K/UL (ref 3.5–11.3)
WBC # BLD: NORMAL 10*3/UL

## 2021-07-13 PROCEDURE — 80053 COMPREHEN METABOLIC PANEL: CPT

## 2021-07-13 PROCEDURE — 36415 COLL VENOUS BLD VENIPUNCTURE: CPT

## 2021-07-13 PROCEDURE — G0103 PSA SCREENING: HCPCS

## 2021-07-13 PROCEDURE — 85025 COMPLETE CBC W/AUTO DIFF WBC: CPT

## 2021-07-13 PROCEDURE — 80061 LIPID PANEL: CPT

## 2021-07-27 ENCOUNTER — OFFICE VISIT (OUTPATIENT)
Dept: PAIN MANAGEMENT | Age: 76
End: 2021-07-27
Payer: MEDICARE

## 2021-07-27 VITALS
WEIGHT: 218 LBS | HEIGHT: 69 IN | DIASTOLIC BLOOD PRESSURE: 60 MMHG | SYSTOLIC BLOOD PRESSURE: 104 MMHG | BODY MASS INDEX: 32.29 KG/M2

## 2021-07-27 DIAGNOSIS — M51.37 DEGENERATION OF LUMBAR OR LUMBOSACRAL INTERVERTEBRAL DISC: ICD-10-CM

## 2021-07-27 DIAGNOSIS — M47.817 LUMBOSACRAL SPONDYLOSIS WITHOUT MYELOPATHY: ICD-10-CM

## 2021-07-27 DIAGNOSIS — G89.29 CHRONIC BILATERAL LOW BACK PAIN WITH LEFT-SIDED SCIATICA: ICD-10-CM

## 2021-07-27 DIAGNOSIS — M47.816 LUMBAR FACET JOINT SYNDROME: Primary | ICD-10-CM

## 2021-07-27 DIAGNOSIS — M54.42 CHRONIC BILATERAL LOW BACK PAIN WITH LEFT-SIDED SCIATICA: ICD-10-CM

## 2021-07-27 PROCEDURE — 99214 OFFICE O/P EST MOD 30 MIN: CPT | Performed by: PHYSICAL MEDICINE & REHABILITATION

## 2021-07-27 PROCEDURE — 4040F PNEUMOC VAC/ADMIN/RCVD: CPT | Performed by: PHYSICAL MEDICINE & REHABILITATION

## 2021-07-27 PROCEDURE — G8417 CALC BMI ABV UP PARAM F/U: HCPCS | Performed by: PHYSICAL MEDICINE & REHABILITATION

## 2021-07-27 PROCEDURE — 1036F TOBACCO NON-USER: CPT | Performed by: PHYSICAL MEDICINE & REHABILITATION

## 2021-07-27 PROCEDURE — 1123F ACP DISCUSS/DSCN MKR DOCD: CPT | Performed by: PHYSICAL MEDICINE & REHABILITATION

## 2021-07-27 PROCEDURE — G8427 DOCREV CUR MEDS BY ELIG CLIN: HCPCS | Performed by: PHYSICAL MEDICINE & REHABILITATION

## 2021-07-27 PROCEDURE — 99213 OFFICE O/P EST LOW 20 MIN: CPT | Performed by: PHYSICAL MEDICINE & REHABILITATION

## 2021-07-27 ASSESSMENT — ENCOUNTER SYMPTOMS
NAUSEA: 0
CONSTIPATION: 0
BACK PAIN: 1
RESPIRATORY NEGATIVE: 1
EYES NEGATIVE: 1
ALLERGIC/IMMUNOLOGIC NEGATIVE: 1

## 2021-07-27 NOTE — Clinical Note
[facility] A department of [facility], [place], operated by [facility]  Phone: [phone]  Fax: [phone]    Jamie Causey MD        July 29, 2021     Patient: Bev Xiong   YOB: 1945   Date of Visit: 7/27/2021       To Whom It May Concern: It is my medical opinion that Germán Georges {Work release (duty restriction):04912}. If you have any questions or concerns, please don't hesitate to call.     Sincerely,        Jamie Causey MD

## 2021-07-27 NOTE — LETTER
[address] Pain Management A department of [place] [address]  Phone: [phone]  Fax: [phone]    Jesus Cortes MD    July 29, 2021     Carlitos Mcdonnell MD  [address] [place] [address] Road [facility]    Patient: Nella Gonzalez   MR Number: [id number]   YOB: 1945   Date of Visit: 7/27/2021       Dear Carlitos Mcdonnell: Thank you for referring Di Leiva to me for evaluation/treatment. Below are the relevant portions of my assessment and plan of care. If you have questions, please do not hesitate to call me. I look forward to following Jorge Jovel along with you.     Sincerely,        Jesus Cortes MD

## 2021-07-27 NOTE — PROGRESS NOTES
PAIN MANAGEMENT FOLLOW-UP NOTE  7/27/21    CHIEF COMPLAINT: This is a72 y.o. male patientwho returns to the [facility] with a history of Follow-up (post facet)      [address] returns today for  reevaluation. Since the visit, the patient reports that the pain is not changed. 2 day relief of B lumbar pain post Lumbar facets    notes  Has  One episode  R leg posterior  Pain per day better with one Norco 5 Qnight  Can tolerate  Denies memory problems      Location: R leg  Location Modifier: entire back  Severity of Pain: 3 sharp episodes  2-3 times a day last  A minuteDuration of Pain: Intermittent  Frequency of Pain: Intermittent  Aggravating Factors: -  Limiting Behavior: lying standing transers   Relieving Factors: relaxation        Previous pain medication trials have included:          Mental health:    Patient feels - secondary to their current pain problems as described above. H/O depression and anxiety: No   Patient is not seeing psychologist orpsychiatrist   Abuse history? No    Employed? No    ANALGESIA:   Are your Current Pain medication (s) helping to decrease pain? No.   Current Pain score:      ADVERSE AFFECTS:   Medication Side Effects: No.    ACTIVITY:  Are you able to be more active with your pain medications? No      ABERRANT BEHAVIORS SINCE LAST VISIT? No    Review of Systems   Constitutional: Positive for fatigue. HENT: Negative. Eyes: Negative. Respiratory: Negative. Cardiovascular: Negative. Gastrointestinal: Negative for constipation and nausea. Endocrine: Negative. Genitourinary: Negative for difficulty urinating. Musculoskeletal: Positive for arthralgias, back pain and myalgias. Skin: Negative. Allergic/Immunologic: Negative. Neurological: Positive for weakness and numbness. Hematological: Negative. Psychiatric/Behavioral: Positive for sleep disturbance. All other systems reviewed and are negative.        Allergies   Allergen Reactions    Atarax [Hydroxyzine]      Double vision    Gabapentin      dizzy    Phenergan [Promethazine Hcl] Other (See Comments)     \"out of it\"    Morphine And Related Nausea And Vomiting     hallucinations       Outpatient Medications Prior to Visit   Medication Sig Dispense Refill    HYDROcodone-acetaminophen (NORCO) 5-325 MG per tablet Take 1 tablet by mouth 2 times daily for 30 days. 60 tablet 0    simvastatin (ZOCOR) 20 MG tablet TAKE ONE TABLET BY MOUTH DAILY 90 tablet 1    metoprolol succinate (TOPROL XL) 50 MG extended release tablet TAKE ONE TABLET BY MOUTH DAILY 90 tablet 1    fenofibrate (TRICOR) 145 MG tablet TAKE ONE TABLET BY MOUTH DAILY 90 tablet 1    Multiple Vitamin (MULTI VITAMIN DAILY PO) Take by mouth      sertraline (ZOLOFT) 100 MG tablet TAKE ONE TABLET BY MOUTH DAILY 90 tablet 2    tamsulosin (FLOMAX) 0.4 MG capsule Take 1 capsule by mouth daily 90 capsule 3    aspirin 81 MG tablet Take 81 mg by mouth daily      ibuprofen (ADVIL;MOTRIN) 200 MG tablet Take 200 mg by mouth every 6 hours as needed for Pain (Patient not taking: Reported on 7/27/2021)      acetaminophen (TYLENOL) 500 MG tablet Take 500 mg by mouth every 6 hours as needed. (Patient not taking: Reported on 6/14/2021)       No facility-administered medications prior to visit. Past Medical History:   Diagnosis Date    Agoraphobia     Anxiety     Arthritis     Basal cell carcinoma     Chronic pain     back    CKD (chronic kidney disease)     Claustrophobia     Enlarged lymph node     rt. lower neck.  Hearing aid worn     dar. ears.     Hearing loss     Hyperlipidemia     Hypertension     RAFAT (obstructive sleep apnea)     CPAP occasional    PONV (postoperative nausea and vomiting)     Retention of urine     last 2 surgeries, patient unable to void, home with catheter both times    SCC (squamous cell carcinoma)     HAND    Spinal stenosis, lumbar region, with neurogenic claudication     Wears glasses        Past Surgical History:   Procedure Laterality Date    BACK SURGERY  10/2013    Dr. Maria Luz Mcgarry hardware lumbar    COLONOSCOPY  06/13/12    mild diverticular dz    LUMBAR FUSION N/A 3/8/2017    LUMBAR L2-3 POSTERIOR DECOMPRESSION FUSION INSTRUMENTATION W/REVISION L3-5 POSTERIOR DECOMPRESSION FUSION INSTRUMENTATION ([address] [facility] [address])  CC SN/KILEY performed by Maris Canada MD at [facility] Right     lower neck, 2-    PAIN MANAGEMENT PROCEDURE Bilateral 3/12/2021    Bilateral S1 TRANSFORAMINAL performed by Dana Stearns MD at [address] Bilateral 3/26/2021    Bilateral S1 TRANSFORAMINAL performed by Dana Stearns MD at [address] Bilateral 7/8/2021    BILAT L4-L5 L5-S1 FACET performed by Dana Stearns MD at [address] THORACIC N/A 1/26/2018    THORACIC & LUMBAR POSTERIOR DECOMPRESSION AND FUSION REVISION T9 THRU L5 performed by Maris Canada MD at [address] [place] [address] CA SCRN NOT  [address]  IND N/A 8/23/2017    COLONOSCOPY performed by Bev Leon MD at [facility]. [address] N/A 11/14/2018    SPINAL CORD STIMULATOR IMPLANT PERMANENT performed by Maris Canada MD at [address] [place] [address] PRE-MALIGNANT / BENIGN SKIN LESION EXCISION      SKIN BIOPSY      skin cancer removed x 4    VASECTOMY       Family History   Problem Relation Age of Onset    Diabetes Mother     Diabetes Brother     Cancer Father         lung cancer    Cancer Brother         lung cancer    Diabetes Brother      Social History     Socioeconomic History    Marital status:      Spouse name: None    Number of children: None    Years of education: None    Highest education level: None   Occupational History    None   Tobacco Use    Smoking status: Never Smoker    Smokeless tobacco: Never Used   Vaping Use    Vaping Use: Never used   Substance and Sexual Activity    Alcohol use: No    Drug use: No    Sexual activity: None   Other Topics Concern    None   Social History Narrative    None     Social Determinants of Health     Financial Resource Strain:     Difficulty of Paying Living Expenses:    Food Insecurity:     Worried About Running Out of Food in the Last Year:     [address] in the Last Year:    Transportation Needs:     Lack of Transportation (Medical):  Lack of Transportation (Non-Medical):    Physical Activity:     Days of Exercise per Week:     Minutes of Exercise per Session:    Stress:     Feeling of Stress :    Social Connections:     Frequency of Communication with Friends and Family:     Frequency of Social Gatherings with Friends and Family:     Attends Sabianist Services:     Active Member of Clubs or Organizations:     Attends Club or Organization Meetings:     Marital Status:    Intimate Partner Violence:     Fear of Current or Ex-Partner:     Emotionally Abused:     Physically Abused:     Sexually Abused:          Family and Social Historyreviewed in the electronic medical record. Imaging:Reviewed available imaging in our system with the patient. No results found. Objective:     Physical Exam:  Vitals:    07/27/21 1433   BP: 104/60   Site: Right Upper Arm   Position: Sitting   Cuff Size: Medium Adult   Weight: 218 lb (98.9 kg)   Height: 5' 9\" (1.753 m)          Physical Exam  Constitutional:       Appearance: He is well-developed. HENT:      Head: Normocephalic and atraumatic. Cardiovascular:      Pulses: Normal pulses. Comments: Warm extremities. Normal capillary refill. Pulmonary:      Effort: Pulmonary effort is normal.   Abdominal:      General: Abdomen is flat. Palpations: Abdomen is soft. Musculoskeletal:      Lumbar back: Negative right straight leg raise test and negative left straight leg raise test.   Skin:     General: Skin is warm and dry.    Neurological:      General: No focal deficit present. Mental Status: He is alert and oriented to person, place, and time. Cranial Nerves: No cranial nerve deficit. Sensory: No sensory deficit. Motor: No atrophy or abnormal muscle tone. Deep Tendon Reflexes: Reflexes are normal and symmetric. Psychiatric:         Mood and Affect: Mood normal.         Speech: Speech normal.         Behavior: Behavior normal.       Back Exam     Tenderness   Back tenderness location: -kemps slump. Range of Motion   Extension: normal   Flexion: normal   Lateral bend right: normal   Lateral bend left: normal   Rotation right: normal   Rotation left: normal     Muscle Strength   Right Quadriceps:  5/5   Left Quadriceps:  5/5   Right Hamstrings:  5/5   Left Hamstrings:  5/5     Tests   Straight leg raise right: negative  Straight leg raise left: negative    Other   Toe walk: normal  Heel walk: normal  Sensation: normal  Gait: normal                                   Research  has found that  Spine injections    reduce pain and  give  better functional  outcomes. Assessment: This is a 68 y.o. male patient with:    Diagnosis:   Diagnosis Orders   1. Lumbar facet joint syndrome     2. Lumbosacral spondylosis without myelopathy     3. Chronic bilateral low back pain with left-sided sciatica     4. Degeneration of lumbar or lumbosacral intervertebral disc         Medical Comorbidities:  As listed in the patient's past medical and surgical history    Functional Limitations:   Pain limits function and quality of life. Plan:    Epsiodes of back pain  Are few and  Last  A minute  Feels  Under enough control will wait on any  More Interventional  Spine Surgeries     Meds:   Controlled Substances Monitoring: Pt educated about the risks of taking opiates,including increased sedation, constipation, slowed breathing, tolerance, dependence,and addiction.       New Prescriptions    No medications on file      No orders of the defined types were placed in this encounter. No orders of the defined types were placed in this encounter. No follow-ups on file. Opioid medication has  significant  risk  benefit concerns. We  instruct    our patients that  a Random Urine Drug Screen is required  along with a  an Opioid assessment questionaire such as ORT  or SOAPP  The patient expressed understanding of the above assessment and plan. Totaltime spent face to face with patient was 25 minutes inwhich  50% or more of the time was spent in counseling, education about risk andbenefits of the above plan, and coordination of care.

## 2021-08-05 ENCOUNTER — OFFICE VISIT (OUTPATIENT)
Dept: FAMILY MEDICINE CLINIC | Age: 76
End: 2021-08-05
Payer: MEDICARE

## 2021-08-05 VITALS
WEIGHT: 217.2 LBS | BODY MASS INDEX: 32.17 KG/M2 | RESPIRATION RATE: 20 BRPM | SYSTOLIC BLOOD PRESSURE: 118 MMHG | HEIGHT: 69 IN | HEART RATE: 74 BPM | DIASTOLIC BLOOD PRESSURE: 62 MMHG

## 2021-08-05 DIAGNOSIS — Z12.5 SCREENING PSA (PROSTATE SPECIFIC ANTIGEN): ICD-10-CM

## 2021-08-05 DIAGNOSIS — Z99.89 OSA ON CPAP: ICD-10-CM

## 2021-08-05 DIAGNOSIS — G47.33 OSA ON CPAP: ICD-10-CM

## 2021-08-05 DIAGNOSIS — E78.00 PURE HYPERCHOLESTEROLEMIA: ICD-10-CM

## 2021-08-05 DIAGNOSIS — F32.5 MAJOR DEPRESSIVE DISORDER IN FULL REMISSION, UNSPECIFIED WHETHER RECURRENT (HCC): ICD-10-CM

## 2021-08-05 DIAGNOSIS — N18.30 STAGE 3 CHRONIC KIDNEY DISEASE, UNSPECIFIED WHETHER STAGE 3A OR 3B CKD (HCC): Primary | ICD-10-CM

## 2021-08-05 DIAGNOSIS — C44.92 SCC (SQUAMOUS CELL CARCINOMA): ICD-10-CM

## 2021-08-05 DIAGNOSIS — I10 ESSENTIAL (PRIMARY) HYPERTENSION: ICD-10-CM

## 2021-08-05 DIAGNOSIS — H90.6 MIXED CONDUCTIVE AND SENSORINEURAL HEARING LOSS OF BOTH EARS: ICD-10-CM

## 2021-08-05 DIAGNOSIS — D50.0 IRON DEFICIENCY ANEMIA DUE TO CHRONIC BLOOD LOSS: ICD-10-CM

## 2021-08-05 DIAGNOSIS — M48.062 SPINAL STENOSIS, LUMBAR REGION, WITH NEUROGENIC CLAUDICATION: ICD-10-CM

## 2021-08-05 PROCEDURE — 4040F PNEUMOC VAC/ADMIN/RCVD: CPT | Performed by: FAMILY MEDICINE

## 2021-08-05 PROCEDURE — G8427 DOCREV CUR MEDS BY ELIG CLIN: HCPCS | Performed by: FAMILY MEDICINE

## 2021-08-05 PROCEDURE — 99214 OFFICE O/P EST MOD 30 MIN: CPT | Performed by: FAMILY MEDICINE

## 2021-08-05 PROCEDURE — 1123F ACP DISCUSS/DSCN MKR DOCD: CPT | Performed by: FAMILY MEDICINE

## 2021-08-05 PROCEDURE — 99213 OFFICE O/P EST LOW 20 MIN: CPT | Performed by: FAMILY MEDICINE

## 2021-08-05 PROCEDURE — G8417 CALC BMI ABV UP PARAM F/U: HCPCS | Performed by: FAMILY MEDICINE

## 2021-08-05 PROCEDURE — 1036F TOBACCO NON-USER: CPT | Performed by: FAMILY MEDICINE

## 2021-08-05 RX ORDER — TAMSULOSIN HYDROCHLORIDE 0.4 MG/1
0.4 CAPSULE ORAL DAILY
Qty: 90 CAPSULE | Refills: 3 | Status: SHIPPED | OUTPATIENT
Start: 2021-08-05 | End: 2021-09-14 | Stop reason: SDUPTHER

## 2021-08-05 RX ORDER — SERTRALINE HYDROCHLORIDE 100 MG/1
TABLET, FILM COATED ORAL
Qty: 90 TABLET | Refills: 2 | Status: SHIPPED | OUTPATIENT
Start: 2021-08-05 | End: 2022-02-08 | Stop reason: SDUPTHER

## 2021-08-05 RX ORDER — METOPROLOL SUCCINATE 50 MG/1
TABLET, EXTENDED RELEASE ORAL
Qty: 90 TABLET | Refills: 1 | Status: SHIPPED | OUTPATIENT
Start: 2021-08-05 | End: 2022-02-08 | Stop reason: SDUPTHER

## 2021-08-05 RX ORDER — SIMVASTATIN 20 MG
TABLET ORAL
Qty: 90 TABLET | Refills: 1 | Status: SHIPPED | OUTPATIENT
Start: 2021-08-05 | End: 2022-02-08 | Stop reason: SDUPTHER

## 2021-08-05 RX ORDER — FENOFIBRATE 145 MG/1
TABLET, COATED ORAL
Qty: 90 TABLET | Refills: 1 | Status: SHIPPED | OUTPATIENT
Start: 2021-08-05 | End: 2022-02-08 | Stop reason: SDUPTHER

## 2021-08-05 ASSESSMENT — ENCOUNTER SYMPTOMS
ALLERGIC/IMMUNOLOGIC NEGATIVE: 1
RESPIRATORY NEGATIVE: 1
EYES NEGATIVE: 1
BACK PAIN: 1
GASTROINTESTINAL NEGATIVE: 1

## 2021-08-05 ASSESSMENT — PATIENT HEALTH QUESTIONNAIRE - PHQ9
1. LITTLE INTEREST OR PLEASURE IN DOING THINGS: 0
SUM OF ALL RESPONSES TO PHQ9 QUESTIONS 1 & 2: 0
SUM OF ALL RESPONSES TO PHQ QUESTIONS 1-9: 0
2. FEELING DOWN, DEPRESSED OR HOPELESS: 0

## 2021-08-05 NOTE — PROGRESS NOTES
Subjective:      Patient ID: Melissa Grant is a 68 y.o. male. Hypertension    Back Pain  Associated symptoms include numbness (more into left leg and feet with prolonged standing, both right and left feet. ). Other  Associated symptoms include numbness (more into left leg and feet with prolonged standing, both right and left feet. ). Hyperlipidemia    Foot Pain   Associated symptoms include numbness (more into left leg and feet with prolonged standing, both right and left feet. ). Routine follow up on chronic medical conditions, refills, and review of updated labs. I have reviewed the patient's medical history in detail and updated the computerized patient record. He had back surgery 1/26/18 with thoracic and lumbar posterior decompression T9-L5. He had gradual, increasing pain in the months after the surgery, actually more painful than prior to surgery. More extensive lumbar fusion in 2017 with instrumentation. Most recently with spinal cord stimulator placement 11/18. Back pain not as severe and more tolerable. Still with pain that comes and goes. Still has some burning paresthesias and numbness into the right leg off and on, but less severe than prior. Currently with bilateral radicular symptoms described. Still reporting daily pain, limiting his activity. Not following with Dr. Janey Valencia at present. Describing a lot more muscle cramps at present, usually the legs. More often at present. Daily/at night. Compliant with medications . Mood stable. No anxiety of concern. No significant other joint arthritis of concern. Hearing loss better with hearing aides. Fatigues easily and somewhat sob with walking. No urination or bowel issues of concern on review today. cpap compliant this week. Thinks he urinates less overnight. Left lower abdominal pain . Couple of weeks or more. Pain comes almost exclusively at night when he lays down, within a couple of minutes.   Laying on his back makes the pain worse, seems to go away if he holds still on his back for 10 minutes. Lesion on right arm. He recalls squeezing some material out of it recently. Past Medical History:   Diagnosis Date    Agoraphobia     Anxiety     Arthritis     Basal cell carcinoma     Chronic pain     back    CKD (chronic kidney disease)     Claustrophobia     Enlarged lymph node     rt. lower neck.  Hearing aid worn     dar. ears.     Hearing loss     Hyperlipidemia     Hypertension     RAFAT (obstructive sleep apnea)     CPAP occasional    PONV (postoperative nausea and vomiting)     Retention of urine     last 2 surgeries, patient unable to void, home with catheter both times    SCC (squamous cell carcinoma)     HAND    Spinal stenosis, lumbar region, with neurogenic claudication     Wears glasses      Past Surgical History:   Procedure Laterality Date    BACK SURGERY  10/2013    Dr. Marika Tipton hardware lumbar    COLONOSCOPY  06/13/12    mild diverticular dz    LUMBAR FUSION N/A 3/8/2017    LUMBAR L2-3 POSTERIOR DECOMPRESSION FUSION INSTRUMENTATION W/REVISION L3-5 POSTERIOR DECOMPRESSION FUSION INSTRUMENTATION (KOKI TOWNSEND & Marlene Daniels)  CC SN/KILEY performed by Primo Bui MD at [facility] Right     lower neck, 2-    PAIN MANAGEMENT PROCEDURE Bilateral 3/12/2021    Bilateral S1 TRANSFORAMINAL performed by Lucas Chester MD at [address] [place] [address] Bilateral 3/26/2021    Bilateral S1 TRANSFORAMINAL performed by Lucas Chester MD at [address] [place] [address] Bilateral 7/8/2021    BILAT L4-L5 L5-S1 FACET performed by Lucas Chester MD at [facility] N/A 1/26/2018    THORACIC & LUMBAR POSTERIOR DECOMPRESSION AND FUSION REVISION T9 THRU L5 performed by Primo Bui MD at [address] [facility] CA [address]  IND N/A 8/23/2017    COLONOSCOPY performed by Martín Delacruz MD at [address] [facility] PERCUT IMPLNT NEUROELECT,EPIDURAL N/A 11/14/2018    SPINAL CORD STIMULATOR IMPLANT PERMANENT performed by Floridalma Carr MD at [address] PRE-MALIGNANT / BENIGN SKIN LESION EXCISION      SKIN BIOPSY      skin cancer removed x 4    VASECTOMY       Current Outpatient Medications   Medication Sig Dispense Refill    HYDROcodone-acetaminophen (NORCO) 5-325 MG per tablet Take 1 tablet by mouth 2 times daily for 30 days. 60 tablet 0    simvastatin (ZOCOR) 20 MG tablet TAKE ONE TABLET BY MOUTH DAILY 90 tablet 1    metoprolol succinate (TOPROL XL) 50 MG extended release tablet TAKE ONE TABLET BY MOUTH DAILY 90 tablet 1    fenofibrate (TRICOR) 145 MG tablet TAKE ONE TABLET BY MOUTH DAILY 90 tablet 1    Multiple Vitamin (MULTI VITAMIN DAILY PO) Take by mouth      sertraline (ZOLOFT) 100 MG tablet TAKE ONE TABLET BY MOUTH DAILY 90 tablet 2    tamsulosin (FLOMAX) 0.4 MG capsule Take 1 capsule by mouth daily 90 capsule 3    aspirin 81 MG tablet Take 81 mg by mouth daily       No current facility-administered medications for this visit. Allergies   Allergen Reactions    Atarax [Hydroxyzine]      Double vision    Gabapentin      dizzy    Phenergan [Promethazine Hcl] Other (See Comments)     \"out of it\"    Morphine And Related Nausea And Vomiting     hallucinations         Review of Systems   Constitutional: Negative. HENT: Positive for hearing loss. Eyes: Negative. Respiratory: Negative. Cardiovascular: Negative. Gastrointestinal: Negative. Endocrine: Negative. Genitourinary: Negative. Musculoskeletal: Positive for back pain and gait problem (limited standing and walking due to back pain). Skin: Negative. Allergic/Immunologic: Negative. Neurological: Positive for numbness (more into left leg and feet with prolonged standing, both right and left feet. ). Hematological: Negative. Psychiatric/Behavioral: Negative.         Objective:   Physical Exam  Constitutional:       General: He is not in acute distress. Appearance: He is well-developed. HENT:      Head: Normocephalic and atraumatic. Right Ear: External ear normal.      Left Ear: External ear normal.      Mouth/Throat:      Pharynx: No oropharyngeal exudate. Eyes:      General: No scleral icterus. Conjunctiva/sclera: Conjunctivae normal.   Neck:      Thyroid: No thyromegaly. Cardiovascular:      Rate and Rhythm: Normal rate and regular rhythm. Heart sounds: Normal heart sounds. No murmur heard. Pulmonary:      Effort: Pulmonary effort is normal. No respiratory distress. Breath sounds: Normal breath sounds. No wheezing. Abdominal:      General: Bowel sounds are normal. There is no distension. Palpations: Abdomen is soft. Tenderness: There is no abdominal tenderness. There is no rebound. Genitourinary:     Prostate: Not enlarged and not tender. Rectum: Normal. No mass. Normal anal tone. Musculoskeletal:         General: No tenderness. Normal range of motion. Cervical back: Neck supple. Feet:    Skin:     General: Skin is warm and dry. Findings: Lesion (multiple seb. keratosis lesions. 2 on scalp, 6 on trunk. 2 on left leg) present. No erythema or rash. Neurological:      Mental Status: He is alert and oriented to person, place, and time. Sensory: Sensory deficit (left leg at present.) present. Psychiatric:         Behavior: Behavior normal.         Thought Content:  Thought content normal.         Judgment: Judgment normal.       /62 (Site: Right Upper Arm, Position: Sitting, Cuff Size: Large Adult)   Pulse 74   Resp 20   Ht 5' 9\" (1.753 m)   Wt 217 lb 3.2 oz (98.5 kg)   BMI 32.07 kg/m²     Hospital Outpatient Visit on 07/13/2021   Component Date Value Ref Range Status    PSA 07/13/2021 1.09  <4.1 ug/L Final    WBC 07/13/2021 7.4  3.5 - 11.3 k/uL Final    RBC 07/13/2021 5.32  4.21 - 5.77 m/uL Final    Hemoglobin 07/13/2021 15.7  13.0 - 17.0 g/dL Final    Hematocrit 07/13/2021 47.4  40.7 - 50.3 % Final    MCV 07/13/2021 89.1  82.6 - 102.9 fL Final    MCH 07/13/2021 29.5  25.2 - 33.5 pg Final    MCHC 07/13/2021 33.1  25.2 - 33.5 g/dL Final    RDW 07/13/2021 13.7  11.8 - 14.4 % Final    Platelets 99/06/6649 195  138 - 453 k/uL Final    MPV 07/13/2021 9.4  8.1 - 13.5 fL Final    NRBC Automated 07/13/2021 0.0  0.0 per 100 WBC Final    Differential Type 07/13/2021 NOT REPORTED   Final    Seg Neutrophils 07/13/2021 57  36 - 65 % Final    Lymphocytes 07/13/2021 28  24 - 43 % Final    Monocytes 07/13/2021 11  3 - 12 % Final    Eosinophils % 07/13/2021 3  1 - 4 % Final    Basophils 07/13/2021 1  0 - 2 % Final    Immature Granulocytes 07/13/2021 0  0 % Final    Segs Absolute 07/13/2021 4.22  1.50 - 8.10 k/uL Final    Absolute Lymph # 07/13/2021 2.09  1.10 - 3.70 k/uL Final    Absolute Mono # 07/13/2021 0.79  0.10 - 1.20 k/uL Final    Absolute Eos # 07/13/2021 0.25  0.00 - 0.44 k/uL Final    Basophils Absolute 07/13/2021 0.04  0.00 - 0.20 k/uL Final    Absolute Immature Granulocyte 07/13/2021 <0.03  0.00 - 0.30 k/uL Final    WBC Morphology 07/13/2021 NOT REPORTED   Final    RBC Morphology 07/13/2021 NOT REPORTED   Final    Platelet Estimate 07/12/4426 NOT REPORTED   Final    Glucose 07/13/2021 93  70 - 99 mg/dL Final    BUN 07/13/2021 21  8 - 23 mg/dL Final    CREATININE 07/13/2021 0.97  0.70 - 1.20 mg/dL Final    Bun/Cre Ratio 07/13/2021 22* 9 - 20 Final    Calcium 07/13/2021 9.5  8.6 - 10.4 mg/dL Final    Sodium 07/13/2021 141  135 - 144 mmol/L Final    Potassium 07/13/2021 4.2  3.7 - 5.3 mmol/L Final    Chloride 07/13/2021 106  98 - 107 mmol/L Final    CO2 07/13/2021 23  20 - 31 mmol/L Final    Anion Gap 07/13/2021 12  9 - 17 mmol/L Final    Alkaline Phosphatase 07/13/2021 75  40 - 129 U/L Final    ALT 07/13/2021 20  5 - 41 U/L Final    AST 07/13/2021 28  <40 U/L Final    Total Bilirubin 07/13/2021 0.58  0.3 - 1.2 mg/dL Final  Total Protein 07/13/2021 7.4  6.4 - 8.3 g/dL Final    Albumin 07/13/2021 4.5  3.5 - 5.2 g/dL Final    Albumin/Globulin Ratio 07/13/2021 1.6  1.0 - 2.5 Final    GFR Non- 07/13/2021 >60  >60 mL/min Final    GFR  07/13/2021 >60  >60 mL/min Final    GFR Comment 07/13/2021        Final    GFR Staging 07/13/2021 NOT REPORTED   Final    Cholesterol 07/13/2021 179  <200 mg/dL Final    HDL 07/13/2021 35* >40 mg/dL Final    LDL Cholesterol 07/13/2021 113  0 - 130 mg/dL Final    Chol/HDL Ratio 07/13/2021 5.1* <5 Final    Triglycerides 07/13/2021 157* <150 mg/dL Final    VLDL 07/13/2021 NOT REPORTED* 1 - 30 mg/dL Final         Assessment:       Encounter Diagnoses   Name Primary?  Stage 3 chronic kidney disease, unspecified whether stage 3a or 3b CKD ([address] Yes    Pure hypercholesterolemia     SCC (squamous cell carcinoma)     Major depressive disorder in full remission, unspecified whether recurrent ([address]     Essential (primary) hypertension     LAURO on CPAP     Spinal stenosis, lumbar region, with neurogenic claudication     Iron deficiency anemia due to chronic blood loss     Screening PSA (prostate specific antigen)     Mixed conductive and sensorineural hearing loss of both ears              Plan:      CKD; improved/ stable with  Normal renal indicies at present. Cont. To avoid nephrotoxins when able and follow serially. Hyperlipidemia: good control at present . Cont. Simvastatin/fenofibrate at current dosing. SCC; no evidence for recurrent disease. Cont. Screening exams. Depression and anxiety. No current concerns on zoloft. Helping with agoraphobia and claustrophobia mostly. Plan to cont. Same. Htn: good overall control. Cont. toprol daily. On flomax as well    Lauro: better compliance over the interval.  He was getting up a lot at night with his back pain issues and it became a hardship for him. Encouraged compliance.   He snores a lot, but reports he feels better without it. He thinks he urinates less often using the cpap. Spinal stenosis with claudication. Dr. Timoteo Carr following s/p surgery in October2013. MRI completed. surgery 3/8/17 through Dr. Timoteo Carr. L2-3 PLIF add on to 3-5. Symptoms in his back and radicular pain down left leg improved , but pain and disability returned. S/p posterior decompression T9-L5 1/26/18. Some early improvement, then pain increased quite a bit again . He has recently been going to pain management, and injections helped some. Most recently he had spinal cord stimulator placed 11/18. Still has symptoms of back pain and paresthesias into right leg, but level of pain /severity has improved. He continues to report daily pain and paresthesias right >left. Activity limiting pain at times. Using norco prn. Needs one in the am before getting out of bed. A lot of pain and stiffness in the morning. No signs of misuse or diversion. He and spouse relate he could use more medication to help keep him active during the day. Current supply at 40/month. Changing to bid dosing / #60. Did not tolerate gabapentin in the past.  Drowsy and confused some. Currently with bilateral radicular symptoms. Plan to re start pain management. Anemia: normalized last 3 visits. Has had some recurrent anemia, microcytic, with surgery in October and again in march, and again in January 2018. Iron low in January. Microcytosis evident. Started ferrous sulfate 325mg bid, then decreased to once daily, now off the iron supplement. Iron level normal.  Cont. Serial follow up. Colonoscopy up to date 2012. Cont. Serial checks. Psa normal, cont. Annual screening. Hearing loss. New hearing aides. Helps some. Hearing back in the shop at present. Leg cramps. Increasing frequency at present. Some sweating and outdoor work. Remains active. Electrolytes normal at present.   Discussed wayne vázquez as a trial .  Not helping much. Frequency once a week or so at present. inject on trials into the lower back through pain management, not really helping the cramps. Left sided abdominal pain. Almost exclusively at night after laying down. Worse lying on his back. Seems to improve after 10 minutes. No significant abdominal surgery to consider adhesions. Last colonoscopy ok 2017. Offered surgical consultation. Problem seems positional /mechanical .    He opts to observe and follow up for concerns. Not every night at present.

## 2021-08-13 DIAGNOSIS — G89.29 CHRONIC BILATERAL LOW BACK PAIN WITH LEFT-SIDED SCIATICA: ICD-10-CM

## 2021-08-13 DIAGNOSIS — M54.42 CHRONIC BILATERAL LOW BACK PAIN WITH LEFT-SIDED SCIATICA: ICD-10-CM

## 2021-08-16 RX ORDER — HYDROCODONE BITARTRATE AND ACETAMINOPHEN 5; 325 MG/1; MG/1
1 TABLET ORAL 2 TIMES DAILY
Qty: 60 TABLET | Refills: 0 | Status: SHIPPED | OUTPATIENT
Start: 2021-08-16 | End: 2021-11-29 | Stop reason: SDUPTHER

## 2021-09-07 ASSESSMENT — ENCOUNTER SYMPTOMS
ALLERGIC/IMMUNOLOGIC NEGATIVE: 1
RESPIRATORY NEGATIVE: 1
CONSTIPATION: 0
EYES NEGATIVE: 1
NAUSEA: 0
BACK PAIN: 1

## 2021-09-07 NOTE — H&P
PAIN MANAGEMENT FOLLOW-UP NOTE      CHIEF COMPLAINT: This is a72 y.o. male patientwho [facility] the [facility] with a history of No chief complaint on file. PAIN HPI:Max Webber returns today for  reevaluation. Since the visit, the patient reports that the pain is not changed. Still  Pain in B lumbar  Not better   worse when wakes up  From sleep    can  Ride a bicycle  But cant walk or stand      Location: Back  Location Modifier: entire back  Severity of Pain: 3  Duration of Pain: Intermittent  Frequency of Pain: Intermittent  Aggravating Factors: -  Limiting Behavior: Standing   Relieving Factors: relaxation        Previous pain medication trials have included:          Mental health:    Patient feels - secondary to their current pain problems as described above. H/O depression and anxiety: No   Patient is not seeing psychologist orpsychiatrist   Abuse history? No    Employed? No    ANALGESIA:   Are your Current Pain medication (s) helping to decrease pain? No.   Current Pain score:      ADVERSE AFFECTS:   Medication Side Effects: No.    ACTIVITY:  Are you able to be more active with your pain medications? No      ABERRANT BEHAVIORS SINCE LAST VISIT? No    Review of Systems   Constitutional: Positive for fatigue. HENT: Negative. Eyes: Negative. Respiratory: Negative. Cardiovascular: Negative. Gastrointestinal: Negative for constipation and nausea. Endocrine: Negative. Genitourinary: Negative for difficulty urinating. Musculoskeletal: Positive for arthralgias, back pain and myalgias. Skin: Negative. Allergic/Immunologic: Negative. Neurological: Positive for weakness and numbness. Hematological: Negative. Psychiatric/Behavioral: Positive for sleep disturbance. All other systems reviewed and are negative.        Allergies   Allergen Reactions    Atarax [Hydroxyzine]      Double vision    Gabapentin      dizzy    Phenergan [Promethazine Hcl] Other (See Comments)     \"out of it\"    Morphine And Related Nausea And Vomiting     hallucinations       Outpatient Medications Prior to Visit   Medication Sig Dispense Refill    ibuprofen (ADVIL;MOTRIN) 200 MG tablet Take 200 mg by mouth every 6 hours as needed for Pain      simvastatin (ZOCOR) 20 MG tablet TAKE ONE TABLET BY MOUTH DAILY 90 tablet 1    HYDROcodone-acetaminophen (NORCO) 5-325 MG per tablet Take 1 tablet by mouth 2 times daily for 30 days. 60 tablet 0    metoprolol succinate (TOPROL XL) 50 MG extended release tablet TAKE ONE TABLET BY MOUTH DAILY 90 tablet 1    fenofibrate (TRICOR) 145 MG tablet TAKE ONE TABLET BY MOUTH DAILY 90 tablet 1    Multiple Vitamin (MULTI VITAMIN DAILY PO) Take by mouth      sertraline (ZOLOFT) 100 MG tablet TAKE ONE TABLET BY MOUTH DAILY 90 tablet 2    tamsulosin (FLOMAX) 0.4 MG capsule Take 1 capsule by mouth daily 90 capsule 3    aspirin 81 MG tablet Take 81 mg by mouth daily      acetaminophen (TYLENOL) 500 MG tablet Take 500 mg by mouth every 6 hours as needed. (Patient not taking: Reported on 6/14/2021)       No facility-administered medications prior to visit. Past Medical History:   Diagnosis Date    Agoraphobia     Anxiety     Arthritis     Basal cell carcinoma     Chronic pain     back    CKD (chronic kidney disease)     Claustrophobia     Enlarged lymph node     rt. lower neck.  Hearing aid worn     dar. ears.     Hearing loss     Hyperlipidemia     Hypertension     RAFAT (obstructive sleep apnea)     CPAP occasional    PONV (postoperative nausea and vomiting)     Retention of urine     last 2 surgeries, patient unable to void, home with catheter both times    SCC (squamous cell carcinoma)     HAND    Spinal stenosis, lumbar region, with neurogenic claudication     Wears glasses        Past Surgical History:   Procedure Laterality Date    BACK SURGERY  10/2013    Dr. Sully Lara hardware lumbar    COLONOSCOPY  06/13/12    mild diverticular dz  LUMBAR FUSION N/A 3/8/2017    LUMBAR L2-3 POSTERIOR DECOMPRESSION FUSION INSTRUMENTATION W/REVISION L3-5 POSTERIOR DECOMPRESSION FUSION INSTRUMENTATION ([address])  CC SN/KILEY performed by Elizabeth Harmon MD at [facility] Right     lower neck, 2-    PAIN MANAGEMENT PROCEDURE Bilateral 3/12/2021    Bilateral S1 TRANSFORAMINAL performed by Raza Clarke MD at [facility] Bilateral 3/26/2021    Bilateral S1 TRANSFORAMINAL performed by Raza Clarke MD at [facility] Bilateral 7/8/2021    BILAT L4-L5 L5-S1 FACET performed by Raza Clarke MD at [address] THORACIC N/A 1/26/2018    THORACIC & LUMBAR POSTERIOR DECOMPRESSION AND FUSION REVISION T9 THRU L5 performed by Elizabeth Harmon MD at [address] [facility] CA SCRN NOT  [address] IND N/A 8/23/2017    COLONOSCOPY performed by Rocky Correa MD at [address] [facility] N/A 11/14/2018    SPINAL CORD STIMULATOR IMPLANT PERMANENT performed by Elizabeth Harmon MD at [address] PRE-MALIGNANT / BENIGN SKIN LESION EXCISION      SKIN BIOPSY      skin cancer removed x 4    VASECTOMY       Family History   Problem Relation Age of Onset    Diabetes Mother     Diabetes Brother     Cancer Father         lung cancer    Cancer Brother         lung cancer    Diabetes Brother      Social History     Socioeconomic History    Marital status:      Spouse name: None    Number of children: None    Years of education: None    Highest education level: None   Occupational History    None   Tobacco Use    Smoking status: Never Smoker    Smokeless tobacco: Never Used   Vaping Use    Vaping Use: Never used   Substance and Sexual Activity    Alcohol use: No    Drug use: No    Sexual activity: None   Other Topics Concern    None   Social History Narrative    None     Social Determinants of Health     Financial Resource Strain:     Difficulty of Paying Living Expenses:    Food Insecurity:     Worried About Running Out of Food in the Last Year:     [address] in the Last Year:    Transportation Needs:     Lack of Transportation (Medical):  Lack of Transportation (Non-Medical):    Physical Activity:     Days of Exercise per Week:     Minutes of Exercise per Session:    Stress:     Feeling of Stress :    Social Connections:     Frequency of Communication with Friends and Family:     Frequency of Social Gatherings with Friends and Family:     Attends Mosque Services:     Active Member of Clubs or Organizations:     Attends Club or Organization Meetings:     Marital Status:    Intimate Partner Violence:     Fear of Current or Ex-Partner:     Emotionally Abused:     Physically Abused:     Sexually Abused:          Family and Social Historyreviewed in the electronic medical record. Imaging:Reviewed available imaging in our system with the patient. No results found. Objective:     Physical Exam:  Vitals:    07/08/21 1111 07/08/21 1116 07/08/21 1122 07/08/21 1131   BP: (!) 165/80 (!) 154/75 (!) 156/74 (!) 159/77   Pulse: 56  54 62   Resp: 16 16 16 18   Temp:   98.1 °F (36.7 °C)    TempSrc:       SpO2: 96%  94% 96%   Weight:       Height:              Physical Exam  Constitutional:       Appearance: He is well-developed. HENT:      Head: Normocephalic and atraumatic. Cardiovascular:      Pulses: Normal pulses. Comments: Warm extremities. Normal capillary refill. Pulmonary:      Effort: Pulmonary effort is normal.   Abdominal:      General: Abdomen is flat. Palpations: Abdomen is soft. Musculoskeletal:      Lumbar back: Negative right straight leg raise test and negative left straight leg raise test.   Skin:     General: Skin is warm and dry. Neurological:      General: No focal deficit present. Mental Status: He is alert and oriented to person, place, and time.       Cranial Nerves: No cranial nerve deficit. Sensory: No sensory deficit. Motor: No atrophy or abnormal muscle tone. Deep Tendon Reflexes: Reflexes are normal and symmetric. Psychiatric:         Mood and Affect: Mood normal.         Speech: Speech normal.         Behavior: Behavior normal.       Back Exam     Tenderness   The patient is experiencing tenderness in the lumbar. Range of Motion   Extension: normal   Flexion: normal   Lateral bend right: normal   Lateral bend left: normal   Rotation right: normal   Rotation left: normal     Muscle Strength   Right Quadriceps:  5/5   Left Quadriceps:  5/5   Right Hamstrings:  5/5   Left Hamstrings:  5/5     Tests   Straight leg raise right: negative  Straight leg raise left: negative    Other   Toe walk: normal  Heel walk: normal  Sensation: normal  Gait: normal     Comments:  +kemps   Mild slump B                                  Research  has found that  Spine injections    reduce pain and  give  better functional  outcomes. Assessment: This is a 68 y.o. male patient with:    Diagnosis:   Diagnosis Orders   1. Lumbar facet joint syndrome     2. Lumbosacral spondylosis without myelopathy     3. DDD (degenerative disc disease), lumbar         Medical Comorbidities:  As listed in the patient's past medical and surgical history    Functional Limitations:   Pain limits function and quality of life. Plan:    B L4-5 5-S1 facet steroid injections     Meds:   Controlled Substances Monitoring: Pt educated about the risks of taking opiates,including increased sedation, constipation, slowed breathing, tolerance, dependence,and addiction.       Discharge Medication List as of 7/8/2021 11:25 AM         Orders Placed This Encounter   Medications    DISCONTD: dexamethasone (DECADRON) injection    DISCONTD: iohexol (OMNIPAQUE 240) injection    DISCONTD: lidocaine 1% (buffered) injection    DISCONTD: lidocaine PF 2 % injection     Orders Placed This Encounter Procedures    FLUORO FOR SURGICAL PROCEDURES     Standing Status:   Standing     Number of Occurrences:   1     Order Specific Question:   Reason for exam:     Answer:   Fluro    Discharge patient     Standing Status:   Standing     Number of Occurrences:   1     Order Specific Question:   Discharge Disposition     Answer:   Home       No follow-ups on file. Opioid medication has  significant  risk  benefit concerns. We  instruct    our patients that  a Random Urine Drug Screen is required  along with a  an Opioid assessment questionaire such as ORT  or SOAPP  The patient expressed understanding of the above assessment and plan. Totaltime spent face to face with patient was 25 minutes inwhich  50% or more of the time was spent in counseling, education about risk andbenefits of the above plan, and coordination of care.

## 2021-09-08 ASSESSMENT — ENCOUNTER SYMPTOMS
BACK PAIN: 1
NAUSEA: 0
EYES NEGATIVE: 1
RESPIRATORY NEGATIVE: 1
ALLERGIC/IMMUNOLOGIC NEGATIVE: 1
CONSTIPATION: 0

## 2021-09-08 NOTE — H&P
PAIN MANAGEMENT FOLLOW-UP NOTE  6/14/21    CHIEF COMPLAINT: This is a72 y.o. male patientwho returns to the [facility] with a history of No chief complaint on file. PAIN HPI:Max Ndiaye returns today for  reevaluation. Since the visit, the patient reports that the pain is not changed. Still  Pain in B lumbar  Not better   worse when wakes up  From sleep    can  Ride a bicycle  But cant walk or stand      Location: Back  Location Modifier: entire back  Severity of Pain: 3  Duration of Pain: Intermittent  Frequency of Pain: Intermittent  Aggravating Factors: -  Limiting Behavior: Standing   Relieving Factors: relaxation        Previous pain medication trials have included:          Mental health:    Patient feels - secondary to their current pain problems as described above. H/O depression and anxiety: No   Patient is not seeing psychologist orpsychiatrist   Abuse history? No    Employed? No    ANALGESIA:   Are your Current Pain medication (s) helping to decrease pain? No.   Current Pain score:      ADVERSE AFFECTS:   Medication Side Effects: No.    ACTIVITY:  Are you able to be more active with your pain medications? No      ABERRANT BEHAVIORS SINCE LAST VISIT? No    Review of Systems   Constitutional: Positive for fatigue. HENT: Negative. Eyes: Negative. Respiratory: Negative. Cardiovascular: Negative. Gastrointestinal: Negative for constipation and nausea. Endocrine: Negative. Genitourinary: Negative for difficulty urinating. Musculoskeletal: Positive for arthralgias, back pain and myalgias. Skin: Negative. Allergic/Immunologic: Negative. Neurological: Positive for weakness and numbness. Hematological: Negative. Psychiatric/Behavioral: Positive for sleep disturbance. All other systems reviewed and are negative.        Allergies   Allergen Reactions    Atarax [Hydroxyzine]      Double vision    Gabapentin      dizzy    Phenergan [Promethazine Hcl] Other (See Comments)     \"out of it\"    Morphine And Related Nausea And Vomiting     hallucinations       Outpatient Medications Prior to Visit   Medication Sig Dispense Refill    ibuprofen (ADVIL;MOTRIN) 200 MG tablet Take 200 mg by mouth every 6 hours as needed for Pain      simvastatin (ZOCOR) 20 MG tablet TAKE ONE TABLET BY MOUTH DAILY 90 tablet 1    HYDROcodone-acetaminophen (NORCO) 5-325 MG per tablet Take 1 tablet by mouth 2 times daily for 30 days. 60 tablet 0    metoprolol succinate (TOPROL XL) 50 MG extended release tablet TAKE ONE TABLET BY MOUTH DAILY 90 tablet 1    fenofibrate (TRICOR) 145 MG tablet TAKE ONE TABLET BY MOUTH DAILY 90 tablet 1    Multiple Vitamin (MULTI VITAMIN DAILY PO) Take by mouth      sertraline (ZOLOFT) 100 MG tablet TAKE ONE TABLET BY MOUTH DAILY 90 tablet 2    tamsulosin (FLOMAX) 0.4 MG capsule Take 1 capsule by mouth daily 90 capsule 3    aspirin 81 MG tablet Take 81 mg by mouth daily      acetaminophen (TYLENOL) 500 MG tablet Take 500 mg by mouth every 6 hours as needed. (Patient not taking: Reported on 6/14/2021)       No facility-administered medications prior to visit. Past Medical History:   Diagnosis Date    Agoraphobia     Anxiety     Arthritis     Basal cell carcinoma     Chronic pain     back    CKD (chronic kidney disease)     Claustrophobia     Enlarged lymph node     rt. lower neck.  Hearing aid worn     dar. ears.     Hearing loss     Hyperlipidemia     Hypertension     RAFAT (obstructive sleep apnea)     CPAP occasional    PONV (postoperative nausea and vomiting)     Retention of urine     last 2 surgeries, patient unable to void, home with catheter both times    SCC (squamous cell carcinoma)     HAND    Spinal stenosis, lumbar region, with neurogenic claudication     Wears glasses        Past Surgical History:   Procedure Laterality Date    BACK SURGERY  10/2013    Dr. Sully Lara hardware lumbar    COLONOSCOPY  06/13/12    mild diverticular Financial Resource Strain:     Difficulty of Paying Living Expenses:    Food Insecurity:     Worried About Running Out of Food in the Last Year:     [address] in the Last Year:    Transportation Needs:     Lack of Transportation (Medical):  Lack of Transportation (Non-Medical):    Physical Activity:     Days of Exercise per Week:     Minutes of Exercise per Session:    Stress:     Feeling of Stress :    Social Connections:     Frequency of Communication with Friends and Family:     Frequency of Social Gatherings with Friends and Family:     Attends Buddhist Services:     Active Member of Clubs or Organizations:     Attends Club or Organization Meetings:     Marital Status:    Intimate Partner Violence:     Fear of Current or Ex-Partner:     Emotionally Abused:     Physically Abused:     Sexually Abused:          Family and Social Historyreviewed in the electronic medical record. Imaging:Reviewed available imaging in our system with the patient. No results found. Objective:     Physical Exam:  Vitals:    07/08/21 1111 07/08/21 1116 07/08/21 1122 07/08/21 1131   BP: (!) 165/80 (!) 154/75 (!) 156/74 (!) 159/77   Pulse: 56  54 62   Resp: 16 16 16 18   Temp:   98.1 °F (36.7 °C)    TempSrc:       SpO2: 96%  94% 96%   Weight:       Height:              Physical Exam  Constitutional:       Appearance: He is well-developed. HENT:      Head: Normocephalic and atraumatic. Cardiovascular:      Pulses: Normal pulses. Comments: Warm extremities. Normal capillary refill. Pulmonary:      Effort: Pulmonary effort is normal.   Abdominal:      General: Abdomen is flat. Palpations: Abdomen is soft. Musculoskeletal:      Lumbar back: Negative right straight leg raise test and negative left straight leg raise test.   Skin:     General: Skin is warm and dry. Neurological:      General: No focal deficit present.       Mental Status: He is alert and oriented to person, place, and time.      Cranial Nerves: No cranial nerve deficit. Sensory: No sensory deficit. Motor: No atrophy or abnormal muscle tone. Deep Tendon Reflexes: Reflexes are normal and symmetric. Psychiatric:         Mood and Affect: Mood normal.         Speech: Speech normal.         Behavior: Behavior normal.       Back Exam     Tenderness   The patient is experiencing tenderness in the lumbar. Range of Motion   Extension: normal   Flexion: normal   Lateral bend right: normal   Lateral bend left: normal   Rotation right: normal   Rotation left: normal     Muscle Strength   Right Quadriceps:  5/5   Left Quadriceps:  5/5   Right Hamstrings:  5/5   Left Hamstrings:  5/5     Tests   Straight leg raise right: negative  Straight leg raise left: negative    Other   Toe walk: normal  Heel walk: normal  Sensation: normal  Gait: normal     Comments:  +kemps   Mild slump B                                  Research  has found that  Spine injections    reduce pain and  give  better functional  outcomes. Assessment: This is a 68 y.o. male patient with:    Diagnosis:   Diagnosis Orders   1. Lumbar facet joint syndrome     2. Lumbosacral spondylosis without myelopathy     3. DDD (degenerative disc disease), lumbar         Medical Comorbidities:  As listed in the patient's past medical and surgical history    Functional Limitations:   Pain limits function and quality of life. Plan:    B L4-5 5-S1 facet steroid injections     Meds:   Controlled Substances Monitoring: Pt educated about the risks of taking opiates,including increased sedation, constipation, slowed breathing, tolerance, dependence,and addiction.       Discharge Medication List as of 7/8/2021 11:25 AM         Orders Placed This Encounter   Medications    DISCONTD: dexamethasone (DECADRON) injection    DISCONTD: iohexol (OMNIPAQUE 240) injection    DISCONTD: lidocaine 1% (buffered) injection    DISCONTD: lidocaine PF 2 % injection     Orders Placed This Encounter   Procedures    FLUORO FOR SURGICAL PROCEDURES     Standing Status:   Standing     Number of Occurrences:   1     Order Specific Question:   Reason for exam:     Answer:   Fluro    Discharge patient     Standing Status:   Standing     Number of Occurrences:   1     Order Specific Question:   Discharge Disposition     Answer:   Home       No follow-ups on file. Opioid medication has  significant  risk  benefit concerns. We  instruct    our patients that  a Random Urine Drug Screen is required  along with a  an Opioid assessment questionaire such as ORT  or SOAPP  The patient expressed understanding of the above assessment and plan. Totaltime spent face to face with patient was 25 minutes inwhich  50% or more of the time was spent in counseling, education about risk andbenefits of the above plan, and coordination of care.

## 2021-09-14 RX ORDER — TAMSULOSIN HYDROCHLORIDE 0.4 MG/1
0.4 CAPSULE ORAL DAILY
Qty: 90 CAPSULE | Refills: 0 | Status: SHIPPED | OUTPATIENT
Start: 2021-09-14 | End: 2021-09-29 | Stop reason: SDUPTHER

## 2021-09-29 ENCOUNTER — OFFICE VISIT (OUTPATIENT)
Dept: UROLOGY | Age: 76
End: 2021-09-29
Payer: MEDICARE

## 2021-09-29 VITALS
WEIGHT: 217 LBS | BODY MASS INDEX: 32.14 KG/M2 | HEIGHT: 69 IN | DIASTOLIC BLOOD PRESSURE: 68 MMHG | SYSTOLIC BLOOD PRESSURE: 151 MMHG | HEART RATE: 67 BPM | TEMPERATURE: 96.1 F

## 2021-09-29 DIAGNOSIS — N40.1 HYPERTROPHY OF PROSTATE WITH URINARY OBSTRUCTION: Primary | ICD-10-CM

## 2021-09-29 DIAGNOSIS — R35.1 NOCTURIA: ICD-10-CM

## 2021-09-29 DIAGNOSIS — N13.8 HYPERTROPHY OF PROSTATE WITH URINARY OBSTRUCTION: Primary | ICD-10-CM

## 2021-09-29 DIAGNOSIS — Z87.898 HISTORY OF URINARY RETENTION: ICD-10-CM

## 2021-09-29 PROCEDURE — 99213 OFFICE O/P EST LOW 20 MIN: CPT | Performed by: NURSE PRACTITIONER

## 2021-09-29 PROCEDURE — G8427 DOCREV CUR MEDS BY ELIG CLIN: HCPCS | Performed by: NURSE PRACTITIONER

## 2021-09-29 PROCEDURE — 1036F TOBACCO NON-USER: CPT | Performed by: NURSE PRACTITIONER

## 2021-09-29 PROCEDURE — 1123F ACP DISCUSS/DSCN MKR DOCD: CPT | Performed by: NURSE PRACTITIONER

## 2021-09-29 PROCEDURE — 4040F PNEUMOC VAC/ADMIN/RCVD: CPT | Performed by: NURSE PRACTITIONER

## 2021-09-29 PROCEDURE — G8417 CALC BMI ABV UP PARAM F/U: HCPCS | Performed by: NURSE PRACTITIONER

## 2021-09-29 RX ORDER — PENICILLIN V POTASSIUM 500 MG/1
TABLET ORAL
COMMUNITY
Start: 2021-09-27 | End: 2022-02-08

## 2021-09-29 RX ORDER — TAMSULOSIN HYDROCHLORIDE 0.4 MG/1
0.4 CAPSULE ORAL DAILY
Qty: 90 CAPSULE | Refills: 3 | Status: SHIPPED | OUTPATIENT
Start: 2021-09-29

## 2021-09-29 ASSESSMENT — ENCOUNTER SYMPTOMS
EYE REDNESS: 0
VOMITING: 0
EYE PAIN: 0
ABDOMINAL PAIN: 0
BACK PAIN: 1
SHORTNESS OF BREATH: 0
COUGH: 0
WHEEZING: 0
CONSTIPATION: 0
NAUSEA: 0
DIARRHEA: 0

## 2021-09-29 NOTE — PROGRESS NOTES
[address] [address]  Dept:  [facility] Urology Office Note - Established    Patient:  Conrad Mo  YOB: 1945  Date: 9/29/2021    The patient is a 68 y.o. male who presents todayfor evaluation of the following problems:   Chief Complaint   Patient presents with    Benign Prostatic Hypertrophy    1 Year Follow Up       HPI   Patient presents for yearly f/u BPH. He does have hx urinary retention, happened twice in the past and were both post-operatively. He is on flomax daily. He has minimal urinary complaints. Denies frequency. Will sometimes have urgency. Stream is good, he does not strain. He feels he empties. He has nocturia x1. Denies lightheadedness or dizziness from medication. He did have a PSA 7/13/21 which was 1.09, low and stable. We discussed that we could stop prostate CA screening and he Is agreeable, no family hx prostate CA. He has never had blood in urine, he has no family hx bladder or kidney CA. No chemical exposure. Summary of old records: N/A    Additional History: N/A    Procedures Today: N/A    Urinalysis today:  No results found for this visit on 09/29/21. Last several PSA's:  Lab Results   Component Value Date    PSA 1.09 07/13/2021    PSA 1.20 07/13/2020    PSA 1.22 01/02/2020     Last total testosterone:  No results found for: TESTOSTERONE    AUA Symptom Score (9/29/2021):   INCOMPLETE EMPTYING: How often have you had the sensation of not emptying your bladder?: Not at all  FREQUENCY: How often do you have to urinate less than every two hours?: Not at all  INTERMITTENCY: How often have you found you stopped and started again several times when you urinated?: Not at all  URGENCY: How often have you found it difficult to postpone urination?: Not at all  WEAK STREAM: How often have you had a weak urinary stream?: Not at all  STRAINING: How often have you had to strain to start  urination?: Not at all  NOCTURIA: How many times did you typically get up at night to uriniate?: NONE  TOTAL I-PSS SCORE[de-identified] 0  How would you feel if you were to spend the rest of your life with your urinary condition?: Pleased    Last BUN and creatinine:  Lab Results   Component Value Date    BUN 21 07/13/2021     Lab Results   Component Value Date    CREATININE 0.97 07/13/2021       Additional Lab/Culture results: none    Imaging Reviewed during this Office Visit: none  (results were independently reviewed by physician and radiology report verified)    PAST MEDICAL, FAMILY AND SOCIAL HISTORY UPDATE:  Past Medical History:   Diagnosis Date    Agoraphobia     Anxiety     Arthritis     Basal cell carcinoma     Chronic pain     back    CKD (chronic kidney disease)     Claustrophobia     Enlarged lymph node     rt. lower neck.  Hearing aid worn     dar. ears.     Hearing loss     Hyperlipidemia     Hypertension     RAFAT (obstructive sleep apnea)     CPAP occasional    PONV (postoperative nausea and vomiting)     Retention of urine     last 2 surgeries, patient unable to void, home with catheter both times    SCC (squamous cell carcinoma)     HAND    Spinal stenosis, lumbar region, with neurogenic claudication     Wears glasses      Past Surgical History:   Procedure Laterality Date    BACK SURGERY  10/2013    Dr. Koch Kitchen hardware lumbar    COLONOSCOPY  06/13/12    mild diverticular dz    LUMBAR FUSION N/A 3/8/2017    LUMBAR L2-3 POSTERIOR DECOMPRESSION FUSION INSTRUMENTATION W/REVISION L3-5 POSTERIOR DECOMPRESSION FUSION INSTRUMENTATION (KOKI GOLBUS & Refugia Juan)  CC SN/KILEY performed by Didier Vazquez MD at [facility] Right     lower neck, 2-    PAIN MANAGEMENT PROCEDURE Bilateral 3/12/2021    Bilateral S1 TRANSFORAMINAL performed by Delio Watkins MD at [facility] Bilateral 3/26/2021    Bilateral S1 TRANSFORAMINAL performed by David Gudino MD at [address] [address] Bilateral 7/8/2021    BILAT L4-L5 L5-S1 FACET performed by David Gudino MD at [facility] N/A 1/26/2018    THORACIC & LUMBAR POSTERIOR DECOMPRESSION AND FUSION REVISION T9 THRU L5 performed by Bree Tovar MD at [facility] CA SCRN NOT  [address] IND N/A 8/23/2017    COLONOSCOPY performed by Mikie Mclean MD at [facility] IMPLNT [address] N/A 11/14/2018    SPINAL CORD STIMULATOR IMPLANT PERMANENT performed by Bree Tovar MD at [address] [employer] PRE-MALIGNANT / BENIGN SKIN LESION EXCISION      SKIN BIOPSY      skin cancer removed x 4    VASECTOMY       Family History   Problem Relation Age of Onset    Diabetes Mother     Diabetes Brother     Cancer Father         lung cancer    Cancer Brother         lung cancer    Diabetes Brother      Outpatient Medications Marked as Taking for the 9/29/21 encounter (Office Visit) with KM Dorantes - CNP   Medication Sig Dispense Refill    tamsulosin (FLOMAX) 0.4 MG capsule Take 1 capsule by mouth daily 90 capsule 3    penicillin v potassium (VEETID) 500 MG tablet       sertraline (ZOLOFT) 100 MG tablet TAKE ONE TABLET BY MOUTH DAILY 90 tablet 2    fenofibrate (TRICOR) 145 MG tablet TAKE ONE TABLET BY MOUTH DAILY 90 tablet 1    metoprolol succinate (TOPROL XL) 50 MG extended release tablet TAKE ONE TABLET BY MOUTH DAILY 90 tablet 1    simvastatin (ZOCOR) 20 MG tablet TAKE ONE TABLET BY MOUTH DAILY 90 tablet 1    Multiple Vitamin (MULTI VITAMIN DAILY PO) Take by mouth      aspirin 81 MG tablet Take 81 mg by mouth daily         Atarax [hydroxyzine], Gabapentin, Phenergan [promethazine hcl], and Morphine and related  Social History     Tobacco Use   Smoking Status Never Smoker   Smokeless Tobacco Never Used     (Ifpatient a smoker, smoking cessation counseling offered)    Social History     Substance and Sexual Activity   Alcohol Use No       REVIEW OF SYSTEMS:  Review of Systems    Physical Exam:      Vitals:    09/29/21 1510   BP: (!) 151/68   Pulse: 67   Temp: 96.1 °F (35.6 °C)     Body mass index is 32.05 kg/m². Patient is a 68 y.o. male in no acute distress and alert and oriented to person, place and time. Physical Exam  Constitutional: Patient in no acute distress. Neuro: Alert and oriented to person, place and time. Psych: Mood normal, affect normal  Lungs: Respiratory effort is normal  Cardiovascular: Warm & Pink  Abdomen: Soft, non-tender, non-distended   Bladder non-tender and not distended. Musculoskeletal: Normal gait and station      Assessment and Plan      1. Hypertrophy of prostate with urinary obstruction    2. Nocturia    3. History of urinary retention           Plan:     1. PSA is normal, he does not require any further screening. 2. BPH- doing well on flomax, will refill. 3. Continue yearly follow-ups. Report any worsening urinary symptoms, blood in urine, painful urination that does not go away, or any other concerns. 4. He lives in [place], we will work on getting him transferred to our providers in that area per family request.     Return in about 1 year (around 9/29/2022), or if symptoms worsen or fail to improve. Prescriptions Ordered:  Orders Placed This Encounter   Medications    tamsulosin (FLOMAX) 0.4 MG capsule     Sig: Take 1 capsule by mouth daily     Dispense:  90 capsule     Refill:  3     Orders Placed:  No orders of the defined types were placed in this encounter. KM Hemphill - CNP    Agree with the ROS entered by the MA.

## 2021-09-29 NOTE — PATIENT INSTRUCTIONS
1. PSA is normal, he does not require any further screening. 2. BPH- doing well on flomax, will refill. 3. Continue yearly follow-ups. Report any worsening urinary symptoms, blood in urine, painful urination that does not go away, or any other concerns. Return in about 1 year (around 9/29/2022), or if symptoms worsen or fail to improve.

## 2021-09-29 NOTE — PROGRESS NOTES
Review of Systems   Constitutional: Positive for fatigue. Negative for appetite change and chills. Eyes: Negative for pain, redness and visual disturbance. Respiratory: Negative for cough, shortness of breath and wheezing. Cardiovascular: Negative for chest pain and leg swelling. Gastrointestinal: Negative for abdominal pain, constipation, diarrhea, nausea and vomiting. Genitourinary: Negative for difficulty urinating, dysuria, flank pain, frequency, hematuria and urgency. Musculoskeletal: Positive for back pain. Negative for joint swelling and myalgias. Skin: Negative for rash and wound. Neurological: Positive for dizziness. Negative for weakness and numbness. Hematological: Does not bruise/bleed easily.

## 2021-10-18 ENCOUNTER — IMMUNIZATION (OUTPATIENT)
Dept: LAB | Age: 76
End: 2021-10-18
Payer: MEDICARE

## 2021-10-18 DIAGNOSIS — M54.42 CHRONIC BILATERAL LOW BACK PAIN WITH LEFT-SIDED SCIATICA: ICD-10-CM

## 2021-10-18 DIAGNOSIS — G89.29 CHRONIC BILATERAL LOW BACK PAIN WITH LEFT-SIDED SCIATICA: ICD-10-CM

## 2021-10-18 PROCEDURE — G0008 ADMIN INFLUENZA VIRUS VAC: HCPCS | Performed by: FAMILY MEDICINE

## 2021-10-18 PROCEDURE — PBSHW INFLUENZA, QUADV, ADJUVANTED, 65 YRS +, IM, PF, PREFILL SYR, 0.5ML (FLUAD): Performed by: FAMILY MEDICINE

## 2021-10-18 RX ORDER — HYDROCODONE BITARTRATE AND ACETAMINOPHEN 5; 325 MG/1; MG/1
1 TABLET ORAL 2 TIMES DAILY
Qty: 60 TABLET | Refills: 0 | Status: SHIPPED | OUTPATIENT
Start: 2021-10-18 | End: 2021-11-17

## 2021-11-12 ENCOUNTER — IMMUNIZATION (OUTPATIENT)
Dept: LAB | Age: 76
End: 2021-11-12
Payer: MEDICARE

## 2021-11-12 PROCEDURE — 0064A: CPT

## 2021-11-12 PROCEDURE — 91306 COVID-19, MODERNA BOOSTER VACCINE 0.25ML DOSE: CPT | Performed by: INTERNAL MEDICINE

## 2021-11-12 PROCEDURE — PBSHW COVID-19, MODERNA BOOSTER VACCINE 0.25ML DOSE: Performed by: INTERNAL MEDICINE

## 2021-11-29 DIAGNOSIS — M54.42 CHRONIC BILATERAL LOW BACK PAIN WITH LEFT-SIDED SCIATICA: ICD-10-CM

## 2021-11-29 DIAGNOSIS — G89.29 CHRONIC BILATERAL LOW BACK PAIN WITH LEFT-SIDED SCIATICA: ICD-10-CM

## 2021-11-29 RX ORDER — HYDROCODONE BITARTRATE AND ACETAMINOPHEN 5; 325 MG/1; MG/1
1 TABLET ORAL 2 TIMES DAILY
Qty: 60 TABLET | Refills: 0 | Status: SHIPPED | OUTPATIENT
Start: 2021-11-29 | End: 2022-01-03 | Stop reason: SDUPTHER

## 2021-11-29 NOTE — TELEPHONE ENCOUNTER
Controlled Substance Monitoring:    Acute and Chronic Pain Monitoring:   RX Monitoring 11/29/2021   Attestation -   Periodic Controlled Substance Monitoring Possible medication side effects, risk of tolerance/dependence & alternative treatments discussed.

## 2022-01-03 DIAGNOSIS — M54.42 CHRONIC BILATERAL LOW BACK PAIN WITH LEFT-SIDED SCIATICA: ICD-10-CM

## 2022-01-03 DIAGNOSIS — G89.29 CHRONIC BILATERAL LOW BACK PAIN WITH LEFT-SIDED SCIATICA: ICD-10-CM

## 2022-01-03 RX ORDER — HYDROCODONE BITARTRATE AND ACETAMINOPHEN 5; 325 MG/1; MG/1
1 TABLET ORAL 2 TIMES DAILY
Qty: 60 TABLET | Refills: 0 | Status: SHIPPED | OUTPATIENT
Start: 2022-01-03 | End: 2022-02-28 | Stop reason: SDUPTHER

## 2022-02-01 ENCOUNTER — HOSPITAL ENCOUNTER (OUTPATIENT)
Dept: LAB | Age: 77
Discharge: HOME | End: 2022-02-01
Payer: MEDICARE

## 2022-02-01 DIAGNOSIS — E78.00 PURE HYPERCHOLESTEROLEMIA: ICD-10-CM

## 2022-02-01 DIAGNOSIS — I10 ESSENTIAL (PRIMARY) HYPERTENSION: ICD-10-CM

## 2022-02-01 DIAGNOSIS — D50.0 IRON DEFICIENCY ANEMIA DUE TO CHRONIC BLOOD LOSS: ICD-10-CM

## 2022-02-01 LAB
ABSOLUTE EOS #: 0.16 K/UL (ref 0–0.44)
ABSOLUTE IMMATURE GRANULOCYTE: <0.03 K/UL (ref 0–0.3)
ABSOLUTE LYMPH #: 2.34 K/UL (ref 1.1–3.7)
ABSOLUTE MONO #: 0.83 K/UL (ref 0.1–1.2)
ALBUMIN SERPL-MCNC: 4.5 G/DL (ref 3.5–5.2)
ALBUMIN/GLOBULIN RATIO: 1.5 (ref 1–2.5)
ALP BLD-CCNC: 64 U/L (ref 40–129)
ALT SERPL-CCNC: 19 U/L (ref 5–41)
ANION GAP SERPL CALCULATED.3IONS-SCNC: 11 MMOL/L (ref 9–17)
AST SERPL-CCNC: 31 U/L
BASOPHILS # BLD: 1 % (ref 0–2)
BASOPHILS ABSOLUTE: 0.04 K/UL (ref 0–0.2)
BILIRUB SERPL-MCNC: 0.62 MG/DL (ref 0.3–1.2)
BUN BLDV-MCNC: 20 MG/DL (ref 8–23)
BUN/CREAT BLD: 21 (ref 9–20)
CALCIUM SERPL-MCNC: 9.8 MG/DL (ref 8.6–10.4)
CHLORIDE BLD-SCNC: 106 MMOL/L (ref 98–107)
CO2: 25 MMOL/L (ref 20–31)
CREAT SERPL-MCNC: 0.96 MG/DL (ref 0.7–1.2)
DIFFERENTIAL TYPE: NORMAL
EOSINOPHILS RELATIVE PERCENT: 2 % (ref 1–4)
GFR AFRICAN AMERICAN: >60 ML/MIN
GFR NON-AFRICAN AMERICAN: >60 ML/MIN
GFR SERPL CREATININE-BSD FRML MDRD: ABNORMAL ML/MIN/{1.73_M2}
GFR SERPL CREATININE-BSD FRML MDRD: ABNORMAL ML/MIN/{1.73_M2}
GLUCOSE BLD-MCNC: 95 MG/DL (ref 70–99)
HCT VFR BLD CALC: 47.6 % (ref 40.7–50.3)
HEMOGLOBIN: 15.5 G/DL (ref 13–17)
IMMATURE GRANULOCYTES: 0 %
LYMPHOCYTES # BLD: 32 % (ref 24–43)
MCH RBC QN AUTO: 28.8 PG (ref 25.2–33.5)
MCHC RBC AUTO-ENTMCNC: 32.6 G/DL (ref 25.2–33.5)
MCV RBC AUTO: 88.5 FL (ref 82.6–102.9)
MONOCYTES # BLD: 11 % (ref 3–12)
NRBC AUTOMATED: 0 PER 100 WBC
PDW BLD-RTO: 14.3 % (ref 11.8–14.4)
PLATELET # BLD: 194 K/UL (ref 138–453)
PLATELET ESTIMATE: NORMAL
PMV BLD AUTO: 9.4 FL (ref 8.1–13.5)
POTASSIUM SERPL-SCNC: 4.1 MMOL/L (ref 3.7–5.3)
RBC # BLD: 5.38 M/UL (ref 4.21–5.77)
RBC # BLD: NORMAL 10*6/UL
SEG NEUTROPHILS: 54 % (ref 36–65)
SEGMENTED NEUTROPHILS ABSOLUTE COUNT: 4.05 K/UL (ref 1.5–8.1)
SODIUM BLD-SCNC: 142 MMOL/L (ref 135–144)
TOTAL PROTEIN: 7.5 G/DL (ref 6.4–8.3)
WBC # BLD: 7.4 K/UL (ref 3.5–11.3)
WBC # BLD: NORMAL 10*3/UL

## 2022-02-01 PROCEDURE — 36415 COLL VENOUS BLD VENIPUNCTURE: CPT

## 2022-02-01 PROCEDURE — 85025 COMPLETE CBC W/AUTO DIFF WBC: CPT

## 2022-02-01 PROCEDURE — 80053 COMPREHEN METABOLIC PANEL: CPT

## 2022-02-01 PROCEDURE — 80061 LIPID PANEL: CPT

## 2022-02-02 LAB
CHOLESTEROL/HDL RATIO: 4.5
CHOLESTEROL: 139 MG/DL
HDLC SERPL-MCNC: 31 MG/DL
LDL CHOLESTEROL: 86 MG/DL (ref 0–130)
TRIGL SERPL-MCNC: 109 MG/DL
VLDLC SERPL CALC-MCNC: ABNORMAL MG/DL (ref 1–30)

## 2022-02-08 ENCOUNTER — OFFICE VISIT (OUTPATIENT)
Dept: FAMILY MEDICINE CLINIC | Age: 77
End: 2022-02-08
Payer: MEDICARE

## 2022-02-08 VITALS
WEIGHT: 218 LBS | HEART RATE: 68 BPM | BODY MASS INDEX: 32.29 KG/M2 | HEIGHT: 69 IN | DIASTOLIC BLOOD PRESSURE: 64 MMHG | SYSTOLIC BLOOD PRESSURE: 122 MMHG

## 2022-02-08 DIAGNOSIS — C44.92 SCC (SQUAMOUS CELL CARCINOMA): ICD-10-CM

## 2022-02-08 DIAGNOSIS — G89.29 ABDOMINAL PAIN, CHRONIC, LEFT LOWER QUADRANT: ICD-10-CM

## 2022-02-08 DIAGNOSIS — Z99.89 OSA ON CPAP: ICD-10-CM

## 2022-02-08 DIAGNOSIS — G47.33 OSA ON CPAP: ICD-10-CM

## 2022-02-08 DIAGNOSIS — F32.5 MAJOR DEPRESSIVE DISORDER IN FULL REMISSION, UNSPECIFIED WHETHER RECURRENT (HCC): ICD-10-CM

## 2022-02-08 DIAGNOSIS — E78.00 PURE HYPERCHOLESTEROLEMIA: ICD-10-CM

## 2022-02-08 DIAGNOSIS — N18.30 STAGE 3 CHRONIC KIDNEY DISEASE, UNSPECIFIED WHETHER STAGE 3A OR 3B CKD (HCC): Primary | ICD-10-CM

## 2022-02-08 DIAGNOSIS — Z12.5 SCREENING PSA (PROSTATE SPECIFIC ANTIGEN): ICD-10-CM

## 2022-02-08 DIAGNOSIS — R10.32 ABDOMINAL PAIN, CHRONIC, LEFT LOWER QUADRANT: ICD-10-CM

## 2022-02-08 DIAGNOSIS — I10 ESSENTIAL (PRIMARY) HYPERTENSION: ICD-10-CM

## 2022-02-08 DIAGNOSIS — D50.0 IRON DEFICIENCY ANEMIA DUE TO CHRONIC BLOOD LOSS: ICD-10-CM

## 2022-02-08 DIAGNOSIS — H90.6 MIXED CONDUCTIVE AND SENSORINEURAL HEARING LOSS OF BOTH EARS: ICD-10-CM

## 2022-02-08 DIAGNOSIS — M48.062 SPINAL STENOSIS, LUMBAR REGION, WITH NEUROGENIC CLAUDICATION: ICD-10-CM

## 2022-02-08 PROCEDURE — 1123F ACP DISCUSS/DSCN MKR DOCD: CPT | Performed by: FAMILY MEDICINE

## 2022-02-08 PROCEDURE — G8484 FLU IMMUNIZE NO ADMIN: HCPCS | Performed by: FAMILY MEDICINE

## 2022-02-08 PROCEDURE — G8417 CALC BMI ABV UP PARAM F/U: HCPCS | Performed by: FAMILY MEDICINE

## 2022-02-08 PROCEDURE — G8427 DOCREV CUR MEDS BY ELIG CLIN: HCPCS | Performed by: FAMILY MEDICINE

## 2022-02-08 PROCEDURE — 99213 OFFICE O/P EST LOW 20 MIN: CPT | Performed by: FAMILY MEDICINE

## 2022-02-08 PROCEDURE — 1036F TOBACCO NON-USER: CPT | Performed by: FAMILY MEDICINE

## 2022-02-08 PROCEDURE — 4040F PNEUMOC VAC/ADMIN/RCVD: CPT | Performed by: FAMILY MEDICINE

## 2022-02-08 PROCEDURE — 99214 OFFICE O/P EST MOD 30 MIN: CPT | Performed by: FAMILY MEDICINE

## 2022-02-08 RX ORDER — FENOFIBRATE 145 MG/1
TABLET, COATED ORAL
Qty: 90 TABLET | Refills: 1 | Status: SHIPPED | OUTPATIENT
Start: 2022-02-08 | End: 2022-08-10 | Stop reason: SDUPTHER

## 2022-02-08 RX ORDER — SIMVASTATIN 20 MG
TABLET ORAL
Qty: 90 TABLET | Refills: 1 | Status: SHIPPED | OUTPATIENT
Start: 2022-02-08 | End: 2022-08-10 | Stop reason: SDUPTHER

## 2022-02-08 RX ORDER — METOPROLOL SUCCINATE 50 MG/1
TABLET, EXTENDED RELEASE ORAL
Qty: 90 TABLET | Refills: 1 | Status: SHIPPED | OUTPATIENT
Start: 2022-02-08 | End: 2022-04-26 | Stop reason: SDUPTHER

## 2022-02-08 RX ORDER — SERTRALINE HYDROCHLORIDE 100 MG/1
TABLET, FILM COATED ORAL
Qty: 90 TABLET | Refills: 2 | Status: SHIPPED | OUTPATIENT
Start: 2022-02-08

## 2022-02-08 ASSESSMENT — PATIENT HEALTH QUESTIONNAIRE - PHQ9
2. FEELING DOWN, DEPRESSED OR HOPELESS: 0
SUM OF ALL RESPONSES TO PHQ9 QUESTIONS 1 & 2: 0
SUM OF ALL RESPONSES TO PHQ QUESTIONS 1-9: 0
1. LITTLE INTEREST OR PLEASURE IN DOING THINGS: 0
SUM OF ALL RESPONSES TO PHQ QUESTIONS 1-9: 0

## 2022-02-08 ASSESSMENT — ENCOUNTER SYMPTOMS
EYES NEGATIVE: 1
BACK PAIN: 1
RESPIRATORY NEGATIVE: 1
ABDOMINAL PAIN: 1
ALLERGIC/IMMUNOLOGIC NEGATIVE: 1

## 2022-02-08 NOTE — PROGRESS NOTES
Subjective:      Patient ID: Jayden Jean Baptiste is a 68 y.o. male. Hypertension    Back Pain  Associated symptoms include abdominal pain (LLQ) and numbness (more into left leg and feet with prolonged standing, both right and left feet. ). Other  Associated symptoms include abdominal pain (LLQ) and numbness (more into left leg and feet with prolonged standing, both right and left feet. ). Hyperlipidemia    Foot Pain   Associated symptoms include numbness (more into left leg and feet with prolonged standing, both right and left feet. ). Routine follow up on chronic medical conditions, refills, and review of updated labs. I have reviewed the patient's medical history in detail and updated the computerized patient record. He had back surgery 1/26/18 with thoracic and lumbar posterior decompression T9-L5. He had gradual, increasing pain in the months after the surgery, actually more painful than prior to surgery. More extensive lumbar fusion in 2017 with instrumentation. Most recently with spinal cord stimulator placement 11/18. Back pain not as severe and more tolerable. Still with pain that comes and goes. Still has some burning paresthesias and numbness into the right leg off and on, but less severe than prior. Currently with bilateral radicular symptoms described. Still reporting daily pain, limiting his activity. Not following with Dr. Ileana Ewing at present. Describing a lot more muscle cramps at present, usually the legs. More often at present. Daily/at night. Compliant with medications . Mood stable. No anxiety of concern. No significant other joint arthritis of concern. Hearing loss better with hearing aides. Fatigues easily and somewhat sob with walking. No urination or bowel issues of concern on review today. Not compliant with cpap. Left lower abdominal pain . Pain comes almost exclusively at night when he lays down, within a couple of minutes.   Laying on his back makes the pain worse, seems to go away if he holds still on his back for 10 minutes. intermittent at present. No bowel obstruction symptoms long term. Past Medical History:   Diagnosis Date    Agoraphobia     Anxiety     Arthritis     Basal cell carcinoma     Chronic pain     back    CKD (chronic kidney disease)     Claustrophobia     Enlarged lymph node     rt. lower neck.  Hearing aid worn     dar. ears.     Hearing loss     Hyperlipidemia     Hypertension     RAFAT (obstructive sleep apnea)     CPAP occasional    PONV (postoperative nausea and vomiting)     Retention of urine     last 2 surgeries, patient unable to void, home with catheter both times    SCC (squamous cell carcinoma)     HAND    Spinal stenosis, lumbar region, with neurogenic claudication     Wears glasses      Past Surgical History:   Procedure Laterality Date    BACK SURGERY  10/2013    Dr. Chevy Jaramillo hardware lumbar    COLONOSCOPY  06/13/12    mild diverticular dz    LUMBAR FUSION N/A 3/8/2017    LUMBAR L2-3 POSTERIOR DECOMPRESSION FUSION INSTRUMENTATION W/REVISION L3-5 POSTERIOR DECOMPRESSION FUSION INSTRUMENTATION (KOKI GOLBUS & Aroldo Mullen)  CC SN/KILEY performed by Eduardo Valera MD at [facility] Right     lower neck, 2-    PAIN MANAGEMENT PROCEDURE Bilateral 3/12/2021    Bilateral S1 TRANSFORAMINAL performed by Yevgeniy España MD at [address] [place] [address] Bilateral 3/26/2021    Bilateral S1 TRANSFORAMINAL performed by Yevgeniy España MD at [address] [place] [address] Bilateral 7/8/2021    BILAT L4-L5 L5-S1 FACET performed by Yevgeniy España MD at [address] [facility] POSTERIOR/PSTLAT TQ 1NTRSPC THORACIC N/A 1/26/2018    THORACIC & LUMBAR POSTERIOR DECOMPRESSION AND FUSION REVISION T9 THRU L5 performed by Eduardo Valera MD at [facility]  [address] IND N/A 8/23/2017    COLONOSCOPY performed by Mena Fonseca MD at [facility] NEUROELECT,EPIDURAL N/A 11/14/2018    SPINAL CORD STIMULATOR IMPLANT PERMANENT performed by Amilcar Mckeon MD at [address] PRE-MALIGNANT / BENIGN SKIN LESION EXCISION      SKIN BIOPSY      skin cancer removed x 4    VASECTOMY       Current Outpatient Medications   Medication Sig Dispense Refill    tamsulosin (FLOMAX) 0.4 MG capsule Take 1 capsule by mouth daily 90 capsule 3    sertraline (ZOLOFT) 100 MG tablet TAKE ONE TABLET BY MOUTH DAILY 90 tablet 2    fenofibrate (TRICOR) 145 MG tablet TAKE ONE TABLET BY MOUTH DAILY 90 tablet 1    metoprolol succinate (TOPROL XL) 50 MG extended release tablet TAKE ONE TABLET BY MOUTH DAILY 90 tablet 1    simvastatin (ZOCOR) 20 MG tablet TAKE ONE TABLET BY MOUTH DAILY 90 tablet 1    Multiple Vitamin (MULTI VITAMIN DAILY PO) Take by mouth      aspirin 81 MG tablet Take 81 mg by mouth daily       No current facility-administered medications for this visit. Allergies   Allergen Reactions    Atarax [Hydroxyzine]      Double vision    Gabapentin      dizzy    Phenergan [Promethazine Hcl] Other (See Comments)     \"out of it\"    Morphine And Related Nausea And Vomiting     hallucinations         Review of Systems   Constitutional: Negative. HENT: Positive for hearing loss. Eyes: Negative. Respiratory: Negative. Cardiovascular: Negative. Gastrointestinal: Positive for abdominal pain (LLQ). Endocrine: Negative. Genitourinary: Negative. Musculoskeletal: Positive for back pain and gait problem (limited standing and walking due to back pain). Skin: Negative. Allergic/Immunologic: Negative. Neurological: Positive for numbness (more into left leg and feet with prolonged standing, both right and left feet. ). Hematological: Negative. Psychiatric/Behavioral: Negative. Objective:   Physical Exam  Constitutional:       General: He is not in acute distress. Appearance: He is well-developed.    HENT:      Head: Normocephalic and atraumatic. Right Ear: External ear normal.      Left Ear: External ear normal.      Mouth/Throat:      Pharynx: No oropharyngeal exudate. Eyes:      General: No scleral icterus. Conjunctiva/sclera: Conjunctivae normal.   Neck:      Thyroid: No thyromegaly. Cardiovascular:      Rate and Rhythm: Normal rate and regular rhythm. Heart sounds: Normal heart sounds. No murmur heard. Pulmonary:      Effort: Pulmonary effort is normal. No respiratory distress. Breath sounds: Normal breath sounds. No wheezing. Abdominal:      General: Bowel sounds are normal. There is no distension. Palpations: Abdomen is soft. Tenderness: There is no abdominal tenderness. There is no rebound. Genitourinary:     Prostate: Not enlarged and not tender. Rectum: Normal. No mass. Normal anal tone. Musculoskeletal:         General: No tenderness. Normal range of motion. Arms:       Cervical back: Neck supple. Skin:     General: Skin is warm and dry. Findings: Lesion (multiple seb. keratosis lesions. 2 on scalp, 6 on trunk. 2 on left leg) present. No erythema or rash. Neurological:      Mental Status: He is alert and oriented to person, place, and time. Sensory: Sensory deficit (left leg at present.) present. Psychiatric:         Behavior: Behavior normal.         Thought Content:  Thought content normal.         Judgment: Judgment normal.       /64 (Site: Right Upper Arm, Position: Sitting, Cuff Size: Large Adult)   Pulse 68   Ht 5' 9\" (1.753 m)   Wt 218 lb (98.9 kg)   BMI 32.19 kg/m²     Hospital Outpatient Visit on 02/01/2022   Component Date Value Ref Range Status    WBC 02/01/2022 7.4  3.5 - 11.3 k/uL Final    RBC 02/01/2022 5.38  4.21 - 5.77 m/uL Final    Hemoglobin 02/01/2022 15.5  13.0 - 17.0 g/dL Final    Hematocrit 02/01/2022 47.6  40.7 - 50.3 % Final    MCV 02/01/2022 88.5  82.6 - 102.9 fL Final    MCH 02/01/2022 28.8  25.2 - 33.5 pg Final    MCHC 02/01/2022 32.6  25.2 - 33.5 g/dL Final    RDW 02/01/2022 14.3  11.8 - 14.4 % Final    Platelets 67/23/6767 194  138 - 453 k/uL Final    MPV 02/01/2022 9.4  8.1 - 13.5 fL Final    NRBC Automated 02/01/2022 0.0  0.0 per 100 WBC Final    Differential Type 02/01/2022 NOT REPORTED   Final    Seg Neutrophils 02/01/2022 54  36 - 65 % Final    Lymphocytes 02/01/2022 32  24 - 43 % Final    Monocytes 02/01/2022 11  3 - 12 % Final    Eosinophils % 02/01/2022 2  1 - 4 % Final    Basophils 02/01/2022 1  0 - 2 % Final    Immature Granulocytes 02/01/2022 0  0 % Final    Segs Absolute 02/01/2022 4.05  1.50 - 8.10 k/uL Final    Absolute Lymph # 02/01/2022 2.34  1.10 - 3.70 k/uL Final    Absolute Mono # 02/01/2022 0.83  0.10 - 1.20 k/uL Final    Absolute Eos # 02/01/2022 0.16  0.00 - 0.44 k/uL Final    Basophils Absolute 02/01/2022 0.04  0.00 - 0.20 k/uL Final    Absolute Immature Granulocyte 02/01/2022 <0.03  0.00 - 0.30 k/uL Final    WBC Morphology 02/01/2022 NOT REPORTED   Final    RBC Morphology 02/01/2022 NOT REPORTED   Final    Platelet Estimate 94/52/9262 NOT REPORTED   Final    Glucose 02/01/2022 95  70 - 99 mg/dL Final    BUN 02/01/2022 20  8 - 23 mg/dL Final    CREATININE 02/01/2022 0.96  0.70 - 1.20 mg/dL Final    Bun/Cre Ratio 02/01/2022 21* 9 - 20 Final    Calcium 02/01/2022 9.8  8.6 - 10.4 mg/dL Final    Sodium 02/01/2022 142  135 - 144 mmol/L Final    Potassium 02/01/2022 4.1  3.7 - 5.3 mmol/L Final    Chloride 02/01/2022 106  98 - 107 mmol/L Final    CO2 02/01/2022 25  20 - 31 mmol/L Final    Anion Gap 02/01/2022 11  9 - 17 mmol/L Final    Alkaline Phosphatase 02/01/2022 64  40 - 129 U/L Final    ALT 02/01/2022 19  5 - 41 U/L Final    AST 02/01/2022 31  <40 U/L Final    Total Bilirubin 02/01/2022 0.62  0.3 - 1.2 mg/dL Final    Total Protein 02/01/2022 7.5  6.4 - 8.3 g/dL Final    Albumin 02/01/2022 4.5  3.5 - 5.2 g/dL Final    Albumin/Globulin Ratio 02/01/2022 1.5  1.0 - 2.5 Final  GFR Non- 02/01/2022 >60  >60 mL/min Final    GFR  02/01/2022 >60  >60 mL/min Final    GFR Comment 02/01/2022        Final    GFR Staging 02/01/2022 NOT REPORTED   Final    Cholesterol 02/01/2022 139  <200 mg/dL Final    HDL 02/01/2022 31* >40 mg/dL Final    LDL Cholesterol 02/01/2022 86  0 - 130 mg/dL Final    Chol/HDL Ratio 02/01/2022 4.5  <5 Final    Triglycerides 02/01/2022 109  <150 mg/dL Final    VLDL 02/01/2022 NOT REPORTED  1 - 30 mg/dL Final         Assessment:       Encounter Diagnoses   Name Primary?  Stage 3 chronic kidney disease, unspecified whether stage 3a or 3b CKD ([address] Yes    Pure hypercholesterolemia     SCC (squamous cell carcinoma)     Major depressive disorder in full remission, unspecified whether recurrent ([address]     Essential (primary) hypertension     LAURO on CPAP     Spinal stenosis, lumbar region, with neurogenic claudication     Iron deficiency anemia due to chronic blood loss     Screening PSA (prostate specific antigen)     Mixed conductive and sensorineural hearing loss of both ears     Abdominal pain, chronic, left lower quadrant        Plan:      CKD; improved/ stable with  Normal renal indicies at present. Cont. To avoid nephrotoxins when able and follow serially. Hyperlipidemia: good control at present . Cont. Simvastatin/fenofibrate at current dosing. SCC; no evidence for recurrent disease. Cont. Screening exams. Lesion on left dorsal arm removed 5-6 months ago. Repeat excision for residual SCC. He still has a scab in the area. Not clear if this is residual disease given persistence of scab. Has follow up next month. Depression and anxiety. No current concerns on zoloft. Helping with agoraphobia and claustrophobia mostly. Plan to cont. Same. Htn: good overall control. Cont. toprol daily.   On flomax as well    Lauro: better compliance over the interval.  He was getting up a lot at night with his back pain issues and it became a hardship for him. Encouraged compliance. He snores a lot, but reports he feels better without it. He thinks he urinates less often using the cpap. Spinal stenosis with claudication. Dr. Latisha Lowery following s/p surgery in October2013. MRI completed. surgery 3/8/17 through Dr. Latisha Lowery. L2-3 PLIF add on to 3-5. Symptoms in his back and radicular pain down left leg improved , but pain and disability returned. S/p posterior decompression T9-L5 1/26/18. Some early improvement, then pain increased quite a bit again . He has recently been going to pain management, and injections helped some. Most recently he had spinal cord stimulator placed 11/18. Still has symptoms of back pain and paresthesias into right leg, but level of pain /severity has improved. He continues to report daily pain and paresthesias right >left. Activity limiting pain at times. Using norco prn. Needs one in the am before getting out of bed. A lot of pain and stiffness in the morning. No signs of misuse or diversion. He and spouse relate he could use more medication to help keep him active during the day. Current supply at 40/month. Changing to bid dosing / #60. Did not tolerate gabapentin in the past.  Drowsy and confused some. Currently with bilateral radicular symptoms. Plan to re start pain management. Anemia: normalized last 3 visits. Has had some recurrent anemia, microcytic, with surgery in October and again in march, and again in January 2018. Iron low in January. Microcytosis evident. Started ferrous sulfate 325mg bid, then decreased to once daily, now off the iron supplement. Iron level normal.  Cont. Serial follow up. Colonoscopy up to date 2012. Cont. Serial checks. Psa normal, cont. Annual screening. Hearing loss. New hearing aides. Helps some. Hearing back in the shop at present. Leg cramps. Increasing frequency at present. Some sweating and outdoor work. Remains active. Electrolytes normal at present. Discussed dill pickles as a trial .  Not helping much. Frequency once a week or so at present. injection trials into the lower back through pain management, not really helping the cramps. Left sided abdominal pain. Almost exclusively at night after laying down. Worse lying on his back. Seems to improve after 10 minutes. No significant abdominal surgery to consider adhesions. Last colonoscopy ok 2017. Offered surgical consultation. Problem seems positional /mechanical .    He opts to observe and follow up for concerns. Not every night at present.

## 2022-02-08 NOTE — PATIENT INSTRUCTIONS
Hospital Outpatient Visit on 02/01/2022   Component Date Value Ref Range Status    WBC 02/01/2022 7.4  3.5 - 11.3 k/uL Final    RBC 02/01/2022 5.38  4.21 - 5.77 m/uL Final    Hemoglobin 02/01/2022 15.5  13.0 - 17.0 g/dL Final    Hematocrit 02/01/2022 47.6  40.7 - 50.3 % Final    MCV 02/01/2022 88.5  82.6 - 102.9 fL Final    MCH 02/01/2022 28.8  25.2 - 33.5 pg Final    MCHC 02/01/2022 32.6  25.2 - 33.5 g/dL Final    RDW 02/01/2022 14.3  11.8 - 14.4 % Final    Platelets 43/61/9745 194  138 - 453 k/uL Final    MPV 02/01/2022 9.4  8.1 - 13.5 fL Final    NRBC Automated 02/01/2022 0.0  0.0 per 100 WBC Final    Differential Type 02/01/2022 NOT REPORTED   Final    Seg Neutrophils 02/01/2022 54  36 - 65 % Final    Lymphocytes 02/01/2022 32  24 - 43 % Final    Monocytes 02/01/2022 11  3 - 12 % Final    Eosinophils % 02/01/2022 2  1 - 4 % Final    Basophils 02/01/2022 1  0 - 2 % Final    Immature Granulocytes 02/01/2022 0  0 % Final    Segs Absolute 02/01/2022 4.05  1.50 - 8.10 k/uL Final    Absolute Lymph # 02/01/2022 2.34  1.10 - 3.70 k/uL Final    Absolute Mono # 02/01/2022 0.83  0.10 - 1.20 k/uL Final    Absolute Eos # 02/01/2022 0.16  0.00 - 0.44 k/uL Final    Basophils Absolute 02/01/2022 0.04  0.00 - 0.20 k/uL Final    Absolute Immature Granulocyte 02/01/2022 <0.03  0.00 - 0.30 k/uL Final    WBC Morphology 02/01/2022 NOT REPORTED   Final    RBC Morphology 02/01/2022 NOT REPORTED   Final    Platelet Estimate 70/56/6530 NOT REPORTED   Final    Glucose 02/01/2022 95  70 - 99 mg/dL Final    BUN 02/01/2022 20  8 - 23 mg/dL Final    CREATININE 02/01/2022 0.96  0.70 - 1.20 mg/dL Final    Bun/Cre Ratio 02/01/2022 21* 9 - 20 Final    Calcium 02/01/2022 9.8  8.6 - 10.4 mg/dL Final    Sodium 02/01/2022 142  135 - 144 mmol/L Final    Potassium 02/01/2022 4.1  3.7 - 5.3 mmol/L Final    Chloride 02/01/2022 106  98 - 107 mmol/L Final    CO2 02/01/2022 25  20 - 31 mmol/L Final    Anion Gap 02/01/2022 11  9 - 17 mmol/L Final    Alkaline Phosphatase 02/01/2022 64  40 - 129 U/L Final    ALT 02/01/2022 19  5 - 41 U/L Final    AST 02/01/2022 31  <40 U/L Final    Total Bilirubin 02/01/2022 0.62  0.3 - 1.2 mg/dL Final    Total Protein 02/01/2022 7.5  6.4 - 8.3 g/dL Final    Albumin 02/01/2022 4.5  3.5 - 5.2 g/dL Final    Albumin/Globulin Ratio 02/01/2022 1.5  1.0 - 2.5 Final    GFR Non- 02/01/2022 >60  >60 mL/min Final    GFR  02/01/2022 >60  >60 mL/min Final    GFR Comment 02/01/2022        Final    Comment: Average GFR for 79or more years old:   76 mL/min/1.73sq m  Chronic Kidney Disease:   <60 mL/min/1.73sq m  Kidney failure:   <15 mL/min/1.73sq m              eGFR calculated using average adult body mass. Additional eGFR calculator available at:        [email or web address]            GFR Staging 02/01/2022 NOT REPORTED   Final    Cholesterol 02/01/2022 139  <200 mg/dL Final    Comment:    Cholesterol Guidelines:      <200  Desirable   200-240  Borderline      >240  Undesirable         HDL 02/01/2022 31* >40 mg/dL Final    Comment:    HDL Guidelines:    <40     Undesirable   40-59    Borderline    >59     Desirable         LDL Cholesterol 02/01/2022 86  0 - 130 mg/dL Final    Comment:    LDL Guidelines:     <100    Desirable   100-129   Near to/above Desirable   130-159   Borderline      >159   Undesirable     Direct (measured) LDL and calculated LDL are not interchangeable tests.  Chol/HDL Ratio 02/01/2022 4.5  <5 Final            Triglycerides 02/01/2022 109  <150 mg/dL Final    Comment:    Triglyceride Guidelines:     <150   Desirable   150-199  Borderline   200-499  High     >499   Very high   Based on AHA Guidelines for fasting triglyceride, October 2012.          VLDL 02/01/2022 NOT REPORTED  1 - 30 mg/dL Final

## 2022-02-28 DIAGNOSIS — G89.29 CHRONIC BILATERAL LOW BACK PAIN WITH LEFT-SIDED SCIATICA: ICD-10-CM

## 2022-02-28 DIAGNOSIS — M54.42 CHRONIC BILATERAL LOW BACK PAIN WITH LEFT-SIDED SCIATICA: ICD-10-CM

## 2022-02-28 RX ORDER — HYDROCODONE BITARTRATE AND ACETAMINOPHEN 5; 325 MG/1; MG/1
1 TABLET ORAL 2 TIMES DAILY
Qty: 60 TABLET | Refills: 0 | Status: SHIPPED | OUTPATIENT
Start: 2022-02-28 | End: 2022-04-26 | Stop reason: SDUPTHER

## 2022-04-26 DIAGNOSIS — G89.29 CHRONIC BILATERAL LOW BACK PAIN WITH LEFT-SIDED SCIATICA: ICD-10-CM

## 2022-04-26 DIAGNOSIS — M54.42 CHRONIC BILATERAL LOW BACK PAIN WITH LEFT-SIDED SCIATICA: ICD-10-CM

## 2022-04-26 RX ORDER — METOPROLOL SUCCINATE 50 MG/1
TABLET, EXTENDED RELEASE ORAL
Qty: 90 TABLET | Refills: 1 | Status: SHIPPED | OUTPATIENT
Start: 2022-04-26

## 2022-04-26 RX ORDER — HYDROCODONE BITARTRATE AND ACETAMINOPHEN 5; 325 MG/1; MG/1
1 TABLET ORAL 2 TIMES DAILY
Qty: 60 TABLET | Refills: 0 | Status: SHIPPED | OUTPATIENT
Start: 2022-04-26 | End: 2022-09-06 | Stop reason: SDUPTHER

## 2022-08-03 ENCOUNTER — HOSPITAL ENCOUNTER (OUTPATIENT)
Dept: LAB | Age: 77
Discharge: HOME | End: 2022-08-03
Payer: MEDICARE

## 2022-08-03 DIAGNOSIS — D50.0 IRON DEFICIENCY ANEMIA DUE TO CHRONIC BLOOD LOSS: ICD-10-CM

## 2022-08-03 DIAGNOSIS — I10 ESSENTIAL (PRIMARY) HYPERTENSION: ICD-10-CM

## 2022-08-03 DIAGNOSIS — E78.00 PURE HYPERCHOLESTEROLEMIA: ICD-10-CM

## 2022-08-03 DIAGNOSIS — N18.30 STAGE 3 CHRONIC KIDNEY DISEASE, UNSPECIFIED WHETHER STAGE 3A OR 3B CKD (HCC): ICD-10-CM

## 2022-08-03 LAB
ABSOLUTE EOS #: 0.27 K/UL (ref 0–0.44)
ABSOLUTE IMMATURE GRANULOCYTE: <0.03 K/UL (ref 0–0.3)
ABSOLUTE LYMPH #: 2.6 K/UL (ref 1.1–3.7)
ABSOLUTE MONO #: 0.79 K/UL (ref 0.1–1.2)
ALBUMIN SERPL-MCNC: 4.4 G/DL (ref 3.5–5.2)
ALBUMIN/GLOBULIN RATIO: 1.6 (ref 1–2.5)
ALP BLD-CCNC: 63 U/L (ref 40–129)
ALT SERPL-CCNC: 17 U/L (ref 5–41)
ANION GAP SERPL CALCULATED.3IONS-SCNC: 11 MMOL/L (ref 9–17)
AST SERPL-CCNC: 27 U/L
BASOPHILS # BLD: 1 % (ref 0–2)
BASOPHILS ABSOLUTE: 0.07 K/UL (ref 0–0.2)
BILIRUB SERPL-MCNC: 0.5 MG/DL (ref 0.3–1.2)
BUN BLDV-MCNC: 16 MG/DL (ref 8–23)
BUN/CREAT BLD: 16 (ref 9–20)
CALCIUM SERPL-MCNC: 9.8 MG/DL (ref 8.6–10.4)
CHLORIDE BLD-SCNC: 108 MMOL/L (ref 98–107)
CHOLESTEROL/HDL RATIO: 4.7
CHOLESTEROL: 169 MG/DL
CO2: 24 MMOL/L (ref 20–31)
CREAT SERPL-MCNC: 0.98 MG/DL (ref 0.7–1.2)
EOSINOPHILS RELATIVE PERCENT: 3 % (ref 1–4)
GFR AFRICAN AMERICAN: >60 ML/MIN
GFR NON-AFRICAN AMERICAN: >60 ML/MIN
GFR SERPL CREATININE-BSD FRML MDRD: ABNORMAL ML/MIN/{1.73_M2}
GLUCOSE BLD-MCNC: 104 MG/DL (ref 70–99)
HCT VFR BLD CALC: 46.7 % (ref 40.7–50.3)
HDLC SERPL-MCNC: 36 MG/DL
HEMOGLOBIN: 15.7 G/DL (ref 13–17)
IMMATURE GRANULOCYTES: 0 %
LDL CHOLESTEROL: 112 MG/DL (ref 0–130)
LYMPHOCYTES # BLD: 32 % (ref 24–43)
MCH RBC QN AUTO: 30 PG (ref 25.2–33.5)
MCHC RBC AUTO-ENTMCNC: 33.6 G/DL (ref 25.2–33.5)
MCV RBC AUTO: 89.1 FL (ref 82.6–102.9)
MONOCYTES # BLD: 10 % (ref 3–12)
NRBC AUTOMATED: 0 PER 100 WBC
PDW BLD-RTO: 14 % (ref 11.8–14.4)
PLATELET # BLD: 218 K/UL (ref 138–453)
PMV BLD AUTO: 9.3 FL (ref 8.1–13.5)
POTASSIUM SERPL-SCNC: 4.2 MMOL/L (ref 3.7–5.3)
RBC # BLD: 5.24 M/UL (ref 4.21–5.77)
SEG NEUTROPHILS: 54 % (ref 36–65)
SEGMENTED NEUTROPHILS ABSOLUTE COUNT: 4.41 K/UL (ref 1.5–8.1)
SODIUM BLD-SCNC: 143 MMOL/L (ref 135–144)
TOTAL PROTEIN: 7.1 G/DL (ref 6.4–8.3)
TRIGL SERPL-MCNC: 103 MG/DL
WBC # BLD: 8.2 K/UL (ref 3.5–11.3)

## 2022-08-03 PROCEDURE — 80061 LIPID PANEL: CPT

## 2022-08-03 PROCEDURE — 36415 COLL VENOUS BLD VENIPUNCTURE: CPT

## 2022-08-03 PROCEDURE — 85025 COMPLETE CBC W/AUTO DIFF WBC: CPT

## 2022-08-03 PROCEDURE — 80053 COMPREHEN METABOLIC PANEL: CPT

## 2022-08-10 ENCOUNTER — IMMUNIZATION (OUTPATIENT)
Dept: LAB | Age: 77
End: 2022-08-10
Payer: MEDICARE

## 2022-08-10 ENCOUNTER — OFFICE VISIT (OUTPATIENT)
Dept: FAMILY MEDICINE CLINIC | Age: 77
End: 2022-08-10
Payer: MEDICARE

## 2022-08-10 VITALS
BODY MASS INDEX: 31.1 KG/M2 | DIASTOLIC BLOOD PRESSURE: 66 MMHG | HEART RATE: 68 BPM | HEIGHT: 69 IN | SYSTOLIC BLOOD PRESSURE: 126 MMHG | WEIGHT: 210 LBS

## 2022-08-10 DIAGNOSIS — M48.062 SPINAL STENOSIS, LUMBAR REGION, WITH NEUROGENIC CLAUDICATION: ICD-10-CM

## 2022-08-10 DIAGNOSIS — G89.29 ABDOMINAL PAIN, CHRONIC, LEFT LOWER QUADRANT: ICD-10-CM

## 2022-08-10 DIAGNOSIS — G47.33 OSA ON CPAP: ICD-10-CM

## 2022-08-10 DIAGNOSIS — I10 ESSENTIAL (PRIMARY) HYPERTENSION: ICD-10-CM

## 2022-08-10 DIAGNOSIS — F32.5 MAJOR DEPRESSIVE DISORDER IN FULL REMISSION, UNSPECIFIED WHETHER RECURRENT (HCC): ICD-10-CM

## 2022-08-10 DIAGNOSIS — D50.0 IRON DEFICIENCY ANEMIA DUE TO CHRONIC BLOOD LOSS: ICD-10-CM

## 2022-08-10 DIAGNOSIS — Z12.5 SCREENING PSA (PROSTATE SPECIFIC ANTIGEN): ICD-10-CM

## 2022-08-10 DIAGNOSIS — E78.00 PURE HYPERCHOLESTEROLEMIA: ICD-10-CM

## 2022-08-10 DIAGNOSIS — N18.30 STAGE 3 CHRONIC KIDNEY DISEASE, UNSPECIFIED WHETHER STAGE 3A OR 3B CKD (HCC): Primary | ICD-10-CM

## 2022-08-10 DIAGNOSIS — R10.32 ABDOMINAL PAIN, CHRONIC, LEFT LOWER QUADRANT: ICD-10-CM

## 2022-08-10 DIAGNOSIS — C44.92 SCC (SQUAMOUS CELL CARCINOMA): ICD-10-CM

## 2022-08-10 DIAGNOSIS — H90.6 MIXED CONDUCTIVE AND SENSORINEURAL HEARING LOSS OF BOTH EARS: ICD-10-CM

## 2022-08-10 DIAGNOSIS — Z99.89 OSA ON CPAP: ICD-10-CM

## 2022-08-10 PROCEDURE — G8427 DOCREV CUR MEDS BY ELIG CLIN: HCPCS | Performed by: FAMILY MEDICINE

## 2022-08-10 PROCEDURE — PBSHW COVID-19, MODERNA BOOSTER BLUE BORDER, (AGE 18Y+), IM, 50MCG/0.25ML: Performed by: FAMILY MEDICINE

## 2022-08-10 PROCEDURE — 1123F ACP DISCUSS/DSCN MKR DOCD: CPT | Performed by: FAMILY MEDICINE

## 2022-08-10 PROCEDURE — 99213 OFFICE O/P EST LOW 20 MIN: CPT | Performed by: FAMILY MEDICINE

## 2022-08-10 PROCEDURE — 1036F TOBACCO NON-USER: CPT | Performed by: FAMILY MEDICINE

## 2022-08-10 PROCEDURE — G8417 CALC BMI ABV UP PARAM F/U: HCPCS | Performed by: FAMILY MEDICINE

## 2022-08-10 PROCEDURE — 91306 COVID-19, MODERNA BOOSTER BLUE BORDER, (AGE 18Y+), IM, 50MCG/0.25ML: CPT | Performed by: FAMILY MEDICINE

## 2022-08-10 PROCEDURE — 99214 OFFICE O/P EST MOD 30 MIN: CPT | Performed by: FAMILY MEDICINE

## 2022-08-10 RX ORDER — FENOFIBRATE 145 MG/1
TABLET, COATED ORAL
Qty: 90 TABLET | Refills: 3 | Status: SHIPPED | OUTPATIENT
Start: 2022-08-10

## 2022-08-10 RX ORDER — SIMVASTATIN 20 MG
TABLET ORAL
Qty: 90 TABLET | Refills: 3 | Status: SHIPPED | OUTPATIENT
Start: 2022-08-10

## 2022-08-10 ASSESSMENT — ENCOUNTER SYMPTOMS
BACK PAIN: 1
RESPIRATORY NEGATIVE: 1
ALLERGIC/IMMUNOLOGIC NEGATIVE: 1
ABDOMINAL PAIN: 1
EYES NEGATIVE: 1

## 2022-08-10 ASSESSMENT — PATIENT HEALTH QUESTIONNAIRE - PHQ9
1. LITTLE INTEREST OR PLEASURE IN DOING THINGS: 0
SUM OF ALL RESPONSES TO PHQ QUESTIONS 1-9: 0
2. FEELING DOWN, DEPRESSED OR HOPELESS: 0
SUM OF ALL RESPONSES TO PHQ9 QUESTIONS 1 & 2: 0
SUM OF ALL RESPONSES TO PHQ QUESTIONS 1-9: 0

## 2022-08-10 NOTE — PROGRESS NOTES
Subjective:      Patient ID: Nelsy Velazquez is a 68 y.o. male. Hypertension    Back Pain  Associated symptoms include abdominal pain (LLQ) and numbness (more into left leg and feet with prolonged standing, both right and left feet. ). Other  Associated symptoms include abdominal pain (LLQ) and numbness (more into left leg and feet with prolonged standing, both right and left feet. ). Hyperlipidemia    Foot Pain   Associated symptoms include numbness (more into left leg and feet with prolonged standing, both right and left feet. ). Routine follow up on chronic medical conditions, refills, and review of updated labs. I have reviewed the patient's medical history in detail and updated the computerized patient record. He had back surgery 1/26/18 with thoracic and lumbar posterior decompression T9-L5. He had gradual, increasing pain in the months after the surgery, actually more painful than prior to surgery. More extensive lumbar fusion in 2017 with instrumentation. Most recently with spinal cord stimulator placement 11/18. Back pain not as severe and more tolerable. Still with pain that comes and goes. Still has some burning paresthesias and numbness into the right leg off and on, but less severe than prior. Currently with bilateral radicular symptoms described. Still reporting daily pain, limiting his activity. Not following with Dr. Italia Barahona at present. Describing a lot more muscle cramps at present, usually the legs. More often at present. Daily/at night. Compliant with medications . Mood stable. No anxiety of concern. No significant other joint arthritis of concern. Hearing loss better with hearing aides. Fatigues easily and somewhat sob with walking. No urination or bowel issues of concern on review today. Not compliant with cpap. Left lower abdominal pain . Pain comes almost exclusively at night when he lays down, within a couple of minutes.   Laying on his back makes the pain worse, seems to go away if he holds still on his back for 10 minutes. Currently quiescent over this interval.   No bowel obstruction symptoms long term. Past Medical History:   Diagnosis Date    Agoraphobia     Anxiety     Arthritis     Basal cell carcinoma     Chronic pain     back    CKD (chronic kidney disease)     Claustrophobia     Enlarged lymph node     rt. lower neck. Hearing aid worn     dar. ears.     Hearing loss     Hyperlipidemia     Hypertension     RAFAT (obstructive sleep apnea)     CPAP occasional    PONV (postoperative nausea and vomiting)     Retention of urine     last 2 surgeries, patient unable to void, home with catheter both times    SCC (squamous cell carcinoma)     HAND    Spinal stenosis, lumbar region, with neurogenic claudication     Wears glasses      Past Surgical History:   Procedure Laterality Date    BACK SURGERY  10/2013    Dr. Conner Ill hardware lumbar    COLONOSCOPY  06/13/12    mild diverticular dz    LUMBAR FUSION N/A 3/8/2017    LUMBAR L2-3 POSTERIOR DECOMPRESSION FUSION INSTRUMENTATION W/REVISION L3-5 POSTERIOR DECOMPRESSION FUSION INSTRUMENTATION (KOKI GOLBUS & Wanda Even)  CC SN/KILEY performed by Estelle Villatoro MD at [address] [place] [address] Right     lower neck, 2-    PAIN MANAGEMENT PROCEDURE Bilateral 3/12/2021    Bilateral S1 TRANSFORAMINAL performed by Julius Varela MD at [address] [place] [address] Bilateral 3/26/2021    Bilateral S1 TRANSFORAMINAL performed by Julius Varela MD at [address] [place] [address] Bilateral 7/8/2021    BILAT L4-L5 L5-S1 FACET performed by Julius Varela MD at [address] [place] [address] POSTERIOR/PSTLAT TQ 1NTRSPC THORACIC N/A 1/26/2018    THORACIC & LUMBAR POSTERIOR DECOMPRESSION AND FUSION REVISION T9 THRU L5 performed by Estelle Villatoro MD at . [address] IND N/A 8/23/2017    COLONOSCOPY performed by Ivet Kearns MD at [facility] ear normal.      Left Ear: External ear normal.      Mouth/Throat:      Pharynx: No oropharyngeal exudate. Eyes:      General: No scleral icterus. Conjunctiva/sclera: Conjunctivae normal.   Neck:      Thyroid: No thyromegaly. Cardiovascular:      Rate and Rhythm: Normal rate and regular rhythm. Heart sounds: Normal heart sounds. No murmur heard. Pulmonary:      Effort: Pulmonary effort is normal. No respiratory distress. Breath sounds: Normal breath sounds. No wheezing. Abdominal:      General: Bowel sounds are normal. There is no distension. Palpations: Abdomen is soft. Tenderness: There is no abdominal tenderness. There is no rebound. Genitourinary:     Prostate: Not enlarged and not tender. Rectum: Normal. No mass. Normal anal tone. Musculoskeletal:         General: No tenderness. Normal range of motion. Cervical back: Neck supple. Skin:     General: Skin is warm and dry. Findings: No erythema, lesion or rash. Neurological:      Mental Status: He is alert and oriented to person, place, and time. Cranial Nerves: Cranial nerve deficit (very hard of hearing , with hearing aides in place) present. Sensory: Sensory deficit (left leg at present.) present. Psychiatric:         Behavior: Behavior normal.         Thought Content:  Thought content normal.         Judgment: Judgment normal.     /66 (Site: Left Upper Arm, Position: Sitting, Cuff Size: Large Adult)   Pulse 68   Ht 5' 9\" (1.753 m)   Wt 210 lb (95.3 kg)   BMI 31.01 kg/m²     Hospital Outpatient Visit on 08/03/2022   Component Date Value Ref Range Status    WBC 08/03/2022 8.2  3.5 - 11.3 k/uL Final    RBC 08/03/2022 5.24  4.21 - 5.77 m/uL Final    Hemoglobin 08/03/2022 15.7  13.0 - 17.0 g/dL Final    Hematocrit 08/03/2022 46.7  40.7 - 50.3 % Final    MCV 08/03/2022 89.1  82.6 - 102.9 fL Final    MCH 08/03/2022 30.0  25.2 - 33.5 pg Final    MCHC 08/03/2022 33.6 (A) 25.2 - 33.5 g/dL Final 112  0 - 130 mg/dL Final    Chol/HDL Ratio 08/03/2022 4.7  <5 Final    Triglycerides 08/03/2022 103  <150 mg/dL Final         Assessment:       Encounter Diagnoses   Name Primary? Stage 3 chronic kidney disease, unspecified whether stage 3a or 3b CKD ([facility] [address] Yes    Pure hypercholesterolemia     SCC (squamous cell carcinoma)     Major depressive disorder in full remission, unspecified whether recurrent ([facility] [address]     Essential (primary) hypertension     LAURO on CPAP     Spinal stenosis, lumbar region, with neurogenic claudication     Iron deficiency anemia due to chronic blood loss     Screening PSA (prostate specific antigen)     Mixed conductive and sensorineural hearing loss of both ears     Abdominal pain, chronic, left lower quadrant          Plan:      CKD; improved/ stable with  Normal renal indicies at present. Cont. To avoid nephrotoxins when able and follow serially. Hyperlipidemia: good control at present . Cont. Simvastatin/fenofibrate at current dosing. SCC; no evidence for recurrent disease. Cont. Screening exams. Lesion on left dorsal arm removed 5-6 months ago. Repeat excision for residual SCC. He still has a scab in the area. Not clear if this is residual disease given persistence of scab. Has follow up next month. Depression and anxiety. No current concerns on zoloft. Helping with agoraphobia and claustrophobia mostly. Plan to cont. Same. Htn: good overall control. Cont. toprol daily. On flomax as well    Lauro: better compliance over the interval.  He was getting up a lot at night with his back pain issues and it became a hardship for him. Encouraged compliance. He snores a lot, but reports he feels better without it. He thinks he urinates less often using the cpap. Spinal stenosis with claudication. Dr. Jerica Retana following s/p surgery in October2013. MRI completed. surgery 3/8/17 through Dr. Jerica Retana. L2-3 PLIF add on to 3-5.   Symptoms in his back and radicular pain down left leg improved , but pain and disability returned. S/p posterior decompression T9-L5 1/26/18. Some early improvement, then pain increased quite a bit again . Most recently he had spinal cord stimulator placed 11/18. Still has symptoms of back pain and paresthesias into right leg, but level of pain /severity has improved. He continues to report daily pain and paresthesias right >left. Activity limiting pain at times. Using norco prn. Needs one in the am before getting out of bed. A lot of pain and stiffness in the morning. No signs of misuse or diversion. He and spouse relate he could use more medication to help keep him active during the day. Current supply at 40/month. Changing to bid dosing / #60. Did not tolerate gabapentin in the past.  Drowsy and confused some. Currently with bilateral radicular symptoms right > left. Rec. Updated mri and consideration for further intervention if results indicate. Anemia: normalized last 3 visits. Has had some recurrent anemia, microcytic, with surgery in October and again in march, and again in January 2018. Iron low in January. Microcytosis evident. Started ferrous sulfate 325mg bid, then decreased to once daily, now off the iron supplement. Iron level normal.  Cont. Serial follow up. Colonoscopy up to date 2012. Cont. Serial checks. Psa normal, cont. Annual screening. Hearing loss. New hearing aides. Helps some. Spouse notes he has poor hearing, even with the hearing aides. Leg cramps. Increasing frequency at present. Some sweating and outdoor work. Remains active. Electrolytes normal at present. Discussed dill pickles as a trial .  Not helping much. Frequency once a week or so at present. injection trials into the lower back through pain management, not really helping the cramps. Left sided abdominal pain. Almost exclusively at night after laying down. Worse lying on his back. Seems to improve after 10 minutes. No significant abdominal surgery to consider adhesions. Last colonoscopy ok 2017. Offered surgical consultation. Problem seems positional /mechanical .    He opts to observe and follow up for concerns. Not every night at present. Has been fairly quiescent over the last interval, improved.

## 2022-08-17 ENCOUNTER — TELEPHONE (OUTPATIENT)
Dept: MRI IMAGING | Age: 77
End: 2022-08-17

## 2022-08-17 DIAGNOSIS — M48.062 SPINAL STENOSIS, LUMBAR REGION WITH NEUROGENIC CLAUDICATION: Primary | ICD-10-CM

## 2022-08-18 NOTE — TELEPHONE ENCOUNTER
Pt wife called asking if appt has been scheduled for pt. Advised pt with of date and time of appt. She verbalized understanding.

## 2022-08-30 ENCOUNTER — HOSPITAL ENCOUNTER (OUTPATIENT)
Dept: CT IMAGING | Age: 77
Discharge: HOME | End: 2022-09-01
Payer: MEDICARE

## 2022-08-30 DIAGNOSIS — M48.062 SPINAL STENOSIS, LUMBAR REGION WITH NEUROGENIC CLAUDICATION: ICD-10-CM

## 2022-08-30 PROCEDURE — 72131 CT LUMBAR SPINE W/O DYE: CPT

## 2022-09-06 DIAGNOSIS — M54.42 CHRONIC BILATERAL LOW BACK PAIN WITH LEFT-SIDED SCIATICA: ICD-10-CM

## 2022-09-06 DIAGNOSIS — G89.29 CHRONIC BILATERAL LOW BACK PAIN WITH LEFT-SIDED SCIATICA: ICD-10-CM

## 2022-09-06 RX ORDER — HYDROCODONE BITARTRATE AND ACETAMINOPHEN 5; 325 MG/1; MG/1
1 TABLET ORAL 2 TIMES DAILY
Qty: 60 TABLET | Refills: 0 | Status: SHIPPED | OUTPATIENT
Start: 2022-09-06 | End: 2022-10-06

## 2022-09-12 ENCOUNTER — TELEPHONE (OUTPATIENT)
Dept: FAMILY MEDICINE CLINIC | Age: 77
End: 2022-09-12

## 2022-09-12 DIAGNOSIS — M51.26 LUMBAR DISC HERNIATION: Primary | ICD-10-CM

## 2022-09-12 NOTE — TELEPHONE ENCOUNTER
----- Message from Federico Tristan MD sent at 9/12/2022 12:52 PM EDT -----  IMPRESSION:  Presence of metallic artifact limited assessment for disc protrusions. Bilateral pedicles and screws devices noted at L2-L5 with laminectomies from  L1-L5 and disc spacers from the L2-L3 to the L4-L5 discs. There is advanced  degenerative disc disease of the L5-S1 disc. There is moderate bony  narrowing of right and left L5-S1 intervertebral foramina. Suspect the issues at L5-S1 likely the source of his current/recurrent symptoms. Would have him follow up with Dr. Meenakshi Vasquez to review current imaging and discuss plan.

## 2022-10-03 ENCOUNTER — OFFICE VISIT (OUTPATIENT)
Dept: UROLOGY | Age: 77
End: 2022-10-03
Payer: MEDICARE

## 2022-10-03 VITALS
SYSTOLIC BLOOD PRESSURE: 110 MMHG | HEART RATE: 58 BPM | BODY MASS INDEX: 31.55 KG/M2 | WEIGHT: 213 LBS | OXYGEN SATURATION: 96 % | DIASTOLIC BLOOD PRESSURE: 72 MMHG | HEIGHT: 69 IN

## 2022-10-03 DIAGNOSIS — N13.8 HYPERTROPHY OF PROSTATE WITH URINARY OBSTRUCTION: Primary | ICD-10-CM

## 2022-10-03 DIAGNOSIS — N40.1 HYPERTROPHY OF PROSTATE WITH URINARY OBSTRUCTION: Primary | ICD-10-CM

## 2022-10-03 DIAGNOSIS — R35.1 NOCTURIA: ICD-10-CM

## 2022-10-03 DIAGNOSIS — Z87.898 HISTORY OF URINARY RETENTION: ICD-10-CM

## 2022-10-03 PROCEDURE — 1123F ACP DISCUSS/DSCN MKR DOCD: CPT | Performed by: UROLOGY

## 2022-10-03 PROCEDURE — 1036F TOBACCO NON-USER: CPT | Performed by: UROLOGY

## 2022-10-03 PROCEDURE — G8484 FLU IMMUNIZE NO ADMIN: HCPCS | Performed by: UROLOGY

## 2022-10-03 PROCEDURE — G8427 DOCREV CUR MEDS BY ELIG CLIN: HCPCS | Performed by: UROLOGY

## 2022-10-03 PROCEDURE — 99202 OFFICE O/P NEW SF 15 MIN: CPT

## 2022-10-03 PROCEDURE — 99204 OFFICE O/P NEW MOD 45 MIN: CPT | Performed by: UROLOGY

## 2022-10-03 PROCEDURE — G8417 CALC BMI ABV UP PARAM F/U: HCPCS | Performed by: UROLOGY

## 2022-10-03 NOTE — PROGRESS NOTES
Nicole Duron MD.    [place] [address]  [address]  [address]  [place] [address]  Dept: [phone]  Dept Fax: [phone]    [facility] Urology Office Note -     Patient:  Blanco Living  YOB: 1945    The patient is a 68 y.o. male who presents today for evaluation of the following problems:   Chief Complaint   Patient presents with    Benign Prostatic Hypertrophy     1 year f/u    referred/consultation requested by Brian Archer MD.    History of Present Illness:    BPH  Worsening incontinence  Transfer from [address] office        Requested/reviewed records from Brian Archer MD office and/or outside physician/EMR    (Patient's old records have been requested, reviewed and pertinent findings summarized in today's note.)    Procedures Today: N/A      Last several PSA's:  Lab Results   Component Value Date    PSA 1.09 07/13/2021    PSA 1.20 07/13/2020    PSA 1.22 01/02/2020       Last total testosterone:  No results found for: TESTOSTERONE    Urinalysis today:  No results found for this visit on 10/03/22. Last BUN and creatinine:  Lab Results   Component Value Date    BUN 16 08/03/2022     Lab Results   Component Value Date    CREATININE 0.98 08/03/2022         Imaging Reviewed during this Office Visit:   Dana Jones MD independently reviewed the images and verified the radiology reports from:    CT LUMBAR SPINE WO CONTRAST    Result Date: 8/30/2022  EXAMINATION: CT OF THE LUMBAR SPINE WITHOUT CONTRAST  8/30/2022 TECHNIQUE: CT of the lumbar spine was performed without the administration of intravenous contrast. Multiplanar reformatted images are provided for review. Adjustment of mA and/or kV according to patient size was utilized.   Automated exposure control, iterative reconstruction, and/or weight based adjustment of the mA/kV was utilized to reduce the radiation dose to as low as reasonably achievable. COMPARISON: None HISTORY: ORDERING SYSTEM PROVIDED HISTORY: Spinal stenosis, lumbar region with neurogenic claudication TECHNOLOGIST PROVIDED HISTORY: Reason for Exam: Increasing low back pain, burning paresthesias and numbness in legs. No new injury, history of prior fusion & spinal stimulator. FINDINGS: Presence of the metallic hardware limits assessment for disc protrusions. Pedicle screws and connecting rods are noted bilaterally at L2, L3, L4 and L5. Disc spacers are noted at the L2-L3, L3-L4, and L4-L5 discs. The patient has had prior laminectomies from L1-L5. There is advanced degenerative disc disease of the L5-S1 disc, with complete loss of the disc, and gas within the disc space. This is associated with prominent anterior osteophytes. There is a moderate narrowing of the left and right intervertebral foramina at L5-S1. Presence of metallic artifact limited assessment for disc protrusions. Bilateral pedicles and screws devices noted at L2-L5 with laminectomies from L1-L5 and disc spacers from the L2-L3 to the L4-L5 discs. There is advanced degenerative disc disease of the L5-S1 disc. There is moderate bony narrowing of right and left L5-S1 intervertebral foramina. PAST MEDICAL, FAMILY AND SOCIAL HISTORY:  Past Medical History:   Diagnosis Date    Agoraphobia     Anxiety     Arthritis     Basal cell carcinoma     Chronic pain     back    CKD (chronic kidney disease)     Claustrophobia     Enlarged lymph node     rt. lower neck. Hearing aid worn     dar. ears.     Hearing loss     Hyperlipidemia     Hypertension     RAFAT (obstructive sleep apnea)     CPAP occasional    PONV (postoperative nausea and vomiting)     Retention of urine     last 2 surgeries, patient unable to void, home with catheter both times    SCC (squamous cell carcinoma)     HAND    Spinal stenosis, lumbar region, with neurogenic claudication     Wears glasses      Past Surgical History:   Procedure Laterality Date    BACK SURGERY  10/2013    Dr. Erin Lee hardware lumbar    COLONOSCOPY  06/13/12    mild diverticular dz    LUMBAR FUSION N/A 3/8/2017    LUMBAR L2-3 POSTERIOR DECOMPRESSION FUSION INSTRUMENTATION W/REVISION L3-5 POSTERIOR DECOMPRESSION FUSION INSTRUMENTATION ([facility] [address] [facility] [facility])  CC SN/KILEY performed by Wang Pradhan MD at [address] Right     lower neck, 2-    PAIN MANAGEMENT PROCEDURE Bilateral 3/12/2021    Bilateral S1 TRANSFORAMINAL performed by Adonis Gaspar MD at [address] Bilateral 3/26/2021    Bilateral S1 TRANSFORAMINAL performed by Adonis Gaspar MD at [address] Bilateral 7/8/2021    BILAT L4-L5 L5-S1 FACET performed by Adonis Gaspar MD at [address] [facility] POSTERIOR/PSTLAT TQ 1NTRSP THORACIC N/A 1/26/2018    THORACIC & LUMBAR POSTERIOR DECOMPRESSION AND FUSION REVISION T9 THRU L5 performed by Wang Pradhan MD at . [address] IND N/A 8/23/2017    COLONOSCOPY performed by Tulio Jefferson MD at [facility] N/A 11/14/2018    SPINAL CORD STIMULATOR IMPLANT PERMANENT performed by Wang Pradhan MD at [address] [place] [address]     PRE-MALIGNANT / BENIGN SKIN LESION EXCISION      SKIN BIOPSY      skin cancer removed x 4    VASECTOMY       Family History   Problem Relation Age of Onset    Diabetes Mother     Diabetes Brother     Cancer Father         lung cancer    Cancer Brother         lung cancer    Diabetes Brother      Outpatient Medications Marked as Taking for the 10/3/22 encounter (Office Visit) with Gale Mederos MD   Medication Sig Dispense Refill    HYDROcodone-acetaminophen (NORCO) 5-325 MG per tablet Take 1 tablet by mouth 2 times daily for 30 days.  60 tablet 0    simvastatin (ZOCOR) 20 MG tablet TAKE ONE TABLET BY MOUTH DAILY 90 tablet 3    fenofibrate (TRICOR) 145 MG tablet TAKE ONE TABLET BY MOUTH DAILY 90 tablet 3    metoprolol succinate (TOPROL XL) 50 MG extended release tablet TAKE ONE TABLET BY MOUTH DAILY 90 tablet 1    sertraline (ZOLOFT) 100 MG tablet TAKE ONE TABLET BY MOUTH DAILY 90 tablet 2    tamsulosin (FLOMAX) 0.4 MG capsule Take 1 capsule by mouth daily 90 capsule 3    Multiple Vitamin (MULTI VITAMIN DAILY PO) Take by mouth      aspirin 81 MG tablet Take 81 mg by mouth daily         Atarax [hydroxyzine], Gabapentin, Phenergan [promethazine hcl], and Morphine and related  Social History     Tobacco Use   Smoking Status Never   Smokeless Tobacco Never      (If patient a smoker, smoking cessation counseling offered)   Social History     Substance and Sexual Activity   Alcohol Use No       REVIEW OF SYSTEMS:  Constitutional: negative  Eyes: negative  Respiratory: negative  Cardiovascular: negative  Gastrointestinal: negative  Genitourinary: see HPI  Musculoskeletal: negative  Skin: negative   Neurological: negative  Hematological/Lymphatic: negative  Psychological: negative        Physical Exam:    This a 68 y.o. male  Vitals:    10/03/22 1100   BP: 110/72   Pulse: 58   SpO2: 96%     Body mass index is 31.45 kg/m². Assessment and Plan        1. Hypertrophy of prostate with urinary obstruction    2. Nocturia    3. History of urinary retention               Plan:      BPH- on flomax. Worsening incontinence. Very bothered. Has had retention episodes in the past. Hold off anticholinergics. Cystoscopy in office for bph eval      Prescriptions Ordered:  No orders of the defined types were placed in this encounter. Orders Placed:  No orders of the defined types were placed in this encounter.            Eddie Yoon MD

## 2022-10-07 ENCOUNTER — IMMUNIZATION (OUTPATIENT)
Dept: LAB | Age: 77
End: 2022-10-07
Payer: MEDICARE

## 2022-10-07 PROCEDURE — PBSHW INFLUENZA, FLUAD, (AGE 65 Y+), IM, PF, 0.5 ML: Performed by: FAMILY MEDICINE

## 2022-10-07 PROCEDURE — G0008 ADMIN INFLUENZA VIRUS VAC: HCPCS | Performed by: FAMILY MEDICINE

## 2022-10-17 ENCOUNTER — PROCEDURE VISIT (OUTPATIENT)
Dept: UROLOGY | Age: 77
End: 2022-10-17
Payer: MEDICARE

## 2022-10-17 VITALS
WEIGHT: 204.6 LBS | OXYGEN SATURATION: 93 % | HEIGHT: 69 IN | DIASTOLIC BLOOD PRESSURE: 82 MMHG | BODY MASS INDEX: 30.3 KG/M2 | HEART RATE: 61 BPM | SYSTOLIC BLOOD PRESSURE: 122 MMHG

## 2022-10-17 DIAGNOSIS — N13.8 HYPERTROPHY OF PROSTATE WITH URINARY OBSTRUCTION: Primary | ICD-10-CM

## 2022-10-17 DIAGNOSIS — N40.1 HYPERTROPHY OF PROSTATE WITH URINARY OBSTRUCTION: Primary | ICD-10-CM

## 2022-10-17 PROCEDURE — 52000 CYSTOURETHROSCOPY: CPT | Performed by: UROLOGY

## 2022-10-17 RX ORDER — OXYBUTYNIN CHLORIDE 10 MG/1
10 TABLET, EXTENDED RELEASE ORAL DAILY
Qty: 30 TABLET | Refills: 3 | Status: SHIPPED | OUTPATIENT
Start: 2022-10-17

## 2022-10-17 NOTE — PROGRESS NOTES
Cystoscopy    Operative Note    Patient:  Barbara Avilez  MRN: [id number]  YOB: 1945    Date: 10/17/22  Surgeon: Chayo Toledo MD  Anesthesia: Kasia Wills Local  Indications:     BPH- on flomax. Worsening incontinence. Very bothered. Has had retention episodes in the past. Hold off anticholinergics. Cystoscopy in office for bph eval      Position: Supine  EBL: 0 ml    Findings:   The patient was prepped and draped in the usual sterile fashion. The flexible cystoscope was advanced through the urethra and into the bladder. The bladder was thoroughly inspected and the following was noted:    Residual Urine: moderate. Urine clear, with no obvious infection  Urethra: No abnormalities of the urethra are noted. Urethral dilation was not performed. Prostate: lateral lobe hypertrophy + present, prostate moderately obstructing, intravesical extension of prostate not present. There was no previous TURP defect. Bladder: no tumor noted . Mild trabeculation noted. no bladder diverticulum. Ureters: Orifices with normal configuration and location. The cystoscope was removed. The patient tolerated the procedure well. Pt with mod bph (30) but oab symptoms are worsening  Already on flomax. Discussed antichol/b3 agonist. Discussed retention risk  Try maximizing meds before PVP.   F/u 3 months for checks

## 2022-11-09 DIAGNOSIS — G89.29 CHRONIC BILATERAL LOW BACK PAIN WITH LEFT-SIDED SCIATICA: ICD-10-CM

## 2022-11-09 DIAGNOSIS — M54.42 CHRONIC BILATERAL LOW BACK PAIN WITH LEFT-SIDED SCIATICA: ICD-10-CM

## 2022-11-09 RX ORDER — HYDROCODONE BITARTRATE AND ACETAMINOPHEN 5; 325 MG/1; MG/1
1 TABLET ORAL 2 TIMES DAILY
Qty: 60 TABLET | Refills: 0 | Status: SHIPPED | OUTPATIENT
Start: 2022-11-09 | End: 2022-12-09

## 2022-11-14 RX ORDER — METOPROLOL SUCCINATE 50 MG/1
TABLET, EXTENDED RELEASE ORAL
Qty: 90 TABLET | Refills: 1 | Status: SHIPPED | OUTPATIENT
Start: 2022-11-14

## 2022-11-18 ENCOUNTER — OFFICE VISIT (OUTPATIENT)
Dept: NEUROSURGERY | Age: 77
End: 2022-11-18
Payer: MEDICARE

## 2022-11-18 VITALS
HEART RATE: 62 BPM | WEIGHT: 212 LBS | BODY MASS INDEX: 31.4 KG/M2 | DIASTOLIC BLOOD PRESSURE: 80 MMHG | HEIGHT: 69 IN | RESPIRATION RATE: 16 BRPM | SYSTOLIC BLOOD PRESSURE: 116 MMHG | OXYGEN SATURATION: 93 %

## 2022-11-18 DIAGNOSIS — G95.19 NEUROGENIC CLAUDICATION (HCC): Primary | ICD-10-CM

## 2022-11-18 DIAGNOSIS — M47.816 LUMBAR SPONDYLOSIS: ICD-10-CM

## 2022-11-18 DIAGNOSIS — Z09 ENCOUNTER FOR FOLLOW-UP EXAMINATION AFTER COMPLETED TREATMENT FOR CONDITIONS OTHER THAN MALIGNANT NEOPLASM: ICD-10-CM

## 2022-11-18 DIAGNOSIS — Z96.89 SPINAL CORD STIMULATOR STATUS: ICD-10-CM

## 2022-11-18 DIAGNOSIS — M43.9 ACQUIRED SPINAL DEFORMITY: ICD-10-CM

## 2022-11-18 PROCEDURE — 99204 OFFICE O/P NEW MOD 45 MIN: CPT | Performed by: NURSE PRACTITIONER

## 2022-11-18 PROCEDURE — G8417 CALC BMI ABV UP PARAM F/U: HCPCS | Performed by: NURSE PRACTITIONER

## 2022-11-18 PROCEDURE — 3074F SYST BP LT 130 MM HG: CPT | Performed by: NURSE PRACTITIONER

## 2022-11-18 PROCEDURE — 99213 OFFICE O/P EST LOW 20 MIN: CPT | Performed by: NURSE PRACTITIONER

## 2022-11-18 PROCEDURE — G8484 FLU IMMUNIZE NO ADMIN: HCPCS | Performed by: NURSE PRACTITIONER

## 2022-11-18 PROCEDURE — 3078F DIAST BP <80 MM HG: CPT | Performed by: NURSE PRACTITIONER

## 2022-11-18 PROCEDURE — G8427 DOCREV CUR MEDS BY ELIG CLIN: HCPCS | Performed by: NURSE PRACTITIONER

## 2022-11-18 PROCEDURE — 1123F ACP DISCUSS/DSCN MKR DOCD: CPT | Performed by: NURSE PRACTITIONER

## 2022-11-18 PROCEDURE — 1036F TOBACCO NON-USER: CPT | Performed by: NURSE PRACTITIONER

## 2022-11-29 ENCOUNTER — HOSPITAL ENCOUNTER (OUTPATIENT)
Dept: PHYSICAL THERAPY | Age: 77
Setting detail: THERAPIES SERIES
Discharge: HOME | End: 2022-11-29
Payer: MEDICARE

## 2022-11-29 ENCOUNTER — HOSPITAL ENCOUNTER (OUTPATIENT)
Dept: GENERAL RADIOLOGY | Age: 77
Discharge: HOME | End: 2022-12-01
Payer: MEDICARE

## 2022-11-29 ENCOUNTER — HOSPITAL ENCOUNTER (OUTPATIENT)
Dept: BONE DENSITY | Age: 77
Discharge: HOME | End: 2022-12-01
Payer: MEDICARE

## 2022-11-29 DIAGNOSIS — M47.816 LUMBAR SPONDYLOSIS: ICD-10-CM

## 2022-11-29 DIAGNOSIS — M43.9 ACQUIRED SPINAL DEFORMITY: ICD-10-CM

## 2022-11-29 DIAGNOSIS — Z09 ENCOUNTER FOR FOLLOW-UP EXAMINATION AFTER COMPLETED TREATMENT FOR CONDITIONS OTHER THAN MALIGNANT NEOPLASM: ICD-10-CM

## 2022-11-29 DIAGNOSIS — Z96.89 SPINAL CORD STIMULATOR STATUS: ICD-10-CM

## 2022-11-29 DIAGNOSIS — G95.19 NEUROGENIC CLAUDICATION (HCC): ICD-10-CM

## 2022-11-29 PROCEDURE — 77085 DXA BONE DENSITY AXL VRT FX: CPT

## 2022-11-29 PROCEDURE — 72100 X-RAY EXAM L-S SPINE 2/3 VWS: CPT

## 2022-11-29 PROCEDURE — 72120 X-RAY BEND ONLY L-S SPINE: CPT

## 2022-11-29 PROCEDURE — 77080 DXA BONE DENSITY AXIAL: CPT

## 2022-11-29 PROCEDURE — G1010 CDSM STANSON: HCPCS

## 2022-11-29 PROCEDURE — 97161 PT EVAL LOW COMPLEX 20 MIN: CPT | Performed by: PHYSICAL THERAPIST

## 2022-11-29 ASSESSMENT — PAIN DESCRIPTION - LOCATION: LOCATION: BACK

## 2022-11-29 ASSESSMENT — PAIN DESCRIPTION - PAIN TYPE: TYPE: CHRONIC PAIN

## 2022-11-29 ASSESSMENT — PAIN DESCRIPTION - DESCRIPTORS: DESCRIPTORS: ACHING;BURNING

## 2022-11-29 ASSESSMENT — PAIN DESCRIPTION - FREQUENCY: FREQUENCY: CONTINUOUS

## 2022-11-29 ASSESSMENT — PAIN - FUNCTIONAL ASSESSMENT: PAIN_FUNCTIONAL_ASSESSMENT: PREVENTS OR INTERFERES WITH ALL ACTIVE AND SOME PASSIVE ACTIVITIES

## 2022-11-29 ASSESSMENT — PAIN SCALES - GENERAL: PAINLEVEL_OUTOF10: 2

## 2022-11-29 ASSESSMENT — PAIN DESCRIPTION - ONSET: ONSET: PROGRESSIVE

## 2022-11-29 NOTE — PLAN OF CARE
[facility]    [x] [place]  Phone: [phone]  Fax: [phone]      [] [place]  Phone: [phone]  Fax: [phone]        To:        Patient: Dianne Whitlock  : 1945   MRN: [id number]  Evaluation Date: 2022      Diagnosis Information:  Diagnosis: M47.826 lumbar spondylosis, post multi-segment fusions    Treatment Diagnosis: Gait & balance difficulty     Physical Therapy Certification Form  Dear Reese Parra CNP  The following patient has been evaluated for physical therapy services and for therapy to continue, Medicare requires monthly physician review of the treatment plan. Please review the attached evaluation and/or summary of the patient's plan of care, and verify that you agree therapy should continue by signing the attached document and sending it back to our office.     Plan of Care/Treatment to date:  [x] Therapeutic Exercise    [] Modalities:  [x] Therapeutic Activity     [] Ultrasound  [] Electrical Stimulation  [x] Gait Training      [] Cervical Traction [] Lumbar Traction  [x] Neuromuscular Re-education    [] Cold/hotpack [] Iontophoresis   [x] Instruction in HEP     Other:  [] Manual Therapy      []             [] Aquatic Therapy      []                 Goals:  Short Term Goals  Time Frame for Short Term Goals: 1 week  Short Term Goal 1: Start HEP    Long Term Goals  Time Frame for Long Term Goals : 4 weeks  Long Term Goal 1: Able to stand from chair on first attempt, without fall back into chair  Long Term Goal 2: TUG improve to 12 sec for gait efficiency  Long Term Goal 3: Able to tolerate standing 10 min  Long Term Goal 4: Gait used for outdoor activity to allow for total 10 min standing time    Frequency/Duration:22 - 22  # Days per week: [] 1 day # Weeks: [] 1 week [] 5 weeks     [x] 2 days   [] 2 weeks [] 6 weeks     [] 3 days   [] 3 weeks [] 7 weeks     [] 4 days   [x] 4 weeks [] 8 weeks    Rehab Potential: [] Excellent [] Good [x] Fair  [] Poor     Electronically signed by:  Oz Chester PT      If you have any questions or concerns, please don't hesitate to call.   Thank you for your referral.      Physician Signature:________________________________Date:__________________  By signing above, therapists plan is approved by physician

## 2022-11-29 NOTE — PROGRESS NOTES
Physical Therapy    Physical Therapy Daily Treatment Note    Date:  2022    Patient Name:  Milli Alford    :  1945  MRN: [id number]  Restrictions/Precautions:   High fall risk  Medical/Treatment Diagnosis Information:   Diagnosis: M47.826 lumbar spondylosis, post multi-segment fusions 2018  Treatment Diagnosis: Gait & balance difficulty  Insurance/Certification information:  PT Insurance Information: Medicare  Physician Information:   Steffanie Parra CNP  Plan of care signed (Y/N):  n  Visit# / total visits:  1/10  Pain level: /10       Time In:9:50   Time Out:10:25    Progress Note: [x]  Yes  []  No  Next due by: Visit #10, or 22      Subjective:  See eval     Objective: See eval  Observation:   Test measurements:      Exercises:   Exercise/Equipment Resistance/Repetitions Other comments   Counter ex  Posture correct   Squat Matrix     Sit to stand  Caution of LOB posterior   B hip abd/add     Step up     Lunge     Gait RW                                         [x] Provided verbal/tactile cueing for activities related to strengthening, flexibility, endurance, ROM. (27307)  [] Provided verbal/tactile cueing for activities related to improving balance, coordination, kinesthetic sense, posture, motor skill, proprioception. (78082)    Therapeutic Activities:     [] Therapeutic activities, direct (one-on-one) patient contact (use of dynamic activities to improve functional performance). (01515)    Gait:   [] Provided training and instruction to the patient for ambulation re-education. (32679)    Self-Care/ADL's  [] Self-care/home management training and compensatory training, meal preparation, safety procedures, and instructions in use of assistive technology devices/adaptive equipment, direct one-on-one contact.  (03321)    Home Exercise Program:     [] Reviewed/Progressed HEP activities related to strengthening, flexibility, endurance, ROM. (38527)  [] Reviewed/Progressed HEP activities related to improving balance, coordination, kinesthetic sense, posture, motor skill, proprioception.  (15011)    Manual Treatments:    [] Provided manual therapy to mobilize soft tissue/joints for the purpose of modulating pain, promoting relaxation,  increasing ROM, reducing/eliminating soft tissue swelling/inflammation/restriction, improving soft tissue extensibility. (92422)    Service Based Modalities:  Eval 35'    Timed Code Treatment Minutes:        Total Treatment Minutes:  28'     Treatment/Activity Tolerance:  [x] Patient tolerated treatment well [] Patient limited by fatique  [] Patient limited by pain  [x] Patient limited by other medical complications  [] Other:     Prognosis: [] Good [x] Fair  [] Poor    Patient Requires Follow-up: [x] Yes  [] No      Goals:  Short Term Goals  Time Frame for Short Term Goals: 1 week  Short Term Goal 1: Start HEP    Long Term Goals  Time Frame for Long Term Goals : 4 weeks  Long Term Goal 1: Able to stand from chair on first attempt, without fall back into chair  Long Term Goal 2: TUG improve to 12 sec for gait efficiency  Long Term Goal 3: Able to tolerate standing 10 min  Long Term Goal 4: Gait used for outdoor activity to allow for total 10 min standing time          Plan:   [] Continue per plan of care [] Alter current plan (see comments)  [x] Plan of care initiated [] Hold pending MD visit [] Discharge  Plan for Next Session:      Electronically signed by:  Syed Alex PT,PT

## 2022-11-29 NOTE — PROGRESS NOTES
Physical Therapy  Initial Assessment  Date: 2022  Patient Name: Rogelio Apgar  MRN: [id number]  : 1945    Referring Physician: KM Cui - * Ebonie Escobar CNP   PCP: Nae Mercado MD     Medical Diagnosis: Lumbar spondylosis [I60.394]  Neurogenic claudication ([address] [G95.19]  Acquired spinal deformity [M43.9]  Spinal cord stimulator status [Z96.89] M47.826 lumbar spondylosis, post multi-segment fusions   Treatment Diagnosis: Gait & balance difficulty      Insurance: Payor: MEDICARE / Plan: MEDICARE PART A AND B / Product Type: *No Product type* /   Insurance ID: [id number] - (Medicare)      Restrictions:High Fall Risk       Subjective:   General  Chart Reviewed: Yes  Patient Assessed for Rehabilitation Services: Yes  Additional Pertinent Hx: T9-L5 fusions   History obtained from[de-identified] Patient, Chart Review  Family/Caregiver Present: Yes  Diagnosis: M47.826 lumbar spondylosis, post multi-segment fusions   Referring Provider (secondary): Ebonie Escobar CNP  Follows Commands: Within Functional Limits  PT Visit Information  Onset Date: 22  PT Insurance Information: Medicare  Referring Provider (secondary): Ebnoie Escobar CNP  Subjective  Subjective: Had major back surgery 4 yrs ago. Spinal cord stimulator is still in place. Had a hard time with any standing or walking  Previous treatments prior to current episode?: Surgery, Injections, Outpatient PT  Pain Screening  Patient Currently in Pain: Yes  Pain Assessment: 0-10  Pain Level: 2  Best Pain Level: 0  Worst Pain Level: 5  Patient's Stated Pain Goal: 3  Pain Type: Chronic pain  Pain Location: Back  Pain Radiating Towards: Back B LE's  to feet.   Pain Descriptors: Aching, Burning  Pain Frequency: Continuous  Pain Onset: Progressive  Functional Pain Assessment: Prevents or interferes with all active and some passive activities  Aggravating factors: Sleeping, Standing, Walking  Multiple Pain Sites: Yes       Vision/Hearing:  Vision  Vision: Within Functional Limits  Hearing  Hearing: Within functional limits    Orientation:  Orientation  Overall Orientation Status: Within Normal Limits  Follows Commands: Within Functional Limits    Social History:  Social History  Lives With: Spouse  Type of Home: House  Home Layout: Two level    Functional Status:  Functional Status  Prior level of function: Independent  Occupation: Retired  Receives Help From: Family  ADL Assistance: Independent  Homemaking Assistance: Needs assistance  Ambulation Assistance: Independent  Transfer Assistance: Independent  Active : Yes  Additional Comments: Low activity level at home    Objective:     PROM RLE (degrees)  RLE PROM: WFL  PROM LLE (degrees)  LLE PROM: [facility]  Spine  Lumbar: Major loss of extension, 100%. Strength RLE  Comment: 4/5  Strength LLE  Comment: 4/5 hip, knee, ankle dflex 3+/5. Partial foot drop since surgery     Additional Measures  Special Tests: TUG 14 sec  Other: Sit to  30\" x5. Loss of balance on eacg attempt        Transfers  Sit to Stand: Modified independent  Stand to Sit: Modified independent  Comment: Consistent loss of balance posterior, often falls back into chair       Ambulation  Surface: Level tile  Device: No Device  Assistance: Modified Independent  Quality of Gait: Flexed posture approx 20 deg. Gait Deviations: Slow Angie; Shuffles;Staggers     Assessment:    Conditions Requiring Skilled Therapeutic Intervention  Body Structures, Functions, Activity Limitations Requiring Skilled Therapeutic Intervention: Decreased endurance; Increased pain;Decreased strength;Decreased balance  Therapy Prognosis: Fair  Treatment Diagnosis: Gait & balance difficulty  Activity Tolerance  Activity Tolerance: Treatment limited secondary to medical complications; Patient tolerated treatment well  Activity Tolerance: Treatment limited secondary to medical complications; Patient tolerated treatment well         Plan:    Physcial Therapy Plan  Plan weeks: 4  Current Treatment Recommendations: Strengthening, Balance training, Gait training, Functional mobility training, Home exercise program    OutComes Score:  Oswestry CMS Modifier: CL (11/29/22 1021)  Oswestry Disability Scores %: 66.67 (11/29/22 1021)            Goals:  Short Term Goals  Time Frame for Short Term Goals: 1 week  Short Term Goal 1: Start HEP  Long Term Goals  Time Frame for Long Term Goals : 4 weeks  Long Term Goal 1: Able to stand from chair on first attempt, without fall back into chair  Long Term Goal 2: TUG improve to 12 sec for gait efficiency  Long Term Goal 3: Able to tolerate standing 10 min  Long Term Goal 4: Gait used for outdoor activity to allow for total 10 min standing time       Therapy Time:   Individual Concurrent Group Co-treatment   Time In  9:50         Time Out  10:25         Minutes  35                 Marlee Villeda, PT

## 2022-12-02 ENCOUNTER — HOSPITAL ENCOUNTER (OUTPATIENT)
Dept: PHYSICAL THERAPY | Age: 77
Setting detail: THERAPIES SERIES
Discharge: HOME | End: 2022-12-02
Payer: MEDICARE

## 2022-12-02 PROCEDURE — 97110 THERAPEUTIC EXERCISES: CPT

## 2022-12-02 NOTE — PROGRESS NOTES
Physical Therapy    Physical Therapy Daily Treatment Note    Date:  2022    Patient Name:  Milli Alford    :  1945  MRN: [id number]  Restrictions/Precautions:   High fall risk  Medical/Treatment Diagnosis Information:   Diagnosis: M47.826 lumbar spondylosis, post multi-segment fusions 2018  Treatment Diagnosis: Gait & balance difficulty  Insurance/Certification information:  PT Insurance Information: Medicare  Physician Information:   Steffanie Parra CNP  Plan of care signed (Y/N):  y  Visit# / total visits:  2/10  Pain level: 0/10       Time In:  1:45  Time Out: 2:25    Progress Note: []  Yes  [x]  No  Next due by: Visit #10, or 22      Subjective:  Pt reports doing well this date just feels wobbly. Objective: DARRIN performed per flow sheet for increased strength, proprioception, and mobility for improvements in balance and ambulation. Verbal cueing for sequencing and proper form. Exercises initiated with good tolerance. Gait training with RW completed with education on why is a safer choice to use to prevent LOB and falls. Pt reports has 2 walkers at home but does not want to use them. Encouraged borrowing wife's extra walker for safety. Counter HEP given with instruction to use a counter with wife sitting there    Observation:   Test measurements:      Exercises:   Exercise/Equipment Resistance/Repetitions Other comments   Nustep 6' Level 4    Counter ex 10x Posture correct   Squat Matrix 5x 3 position    Sit to stand 10x Caution of LOB posterior   B hip abd/add 10x   red/ ball    Step up 10x   4\" Fwd, lat   Lunge 10x Fwd, lat   Gait RW 3 laps Education provided about use         Lateral stepping  3 laps                              [x] Provided verbal/tactile cueing for activities related to strengthening, flexibility, endurance, ROM. (13775)  [] Provided verbal/tactile cueing for activities related to improving balance, coordination, kinesthetic sense, posture, motor skill, proprioception. (13520)    Therapeutic Activities:     [] Therapeutic activities, direct (one-on-one) patient contact (use of dynamic activities to improve functional performance). (65051)    Gait:   [] Provided training and instruction to the patient for ambulation re-education. (49724)    Self-Care/ADL's  [] Self-care/home management training and compensatory training, meal preparation, safety procedures, and instructions in use of assistive technology devices/adaptive equipment, direct one-on-one contact. (36376)    Home Exercise Program:  Counter HEP   [x] Reviewed/Progressed HEP activities related to strengthening, flexibility, endurance, ROM. (89919)  [] Reviewed/Progressed HEP activities related to improving balance, coordination, kinesthetic sense, posture, motor skill, proprioception.  (96235)    Manual Treatments:    [] Provided manual therapy to mobilize soft tissue/joints for the purpose of modulating pain, promoting relaxation,  increasing ROM, reducing/eliminating soft tissue swelling/inflammation/restriction, improving soft tissue extensibility.  (75628)    Service Based Modalities:      Timed Code Treatment Minutes:   36' therex    Total Treatment Minutes:  36'     Treatment/Activity Tolerance:  [x] Patient tolerated treatment well [] Patient limited by fatique  [] Patient limited by pain  [x] Patient limited by other medical complications  [] Other:     Prognosis: [] Good [x] Fair  [] Poor    Patient Requires Follow-up: [x] Yes  [] No      Goals:  Short Term Goals  Time Frame for Short Term Goals: 1 week  Short Term Goal 1: Start HEP    Long Term Goals  Time Frame for Long Term Goals : 4 weeks  Long Term Goal 1: Able to stand from chair on first attempt, without fall back into chair  Long Term Goal 2: TUG improve to 12 sec for gait efficiency  Long Term Goal 3: Able to tolerate standing 10 min  Long Term Goal 4: Gait used for outdoor activity to allow for total 10 min standing time          Plan:   [x] Continue per plan of care [] Alter current plan (see comments)  [] Plan of care initiated [] Hold pending MD visit [] Discharge    Plan for Next Session:  Electronically signed by:  Idris Meade PTA

## 2022-12-05 ENCOUNTER — HOSPITAL ENCOUNTER (OUTPATIENT)
Dept: PHYSICAL THERAPY | Age: 77
Setting detail: THERAPIES SERIES
Discharge: HOME | End: 2022-12-05
Payer: MEDICARE

## 2022-12-05 PROCEDURE — 97110 THERAPEUTIC EXERCISES: CPT

## 2022-12-05 NOTE — PROGRESS NOTES
Physical Therapy    Physical Therapy Daily Treatment Note    Date:  2022    Patient Name:  Shmuel Ashby    :  1945  MRN: [id number]  Restrictions/Precautions:   High fall risk  Medical/Treatment Diagnosis Information:   Diagnosis: M47.826 lumbar spondylosis, post multi-segment fusions 2018  Treatment Diagnosis: Gait & balance difficulty  Insurance/Certification information:  PT Insurance Information: Medicare  Physician Information:   Bailey Saavedra CNP  Plan of care signed (Y/N):  y  Visit# / total visits:  3/10  Pain level: \"Sore\"/10       Time In:  11:10 Time Out: 11:42    Progress Note: []  Yes  [x]  No  Next due by: Visit #10, or 22      Subjective:  Pt reports did not try using walker at home as did not ask wife about using a walker. Pt reports was sore after last session    Objective: DARRIN performed per flow sheet for increased strength, proprioception, and mobility for improvements in balance and ambulation. Verbal cueing for sequencing and proper form. Much time spent providing education about using a walker to prevent falls. Pt reports does fall frequently. Continued encouraged use of RW use. Pt reports \"we will see\" This PTA has given multiple attempts for education. All incidents of falling is not at fault of therapists. Pt reports fatigue at end of session.      Observation:   Test measurements:      Exercises:   Exercise/Equipment Resistance/Repetitions Other comments   Nustep 6' Level 4    Counter ex 10x Posture correct   Squat Matrix 5x 3 position    Sit to stand 10x   4\"  no UEs Caution of LOB posterior   B hip abd/add 15x5\"   red/ ball    Step up 10x   4\" Fwd, lat   Lunge 10x Fwd, lat   Gait RW 3 laps Education provided about use         Lateral stepping  3 laps     FTEO, FTEC 30\"x2                        [x] Provided verbal/tactile cueing for activities related to strengthening, flexibility, endurance, ROM. (51152)  [] Provided verbal/tactile cueing for activities related to improving balance, coordination, kinesthetic sense, posture, motor skill, proprioception. (94639)    Therapeutic Activities:     [] Therapeutic activities, direct (one-on-one) patient contact (use of dynamic activities to improve functional performance). (81683)    Gait:   [] Provided training and instruction to the patient for ambulation re-education. (84982)    Self-Care/ADL's  [] Self-care/home management training and compensatory training, meal preparation, safety procedures, and instructions in use of assistive technology devices/adaptive equipment, direct one-on-one contact. (75670)    Home Exercise Program:  Counter HEP   [x] Reviewed/Progressed HEP activities related to strengthening, flexibility, endurance, ROM. (83410)  [] Reviewed/Progressed HEP activities related to improving balance, coordination, kinesthetic sense, posture, motor skill, proprioception.  (08713)    Manual Treatments:    [] Provided manual therapy to mobilize soft tissue/joints for the purpose of modulating pain, promoting relaxation,  increasing ROM, reducing/eliminating soft tissue swelling/inflammation/restriction, improving soft tissue extensibility.  (51864)    Service Based Modalities:      Timed Code Treatment Minutes:   28' therex    Total Treatment Minutes:  28'     Treatment/Activity Tolerance:  [x] Patient tolerated treatment well [] Patient limited by fatique  [] Patient limited by pain  [x] Patient limited by other medical complications  [] Other:     Prognosis: [] Good [x] Fair  [] Poor    Patient Requires Follow-up: [x] Yes  [] No      Goals:  Short Term Goals  Time Frame for Short Term Goals: 1 week  Short Term Goal 1: Start HEP    Long Term Goals  Time Frame for Long Term Goals : 4 weeks  Long Term Goal 1: Able to stand from chair on first attempt, without fall back into chair  Long Term Goal 2: TUG improve to 12 sec for gait efficiency  Long Term Goal 3: Able to tolerate standing 10 min  Long Term Goal 4: Gait used for outdoor activity to allow for total 10 min standing time          Plan:   [x] Continue per plan of care [] Brisa Lantigua current plan (see comments)  [] Plan of care initiated [] Hold pending MD visit [] Discharge    Plan for Next Session:  Electronically signed by:  Casandra Santos PTA

## 2022-12-06 NOTE — PROGRESS NOTES
I have reviewed and agree to the content of the note written by the PTA.   Electronically signed by Sarah Pacheco PT [id number]

## 2022-12-06 NOTE — PROGRESS NOTES
I have reviewed and agree to the content of the note written by the PTA.   Electronically signed by Radha Murphy PT [id number]

## 2022-12-07 ENCOUNTER — HOSPITAL ENCOUNTER (OUTPATIENT)
Dept: PHYSICAL THERAPY | Age: 77
Setting detail: THERAPIES SERIES
Discharge: HOME | End: 2022-12-07
Payer: MEDICARE

## 2022-12-07 PROCEDURE — 97110 THERAPEUTIC EXERCISES: CPT

## 2022-12-07 NOTE — PROGRESS NOTES
Physical Therapy    Physical Therapy Daily Treatment Note    Date:  2022    Patient Name:  Ayana Chairez    :  1945  MRN: [id number]  Restrictions/Precautions:   High fall risk  Medical/Treatment Diagnosis Information:   Diagnosis: M47.826 lumbar spondylosis, post multi-segment fusions 2018  Treatment Diagnosis: Gait & balance difficulty  Insurance/Certification information:  PT Insurance Information: Medicare  Physician Information:   Collette Del Real CNP  Plan of care signed (Y/N):  y  Visit# / total visits:  4/10  Pain level: 0/10       Time In:  11:14 Time Out: 11:52    Progress Note: []  Yes  [x]  No  Next due by: Visit #10, or 22      Subjective:  Pt brought RW this date. Pt reports has been busy taking care of wife. Objective: DARRIN performed per flow sheet for increased strength, proprioception, and mobility for improvements in balance and ambulation. Verbal cueing for sequencing and proper form. Observation:  RW this date  Test measurements:  TUG no AD: 11 seconds    Exercises:   Exercise/Equipment Resistance/Repetitions Other comments   Nustep 6' Level 4    Counter ex 15x Posture correct   Squat Matrix 10x 3 position    Sit to stand 10x   4\"  no UEs Caution of LOB posterior   B hip abd/add 15x5\"   red/ ball    Step up 10x   4\" Fwd, lat   Lunge 15x Fwd, lat   Gait RW  Education provided about use         Lateral stepping  3 laps     FTEO, FTEC 30\"x2 foam   Tandem stance 2x    Step taps 10x 6\"         Seated HS curls  15x red    Seated marches 10x    [x] Provided verbal/tactile cueing for activities related to strengthening, flexibility, endurance, ROM. (29920)  [] Provided verbal/tactile cueing for activities related to improving balance, coordination, kinesthetic sense, posture, motor skill, proprioception. (10757)    Therapeutic Activities:     [] Therapeutic activities, direct (one-on-one) patient contact (use of dynamic activities to improve functional performance).  (70710)    Gait: [] Provided training and instruction to the patient for ambulation re-education. (61848)    Self-Care/ADL's  [] Self-care/home management training and compensatory training, meal preparation, safety procedures, and instructions in use of assistive technology devices/adaptive equipment, direct one-on-one contact. (04420)    Home Exercise Program:  Counter HEP   [x] Reviewed/Progressed HEP activities related to strengthening, flexibility, endurance, ROM. (67940)  [] Reviewed/Progressed HEP activities related to improving balance, coordination, kinesthetic sense, posture, motor skill, proprioception.  (19982)    Manual Treatments:    [] Provided manual therapy to mobilize soft tissue/joints for the purpose of modulating pain, promoting relaxation,  increasing ROM, reducing/eliminating soft tissue swelling/inflammation/restriction, improving soft tissue extensibility.  (22688)    Service Based Modalities:      Timed Code Treatment Minutes:   45' therex    Total Treatment Minutes:  45'     Treatment/Activity Tolerance:  [x] Patient tolerated treatment well [] Patient limited by fatique  [] Patient limited by pain  [] Patient limited by other medical complications  [] Other:     Prognosis: [] Good [x] Fair  [] Poor    Patient Requires Follow-up: [x] Yes  [] No      Goals:  Short Term Goals  Time Frame for Short Term Goals: 1 week  Short Term Goal 1: Start HEP    Long Term Goals  Time Frame for Long Term Goals : 4 weeks  Long Term Goal 1: Able to stand from chair on first attempt, without fall back into chair  Long Term Goal 2: TUG improve to 12 sec for gait efficiency (see above)  Long Term Goal 3: Able to tolerate standing 10 min  Long Term Goal 4: Gait used for outdoor activity to allow for total 10 min standing time    Plan:   [x] Continue per plan of care [] Alter current plan (see comments)  [] Plan of care initiated [] Hold pending MD visit [] Discharge    Plan for Next Session:  Electronically signed by:  Yemi Blankenship Lory, PTA

## 2022-12-08 NOTE — PROGRESS NOTES
I have reviewed and agree to the content of the note written by the PTA.   Electronically signed by David Garcia PT [id number]

## 2022-12-12 ENCOUNTER — HOSPITAL ENCOUNTER (OUTPATIENT)
Dept: PHYSICAL THERAPY | Age: 77
Setting detail: THERAPIES SERIES
Discharge: HOME | End: 2022-12-12
Payer: MEDICARE

## 2022-12-12 PROCEDURE — 97110 THERAPEUTIC EXERCISES: CPT | Performed by: PHYSICAL THERAPY ASSISTANT

## 2022-12-12 NOTE — PROGRESS NOTES
Physical Therapy    Physical Therapy Daily Treatment Note    Date:  2022    Patient Name:  Cecilio Carl    :  1945  MRN: [id number]  Restrictions/Precautions:   High fall risk  Medical/Treatment Diagnosis Information:   Diagnosis: M47.826 lumbar spondylosis, post multi-segment fusions 2018  Treatment Diagnosis: Gait & balance difficulty  Insurance/Certification information:  PT Insurance Information: Medicare  Physician Information:   Quentin Cam CNP  Plan of care signed (Y/N):  y  Visit# / total visits:  10  Pain level: 0/10       Time In:  1116 Time Out:  1158    Progress Note: []  Yes  [x]  No  Next due by: Visit #10, or 22      Subjective:  Utilizing RW in clinic, notes use of no AD when at home. No pain noted at initiation of session. Relates compliance with HEP 1x/day but unable to recall which exercises are being completed. Notes ongoing difficulty getting up and down steps on two story house. Objective: DARRIN performed per flow sheet for increased strength, proprioception, and mobility for improvements in balance and ambulation. Verbal cueing for sequencing and proper form. Initiated and advanced several exercises to improve motion and strength, stability. Difficulty noted with advanced balance exercises. Observation:  Slouched posture remains. Test measurements:      Exercises:   Exercise/Equipment Resistance/Repetitions Other comments   Nustep 6' Level 4    Counter ex 15x Posture correct   Squat Matrix 10x 3 position No UE assist   Sit to stand 10x     no UEs Caution of LOB posterior   B hip abd/add 20x5\"   red/ ball    Step up 10x   4\" Fwd, lat   Lunge 15x Fwd, lat   Gait RW  Education provided about use         Lateral stepping  3 laps     FTEO, FTEC 30\"x3 foam   Modified Tandem stance 3x30''    Step taps 10x 6\"         Seated HS curls  15x red    Seated marches 15x    [x] Provided verbal/tactile cueing for activities related to strengthening, flexibility, endurance, ROM. (11858)  [] Provided verbal/tactile cueing for activities related to improving balance, coordination, kinesthetic sense, posture, motor skill, proprioception. (93501)    Therapeutic Activities:     [] Therapeutic activities, direct (one-on-one) patient contact (use of dynamic activities to improve functional performance). (97047)    Gait:   [] Provided training and instruction to the patient for ambulation re-education. (84462)    Self-Care/ADL's  [] Self-care/home management training and compensatory training, meal preparation, safety procedures, and instructions in use of assistive technology devices/adaptive equipment, direct one-on-one contact. (39475)    Home Exercise Program:  Counter HEP   [x] Reviewed/Progressed HEP activities related to strengthening, flexibility, endurance, ROM. (92867)  [] Reviewed/Progressed HEP activities related to improving balance, coordination, kinesthetic sense, posture, motor skill, proprioception.  (68116)    Manual Treatments:    [] Provided manual therapy to mobilize soft tissue/joints for the purpose of modulating pain, promoting relaxation,  increasing ROM, reducing/eliminating soft tissue swelling/inflammation/restriction, improving soft tissue extensibility.  (09041)    Service Based Modalities:      Timed Code Treatment Minutes:   43' therex    Total Treatment Minutes:  43'     Treatment/Activity Tolerance:  [x] Patient tolerated treatment well [] Patient limited by fatique  [] Patient limited by pain  [] Patient limited by other medical complications  [] Other:     Prognosis: [] Good [x] Fair  [] Poor    Patient Requires Follow-up: [x] Yes  [] No      Goals:  Short Term Goals  Time Frame for Short Term Goals: 1 week  Short Term Goal 1: Start HEP    Long Term Goals  Time Frame for Long Term Goals : 4 weeks  Long Term Goal 1: Able to stand from chair on first attempt, without fall back into chair  Long Term Goal 2: TUG improve to 12 sec for gait efficiency (see above)  Long Term Goal 3: Able to tolerate standing 10 min  Long Term Goal 4: Gait used for outdoor activity to allow for total 10 min standing time    Plan:   [x] Continue per plan of care [] Alter current plan (see comments)  [] Plan of care initiated [] Hold pending MD visit [] Discharge    Plan for Next Session: Monitor tolerance and advance as able. Electronically signed by:   Mirela Arndt PTA

## 2022-12-13 DIAGNOSIS — N13.8 HYPERTROPHY OF PROSTATE WITH URINARY OBSTRUCTION: ICD-10-CM

## 2022-12-13 DIAGNOSIS — N40.1 HYPERTROPHY OF PROSTATE WITH URINARY OBSTRUCTION: ICD-10-CM

## 2022-12-13 DIAGNOSIS — R35.1 NOCTURIA: ICD-10-CM

## 2022-12-14 ENCOUNTER — HOSPITAL ENCOUNTER (OUTPATIENT)
Dept: PHYSICAL THERAPY | Age: 77
Setting detail: THERAPIES SERIES
Discharge: HOME | End: 2022-12-14
Payer: MEDICARE

## 2022-12-14 PROCEDURE — 97110 THERAPEUTIC EXERCISES: CPT | Performed by: PHYSICAL THERAPY ASSISTANT

## 2022-12-14 NOTE — PROGRESS NOTES
Physical Therapy    Physical Therapy Daily Treatment Note    Date:  2022    Patient Name:  Kathleen Richter    :  1945  MRN: [id number]  Restrictions/Precautions:   High fall risk  Medical/Treatment Diagnosis Information:   Diagnosis: M47.826 lumbar spondylosis, post multi-segment fusions 2018  Treatment Diagnosis: Gait & balance difficulty  Insurance/Certification information:  PT Insurance Information: Medicare  Physician Information:   Candice Sandy CNP  Plan of care signed (Y/N):  y  Visit# / total visits:  6/10  Pain level: 0/10       Time In:  1693 Time Out:  2573    Progress Note: []  Yes  [x]  No  Next due by: Visit #10, or 22      Subjective:  Utilizing RW in clinic, notes use of no AD when at home. No pain noted at initiation of session. Relates compliance with HEP 1x/day but unable to recall which exercises are being completed. Notes ongoing difficulty getting up and down steps on two story house. Objective: DARRIN performed per flow sheet for increased strength, proprioception, and mobility for improvements in balance and ambulation. Verbal cueing for sequencing and proper form. Initiated and advanced several exercises to improve motion and strength, stability. Difficulty noted with advanced balance exercises. Encouraged ongoing use of [employer] for stability during gait. Observation:  Slouched posture remains. Test measurements:  Able to stand without UE assistance on first attempt.      Exercises:   Exercise/Equipment Resistance/Repetitions Other comments   Nustep 6' Level 4    Counter ex 20x Posture correct   Squat Matrix 10x 3 position No UE assist   Sit to stand 10x     no UEs Caution of LOB posterior   B hip abd/add 20x5\"   GR/ ball    Step up 15x   6\" Fwd, lat   Lunge 15x Fwd, lat   Gait RW  Education provided about use         Lateral stepping  3 laps     FTEO, FTEC 30\"x3 foam   Modified Tandem stance 3x30''    Step taps 10x 6\"         Seated HS curls  15x red    Seated marches 15x    [x] Provided verbal/tactile cueing for activities related to strengthening, flexibility, endurance, ROM. (87283)  [] Provided verbal/tactile cueing for activities related to improving balance, coordination, kinesthetic sense, posture, motor skill, proprioception. (53096)    Therapeutic Activities:     [] Therapeutic activities, direct (one-on-one) patient contact (use of dynamic activities to improve functional performance). (15218)    Gait:   [] Provided training and instruction to the patient for ambulation re-education. (32981)    Self-Care/ADL's  [] Self-care/home management training and compensatory training, meal preparation, safety procedures, and instructions in use of assistive technology devices/adaptive equipment, direct one-on-one contact. (13384)    Home Exercise Program:  Counter HEP   [x] Reviewed/Progressed HEP activities related to strengthening, flexibility, endurance, ROM. (95702)  [] Reviewed/Progressed HEP activities related to improving balance, coordination, kinesthetic sense, posture, motor skill, proprioception.  (05789)    Manual Treatments:    [] Provided manual therapy to mobilize soft tissue/joints for the purpose of modulating pain, promoting relaxation,  increasing ROM, reducing/eliminating soft tissue swelling/inflammation/restriction, improving soft tissue extensibility.  (15950)    Service Based Modalities:      Timed Code Treatment Minutes:   36' therex    Total Treatment Minutes:  40     Treatment/Activity Tolerance:  [x] Patient tolerated treatment well [] Patient limited by fatique  [] Patient limited by pain  [] Patient limited by other medical complications  [] Other:     Prognosis: [] Good [x] Fair  [] Poor    Patient Requires Follow-up: [x] Yes  [] No      Goals:  Short Term Goals  Time Frame for Short Term Goals: 1 week  Short Term Goal 1: Start HEP (Initiated)    Long Term Goals  Time Frame for Long Term Goals : 4 weeks  Long Term Goal 1: Able to stand from chair on first attempt, without fall back into chair (Able this date, 1/1)  Long Term Goal 2: TUG improve to 12 sec for gait efficiency   Long Term Goal 3: Able to tolerate standing 10 min (Unsure)  Long Term Goal 4: Gait used for outdoor activity to allow for total 10 min standing time    Plan:   [x] Continue per plan of care [] Alter current plan (see comments)  [] Plan of care initiated [] Hold pending MD visit [] Discharge    Plan for Next Session: Monitor tolerance and advance as able. Electronically signed by:   Ketty Macario PTA

## 2022-12-14 NOTE — PROGRESS NOTES
I have reviewed and agree to the content of the note written by the PTA.   Electronically signed by Mag Vick PT [id number]

## 2022-12-15 NOTE — PROGRESS NOTES
I have reviewed and agree to the content of the note written by the PTA.   Electronically signed by Myesha Bah PT [id number]

## 2022-12-16 RX ORDER — TAMSULOSIN HYDROCHLORIDE 0.4 MG/1
CAPSULE ORAL
Qty: 90 CAPSULE | Refills: 3 | OUTPATIENT
Start: 2022-12-16

## 2022-12-19 ENCOUNTER — HOSPITAL ENCOUNTER (OUTPATIENT)
Dept: PHYSICAL THERAPY | Age: 77
Setting detail: THERAPIES SERIES
Discharge: HOME | End: 2022-12-19
Payer: MEDICARE

## 2022-12-19 PROCEDURE — 97110 THERAPEUTIC EXERCISES: CPT | Performed by: PHYSICAL THERAPIST

## 2022-12-19 NOTE — PROGRESS NOTES
Physical Therapy    Physical Therapy Daily Treatment Note    Date:  2022    Patient Name:  Cierra Vizcaino    :  1945  MRN: [id number]  Restrictions/Precautions:   High fall risk  Medical/Treatment Diagnosis Information:   Diagnosis: M47.826 lumbar spondylosis, post multi-segment fusions 2018  Treatment Diagnosis: Gait & balance difficulty  Insurance/Certification information:  PT Insurance Information: Medicare  Physician Information:   Chelita Kaur CNP  Plan of care signed (Y/N):  y  Visit# / total visits:  7/10  Pain level: 0/10       Time In:  1103 Time Out:  11:45    Progress Note: []  Yes  [x]  No  Next due by: Visit #10, or 22      Subjective:  \"I drove myself in today, and I didn't bring a walker or cane with me because I honestly don't use them that much. \"    Objective: DARRIN performed per flow sheet for increased strength, proprioception, and mobility for improvements in balance and ambulation. Verbal cueing for sequencing and proper form. Initiated and advanced several exercises to improve motion and strength, stability. Difficulty noted with advanced balance exercises. Encouraged ongoing use of [employer] for stability during gait. Observation:  Slouched posture remains. Initiated prone laying to address increased hip flexor tone  Test measurements:  Able to stand without UE assistance on all attempts.      Exercises:   Exercise/Equipment Resistance/Repetitions Other comments   Nustep 6' Level 4    Counter ex 20x Posture correct   Squat Matrix 10x 3 position No UE assist   Sit to stand 10x     no UEs Caution of LOB posterior   B hip abd/add 20x5\"   GR/ ball    Step up 15x   6\" Fwd, lat   Lunge 15x Fwd, lat        Lateral stepping  3 laps     FTEO, FTEC 30\"x3 foam   Modified Tandem stance 3x30''    Step taps 10x 6\"    Prone laying (increase hip flexibility for upright posture) 5 min         Seated HS curls  15x red    Seated marches 15x    [x] Provided verbal/tactile cueing for activities related to strengthening, flexibility, endurance, ROM. (49123)  [] Provided verbal/tactile cueing for activities related to improving balance, coordination, kinesthetic sense, posture, motor skill, proprioception. (47080)    Therapeutic Activities:     [] Therapeutic activities, direct (one-on-one) patient contact (use of dynamic activities to improve functional performance). (08967)    Gait:   [] Provided training and instruction to the patient for ambulation re-education. (62180)    Self-Care/ADL's  [] Self-care/home management training and compensatory training, meal preparation, safety procedures, and instructions in use of assistive technology devices/adaptive equipment, direct one-on-one contact. (57520)    Home Exercise Program:  Counter HEP   [x] Reviewed/Progressed HEP activities related to strengthening, flexibility, endurance, ROM. (47708)  [] Reviewed/Progressed HEP activities related to improving balance, coordination, kinesthetic sense, posture, motor skill, proprioception.  (24016)    Manual Treatments:    [] Provided manual therapy to mobilize soft tissue/joints for the purpose of modulating pain, promoting relaxation,  increasing ROM, reducing/eliminating soft tissue swelling/inflammation/restriction, improving soft tissue extensibility.  (79202)    Service Based Modalities:      Timed Code Treatment Minutes:   43' therex    Total Treatment Minutes:  43'     Treatment/Activity Tolerance:  [x] Patient tolerated treatment well [] Patient limited by fatique  [] Patient limited by pain  [] Patient limited by other medical complications  [] Other:     Prognosis: [] Good [x] Fair  [] Poor    Patient Requires Follow-up: [x] Yes  [] No      Goals:  Short Term Goals  Time Frame for Short Term Goals: 1 week  Short Term Goal 1: Start HEP (Initiated)    Long Term Goals  Time Frame for Long Term Goals : 4 weeks  Long Term Goal 1: Able to stand from chair on first attempt, without fall back into chair (Able this date, 1/1)  Long Term Goal 2: TUG improve to 12 sec for gait efficiency   Long Term Goal 3: Able to tolerate standing 10 min (Unsure)  Long Term Goal 4: Gait used for outdoor activity to allow for total 10 min standing time    Plan:   [x] Continue per plan of care [] Alter current plan (see comments)  [] Plan of care initiated [] Hold pending MD visit [] Discharge    Plan for Next Session: Monitor tolerance and advance as able.      Electronically signed by:  Que Saleem, PT, DPT

## 2022-12-20 DIAGNOSIS — N13.8 HYPERTROPHY OF PROSTATE WITH URINARY OBSTRUCTION: ICD-10-CM

## 2022-12-20 DIAGNOSIS — N40.1 HYPERTROPHY OF PROSTATE WITH URINARY OBSTRUCTION: ICD-10-CM

## 2022-12-20 DIAGNOSIS — R35.1 NOCTURIA: ICD-10-CM

## 2022-12-20 RX ORDER — TAMSULOSIN HYDROCHLORIDE 0.4 MG/1
0.4 CAPSULE ORAL DAILY
Qty: 90 CAPSULE | Refills: 3 | Status: SHIPPED | OUTPATIENT
Start: 2022-12-20

## 2022-12-21 ENCOUNTER — HOSPITAL ENCOUNTER (OUTPATIENT)
Dept: PHYSICAL THERAPY | Age: 77
Setting detail: THERAPIES SERIES
Discharge: HOME | End: 2022-12-21
Payer: MEDICARE

## 2022-12-21 PROCEDURE — 97110 THERAPEUTIC EXERCISES: CPT

## 2022-12-21 NOTE — PROGRESS NOTES
Physical Therapy    Physical Therapy Daily Treatment Note    Date:  2022    Patient Name:  Vane Warren    :  1945  MRN: [id number]  Restrictions/Precautions:   High fall risk  Medical/Treatment Diagnosis Information:   Diagnosis: M47.826 lumbar spondylosis, post multi-segment fusions 2018  Treatment Diagnosis: Gait & balance difficulty  Insurance/Certification information:  PT Insurance Information: Medicare  Physician Information:   Milagros Sinclair CNP  Plan of care signed (Y/N):  y  Visit# / total visits:  8/10  Pain level: 0/10       Time In:  1110 Time Out:  9215    Progress Note: []  Yes  [x]  No  Next due by: Visit #10, or 22      Subjective: \"It  hurts when I try laying on my stomach at home. I have pain in my low back when I do that at home, so I haven't been doing it. \" Denied pain upon arrival. Reports that he uses the FWW out in the community, but doesn't use it when he is at home. Objective: DARRIN performed per flow sheet for increased strength, proprioception, and mobility for improvements in balance and ambulation. Verbal cueing for sequencing and proper form. Difficulty noted with advanced balance exercises. Encouraged ongoing use of [employer] for stability during gait. Reported that laying on his stomach here did not cause any pain. Observation:  Patient presented to the clinic with FWW this date - improved posture noted.      Test measurements:      Exercises:   Exercise/Equipment Resistance/Repetitions Other comments   Nustep 6' Level 4    Counter ex 20x Posture correct   Squat Matrix 10x 3 position No UE assist   Sit to stand 10x     no UEs Caution of LOB posterior   B hip abd/add 20x5\"   GR/ ball    Step up 15x   6\" Fwd, lat   Lunge 15x Fwd, lat        Lateral stepping  3 laps     FTEO, FTEC 30\"x3 foam   Modified Tandem stance 3x30''    Step taps 10x 6\"    Prone laying (increase hip flexibility for upright posture) 5 min         Seated HS curls  15x red    Seated marches 15x    [x] Provided verbal/tactile cueing for activities related to strengthening, flexibility, endurance, ROM. (23471)  [] Provided verbal/tactile cueing for activities related to improving balance, coordination, kinesthetic sense, posture, motor skill, proprioception. (28714)    Therapeutic Activities:     [] Therapeutic activities, direct (one-on-one) patient contact (use of dynamic activities to improve functional performance). (42531)    Gait:   [] Provided training and instruction to the patient for ambulation re-education. (41782)    Self-Care/ADL's  [] Self-care/home management training and compensatory training, meal preparation, safety procedures, and instructions in use of assistive technology devices/adaptive equipment, direct one-on-one contact. (66540)    Home Exercise Program:  Counter HEP   [x] Reviewed/Progressed HEP activities related to strengthening, flexibility, endurance, ROM. (65911)  [] Reviewed/Progressed HEP activities related to improving balance, coordination, kinesthetic sense, posture, motor skill, proprioception.  (07047)    Manual Treatments:    [] Provided manual therapy to mobilize soft tissue/joints for the purpose of modulating pain, promoting relaxation,  increasing ROM, reducing/eliminating soft tissue swelling/inflammation/restriction, improving soft tissue extensibility.  (66681)    Service Based Modalities:      Timed Code Treatment Minutes:   44' therex    Total Treatment Minutes:  44'     Treatment/Activity Tolerance:  [x] Patient tolerated treatment well [] Patient limited by fatique  [] Patient limited by pain  [] Patient limited by other medical complications  [] Other:     Prognosis: [] Good [x] Fair  [] Poor    Patient Requires Follow-up: [x] Yes  [] No      Goals:  Short Term Goals  Time Frame for Short Term Goals: 1 week  Short Term Goal 1: Start HEP (Initiated)    Long Term Goals  Time Frame for Long Term Goals : 4 weeks  Long Term Goal 1: Able to stand from chair on first attempt, without fall back into chair (Able this date, 1/1)  Long Term Goal 2: TUG improve to 12 sec for gait efficiency   Long Term Goal 3: Able to tolerate standing 10 min (Unsure)  Long Term Goal 4: Gait used for outdoor activity to allow for total 10 min standing time    Plan:   [x] Continue per plan of care [] Alter current plan (see comments)  [] Plan of care initiated [] Hold pending MD visit [] Discharge    Plan for Next Session: Monitor tolerance and advance as able.      Electronically signed by:  pJ Figueroa PT

## 2022-12-22 RX ORDER — TAMSULOSIN HYDROCHLORIDE 0.4 MG/1
CAPSULE ORAL
Qty: 90 CAPSULE | Refills: 3 | OUTPATIENT
Start: 2022-12-22

## 2022-12-28 ENCOUNTER — HOSPITAL ENCOUNTER (OUTPATIENT)
Dept: PHYSICAL THERAPY | Age: 77
Setting detail: THERAPIES SERIES
Discharge: HOME | End: 2022-12-28
Payer: MEDICARE

## 2022-12-28 PROCEDURE — 97110 THERAPEUTIC EXERCISES: CPT | Performed by: PHYSICAL THERAPIST

## 2022-12-28 NOTE — PROGRESS NOTES
Physical Therapy    Physical Therapy Daily Treatment Note    Date:  2022    Patient Name:  Milton Rooney    :  1945  MRN: [id number]  Restrictions/Precautions:   High fall risk  Medical/Treatment Diagnosis Information:   Diagnosis: M47.826 lumbar spondylosis, post multi-segment fusions 2018  Treatment Diagnosis: Gait & balance difficulty  Insurance/Certification information:  PT Insurance Information: Medicare  Physician Information:   Rajinder May CNP  Plan of care signed (Y/N):  y  Visit# / total visits:  9/10  Pain level: 0/10       Time In:  1:40 Time Out:  2:11    Progress Note: []  Yes  [x]  No  Next due by: Visit #10, or 22      Subjective:  Gets around home ok. Objective:   Observation:       Test measurements:    Able to sit to stand on first attempt  TUG 11 sec    Exercises:   Exercise/Equipment Resistance/Repetitions Other comments   Nustep 6' Level 4    Counter ex 20x Posture correct   Squat Matrix 10x 3 position No UE assist   Sit to stand 10x     no UEs Caution of LOB posterior   B hip abd/add 20x5\"   GR    Step up 15x   6\" Fwd, lat   Lunge 15x Fwd, lat        Lateral stepping  3 laps     FTEO, FTEC  foam   Modified Tandem stance 3x30''    Step taps 10x 6\"    Prone laying (increase hip flexibility for upright posture)          Seated HS curls  15x red    Seated marches     [x] Provided verbal/tactile cueing for activities related to strengthening, flexibility, endurance, ROM. (26900)  [] Provided verbal/tactile cueing for activities related to improving balance, coordination, kinesthetic sense, posture, motor skill, proprioception. (79305)    Therapeutic Activities:     [] Therapeutic activities, direct (one-on-one) patient contact (use of dynamic activities to improve functional performance). (09469)    Gait:   [] Provided training and instruction to the patient for ambulation re-education.  (27680)    Self-Care/ADL's  [] Self-care/home management training and compensatory training, meal preparation, safety procedures, and instructions in use of assistive technology devices/adaptive equipment, direct one-on-one contact. (23669)    Home Exercise Program:  Counter HEP   [x] Reviewed/Progressed HEP activities related to strengthening, flexibility, endurance, ROM. (89523)  [] Reviewed/Progressed HEP activities related to improving balance, coordination, kinesthetic sense, posture, motor skill, proprioception.  (88377)    Manual Treatments:    [] Provided manual therapy to mobilize soft tissue/joints for the purpose of modulating pain, promoting relaxation,  increasing ROM, reducing/eliminating soft tissue swelling/inflammation/restriction, improving soft tissue extensibility.  (74334)    Service Based Modalities:      Timed Code Treatment Minutes:   32' therex    Total Treatment Minutes:  32'     Treatment/Activity Tolerance:  [x] Patient tolerated treatment well [] Patient limited by fatique  [] Patient limited by pain  [] Patient limited by other medical complications  [] Other:     Prognosis: [] Good [x] Fair  [] Poor    Patient Requires Follow-up: [x] Yes  [] No      Goals:  Short Term Goals  Time Frame for Short Term Goals: 1 week  Short Term Goal 1: Start HEP (Initiated)    Long Term Goals  Time Frame for Long Term Goals : 4 weeks  Long Term Goal 1: Able to stand from chair on first attempt, without fall back into chair -met  Long Term Goal 2: TUG improve to 12 sec for gait efficiency - met  Long Term Goal 3: Able to tolerate standing 10 min -met  Long Term Goal 4: Gait used for outdoor activity to allow for total 10 min standing time-met    Plan:   [] Continue per plan of care [] Alter current plan (see comments)  [] Plan of care initiated [] Hold pending MD visit [x] Discharge    Plan for Next Session:     Electronically signed by:  Ward Goldmann, PT

## 2022-12-28 NOTE — DISCHARGE SUMMARY
[facility]    [x] [place]  Phone: [phone]  Fax: [phone]      [] [place]  Phone: [phone]  Fax: [phone]    Physical Therapy Discharge Note  Date: 2022        Patient Name:  John Wang    :  1945  MRN: [id number]  Restrictions/Precautions:      Medical/Treatment Diagnosis Information:   Diagnosis: M47.826 lumbar spondylosis, post multi-segment fusions 2018  Treatment Diagnosis: Gait & balance difficulty     Insurance/Certification information:    Medicare  Physician Information:     Tree Mcarthur CNP  Plan of care signed (Y/N): y  Visit# / total visits:  9  Pain level: 0/10         Plan of Care/Treatment to date:  [x] Therapeutic Exercise    [] Modalities:  [x] Therapeutic Activity     [] Ultrasound  [] Electrical Stimulation  [x] Gait Training      [] Cervical Traction    [] Lumbar Traction  [] Neuromuscular Re-education  [] Cold/hotpack [] Iontophoresis  [x] Instruction in HEP      Other:  [] Manual Therapy       []    [] Aquatic Therapy       []                              Subjective:   Gets around home ok         Objective:   Test measurements:    Able to sit to stand on first attempt  TUG 11 sec          Assessment:   Back surgery over 4 yr ago.    At [facility] re[facility] potential.  Needs to use RW for max safety    Plan:    D/c       Goals:   Short Term Goals  Time Frame for Short Term Goals: 1 week  Short Term Goal 1: Start HEP (Initiated)     Long Term Goals  Time Frame for Long Term Goals : 4 weeks  Long Term Goal 1: Able to stand from chair on first attempt, without fall back into chair -met  Long Term Goal 2: TUG improve to 12 sec for gait efficiency - met  Long Term Goal 3: Able to tolerate standing 10 min -met  Long Term Goal 4: Gait used for outdoor activity to allow for total 10 min standing time-met         Percentage of Goals Met: 100            Discharge Prognosis: [] Excellent [] Good [x] Fair  [] Poor     Goal Status:  [x] Achieved [] Partially Achieved  [] Not Achieved       Electronically signed by:  Jackelin Guevara PT

## 2022-12-30 ENCOUNTER — APPOINTMENT (OUTPATIENT)
Dept: PHYSICAL THERAPY | Age: 77
End: 2022-12-30
Payer: MEDICARE

## 2023-01-23 DIAGNOSIS — M54.42 CHRONIC BILATERAL LOW BACK PAIN WITH LEFT-SIDED SCIATICA: ICD-10-CM

## 2023-01-23 DIAGNOSIS — G89.29 CHRONIC BILATERAL LOW BACK PAIN WITH LEFT-SIDED SCIATICA: ICD-10-CM

## 2023-01-23 RX ORDER — HYDROCODONE BITARTRATE AND ACETAMINOPHEN 5; 325 MG/1; MG/1
1 TABLET ORAL 2 TIMES DAILY
Qty: 60 TABLET | Refills: 0 | Status: SHIPPED | OUTPATIENT
Start: 2023-01-23 | End: 2023-02-22

## 2023-02-03 ENCOUNTER — HOSPITAL ENCOUNTER (OUTPATIENT)
Age: 78
Discharge: HOME | End: 2023-02-03
Payer: MEDICARE

## 2023-02-03 DIAGNOSIS — D50.0 IRON DEFICIENCY ANEMIA DUE TO CHRONIC BLOOD LOSS: ICD-10-CM

## 2023-02-03 DIAGNOSIS — Z12.5 SCREENING PSA (PROSTATE SPECIFIC ANTIGEN): ICD-10-CM

## 2023-02-03 DIAGNOSIS — N18.30 STAGE 3 CHRONIC KIDNEY DISEASE, UNSPECIFIED WHETHER STAGE 3A OR 3B CKD (HCC): ICD-10-CM

## 2023-02-03 DIAGNOSIS — E78.00 PURE HYPERCHOLESTEROLEMIA: ICD-10-CM

## 2023-02-03 DIAGNOSIS — I10 ESSENTIAL (PRIMARY) HYPERTENSION: ICD-10-CM

## 2023-02-03 LAB
ABSOLUTE EOS #: 0.21 K/UL (ref 0–0.44)
ABSOLUTE IMMATURE GRANULOCYTE: <0.03 K/UL (ref 0–0.3)
ABSOLUTE LYMPH #: 2.34 K/UL (ref 1.1–3.7)
ABSOLUTE MONO #: 0.9 K/UL (ref 0.1–1.2)
ALBUMIN SERPL-MCNC: 4.3 G/DL (ref 3.5–5.2)
ALBUMIN/GLOBULIN RATIO: 1.5 (ref 1–2.5)
ALP SERPL-CCNC: 60 U/L (ref 40–129)
ALT SERPL-CCNC: 15 U/L (ref 5–41)
ANION GAP SERPL CALCULATED.3IONS-SCNC: 8 MMOL/L (ref 9–17)
AST SERPL-CCNC: 26 U/L
BASOPHILS # BLD: 1 % (ref 0–2)
BASOPHILS ABSOLUTE: 0.06 K/UL (ref 0–0.2)
BILIRUB SERPL-MCNC: 0.6 MG/DL (ref 0.3–1.2)
BUN SERPL-MCNC: 21 MG/DL (ref 8–23)
BUN/CREAT BLD: 21 (ref 9–20)
CALCIUM SERPL-MCNC: 9.6 MG/DL (ref 8.6–10.4)
CHLORIDE SERPL-SCNC: 108 MMOL/L (ref 98–107)
CHOLEST SERPL-MCNC: 165 MG/DL
CHOLESTEROL/HDL RATIO: 5
CO2 SERPL-SCNC: 26 MMOL/L (ref 20–31)
CREAT SERPL-MCNC: 0.99 MG/DL (ref 0.7–1.2)
EOSINOPHILS RELATIVE PERCENT: 2 % (ref 1–4)
GFR SERPL CREATININE-BSD FRML MDRD: >60 ML/MIN/1.73M2
GLUCOSE SERPL-MCNC: 104 MG/DL (ref 70–99)
HCT VFR BLD AUTO: 46 % (ref 40.7–50.3)
HDLC SERPL-MCNC: 33 MG/DL
HGB BLD-MCNC: 15.5 G/DL (ref 13–17)
IMMATURE GRANULOCYTES: 0 %
LDLC SERPL CALC-MCNC: 105 MG/DL (ref 0–130)
LYMPHOCYTES # BLD: 27 % (ref 24–43)
MCH RBC QN AUTO: 29.2 PG (ref 25.2–33.5)
MCHC RBC AUTO-ENTMCNC: 33.7 G/DL (ref 25.2–33.5)
MCV RBC AUTO: 86.6 FL (ref 82.6–102.9)
MONOCYTES # BLD: 10 % (ref 3–12)
NRBC AUTOMATED: 0 PER 100 WBC
PDW BLD-RTO: 13.7 % (ref 11.8–14.4)
PLATELET # BLD AUTO: 203 K/UL (ref 138–453)
PMV BLD AUTO: 9.3 FL (ref 8.1–13.5)
POTASSIUM SERPL-SCNC: 4.2 MMOL/L (ref 3.7–5.3)
PROSTATE SPECIFIC ANTIGEN: 1.38 NG/ML
PROT SERPL-MCNC: 7.2 G/DL (ref 6.4–8.3)
RBC # BLD: 5.31 M/UL (ref 4.21–5.77)
SEG NEUTROPHILS: 60 % (ref 36–65)
SEGMENTED NEUTROPHILS ABSOLUTE COUNT: 5.21 K/UL (ref 1.5–8.1)
SODIUM SERPL-SCNC: 142 MMOL/L (ref 135–144)
TRIGL SERPL-MCNC: 135 MG/DL
WBC # BLD AUTO: 8.7 K/UL (ref 3.5–11.3)

## 2023-02-03 PROCEDURE — 36415 COLL VENOUS BLD VENIPUNCTURE: CPT

## 2023-02-03 PROCEDURE — 80061 LIPID PANEL: CPT

## 2023-02-03 PROCEDURE — G0103 PSA SCREENING: HCPCS

## 2023-02-03 PROCEDURE — 80053 COMPREHEN METABOLIC PANEL: CPT

## 2023-02-03 PROCEDURE — 85025 COMPLETE CBC W/AUTO DIFF WBC: CPT

## 2023-02-10 ENCOUNTER — OFFICE VISIT (OUTPATIENT)
Dept: FAMILY MEDICINE CLINIC | Age: 78
End: 2023-02-10
Payer: MEDICARE

## 2023-02-10 VITALS
HEART RATE: 70 BPM | SYSTOLIC BLOOD PRESSURE: 137 MMHG | DIASTOLIC BLOOD PRESSURE: 82 MMHG | WEIGHT: 214 LBS | BODY MASS INDEX: 31.7 KG/M2 | HEIGHT: 69 IN

## 2023-02-10 DIAGNOSIS — R10.32 ABDOMINAL PAIN, CHRONIC, LEFT LOWER QUADRANT: ICD-10-CM

## 2023-02-10 DIAGNOSIS — D50.0 IRON DEFICIENCY ANEMIA DUE TO CHRONIC BLOOD LOSS: ICD-10-CM

## 2023-02-10 DIAGNOSIS — N18.30 STAGE 3 CHRONIC KIDNEY DISEASE, UNSPECIFIED WHETHER STAGE 3A OR 3B CKD (HCC): Primary | ICD-10-CM

## 2023-02-10 DIAGNOSIS — F32.5 MAJOR DEPRESSIVE DISORDER IN FULL REMISSION, UNSPECIFIED WHETHER RECURRENT (HCC): ICD-10-CM

## 2023-02-10 DIAGNOSIS — Z12.5 SCREENING PSA (PROSTATE SPECIFIC ANTIGEN): ICD-10-CM

## 2023-02-10 DIAGNOSIS — I10 ESSENTIAL (PRIMARY) HYPERTENSION: ICD-10-CM

## 2023-02-10 DIAGNOSIS — Z99.89 OSA ON CPAP: ICD-10-CM

## 2023-02-10 DIAGNOSIS — C44.92 SCC (SQUAMOUS CELL CARCINOMA): ICD-10-CM

## 2023-02-10 DIAGNOSIS — G47.33 OSA ON CPAP: ICD-10-CM

## 2023-02-10 DIAGNOSIS — E78.00 PURE HYPERCHOLESTEROLEMIA: ICD-10-CM

## 2023-02-10 DIAGNOSIS — H90.6 MIXED CONDUCTIVE AND SENSORINEURAL HEARING LOSS OF BOTH EARS: ICD-10-CM

## 2023-02-10 DIAGNOSIS — G89.29 ABDOMINAL PAIN, CHRONIC, LEFT LOWER QUADRANT: ICD-10-CM

## 2023-02-10 DIAGNOSIS — M48.062 SPINAL STENOSIS, LUMBAR REGION, WITH NEUROGENIC CLAUDICATION: ICD-10-CM

## 2023-02-10 PROCEDURE — 1123F ACP DISCUSS/DSCN MKR DOCD: CPT | Performed by: FAMILY MEDICINE

## 2023-02-10 PROCEDURE — 99214 OFFICE O/P EST MOD 30 MIN: CPT | Performed by: FAMILY MEDICINE

## 2023-02-10 PROCEDURE — 3075F SYST BP GE 130 - 139MM HG: CPT | Performed by: FAMILY MEDICINE

## 2023-02-10 PROCEDURE — 1036F TOBACCO NON-USER: CPT | Performed by: FAMILY MEDICINE

## 2023-02-10 PROCEDURE — G8427 DOCREV CUR MEDS BY ELIG CLIN: HCPCS | Performed by: FAMILY MEDICINE

## 2023-02-10 PROCEDURE — G8417 CALC BMI ABV UP PARAM F/U: HCPCS | Performed by: FAMILY MEDICINE

## 2023-02-10 PROCEDURE — 3079F DIAST BP 80-89 MM HG: CPT | Performed by: FAMILY MEDICINE

## 2023-02-10 PROCEDURE — G8484 FLU IMMUNIZE NO ADMIN: HCPCS | Performed by: FAMILY MEDICINE

## 2023-02-10 PROCEDURE — 99213 OFFICE O/P EST LOW 20 MIN: CPT | Performed by: FAMILY MEDICINE

## 2023-02-10 RX ORDER — SERTRALINE HYDROCHLORIDE 100 MG/1
TABLET, FILM COATED ORAL
Qty: 90 TABLET | Refills: 2 | Status: SHIPPED | OUTPATIENT
Start: 2023-02-10

## 2023-02-10 SDOH — ECONOMIC STABILITY: INCOME INSECURITY: HOW HARD IS IT FOR YOU TO PAY FOR THE VERY BASICS LIKE FOOD, HOUSING, MEDICAL CARE, AND HEATING?: NOT HARD AT ALL

## 2023-02-10 SDOH — ECONOMIC STABILITY: FOOD INSECURITY: WITHIN THE PAST 12 MONTHS, YOU WORRIED THAT YOUR FOOD WOULD RUN OUT BEFORE YOU GOT MONEY TO BUY MORE.: NEVER TRUE

## 2023-02-10 SDOH — ECONOMIC STABILITY: HOUSING INSECURITY
IN THE LAST 12 MONTHS, WAS THERE A TIME WHEN YOU DID NOT HAVE A STEADY PLACE TO SLEEP OR SLEPT IN A SHELTER (INCLUDING NOW)?: NO

## 2023-02-10 SDOH — ECONOMIC STABILITY: FOOD INSECURITY: WITHIN THE PAST 12 MONTHS, THE FOOD YOU BOUGHT JUST DIDN'T LAST AND YOU DIDN'T HAVE MONEY TO GET MORE.: NEVER TRUE

## 2023-02-10 ASSESSMENT — PATIENT HEALTH QUESTIONNAIRE - PHQ9
SUM OF ALL RESPONSES TO PHQ QUESTIONS 1-9: 3
4. FEELING TIRED OR HAVING LITTLE ENERGY: 3
6. FEELING BAD ABOUT YOURSELF - OR THAT YOU ARE A FAILURE OR HAVE LET YOURSELF OR YOUR FAMILY DOWN: 0
3. TROUBLE FALLING OR STAYING ASLEEP: 0
SUM OF ALL RESPONSES TO PHQ QUESTIONS 1-9: 3
8. MOVING OR SPEAKING SO SLOWLY THAT OTHER PEOPLE COULD HAVE NOTICED. OR THE OPPOSITE, BEING SO FIGETY OR RESTLESS THAT YOU HAVE BEEN MOVING AROUND A LOT MORE THAN USUAL: 0
1. LITTLE INTEREST OR PLEASURE IN DOING THINGS: 0
7. TROUBLE CONCENTRATING ON THINGS, SUCH AS READING THE NEWSPAPER OR WATCHING TELEVISION: 0
10. IF YOU CHECKED OFF ANY PROBLEMS, HOW DIFFICULT HAVE THESE PROBLEMS MADE IT FOR YOU TO DO YOUR WORK, TAKE CARE OF THINGS AT HOME, OR GET ALONG WITH OTHER PEOPLE: 0
2. FEELING DOWN, DEPRESSED OR HOPELESS: 0
9. THOUGHTS THAT YOU WOULD BE BETTER OFF DEAD, OR OF HURTING YOURSELF: 0
SUM OF ALL RESPONSES TO PHQ QUESTIONS 1-9: 3
SUM OF ALL RESPONSES TO PHQ9 QUESTIONS 1 & 2: 0
5. POOR APPETITE OR OVEREATING: 0
SUM OF ALL RESPONSES TO PHQ QUESTIONS 1-9: 3

## 2023-02-10 ASSESSMENT — ENCOUNTER SYMPTOMS
ABDOMINAL PAIN: 1
ALLERGIC/IMMUNOLOGIC NEGATIVE: 1
BACK PAIN: 1
EYES NEGATIVE: 1
RESPIRATORY NEGATIVE: 1

## 2023-02-10 NOTE — PATIENT INSTRUCTIONS
Hospital Outpatient Visit on 02/03/2023   Component Date Value Ref Range Status    PSA 02/03/2023 1.38  <4.1 ng/mL Final    Comment: The Roche \"ECLIA\" assay is used. Results obtained with different assay methods cannot be   used interchangeably. Cholesterol 02/03/2023 165  <200 mg/dL Final    Comment:    Cholesterol Guidelines:      <200  Desirable   200-240  Borderline      >240  Undesirable         HDL 02/03/2023 33 (A)  >40 mg/dL Final    Comment:    HDL Guidelines:    <40     Undesirable   40-59    Borderline    >59     Desirable         LDL Cholesterol 02/03/2023 105  0 - 130 mg/dL Final    Comment:    LDL Guidelines:     <100    Desirable   100-129   Near to/above Desirable   130-159   Borderline      >159   Undesirable     Direct (measured) LDL and calculated LDL are not interchangeable tests. Chol/HDL Ratio 02/03/2023 5.0 (A)  <5 Final            Triglycerides 02/03/2023 135  <150 mg/dL Final    Comment:    Triglyceride Guidelines:     <150   Desirable   150-199  Borderline   200-499  High     >499   Very high   Based on AHA Guidelines for fasting triglyceride, October 2012. Glucose 02/03/2023 104 (A)  70 - 99 mg/dL Final    BUN 02/03/2023 21  8 - 23 mg/dL Final    Creatinine 02/03/2023 0.99  0.70 - 1.20 mg/dL Final    Est, Glom Filt Rate 02/03/2023 >60  >60 mL/min/1.73m2 Final    Comment:       These results are not intended for use in patients <25years of age. eGFR results are calculated without a race factor using the 2021 CKD-EPI equation. Careful clinical correlation is recommended, particularly when comparing to results   calculated using previous equations. The CKD-EPI equation is less accurate in patients with extremes of muscle mass, extra-renal   metabolism of creatine, excessive creatine ingestion, or following therapy that affects   renal tubular secretion.       Bun/Cre Ratio 02/03/2023 21 (A)  9 - 20 Final    Calcium 02/03/2023 9.6  8.6 - 10.4 mg/dL Final    Sodium 02/03/2023 142  135 - 144 mmol/L Final    Potassium 02/03/2023 4.2  3.7 - 5.3 mmol/L Final    Chloride 02/03/2023 108 (A)  98 - 107 mmol/L Final    CO2 02/03/2023 26  20 - 31 mmol/L Final    Anion Gap 02/03/2023 8 (A)  9 - 17 mmol/L Final    Alkaline Phosphatase 02/03/2023 60  40 - 129 U/L Final    ALT 02/03/2023 15  5 - 41 U/L Final    AST 02/03/2023 26  <40 U/L Final    Total Bilirubin 02/03/2023 0.6  0.3 - 1.2 mg/dL Final    Total Protein 02/03/2023 7.2  6.4 - 8.3 g/dL Final    Albumin 02/03/2023 4.3  3.5 - 5.2 g/dL Final    Albumin/Globulin Ratio 02/03/2023 1.5  1.0 - 2.5 Final    WBC 02/03/2023 8.7  3.5 - 11.3 k/uL Final    RBC 02/03/2023 5.31  4.21 - 5.77 m/uL Final    Hemoglobin 02/03/2023 15.5  13.0 - 17.0 g/dL Final    Hematocrit 02/03/2023 46.0  40.7 - 50.3 % Final    MCV 02/03/2023 86.6  82.6 - 102.9 fL Final    MCH 02/03/2023 29.2  25.2 - 33.5 pg Final    MCHC 02/03/2023 33.7 (A)  25.2 - 33.5 g/dL Final    RDW 02/03/2023 13.7  11.8 - 14.4 % Final    Platelets 47/00/8469 203  138 - 453 k/uL Final    MPV 02/03/2023 9.3  8.1 - 13.5 fL Final    NRBC Automated 02/03/2023 0.0  0.0 per 100 WBC Final    Seg Neutrophils 02/03/2023 60  36 - 65 % Final    Lymphocytes 02/03/2023 27  24 - 43 % Final    Monocytes 02/03/2023 10  3 - 12 % Final    Eosinophils % 02/03/2023 2  1 - 4 % Final    Basophils 02/03/2023 1  0 - 2 % Final    Immature Granulocytes 02/03/2023 0  0 % Final    Segs Absolute 02/03/2023 5.21  1.50 - 8.10 k/uL Final    Absolute Lymph # 02/03/2023 2.34  1.10 - 3.70 k/uL Final    Absolute Mono # 02/03/2023 0.90  0.10 - 1.20 k/uL Final    Absolute Eos # 02/03/2023 0.21  0.00 - 0.44 k/uL Final    Basophils Absolute 02/03/2023 0.06  0.00 - 0.20 k/uL Final    Absolute Immature Granulocyte 02/03/2023 <0.03  0.00 - 0.30 k/uL Final

## 2023-02-10 NOTE — PROGRESS NOTES
Subjective:      Patient ID: King Cristóbal is a 66 y.o. male. Hypertension    Back Pain  Associated symptoms include abdominal pain (LLQ) and numbness (more into left leg and feet with prolonged standing, both right and left feet. ). Other  Associated symptoms include abdominal pain (LLQ) and numbness (more into left leg and feet with prolonged standing, both right and left feet. ). Hyperlipidemia    Foot Pain   Associated symptoms include numbness (more into left leg and feet with prolonged standing, both right and left feet. ). Routine follow up on chronic medical conditions, refills, and review of updated labs. I have reviewed the patient's medical history in detail and updated the computerized patient record. He had back surgery 1/26/18 with thoracic and lumbar posterior decompression T9-L5. He had gradual, increasing pain in the months after the surgery, actually more painful than prior to surgery. More extensive lumbar fusion in 2017 with instrumentation. Most recently with spinal cord stimulator placement 11/18. Back pain not as severe and more tolerable. Still with pain that comes and goes. Still has some burning paresthesias and numbness into the right leg off and on, but less severe than prior. Currently with bilateral radicular symptoms described. Still reporting daily pain, limiting his activity. Not following with Dr. Radha Alexis at present. Needed pain pill to get up out of bed and get going. Sleeping in a recliner seems to go better overnight. Compliant with medications . Mood stable. No anxiety of concern. No significant other joint arthritis of concern. Hearing loss better with hearing aides. Fatigues easily and somewhat sob with walking. No urination or bowel issues of concern on review today. Not compliant with cpap. Left lower abdominal pain . Pain comes almost exclusively at night when he lays down, within a couple of minutes.   Laying on his back makes the pain worse, seems to go away if he holds still on his back for 10 minutes. Currently quiescent over this interval.   No bowel obstruction symptoms long term. Past Medical History:   Diagnosis Date    Agoraphobia     Anxiety     Arthritis     Basal cell carcinoma     Chronic pain     back    CKD (chronic kidney disease)     Claustrophobia     Enlarged lymph node     rt. lower neck. Hearing aid worn     dar. ears.     Hearing loss     Hyperlipidemia     Hypertension     RAFAT (obstructive sleep apnea)     CPAP occasional    PONV (postoperative nausea and vomiting)     Retention of urine     last 2 surgeries, patient unable to void, home with catheter both times    SCC (squamous cell carcinoma)     HAND    Spinal stenosis, lumbar region, with neurogenic claudication     Wears glasses      Past Surgical History:   Procedure Laterality Date    BACK SURGERY  10/2013    Dr. Anjali Pineda hardware lumbar    COLONOSCOPY  06/13/12    mild diverticular dz    LUMBAR FUSION N/A 3/8/2017    LUMBAR L2-3 POSTERIOR DECOMPRESSION FUSION INSTRUMENTATION W/REVISION L3-5 POSTERIOR DECOMPRESSION FUSION INSTRUMENTATION (KOKI GOLBUS & Arlean Theodore)  CC SN/KILEY performed by Lamont Lopez MD at [address] [place] [address] Right     lower neck, 2-    PAIN MANAGEMENT PROCEDURE Bilateral 3/12/2021    Bilateral S1 TRANSFORAMINAL performed by Varsha Martin MD at [address] Bilateral 3/26/2021    Bilateral S1 TRANSFORAMINAL performed by Varsha Martin MD at [address] [place] [address] Bilateral 7/8/2021    BILAT L4-L5 L5-S1 FACET performed by Varsha Martin MD at [address] [facility] POSTERIOR/PSTLAT TQ 1NTRSPC THORACIC N/A 1/26/2018    THORACIC & LUMBAR POSTERIOR DECOMPRESSION AND FUSION REVISION T9 THRU L5 performed by Lamont Lopez MD at . [address] IND N/A 8/23/2017    COLONOSCOPY performed by Joycelyn Huntley MD at [facility] ([facility] [address] EPIDURAL N/A 11/14/2018    SPINAL CORD STIMULATOR IMPLANT PERMANENT performed by Anthony Cardenas MD at [address]    PRE-MALIGNANT / BENIGN SKIN LESION EXCISION      SKIN BIOPSY      skin cancer removed x 4    VASECTOMY       Current Outpatient Medications   Medication Sig Dispense Refill    HYDROcodone-acetaminophen (NORCO) 5-325 MG per tablet Take 1 tablet by mouth 2 times daily for 30 days. 60 tablet 0    tamsulosin (FLOMAX) 0.4 MG capsule Take 1 capsule by mouth daily 90 capsule 3    metoprolol succinate (TOPROL XL) 50 MG extended release tablet TAKE ONE TABLET BY MOUTH DAILY 90 tablet 1    oxybutynin (DITROPAN XL) 10 MG extended release tablet Take 1 tablet by mouth daily 30 tablet 3    simvastatin (ZOCOR) 20 MG tablet TAKE ONE TABLET BY MOUTH DAILY 90 tablet 3    fenofibrate (TRICOR) 145 MG tablet TAKE ONE TABLET BY MOUTH DAILY 90 tablet 3    sertraline (ZOLOFT) 100 MG tablet TAKE ONE TABLET BY MOUTH DAILY 90 tablet 2    Multiple Vitamin (MULTI VITAMIN DAILY PO) Take by mouth      aspirin 81 MG tablet Take 81 mg by mouth daily       No current facility-administered medications for this visit. Allergies   Allergen Reactions    Atarax [Hydroxyzine]      Double vision    Gabapentin      dizzy    Phenergan [Promethazine Hcl] Other (See Comments)     \"out of it\"    Morphine And Related Nausea And Vomiting     hallucinations         Review of Systems   Constitutional: Negative. HENT:  Positive for hearing loss. Eyes: Negative. Respiratory: Negative. Cardiovascular: Negative. Gastrointestinal:  Positive for abdominal pain (LLQ). Endocrine: Negative. Genitourinary: Negative. Musculoskeletal:  Positive for back pain and gait problem (limited standing and walking due to back pain). Skin: Negative. Allergic/Immunologic: Negative. Neurological:  Positive for numbness (more into left leg and feet with prolonged standing, both right and left feet. ). Hematological: Negative. Psychiatric/Behavioral: Negative. Objective:   Physical Exam  Constitutional:       General: He is not in acute distress. Appearance: He is well-developed. HENT:      Head: Normocephalic and atraumatic. Right Ear: External ear normal.      Left Ear: External ear normal.      Mouth/Throat:      Pharynx: No oropharyngeal exudate. Eyes:      General: No scleral icterus. Conjunctiva/sclera: Conjunctivae normal.   Neck:      Thyroid: No thyromegaly. Cardiovascular:      Rate and Rhythm: Normal rate and regular rhythm. Heart sounds: Normal heart sounds. No murmur heard. Pulmonary:      Effort: Pulmonary effort is normal. No respiratory distress. Breath sounds: Normal breath sounds. No wheezing. Abdominal:      General: Bowel sounds are normal. There is no distension. Palpations: Abdomen is soft. Tenderness: There is no abdominal tenderness. There is no rebound. Genitourinary:     Prostate: Not enlarged and not tender. Rectum: Normal. No mass. Normal anal tone. Musculoskeletal:         General: No tenderness. Normal range of motion. Cervical back: Neck supple. Skin:     General: Skin is warm and dry. Findings: No erythema, lesion or rash. Neurological:      Mental Status: He is alert and oriented to person, place, and time. Cranial Nerves: Cranial nerve deficit (very hard of hearing , with hearing aides in place) present. Sensory: Sensory deficit (left leg at present.) present. Psychiatric:         Behavior: Behavior normal.         Thought Content:  Thought content normal.         Judgment: Judgment normal.     /82 (Site: Left Upper Arm, Position: Sitting, Cuff Size: Large Adult)   Pulse 70   Ht 5' 9\" (1.753 m)   Wt 214 lb (97.1 kg)   BMI 31.60 kg/m²     Hospital Outpatient Visit on 02/03/2023   Component Date Value Ref Range Status    PSA 02/03/2023 1.38  <4.1 ng/mL Final    Cholesterol 02/03/2023 165  <200 mg/dL Final    HDL 02/03/2023 33 (A)  >40 mg/dL Final    LDL Cholesterol 02/03/2023 105  0 - 130 mg/dL Final    Chol/HDL Ratio 02/03/2023 5.0 (A)  <5 Final    Triglycerides 02/03/2023 135  <150 mg/dL Final    Glucose 02/03/2023 104 (A)  70 - 99 mg/dL Final    BUN 02/03/2023 21  8 - 23 mg/dL Final    Creatinine 02/03/2023 0.99  0.70 - 1.20 mg/dL Final    Est, Glom Filt Rate 02/03/2023 >60  >60 mL/min/1.73m2 Final    Bun/Cre Ratio 02/03/2023 21 (A)  9 - 20 Final    Calcium 02/03/2023 9.6  8.6 - 10.4 mg/dL Final    Sodium 02/03/2023 142  135 - 144 mmol/L Final    Potassium 02/03/2023 4.2  3.7 - 5.3 mmol/L Final    Chloride 02/03/2023 108 (A)  98 - 107 mmol/L Final    CO2 02/03/2023 26  20 - 31 mmol/L Final    Anion Gap 02/03/2023 8 (A)  9 - 17 mmol/L Final    Alkaline Phosphatase 02/03/2023 60  40 - 129 U/L Final    ALT 02/03/2023 15  5 - 41 U/L Final    AST 02/03/2023 26  <40 U/L Final    Total Bilirubin 02/03/2023 0.6  0.3 - 1.2 mg/dL Final    Total Protein 02/03/2023 7.2  6.4 - 8.3 g/dL Final    Albumin 02/03/2023 4.3  3.5 - 5.2 g/dL Final    Albumin/Globulin Ratio 02/03/2023 1.5  1.0 - 2.5 Final    WBC 02/03/2023 8.7  3.5 - 11.3 k/uL Final    RBC 02/03/2023 5.31  4.21 - 5.77 m/uL Final    Hemoglobin 02/03/2023 15.5  13.0 - 17.0 g/dL Final    Hematocrit 02/03/2023 46.0  40.7 - 50.3 % Final    MCV 02/03/2023 86.6  82.6 - 102.9 fL Final    MCH 02/03/2023 29.2  25.2 - 33.5 pg Final    MCHC 02/03/2023 33.7 (A)  25.2 - 33.5 g/dL Final    RDW 02/03/2023 13.7  11.8 - 14.4 % Final    Platelets 23/12/4786 203  138 - 453 k/uL Final    MPV 02/03/2023 9.3  8.1 - 13.5 fL Final    NRBC Automated 02/03/2023 0.0  0.0 per 100 WBC Final    Seg Neutrophils 02/03/2023 60  36 - 65 % Final    Lymphocytes 02/03/2023 27  24 - 43 % Final    Monocytes 02/03/2023 10  3 - 12 % Final    Eosinophils % 02/03/2023 2  1 - 4 % Final    Basophils 02/03/2023 1  0 - 2 % Final    Immature Granulocytes 02/03/2023 0  0 % Final    Segs Absolute 02/03/2023 5.21  1.50 - 8.10 k/uL Final    Absolute Lymph # 02/03/2023 2.34  1.10 - 3.70 k/uL Final    Absolute Mono # 02/03/2023 0.90  0.10 - 1.20 k/uL Final    Absolute Eos # 02/03/2023 0.21  0.00 - 0.44 k/uL Final    Basophils Absolute 02/03/2023 0.06  0.00 - 0.20 k/uL Final    Absolute Immature Granulocyte 02/03/2023 <0.03  0.00 - 0.30 k/uL Final         Assessment:       Encounter Diagnoses   Name Primary? Stage 3 chronic kidney disease, unspecified whether stage 3a or 3b CKD ([facility] Yes    Pure hypercholesterolemia     SCC (squamous cell carcinoma)     Major depressive disorder in full remission, unspecified whether recurrent ([facility] [facility]     Essential (primary) hypertension     Spinal stenosis, lumbar region, with neurogenic claudication     LAURO on CPAP     Iron deficiency anemia due to chronic blood loss     Screening PSA (prostate specific antigen)     Mixed conductive and sensorineural hearing loss of both ears     Abdominal pain, chronic, left lower quadrant            Plan:      CKD; improved/ stable with  Normal renal indicies at present. Cont. To avoid nephrotoxins when able and follow serially. Hyperlipidemia: good control at present . Cont. Simvastatin/fenofibrate at current dosing. SCC; no evidence for recurrent disease. Cont. Screening exams. Depression and anxiety. No current concerns on zoloft. Helping with agoraphobia and claustrophobia mostly. Plan to cont. Same. Htn: fair control on review. Cont. toprol daily. On flomax as well    Lauro: better compliance over the interval.  He was getting up a lot at night with his back pain issues and it became a hardship for him. Encouraged compliance. He snores a lot, but reports he feels better without it. He thinks he urinates less often using the cpap. Still not completely compliant. Taking naps during the day. Encouraged compliance in the recliner at night. Spinal stenosis with claudication. Dr. Steffi Douglass following s/p surgery in October2013. MRI completed. surgery 3/8/17 through Dr. Morgan Ledezma. L2-3 PLIF add on to 3-5. Symptoms in his back and radicular pain down left leg improved , but pain and disability returned. S/p posterior decompression T9-L5 1/26/18. Some early improvement, then pain increased quite a bit again . Most recently he had spinal cord stimulator placed 11/18. Still has symptoms of back pain and paresthesias into right leg, but level of pain /severity has improved. He continues to report daily pain and paresthesias right >left. Activity limiting pain at times. Using norco prn. Needs one in the am before getting out of bed. A lot of pain and stiffness in the morning. No signs of misuse or diversion. He and spouse relate he could use more medication to help keep him active during the day. Current supply at 40/month. Changing to bid dosing / #60. Did not tolerate gabapentin in the past.  Drowsy and confused some. Currently with bilateral radicular symptoms right > left. Rec. Updated mri and consideration for further intervention if results indicate. Presence of spinal stimulator makes mri not worthwhile/declined . Anemia: normalized last 4 visits. Has had some recurrent anemia, microcytic, with surgery in October and again in march, and again in January 2018. Iron low in January. Microcytosis evident. Started ferrous sulfate 325mg bid, then decreased to once daily, now off the iron supplement. Iron level normal.  Cont. Serial follow up. Colonoscopy up to date 2012. Cont. Serial checks. Psa normal, cont. Annual screening. Hearing loss. New hearing aides. Helps some. Spouse notes he has poor hearing, even with the hearing aides. Leg cramps. Increasing frequency at present. Some sweating and outdoor work. Remains active. Electrolytes normal at present. Discussed dill pickles as a trial .  Not helping much. More quiescent at present. Left sided abdominal pain.   Almost exclusively at night after laying down.  Worse lying on his back. Seems to improve after 10 minutes. No significant abdominal surgery to consider adhesions. Last colonoscopy ok 2017. Offered surgical consultation. Problem seems positional /mechanical .    He opts to observe and follow up for concerns. Not every night at present. Has been fairly quiescent over the last 2 intervals, improved.

## 2023-02-13 ENCOUNTER — HOSPITAL ENCOUNTER (OUTPATIENT)
Dept: GENERAL RADIOLOGY | Age: 78
Discharge: HOME | End: 2023-02-15
Payer: MEDICARE

## 2023-02-13 ENCOUNTER — TELEMEDICINE (OUTPATIENT)
Dept: FAMILY MEDICINE CLINIC | Age: 78
End: 2023-02-13
Payer: MEDICARE

## 2023-02-13 ENCOUNTER — OFFICE VISIT (OUTPATIENT)
Dept: UROLOGY | Age: 78
End: 2023-02-13
Payer: MEDICARE

## 2023-02-13 ENCOUNTER — HOSPITAL ENCOUNTER (OUTPATIENT)
Age: 78
Discharge: HOME | End: 2023-02-13
Payer: MEDICARE

## 2023-02-13 ENCOUNTER — IMMUNIZATION (OUTPATIENT)
Dept: LAB | Age: 78
End: 2023-02-13
Payer: MEDICARE

## 2023-02-13 ENCOUNTER — HOSPITAL ENCOUNTER (OUTPATIENT)
Dept: NON INVASIVE DIAGNOSTICS | Age: 78
Discharge: HOME | End: 2023-02-13
Payer: MEDICARE

## 2023-02-13 VITALS
SYSTOLIC BLOOD PRESSURE: 124 MMHG | DIASTOLIC BLOOD PRESSURE: 80 MMHG | HEART RATE: 61 BPM | WEIGHT: 213 LBS | HEIGHT: 69 IN | BODY MASS INDEX: 31.55 KG/M2

## 2023-02-13 DIAGNOSIS — Z01.818 PRE-OP TESTING: ICD-10-CM

## 2023-02-13 DIAGNOSIS — N13.8 HYPERTROPHY OF PROSTATE WITH URINARY OBSTRUCTION: Primary | ICD-10-CM

## 2023-02-13 DIAGNOSIS — Z00.00 MEDICARE ANNUAL WELLNESS VISIT, SUBSEQUENT: Primary | ICD-10-CM

## 2023-02-13 DIAGNOSIS — N40.1 HYPERTROPHY OF PROSTATE WITH URINARY OBSTRUCTION: Primary | ICD-10-CM

## 2023-02-13 LAB
ABSOLUTE EOS #: 0.15 K/UL (ref 0–0.44)
ABSOLUTE IMMATURE GRANULOCYTE: 0.03 K/UL (ref 0–0.3)
ABSOLUTE LYMPH #: 2.05 K/UL (ref 1.1–3.7)
ABSOLUTE MONO #: 0.89 K/UL (ref 0.1–1.2)
ALBUMIN SERPL-MCNC: 4.4 G/DL (ref 3.5–5.2)
ALBUMIN/GLOBULIN RATIO: 1.5 (ref 1–2.5)
ALP SERPL-CCNC: 60 U/L (ref 40–129)
ALT SERPL-CCNC: 18 U/L (ref 5–41)
ANION GAP SERPL CALCULATED.3IONS-SCNC: 10 MMOL/L (ref 9–17)
AST SERPL-CCNC: 28 U/L
BASOPHILS # BLD: 1 % (ref 0–2)
BASOPHILS ABSOLUTE: 0.06 K/UL (ref 0–0.2)
BILIRUB SERPL-MCNC: 0.4 MG/DL (ref 0.3–1.2)
BUN SERPL-MCNC: 21 MG/DL (ref 8–23)
BUN/CREAT BLD: 21 (ref 9–20)
CALCIUM SERPL-MCNC: 10.3 MG/DL (ref 8.6–10.4)
CHLORIDE SERPL-SCNC: 107 MMOL/L (ref 98–107)
CO2 SERPL-SCNC: 26 MMOL/L (ref 20–31)
CREAT SERPL-MCNC: 1.02 MG/DL (ref 0.7–1.2)
EKG ATRIAL RATE: 65 BPM
EKG P AXIS: 8 DEGREES
EKG P-R INTERVAL: 196 MS
EKG Q-T INTERVAL: 416 MS
EKG QRS DURATION: 88 MS
EKG QTC CALCULATION (BAZETT): 432 MS
EKG R AXIS: 9 DEGREES
EKG T AXIS: 19 DEGREES
EKG VENTRICULAR RATE: 65 BPM
EOSINOPHILS RELATIVE PERCENT: 2 % (ref 1–4)
GFR SERPL CREATININE-BSD FRML MDRD: >60 ML/MIN/1.73M2
GLUCOSE SERPL-MCNC: 100 MG/DL (ref 70–99)
HCT VFR BLD AUTO: 47 % (ref 40.7–50.3)
HGB BLD-MCNC: 15.4 G/DL (ref 13–17)
IMMATURE GRANULOCYTES: 0 %
LYMPHOCYTES # BLD: 26 % (ref 24–43)
MCH RBC QN AUTO: 28.7 PG (ref 25.2–33.5)
MCHC RBC AUTO-ENTMCNC: 32.8 G/DL (ref 25.2–33.5)
MCV RBC AUTO: 87.5 FL (ref 82.6–102.9)
MONOCYTES # BLD: 11 % (ref 3–12)
NRBC AUTOMATED: 0 PER 100 WBC
PDW BLD-RTO: 14 % (ref 11.8–14.4)
PLATELET # BLD AUTO: 226 K/UL (ref 138–453)
PMV BLD AUTO: 9.3 FL (ref 8.1–13.5)
POTASSIUM SERPL-SCNC: 4.5 MMOL/L (ref 3.7–5.3)
PROT SERPL-MCNC: 7.4 G/DL (ref 6.4–8.3)
RBC # BLD: 5.37 M/UL (ref 4.21–5.77)
SEG NEUTROPHILS: 60 % (ref 36–65)
SEGMENTED NEUTROPHILS ABSOLUTE COUNT: 4.82 K/UL (ref 1.5–8.1)
SODIUM SERPL-SCNC: 143 MMOL/L (ref 135–144)
WBC # BLD AUTO: 8 K/UL (ref 3.5–11.3)

## 2023-02-13 PROCEDURE — PBSHW COVID-19, MODERNA BIVALENT BOOSTER, (AGE 12Y+), IM, 50 MCG/0.5 ML: Performed by: FAMILY MEDICINE

## 2023-02-13 PROCEDURE — 99215 OFFICE O/P EST HI 40 MIN: CPT | Performed by: UROLOGY

## 2023-02-13 PROCEDURE — 1123F ACP DISCUSS/DSCN MKR DOCD: CPT | Performed by: FAMILY MEDICINE

## 2023-02-13 PROCEDURE — 85025 COMPLETE CBC W/AUTO DIFF WBC: CPT

## 2023-02-13 PROCEDURE — 99214 OFFICE O/P EST MOD 30 MIN: CPT | Performed by: UROLOGY

## 2023-02-13 PROCEDURE — 91313 COVID-19, MODERNA BIVALENT BOOSTER, (AGE 12Y+), IM, 50 MCG/0.5 ML: CPT | Performed by: FAMILY MEDICINE

## 2023-02-13 PROCEDURE — 80053 COMPREHEN METABOLIC PANEL: CPT

## 2023-02-13 PROCEDURE — G8484 FLU IMMUNIZE NO ADMIN: HCPCS | Performed by: FAMILY MEDICINE

## 2023-02-13 PROCEDURE — 71045 X-RAY EXAM CHEST 1 VIEW: CPT

## 2023-02-13 PROCEDURE — G8427 DOCREV CUR MEDS BY ELIG CLIN: HCPCS | Performed by: UROLOGY

## 2023-02-13 PROCEDURE — 93005 ELECTROCARDIOGRAM TRACING: CPT

## 2023-02-13 PROCEDURE — 1036F TOBACCO NON-USER: CPT | Performed by: UROLOGY

## 2023-02-13 PROCEDURE — 3074F SYST BP LT 130 MM HG: CPT | Performed by: UROLOGY

## 2023-02-13 PROCEDURE — G8484 FLU IMMUNIZE NO ADMIN: HCPCS | Performed by: UROLOGY

## 2023-02-13 PROCEDURE — 36415 COLL VENOUS BLD VENIPUNCTURE: CPT

## 2023-02-13 PROCEDURE — G8417 CALC BMI ABV UP PARAM F/U: HCPCS | Performed by: UROLOGY

## 2023-02-13 PROCEDURE — 3079F DIAST BP 80-89 MM HG: CPT | Performed by: UROLOGY

## 2023-02-13 PROCEDURE — 1123F ACP DISCUSS/DSCN MKR DOCD: CPT | Performed by: UROLOGY

## 2023-02-13 PROCEDURE — G0439 PPPS, SUBSEQ VISIT: HCPCS | Performed by: FAMILY MEDICINE

## 2023-02-13 PROCEDURE — 87086 URINE CULTURE/COLONY COUNT: CPT

## 2023-02-13 ASSESSMENT — PATIENT HEALTH QUESTIONNAIRE - PHQ9
6. FEELING BAD ABOUT YOURSELF - OR THAT YOU ARE A FAILURE OR HAVE LET YOURSELF OR YOUR FAMILY DOWN: 0
SUM OF ALL RESPONSES TO PHQ QUESTIONS 1-9: 0
SUM OF ALL RESPONSES TO PHQ QUESTIONS 1-9: 0
7. TROUBLE CONCENTRATING ON THINGS, SUCH AS READING THE NEWSPAPER OR WATCHING TELEVISION: 0
3. TROUBLE FALLING OR STAYING ASLEEP: 0
SUM OF ALL RESPONSES TO PHQ QUESTIONS 1-9: 0
9. THOUGHTS THAT YOU WOULD BE BETTER OFF DEAD, OR OF HURTING YOURSELF: 0
1. LITTLE INTEREST OR PLEASURE IN DOING THINGS: 0
4. FEELING TIRED OR HAVING LITTLE ENERGY: 0
5. POOR APPETITE OR OVEREATING: 0
10. IF YOU CHECKED OFF ANY PROBLEMS, HOW DIFFICULT HAVE THESE PROBLEMS MADE IT FOR YOU TO DO YOUR WORK, TAKE CARE OF THINGS AT HOME, OR GET ALONG WITH OTHER PEOPLE: 0
2. FEELING DOWN, DEPRESSED OR HOPELESS: 0
SUM OF ALL RESPONSES TO PHQ QUESTIONS 1-9: 0
SUM OF ALL RESPONSES TO PHQ9 QUESTIONS 1 & 2: 0
8. MOVING OR SPEAKING SO SLOWLY THAT OTHER PEOPLE COULD HAVE NOTICED. OR THE OPPOSITE, BEING SO FIGETY OR RESTLESS THAT YOU HAVE BEEN MOVING AROUND A LOT MORE THAN USUAL: 0

## 2023-02-13 ASSESSMENT — LIFESTYLE VARIABLES
HOW OFTEN DO YOU HAVE A DRINK CONTAINING ALCOHOL: NEVER
HOW MANY STANDARD DRINKS CONTAINING ALCOHOL DO YOU HAVE ON A TYPICAL DAY: PATIENT DOES NOT DRINK

## 2023-02-13 NOTE — PROGRESS NOTES
Medicare Annual Wellness Visit    Regulo Carrera is here for Medicare AWV    Assessment & Plan    Recommendations for Preventive Services Due: see orders and patient instructions/AVS.  Recommended screening schedule for the next 5-10 years is provided to the patient in written form: see Patient Instructions/AVS.     No follow-ups on file. Subjective       Patient's complete Health Risk Assessment and screening values have been reviewed and are found in Flowsheets. The following problems were reviewed today and where indicated follow up appointments were made and/or referrals ordered. Positive Risk Factor Screenings with Interventions:                Opioid Risk: (Low risk score <55) Opioid risk score: 17    Patient is low risk for opioid use disorder or overdose. Last PDMP Alexia Outlaw as Reviewed:  Review User Review Instant Review Result   Khadijah Maza 11/29/2021  4:06 PM     Reviewed PDMP [1]     Last Controlled Substance Monitoring Documentation      Flowsheet Row Refill from 11/29/2021 in [address] [place] [address] of [facility]   Periodic Controlled Substance Monitoring Possible medication side effects, risk of tolerance/dependence & alternative treatments discussed. filed at 11/29/2021 1606            Opioid Risk: (Low risk score ? 55)  Opioid risk score: 17    Any opioid medication usage will be addressed by their licensed provider at a future visit.            Weight and Activity:  Physical Activity: Inactive    Days of Exercise per Week: 0 days    Minutes of Exercise per Session: 0 min     On average, how many days per week do you engage in moderate to strenuous exercise (like a brisk walk)?: 0 days  Have you lost any weight without trying in the past 3 months?: No         Inactivity Interventions:  Patient declined any further interventions or treatment  Obesity Interventions:  Patient declines any further evaluation or treatment               Advanced Directives:  Do you have a Living Will?: (!) No    Intervention:  has NO advanced directive - information provided                       Objective      Patient-Reported Vitals  Patient-Reported Height: 5'9\"            Allergies   Allergen Reactions    Atarax [Hydroxyzine]      Double vision    Gabapentin      dizzy    Phenergan [Promethazine Hcl] Other (See Comments)     \"out of it\"    Morphine And Related Nausea And Vomiting     hallucinations     Prior to Visit Medications    Medication Sig Taking? Authorizing Provider   sertraline (ZOLOFT) 100 MG tablet TAKE ONE TABLET BY MOUTH DAILY  Sean Valdez MD   HYDROcodone-acetaminophen (NORCO) 5-325 MG per tablet Take 1 tablet by mouth 2 times daily for 30 days. Sean Valdez MD   tamsulosin (FLOMAX) 0.4 MG capsule Take 1 capsule by mouth daily  Sean Valdez MD   metoprolol succinate (TOPROL XL) 50 MG extended release tablet TAKE ONE TABLET BY MOUTH DAILY  Sean Vadlez MD   oxybutynin (DITROPAN XL) 10 MG extended release tablet Take 1 tablet by mouth daily  Bright Rey MD   simvastatin (ZOCOR) 20 MG tablet TAKE ONE TABLET BY MOUTH DAILY  Sean Valdez MD   fenofibrate (TRICOR) 145 MG tablet TAKE ONE TABLET BY MOUTH DAILY  Sean Valdez MD   Multiple Vitamin (MULTI VITAMIN DAILY PO) Take by mouth  Historical Provider, MD   aspirin 81 MG tablet Take 81 mg by mouth daily  Historical Provider, MD [place] (Including outside providers/suppliers regularly involved in providing care):   Patient Care Team:  Sean Valdez MD as PCP - General (Family Medicine)  Sean Valdez MD as PCP - Empaneled Provider     Reviewed and updated this visit:  Tobacco  Allergies  Meds  Med Hx  Surg Hx  Soc Hx  Fam Hx        I, Esperanza Alvarado RN, 2/13/2023, performed the documented evaluation under the direct supervision of the attending physician. Jane Solano, was evaluated through a synchronous (real-time) audio encounter.  The patient (or guardian if applicable) is aware that this is a billable service, which includes applicable co-pays. This Virtual Visit was conducted with patient's (and/or legal guardian's) consent. The visit was conducted pursuant to the emergency declaration under the [facility], [address] [place] [address] authority and the [employer] and [employer] General Act. Patient identification was verified, and a caregiver was present when appropriate. The patient was located at [facility] ([address] [facility]): [address],  [id number]  Provider was located at [facility] (Appt Dept): [address],  [id number] Randhawa RN \    This encounter was performed under my, Christine Albrecht, direct supervision, 2/13/2023.   Electronically signed by Lesli Crooks MD on 2/20/2023 at 1:14 PM

## 2023-02-13 NOTE — PATIENT INSTRUCTIONS
Personalized Preventive Plan for Jo Ann Philippe - 2/13/2023  Medicare offers a range of preventive health benefits. Some of the tests and screenings are paid in full while other may be subject to a deductible, co-insurance, and/or copay. Some of these benefits include a comprehensive review of your medical history including lifestyle, illnesses that may run in your family, and various assessments and screenings as appropriate. After reviewing your medical record and screening and assessments performed today your provider may have ordered immunizations, labs, imaging, and/or referrals for you. A list of these orders (if applicable) as well as your Preventive Care list are included within your After Visit Summary for your review. Other Preventive Recommendations:    A preventive eye exam performed by an eye specialist is recommended every 1-2 years to screen for glaucoma; cataracts, macular degeneration, and other eye disorders. A preventive dental visit is recommended every 6 months. Try to get at least 150 minutes of exercise per week or 10,000 steps per day on a pedometer . Order or download the FREE \"Exercise & Physical Activity: Your Everyday Guide\" from [employer]. Call 0-815.528.6498 or search [employer] online. You need 0137-9498 mg of calcium and 8842-9470 IU of vitamin D per day. It is possible to meet your calcium requirement with diet alone, but a vitamin D supplement is usually necessary to meet this goal.  When exposed to the sun, use a sunscreen that protects against both UVA and UVB radiation with an SPF of 30 or greater. Reapply every 2 to 3 hours or after sweating, drying off with a towel, or swimming. Always wear a seat belt when traveling in a car. Always wear a helmet when riding a bicycle or motorcycle.

## 2023-02-13 NOTE — PROGRESS NOTES
Mike Valle MD.    [place] [address]  [address] [address]  [address]  [place] [address]  Dept: [phone]  Dept Fax: [phone]    [facility] Urology Office Note -     Patient:  Blanca Vargas  YOB: 1945    The patient is a 66 y.o. male who presents today for evaluation of the following problems:   Chief Complaint   Patient presents with    Benign Prostatic Hypertrophy     3 month med check, which patient says is not helping    referred/consultation requested by Cassie Eldridge MD.    History of Present Illness:    BPH  Worsening incontinence  Transfer from [facility] office  Still not at goal        Requested/reviewed records from Cassie Eldridge MD office and/or outside physician/EMR    (Patient's old records have been requested, reviewed and pertinent findings summarized in today's note.)    Procedures Today: N/A      Last several PSA's:  Lab Results   Component Value Date    PSA 1.38 02/03/2023    PSA 1.09 07/13/2021    PSA 1.20 07/13/2020       Last total testosterone:  No results found for: TESTOSTERONE    Urinalysis today:  No results found for this visit on 02/13/23. Last BUN and creatinine:  Lab Results   Component Value Date    BUN 21 02/03/2023     Lab Results   Component Value Date    CREATININE 0.99 02/03/2023         Imaging Reviewed during this Office Visit:   Mary Alice Hernandez MD independently reviewed the images and verified the radiology reports from:    CT LUMBAR SPINE WO CONTRAST    Result Date: 8/30/2022  EXAMINATION: CT OF THE LUMBAR SPINE WITHOUT CONTRAST  8/30/2022 TECHNIQUE: CT of the lumbar spine was performed without the administration of intravenous contrast. Multiplanar reformatted images are provided for review. Adjustment of mA and/or kV according to patient size was utilized.   Automated exposure control, iterative reconstruction, and/or weight based adjustment of the mA/kV was utilized to reduce the radiation dose to as low as reasonably achievable. COMPARISON: None HISTORY: ORDERING SYSTEM PROVIDED HISTORY: Spinal stenosis, lumbar region with neurogenic claudication TECHNOLOGIST PROVIDED HISTORY: Reason for Exam: Increasing low back pain, burning paresthesias and numbness in legs. No new injury, history of prior fusion & spinal stimulator. FINDINGS: Presence of the metallic hardware limits assessment for disc protrusions. Pedicle screws and connecting rods are noted bilaterally at L2, L3, L4 and L5. Disc spacers are noted at the L2-L3, L3-L4, and L4-L5 discs. The patient has had prior laminectomies from L1-L5. There is advanced degenerative disc disease of the L5-S1 disc, with complete loss of the disc, and gas within the disc space. This is associated with prominent anterior osteophytes. There is a moderate narrowing of the left and right intervertebral foramina at L5-S1. Presence of metallic artifact limited assessment for disc protrusions. Bilateral pedicles and screws devices noted at L2-L5 with laminectomies from L1-L5 and disc spacers from the L2-L3 to the L4-L5 discs. There is advanced degenerative disc disease of the L5-S1 disc. There is moderate bony narrowing of right and left L5-S1 intervertebral foramina. PAST MEDICAL, FAMILY AND SOCIAL HISTORY:  Past Medical History:   Diagnosis Date    Agoraphobia     Anxiety     Arthritis     Basal cell carcinoma     Chronic pain     back    CKD (chronic kidney disease)     Claustrophobia     Enlarged lymph node     rt. lower neck. Hearing aid worn     dar. ears.     Hearing loss     Hyperlipidemia     Hypertension     RAFAT (obstructive sleep apnea)     CPAP occasional    PONV (postoperative nausea and vomiting)     Retention of urine     last 2 surgeries, patient unable to void, home with catheter both times    SCC (squamous cell carcinoma)     HAND    Spinal stenosis, lumbar region, with neurogenic claudication     Wears glasses      Past Surgical History:   Procedure Laterality Date    BACK SURGERY  10/2013    Dr. Wander Mascorro hardware lumbar    COLONOSCOPY  06/13/12    mild diverticular dz    LUMBAR FUSION N/A 3/8/2017    LUMBAR L2-3 POSTERIOR DECOMPRESSION FUSION INSTRUMENTATION W/REVISION L3-5 POSTERIOR DECOMPRESSION FUSION INSTRUMENTATION ([address])  CC SN/KILEY performed by Nai Soria MD at [address] Right     lower neck, 2-    PAIN MANAGEMENT PROCEDURE Bilateral 3/12/2021    Bilateral S1 TRANSFORAMINAL performed by Gamaliel Myles MD at [address] Bilateral 3/26/2021    Bilateral S1 TRANSFORAMINAL performed by Gamaliel Myles MD at [address] Bilateral 7/8/2021    BILAT L4-L5 L5-S1 FACET performed by Gamaliel Myles MD at [address] [facility] POSTERIOR/PSTLAT TQ 1NTRSPC THORACIC N/A 1/26/2018    THORACIC & LUMBAR POSTERIOR DECOMPRESSION AND FUSION REVISION T9 THRU L5 performed by Nai Soria MD at . [address] [place] [address] IND N/A 8/23/2017    COLONOSCOPY performed by Eh Lanier MD at [facility] [address]ST[address] ELECTRODE ARRAY EPIDURAL N/A 11/14/2018    SPINAL CORD STIMULATOR IMPLANT PERMANENT performed by Nai Soria MD at [address] [facility]     PRE-MALIGNANT / BENIGN SKIN LESION EXCISION      SKIN BIOPSY      skin cancer removed x 4    VASECTOMY       Family History   Problem Relation Age of Onset    Diabetes Mother     Diabetes Brother     Cancer Father         lung cancer    Cancer Brother         lung cancer    Diabetes Brother      Outpatient Medications Marked as Taking for the 2/13/23 encounter (Office Visit) with Alessandro Nice MD   Medication Sig Dispense Refill    sertraline (ZOLOFT) 100 MG tablet TAKE ONE TABLET BY MOUTH DAILY 90 tablet 2    HYDROcodone-acetaminophen (NORCO) 5-325 MG per tablet Take 1 tablet by mouth 2 times daily for 30 days.  60 tablet 0    tamsulosin (FLOMAX) 0.4 MG capsule Take 1 capsule by mouth daily 90 capsule 3    metoprolol succinate (TOPROL XL) 50 MG extended release tablet TAKE ONE TABLET BY MOUTH DAILY 90 tablet 1    oxybutynin (DITROPAN XL) 10 MG extended release tablet Take 1 tablet by mouth daily 30 tablet 3    simvastatin (ZOCOR) 20 MG tablet TAKE ONE TABLET BY MOUTH DAILY 90 tablet 3    fenofibrate (TRICOR) 145 MG tablet TAKE ONE TABLET BY MOUTH DAILY 90 tablet 3    Multiple Vitamin (MULTI VITAMIN DAILY PO) Take by mouth      aspirin 81 MG tablet Take 81 mg by mouth daily         Atarax [hydroxyzine], Gabapentin, Phenergan [promethazine hcl], and Morphine and related  Social History     Tobacco Use   Smoking Status Never   Smokeless Tobacco Never      (If patient a smoker, smoking cessation counseling offered)   Social History     Substance and Sexual Activity   Alcohol Use No       REVIEW OF SYSTEMS:  Constitutional: negative  Eyes: negative  Respiratory: negative  Cardiovascular: negative  Gastrointestinal: negative  Genitourinary: see HPI  Musculoskeletal: negative  Skin: negative   Neurological: negative  Hematological/Lymphatic: negative  Psychological: negative        Physical Exam:    This a 66 y.o. male  Vitals:    02/13/23 0944   BP: 124/80   Pulse: 61     Body mass index is 31.45 kg/m². Assessment and Plan        1. Hypertrophy of prostate with urinary obstruction               Plan:      BPH- on flomax/ditropan. Worsening incontinence. Very bothered. Has had retention episodes in the past. Has had cystoscopy. Pvp 30      Prescriptions Ordered:  No orders of the defined types were placed in this encounter. Orders Placed:  No orders of the defined types were placed in this encounter.            Rodriguez Sorensen MD

## 2023-02-14 LAB
MICROORGANISM SPEC CULT: NO GROWTH
SPECIMEN DESCRIPTION: NORMAL

## 2023-02-17 ENCOUNTER — TELEPHONE (OUTPATIENT)
Dept: GENERAL RADIOLOGY | Age: 78
End: 2023-02-17

## 2023-02-17 ENCOUNTER — OFFICE VISIT (OUTPATIENT)
Dept: NEUROSURGERY | Age: 78
End: 2023-02-17
Payer: MEDICARE

## 2023-02-17 VITALS — DIASTOLIC BLOOD PRESSURE: 78 MMHG | HEART RATE: 72 BPM | RESPIRATION RATE: 18 BRPM | SYSTOLIC BLOOD PRESSURE: 122 MMHG

## 2023-02-17 DIAGNOSIS — M43.9 ACQUIRED SPINAL DEFORMITY: ICD-10-CM

## 2023-02-17 DIAGNOSIS — M47.816 LUMBAR SPONDYLOSIS: ICD-10-CM

## 2023-02-17 DIAGNOSIS — G95.19 NEUROGENIC CLAUDICATION (HCC): Primary | ICD-10-CM

## 2023-02-17 DIAGNOSIS — Z96.89 SPINAL CORD STIMULATOR STATUS: ICD-10-CM

## 2023-02-17 PROCEDURE — 3074F SYST BP LT 130 MM HG: CPT | Performed by: NURSE PRACTITIONER

## 2023-02-17 PROCEDURE — 99214 OFFICE O/P EST MOD 30 MIN: CPT | Performed by: NURSE PRACTITIONER

## 2023-02-17 PROCEDURE — G8484 FLU IMMUNIZE NO ADMIN: HCPCS | Performed by: NURSE PRACTITIONER

## 2023-02-17 PROCEDURE — 1123F ACP DISCUSS/DSCN MKR DOCD: CPT | Performed by: NURSE PRACTITIONER

## 2023-02-17 PROCEDURE — 3078F DIAST BP <80 MM HG: CPT | Performed by: NURSE PRACTITIONER

## 2023-02-17 PROCEDURE — G8417 CALC BMI ABV UP PARAM F/U: HCPCS | Performed by: NURSE PRACTITIONER

## 2023-02-17 PROCEDURE — G8427 DOCREV CUR MEDS BY ELIG CLIN: HCPCS | Performed by: NURSE PRACTITIONER

## 2023-02-17 PROCEDURE — 1036F TOBACCO NON-USER: CPT | Performed by: NURSE PRACTITIONER

## 2023-02-17 NOTE — PROGRESS NOTES
[facility] [address] [facility]  [address] [facility]  [address] [facility]  [place] [address]  Dept: [phone]    Patient:  Chelsea Erwin  YOB: 1945  Date: 11/18/22    The patient is a 66 y.o. male who presents today for consult of the following problems:     Chief Complaint   Patient presents with    Follow-up         HPI:     Chelsea Erwin is a 66 y.o. male on whom neurosurgical consultation was requested by Syed Hutchison MD for management of back pain, difficulty standing and walking. Patient has a significant history regarding her lower back. Patient and wife state that he has had longstanding issues with low back pain, this historically would be managed with intermittent chiropractor care. Symptoms eventually worsened and ultimately underwent 3 separate lower back surgeries including extensive fusion. Patient did not have significant relief from symptoms, ultimately undergoing placement of spinal cord stimulator in 2018. Has continued to have significant intolerance to standing and walking. Patient also with a significant amount of discomfort completing daily activities such as raking his leaves, standing for any extended period of time, unable to comfortably sleep in bed. Has to sleep in a recliner 4-5 times per week. If he does sleep in bed he requires a pain pill before he is able to get out of bed. Wife states that he has been walking with a progressively forward leaning posture. Patient states that the pain does not his biggest concern is his inability to stand and walk for any period of time.       Has been evaluated by several pain management specialists, has undergone multiple interventions over the last several years including:  -Bilateral L4/5, L5/S1 facet blocks,   -Bilateral S1 transforaminal XIOMY,   -L5 transforaminal XIOMY,   -Caudal epidural, none of which have provided any significant improvement in symptoms. Ultimately underwent [employer] spinal cord stimulator placement, feels that this did somewhat help with the pain symptoms, but has not had any effect on his standing intolerance. Wife does state that he has also had some intermittent issues with loss of bowel and bladder function, has been evaluated by urology and started on Ditropan. Unsure if this is made much of a difference. Upcoming prostate surgery. Completed approximately 9 physical therapy sessions, was discharged secondary to lack of progress. History:     Past Medical History:   Diagnosis Date    Agoraphobia     Anxiety     Arthritis     Basal cell carcinoma     Chronic pain     back    CKD (chronic kidney disease)     Claustrophobia     Enlarged lymph node     rt. lower neck. Hearing aid worn     dar. ears.     Hearing loss     Hyperlipidemia     Hypertension     RAFAT (obstructive sleep apnea)     CPAP occasional    PONV (postoperative nausea and vomiting)     Retention of urine     last 2 surgeries, patient unable to void, home with catheter both times    SCC (squamous cell carcinoma)     HAND    Spinal stenosis, lumbar region, with neurogenic claudication     Wears glasses      Past Surgical History:   Procedure Laterality Date    BACK SURGERY  10/2013    Dr. Jose Lewis hardware lumbar    COLONOSCOPY  06/13/12    mild diverticular dz    LUMBAR FUSION N/A 3/8/2017    LUMBAR L2-3 POSTERIOR DECOMPRESSION FUSION INSTRUMENTATION W/REVISION L3-5 POSTERIOR DECOMPRESSION FUSION INSTRUMENTATION ([facility])  CC SN/KILEY performed by Rachel Billings MD at [address] Right     lower neck, 2-    PAIN MANAGEMENT PROCEDURE Bilateral 3/12/2021    Bilateral S1 TRANSFORAMINAL performed by Mindi Robin MD at [address] Bilateral 3/26/2021    Bilateral S1 TRANSFORAMINAL performed by Mindi Robin MD at [address] Bilateral 7/8/2021    BILAT L4-L5 L5-S1 FACET performed by Andra June MD at [address] [facility] [address] POSTERIOR/PSTLAT TQ 1NTRSPC THORACIC N/A 1/26/2018    THORACIC & LUMBAR POSTERIOR DECOMPRESSION AND FUSION REVISION T9 THRU L5 performed by Abiodun Santiago MD at [address] [place] [address] IND N/A 8/23/2017    COLONOSCOPY performed by Kevin Pereira MD at [place] [address] NSTIM ELECTRODE ARRAY EPIDURAL N/A 11/14/2018    SPINAL CORD STIMULATOR IMPLANT PERMANENT performed by Abiodun Santiago MD at [address]    PRE-MALIGNANT / BENIGN SKIN LESION EXCISION      SKIN BIOPSY      skin cancer removed x 4    VASECTOMY       Family History   Problem Relation Age of Onset    Diabetes Mother     Diabetes Brother     Cancer Father         lung cancer    Cancer Brother         lung cancer    Diabetes Brother      Current Outpatient Medications on File Prior to Visit   Medication Sig Dispense Refill    sertraline (ZOLOFT) 100 MG tablet TAKE ONE TABLET BY MOUTH DAILY 90 tablet 2    HYDROcodone-acetaminophen (NORCO) 5-325 MG per tablet Take 1 tablet by mouth 2 times daily for 30 days. 60 tablet 0    tamsulosin (FLOMAX) 0.4 MG capsule Take 1 capsule by mouth daily 90 capsule 3    metoprolol succinate (TOPROL XL) 50 MG extended release tablet TAKE ONE TABLET BY MOUTH DAILY 90 tablet 1    oxybutynin (DITROPAN XL) 10 MG extended release tablet Take 1 tablet by mouth daily 30 tablet 3    simvastatin (ZOCOR) 20 MG tablet TAKE ONE TABLET BY MOUTH DAILY 90 tablet 3    fenofibrate (TRICOR) 145 MG tablet TAKE ONE TABLET BY MOUTH DAILY 90 tablet 3    Multiple Vitamin (MULTI VITAMIN DAILY PO) Take by mouth      aspirin 81 MG tablet Take 81 mg by mouth daily       No current facility-administered medications on file prior to visit.      Social History     Tobacco Use    Smoking status: Never    Smokeless tobacco: Never   Vaping Use    Vaping Use: Never used   Substance Use Topics    Alcohol use: No    Drug use: No       Allergies Allergen Reactions    Atarax [Hydroxyzine]      Double vision    Gabapentin      dizzy    Phenergan [Promethazine Hcl] Other (See Comments)     \"out of it\"    Morphine And Related Nausea And Vomiting     hallucinations       Review of Systems  Constitutional: Negative for activity change and appetite change. HENT: Negative for ear pain and facial swelling. Eyes: Negative for discharge and itching. Respiratory: Negative for choking and chest tightness. Cardiovascular: Negative for chest pain and leg swelling. Gastrointestinal: Negative for nausea and abdominal pain. Endocrine: Negative for cold intolerance and heat intolerance. Genitourinary: Negative for frequency and flank pain. Musculoskeletal: Negative for myalgias and joint swelling. Skin: Negative for rash and wound. Allergic/Immunologic: Negative for environmental allergies and food allergies. Hematological: Negative for adenopathy. Does not bruise/bleed easily. Psychiatric/Behavioral: Negative for self-injury. The patient is not nervous/anxious. Physical Exam:      /78   Pulse 72   Resp 18   Estimated body mass index is 31.45 kg/m² as calculated from the following:    Height as of 2/13/23: 5' 9\" (1.753 m). Weight as of 2/13/23: 213 lb (96.6 kg). General:  Mary Macdonald is a 66y.o. year old male who appears his stated age. HEENT: Normocephalic atraumatic. Neck supple. Chest: regular rate; pulses equal  Abdomen: Soft nontender nondistended.   Ext: DP and PT pulses 2+, good cap refill  Neuro    Mentation  Appropriate affect  Registration intact  Orientation intact  Judgement intact to situation    Cranial Nerves:   Pupils equal and reactive to light  Extraocular motion intact  Face and shrug symmetric  Tongue midline  No dysarthria  v1-3 sensation symmetric, masseter tone symmetric  Hearing symmetric    Sensation: intact    Motor  L deltoid 5/5; R deltoid 5/5  L biceps 5/5; R biceps 5/5  L triceps 5/5; R triceps 5/5  L wrist extension 5/5; R wrist extension 5/5  L intrinsics 5/5; R intrinsics 5/5     L iliopsoas 5/5 , R iliopsoas 5/5  L quadriceps 5/5; R quadriceps 5/5  L Dorsiflexion 5/5; R dorsiflexion 5/5  L Plantarflexion 5/5; R plantarflexion 5/5  L EHL 5/5; R EHL 5/5    Reflexes  L Brachioradialis 2+/4; R brachioradialis 2+/4  L Biceps 2+/4; R Biceps 2+/4  L Triceps 2+/4; R Triceps 2+/4  L Patellar 2+/4: R Patellar 2+/4  L Achilles 2+/4; R Achilles 2+/4    hoffmans L: neg  hoffmans R: neg  Clonus L: neg  Clonus R: neg  Babinski L: neg  Babinski R: neg    +Forward leaning posture  Shortened gait stride    Studies Review:     CT lumbar 8/30/2022:  FINDINGS:   Presence of the metallic hardware limits assessment for disc protrusions. Pedicle screws and connecting rods are noted bilaterally at L2, L3, L4 and   L5. Disc spacers are noted at the L2-L3, L3-L4, and L4-L5 discs. The   patient has had prior laminectomies from L1-L5. There is advanced   degenerative disc disease of the L5-S1 disc, with complete loss of the disc,   and gas within the disc space. This is associated with prominent anterior   osteophytes. There is a moderate narrowing of the left and right   intervertebral foramina at L5-S1. Pain management notes reviewed    Assessment and Plan:      1. Neurogenic claudication ([address]    2. Lumbar spondylosis    3. Acquired spinal deformity    4. Spinal cord stimulator status          Plan: Patient with extensive prior surgical history, as well as spinal cord stimulator placement. X-ray images reviewed, does appear to have bismark fractures bilaterally at thoracolumbar junction. And was for upright scoliosis x-rays, however these were discontinued and only individual thoracic and lumbar x-rays were obtained. We will obtain scoliosis x-rays to evaluate overall alignment. Patient unable to proceed with MRI due to spinal cord stimulator, we will plan for lumbar myelogram instead.   Patient to follow-up with Dr. Brown Robbins after updated images. Followup: Return in about 6 weeks (around 3/31/2023), or if symptoms worsen or fail to improve. Prescriptions Ordered:  No orders of the defined types were placed in this encounter. Orders Placed:  Orders Placed This Encounter   Procedures    XR SPINE ENTIRE (2-3 VIEWS)     Standing Status:   Future     Standing Expiration Date:   2/17/2024     Scheduling Instructions:      STANDING -- AP and Lateral; Include C2 to Pelvis & both femoral heads     Order Specific Question:   Reason for exam:     Answer:   scoliosis    IR MYELOGRAM LUMBOSACRAL     Standing Status:   Future     Standing Expiration Date:   2/17/2024     Order Specific Question:   Reason for exam:     Answer:   eval for stenosis, please obtain up through T11    CT LUMBAR SPINE W CONTRAST     Standing Status:   Future     Standing Expiration Date:   5/18/2023     Scheduling Instructions:      Post myelogram     Order Specific Question:   STAT Creatinine as needed:     Answer:   No     Order Specific Question:   Reason for exam:     Answer:   eval for stenosis, please obtain up through T11        Electronically signed by KM Villalta CNP on 2/17/2023 at 11:09 AM    Please note that this chart was generated using voice recognition Dragon dictation software. Although every effort was made to ensure the accuracy of this automated transcription, some errors in transcription may have occurred.

## 2023-02-17 NOTE — TELEPHONE ENCOUNTER
I have a referral for [address] to have a Lumbar Myelogram.  It looks like he is on ASA 81mg. Per Radiologist Recommendation, we typically ask patients to discontinue 5 days prior to scheduled procedure. Please advise.   Thanks,  [facility]  Radiology

## 2023-02-17 NOTE — TELEPHONE ENCOUNTER
Ann-Marie Camp is scheduled for a CT Myelogram on Thursday 2/23 at 1:00. He is to arrive one hour prior to scheduled time, check into hospital registration, and he must have a . He is to eat a small breakfast.  He is on ASA 81mg, and per Dr. Leonela Sarmiento he is ok to discontinue this 5 days prior to scheduled procedure. The patients wife was informed. The appointment and information was confirmed with the patients wife on 2/17 at [phone].   Thanks,  [facility]  Radiology

## 2023-02-17 NOTE — PATIENT INSTRUCTIONS
Follow up in 8 weeks. CT and IR Myelogram-radiology/surgery center will call  Xray-walk in available.

## 2023-02-21 ENCOUNTER — TELEPHONE (OUTPATIENT)
Dept: GENERAL RADIOLOGY | Age: 78
End: 2023-02-21

## 2023-02-21 NOTE — TELEPHONE ENCOUNTER
Torri Choudhury was originally scheduled for a noon arrival for his myelogram.  We have now moved him up. I just spoke with his wife and she confirmed. They will arrive to Radiology at [place] [address] and ask for me personally.   He will get his scoliosis x-ray done, and the I will take him to [facility] after for his myelogram.  Thanks,  [facility]  Radiology

## 2023-02-23 ENCOUNTER — HOSPITAL ENCOUNTER (OUTPATIENT)
Dept: GENERAL RADIOLOGY | Age: 78
Discharge: HOME | End: 2023-02-25
Payer: MEDICARE

## 2023-02-23 ENCOUNTER — HOSPITAL ENCOUNTER (OUTPATIENT)
Dept: CT IMAGING | Age: 78
Discharge: HOME | End: 2023-02-25
Payer: MEDICARE

## 2023-02-23 VITALS
DIASTOLIC BLOOD PRESSURE: 74 MMHG | RESPIRATION RATE: 20 BRPM | OXYGEN SATURATION: 96 % | HEIGHT: 69 IN | HEART RATE: 55 BPM | BODY MASS INDEX: 31.55 KG/M2 | WEIGHT: 213 LBS | SYSTOLIC BLOOD PRESSURE: 150 MMHG

## 2023-02-23 DIAGNOSIS — M47.816 LUMBAR SPONDYLOSIS: ICD-10-CM

## 2023-02-23 DIAGNOSIS — Z96.89 SPINAL CORD STIMULATOR STATUS: ICD-10-CM

## 2023-02-23 DIAGNOSIS — G95.19 NEUROGENIC CLAUDICATION (HCC): ICD-10-CM

## 2023-02-23 DIAGNOSIS — M43.9 ACQUIRED SPINAL DEFORMITY: ICD-10-CM

## 2023-02-23 LAB
INR PPP: 1.1
PLATELET # BLD AUTO: 240 K/UL (ref 138–453)
PROTHROMBIN TIME: 13.2 SEC (ref 11.5–14.2)

## 2023-02-23 PROCEDURE — 36415 COLL VENOUS BLD VENIPUNCTURE: CPT

## 2023-02-23 PROCEDURE — 85049 AUTOMATED PLATELET COUNT: CPT

## 2023-02-23 PROCEDURE — 72082 X-RAY EXAM ENTIRE SPI 2/3 VW: CPT

## 2023-02-23 PROCEDURE — 72132 CT LUMBAR SPINE W/DYE: CPT

## 2023-02-23 PROCEDURE — 62304 MYELOGRAPHY LUMBAR INJECTION: CPT

## 2023-02-23 PROCEDURE — 7100000010 HC PHASE II RECOVERY - FIRST 15 MIN

## 2023-02-23 PROCEDURE — 6360000004 HC RX CONTRAST MEDICATION: Performed by: FAMILY MEDICINE

## 2023-02-23 PROCEDURE — 85610 PROTHROMBIN TIME: CPT

## 2023-02-23 RX ADMIN — IOHEXOL 15 ML: 240 INJECTION, SOLUTION INTRATHECAL; INTRAVASCULAR; INTRAVENOUS; ORAL at 12:11

## 2023-02-23 ASSESSMENT — PAIN SCALES - GENERAL: PAINLEVEL_OUTOF10: 0

## 2023-02-23 ASSESSMENT — PAIN - FUNCTIONAL ASSESSMENT: PAIN_FUNCTIONAL_ASSESSMENT: 0-10

## 2023-02-23 NOTE — DISCHARGE INSTRUCTIONS
Instructions following Myelogram:    Activity  Your Myelogram was performed without sedation. Rest for a minimum of 24 hours. Keep your head elevated at least 30-45 degrees for 8 hours following procedure. No strenuous activity/heavy lifting for 24 hours. Walking and normal activity is acceptable, as long as not overdone. You may remove your bandaid and shower after 24 hours. You may resume normal activity after 24 hours. Medications  Resume your usual medications. Get instructions from your primary care physician regarding aspirin and anticoagulant medication. Diet  You have no diet restrictions. Resume your normal diet (including any previous restrictions)  Drink plenty of fluids for the next 24 hours. Non-alcoholic beverages are best (as alcohol can further dehydrate you). Myelogram: About This Test  What is it? A myelogram uses X-rays and a special dye to make pictures of bones and nerves of the spine (spinal canal). The spinal canal holds the spinal cord, the spinal nerve roots, and the fluid-filled space between the bones in your spine. Why is this test done? A myelogram is done to check for: The cause of arm or leg numbness, weakness, or pain. Narrowing of the spinal canal (spinal stenosis). A tumor or infection causing problems with the spinal cord or nerve roots. A spinal disc that has ruptured (herniated disc). Inflammation of the membrane that covers the brain and spinal cord. Problems with the blood vessels to the spine. .  What happens during the test?  The dye is put into your spinal canal with a thin needle. This is called a lumbar puncture. The dye moves through the space so the nerve roots and spinal cord can be seen more clearly. After the dye is put in, you will lie still while the X-ray pictures are taken. How does it feel? You will feel a quick sting from a small needle that has medicine to numb the skin on your back.  You will also feel some pressure as the long, thin spinal needle is put into your spinal canal. You may feel a quick sharp pain down your buttock or leg when the needle is moved in your spine. The dye may make you feel warm and flushed and have a metallic taste in your mouth. What else should you know about the test?  In rare cases, the hole made by the needle in the sac around the spine does not close normally. This can allow spinal fluid to leak out. This leak may need to be repaired through a procedure called an epidural blood patch. To do the patch, your doctor injects some of your own blood to cover the hole. What happens after the test?  You will probably be able to go home one to two hours after the test.  You may need to lie in bed with your head raised for 4 to 24 hours after the test.  Rest as needed. To prevent seizures, which are a rare side effect, do not bend over or lie down with your head lower than your body. Keeping your head higher than your body after a myelogram also may help prevent or reduce other side effects, such as headache, nausea, or vomiting. Avoid strenuous activity, such as running or heavy lifting, for at least 1 day after your myelogram.  Drink plenty of water after the myelogram.    When should you call for help? Call 911 anytime you think you may need emergency care. For example, call if:  You have a seizure. Call your doctor now or seek immediate medical care if:  You have any increase in pain, weakness, or numbness in your legs. You have a severe headache or stiff neck, or your eyes become very sensitive to light. You have a headache that lasts longer than 24 hours. You have problems urinating or having a bowel movement. You have a fever. Your skin becomes itchy, swollen, or you develop a rash. Your needle puncture site becomes red, swollen, or has pus draining from it. If you have questions or problems please call your primary care physician.   [phone] [facility]), [phone] ([address]),  or after regular business hours call [phone] [facility])

## 2023-03-14 DIAGNOSIS — G89.29 CHRONIC BILATERAL LOW BACK PAIN WITH LEFT-SIDED SCIATICA: ICD-10-CM

## 2023-03-14 DIAGNOSIS — M54.42 CHRONIC BILATERAL LOW BACK PAIN WITH LEFT-SIDED SCIATICA: ICD-10-CM

## 2023-03-14 RX ORDER — HYDROCODONE BITARTRATE AND ACETAMINOPHEN 5; 325 MG/1; MG/1
1 TABLET ORAL 2 TIMES DAILY
Qty: 60 TABLET | Refills: 0 | Status: SHIPPED | OUTPATIENT
Start: 2023-03-14 | End: 2023-04-13

## 2023-03-29 ENCOUNTER — TELEPHONE (OUTPATIENT)
Dept: UROLOGY | Age: 78
End: 2023-03-29

## 2023-05-10 RX ORDER — METOPROLOL SUCCINATE 50 MG/1
TABLET, EXTENDED RELEASE ORAL
Qty: 90 TABLET | Refills: 1 | Status: SHIPPED | OUTPATIENT
Start: 2023-05-10

## 2023-05-10 NOTE — TELEPHONE ENCOUNTER
Spencer Cortez called requesting a refill of the below medication which has been pended for you:     Requested Prescriptions     Pending Prescriptions Disp Refills    metoprolol succinate (TOPROL XL) 50 MG extended release tablet [Pharmacy Med Name: METOPROLOL SUCC ER 50 MG TAB] 90 tablet 1     Sig: TAKE ONE TABLET BY MOUTH DAILY       Last Appointment Date: 2/13/2023  Next Appointment Date: 8/10/2023    Allergies   Allergen Reactions    Atarax [Hydroxyzine]      Double vision    Gabapentin      dizzy    Phenergan [Promethazine Hcl] Other (See Comments)     \"out of it\"    Morphine And Related Nausea And Vomiting     hallucinations

## 2023-05-19 ENCOUNTER — OFFICE VISIT (OUTPATIENT)
Dept: NEUROSURGERY | Age: 78
End: 2023-05-19
Payer: MEDICARE

## 2023-05-19 VITALS
OXYGEN SATURATION: 96 % | RESPIRATION RATE: 16 BRPM | SYSTOLIC BLOOD PRESSURE: 118 MMHG | HEART RATE: 62 BPM | HEIGHT: 69 IN | BODY MASS INDEX: 31.55 KG/M2 | WEIGHT: 213 LBS | DIASTOLIC BLOOD PRESSURE: 70 MMHG

## 2023-05-19 DIAGNOSIS — M43.9 ACQUIRED SPINAL DEFORMITY: Primary | ICD-10-CM

## 2023-05-19 DIAGNOSIS — M96.0 PSEUDOARTHROSIS OF THORACIC SPINE AFTER FUSION PROCEDURE: ICD-10-CM

## 2023-05-19 PROCEDURE — G8427 DOCREV CUR MEDS BY ELIG CLIN: HCPCS | Performed by: NEUROLOGICAL SURGERY

## 2023-05-19 PROCEDURE — 1123F ACP DISCUSS/DSCN MKR DOCD: CPT | Performed by: NEUROLOGICAL SURGERY

## 2023-05-19 PROCEDURE — 1036F TOBACCO NON-USER: CPT | Performed by: NEUROLOGICAL SURGERY

## 2023-05-19 PROCEDURE — 99213 OFFICE O/P EST LOW 20 MIN: CPT | Performed by: NEUROLOGICAL SURGERY

## 2023-05-19 PROCEDURE — G8417 CALC BMI ABV UP PARAM F/U: HCPCS | Performed by: NEUROLOGICAL SURGERY

## 2023-05-19 PROCEDURE — 3074F SYST BP LT 130 MM HG: CPT | Performed by: NEUROLOGICAL SURGERY

## 2023-05-19 PROCEDURE — 99214 OFFICE O/P EST MOD 30 MIN: CPT | Performed by: NEUROLOGICAL SURGERY

## 2023-05-19 PROCEDURE — 3078F DIAST BP <80 MM HG: CPT | Performed by: NEUROLOGICAL SURGERY

## 2023-05-19 NOTE — PROGRESS NOTES
[place] [address] [employer]  [address] [facility]  [address] [facility], [address]  [place] [address]  Dept: [phone]    Patient:  Karen Jamil  YOB: 1945  Date: 5/19/23    The patient is a 66 y.o. male who presents today for consult of the following problems:     Chief Complaint   Patient presents with    Back Pain     Recent fall; review CT results             HPI:     Karen Jamil is a 66 y.o. male on whom neurosurgical consultation was requested by Robbie Conner MD for management of severe axial back pain along with radiculopathy down bilateral lower extremities. The patient has undergone significant surgery with what appears to be a T10-L5 posterior fusion. Patient is also had a spinal cord stimulator placed which she states is not able to provide him significant relief. Difficult historian with his wife accompanying. He does state that he has a significant mount of pain a lot of the time but states that he only requires pain pills 2 out of 7 days of the week and is able to function without them most days. However his wife relays that he oftentimes just falls asleep in the recliner due to the discomfort and not wanting to do much. Patient himself relays a questionable history of severe pain and appears that he is able to function majority of time without pain medication and does not feel impaired in terms of function. He does relate forward flexed posture and difficulty with ambulation for prolonged periods of time and states that he does get fatigued but also has to lean forward on surfaces to get improvement. Radiculopathy lower extremities appears to be nonspecific in terms of dermatome but approximate S1.     History:     Past Medical History:   Diagnosis Date    Agoraphobia     Anxiety     Arthritis     Basal cell carcinoma     Chronic pain     back    CKD (chronic kidney disease)

## 2023-05-22 ENCOUNTER — HOSPITAL ENCOUNTER (OUTPATIENT)
Dept: CT IMAGING | Age: 78
Discharge: HOME | End: 2023-05-24
Payer: MEDICARE

## 2023-05-22 DIAGNOSIS — M96.0 PSEUDOARTHROSIS OF THORACIC SPINE AFTER FUSION PROCEDURE: ICD-10-CM

## 2023-05-22 DIAGNOSIS — M43.9 ACQUIRED SPINAL DEFORMITY: ICD-10-CM

## 2023-05-22 PROCEDURE — 72128 CT CHEST SPINE W/O DYE: CPT

## 2023-06-19 DIAGNOSIS — G89.29 CHRONIC BILATERAL LOW BACK PAIN WITH LEFT-SIDED SCIATICA: ICD-10-CM

## 2023-06-19 DIAGNOSIS — M54.42 CHRONIC BILATERAL LOW BACK PAIN WITH LEFT-SIDED SCIATICA: ICD-10-CM

## 2023-06-19 RX ORDER — HYDROCODONE BITARTRATE AND ACETAMINOPHEN 5; 325 MG/1; MG/1
1 TABLET ORAL 2 TIMES DAILY
Qty: 60 TABLET | Refills: 0 | Status: SHIPPED | OUTPATIENT
Start: 2023-06-19 | End: 2023-07-19

## 2023-06-19 NOTE — TELEPHONE ENCOUNTER
OARRS reviewed last filled 3/14/23 for #60/30 days    Last OV: 2/10/23  Next OV: 8/10/23    Pt aware to come in and sign CSA.

## 2023-08-03 ENCOUNTER — HOSPITAL ENCOUNTER (OUTPATIENT)
Age: 78
Discharge: HOME | End: 2023-08-03
Payer: MEDICARE

## 2023-08-03 DIAGNOSIS — I10 ESSENTIAL (PRIMARY) HYPERTENSION: ICD-10-CM

## 2023-08-03 DIAGNOSIS — D50.0 IRON DEFICIENCY ANEMIA DUE TO CHRONIC BLOOD LOSS: ICD-10-CM

## 2023-08-03 DIAGNOSIS — E78.00 PURE HYPERCHOLESTEROLEMIA: ICD-10-CM

## 2023-08-03 LAB
ALBUMIN SERPL-MCNC: 4.3 G/DL (ref 3.5–5.2)
ALBUMIN/GLOB SERPL: 1.5 {RATIO} (ref 1–2.5)
ALP SERPL-CCNC: 63 U/L (ref 40–129)
ALT SERPL-CCNC: 18 U/L (ref 5–41)
ANION GAP SERPL CALCULATED.3IONS-SCNC: 9 MMOL/L (ref 9–17)
AST SERPL-CCNC: 30 U/L
BASOPHILS # BLD: 0.06 K/UL (ref 0–0.2)
BASOPHILS NFR BLD: 1 % (ref 0–2)
BILIRUB SERPL-MCNC: 0.7 MG/DL (ref 0.3–1.2)
BUN SERPL-MCNC: 19 MG/DL (ref 8–23)
BUN/CREAT SERPL: 19 (ref 9–20)
CALCIUM SERPL-MCNC: 9.6 MG/DL (ref 8.6–10.4)
CHLORIDE SERPL-SCNC: 109 MMOL/L (ref 98–107)
CHOLEST SERPL-MCNC: 159 MG/DL
CHOLESTEROL/HDL RATIO: 4.8
CO2 SERPL-SCNC: 25 MMOL/L (ref 20–31)
CREAT SERPL-MCNC: 1 MG/DL (ref 0.7–1.2)
EOSINOPHIL # BLD: 0.14 K/UL (ref 0–0.44)
EOSINOPHILS RELATIVE PERCENT: 2 % (ref 1–4)
ERYTHROCYTE [DISTWIDTH] IN BLOOD BY AUTOMATED COUNT: 13.8 % (ref 11.8–14.4)
GFR SERPL CREATININE-BSD FRML MDRD: >60 ML/MIN/1.73M2
GLUCOSE SERPL-MCNC: 98 MG/DL (ref 70–99)
HCT VFR BLD AUTO: 45.6 % (ref 40.7–50.3)
HDLC SERPL-MCNC: 33 MG/DL
HGB BLD-MCNC: 15.2 G/DL (ref 13–17)
IMM GRANULOCYTES # BLD AUTO: <0.03 K/UL (ref 0–0.3)
IMM GRANULOCYTES NFR BLD: 0 %
LDLC SERPL CALC-MCNC: 102 MG/DL (ref 0–130)
LYMPHOCYTES NFR BLD: 2.45 K/UL (ref 1.1–3.7)
LYMPHOCYTES RELATIVE PERCENT: 32 % (ref 24–43)
MCH RBC QN AUTO: 29.6 PG (ref 25.2–33.5)
MCHC RBC AUTO-ENTMCNC: 33.3 G/DL (ref 25.2–33.5)
MCV RBC AUTO: 88.7 FL (ref 82.6–102.9)
MONOCYTES NFR BLD: 0.82 K/UL (ref 0.1–1.2)
MONOCYTES NFR BLD: 11 % (ref 3–12)
NEUTROPHILS NFR BLD: 54 % (ref 36–65)
NEUTS SEG NFR BLD: 4.23 K/UL (ref 1.5–8.1)
NRBC BLD-RTO: 0 PER 100 WBC
PLATELET # BLD AUTO: 209 K/UL (ref 138–453)
PMV BLD AUTO: 9.5 FL (ref 8.1–13.5)
POTASSIUM SERPL-SCNC: 4.2 MMOL/L (ref 3.7–5.3)
PROT SERPL-MCNC: 7.2 G/DL (ref 6.4–8.3)
RBC # BLD AUTO: 5.14 M/UL (ref 4.21–5.77)
SODIUM SERPL-SCNC: 143 MMOL/L (ref 135–144)
TRIGL SERPL-MCNC: 121 MG/DL
WBC OTHER # BLD: 7.7 K/UL (ref 3.5–11.3)

## 2023-08-03 PROCEDURE — 36415 COLL VENOUS BLD VENIPUNCTURE: CPT

## 2023-08-03 PROCEDURE — 80061 LIPID PANEL: CPT

## 2023-08-03 PROCEDURE — 80053 COMPREHEN METABOLIC PANEL: CPT

## 2023-08-03 PROCEDURE — 85025 COMPLETE CBC W/AUTO DIFF WBC: CPT

## 2023-08-10 ENCOUNTER — OFFICE VISIT (OUTPATIENT)
Dept: FAMILY MEDICINE CLINIC | Age: 78
End: 2023-08-10
Payer: MEDICARE

## 2023-08-10 VITALS
DIASTOLIC BLOOD PRESSURE: 60 MMHG | BODY MASS INDEX: 31.28 KG/M2 | HEART RATE: 58 BPM | WEIGHT: 211.2 LBS | HEIGHT: 69 IN | OXYGEN SATURATION: 95 % | SYSTOLIC BLOOD PRESSURE: 110 MMHG

## 2023-08-10 DIAGNOSIS — H90.6 MIXED CONDUCTIVE AND SENSORINEURAL HEARING LOSS OF BOTH EARS: ICD-10-CM

## 2023-08-10 DIAGNOSIS — N18.30 STAGE 3 CHRONIC KIDNEY DISEASE, UNSPECIFIED WHETHER STAGE 3A OR 3B CKD (HCC): Primary | ICD-10-CM

## 2023-08-10 DIAGNOSIS — Z99.89 OSA ON CPAP: ICD-10-CM

## 2023-08-10 DIAGNOSIS — G47.33 OSA ON CPAP: ICD-10-CM

## 2023-08-10 DIAGNOSIS — D50.0 IRON DEFICIENCY ANEMIA DUE TO CHRONIC BLOOD LOSS: ICD-10-CM

## 2023-08-10 DIAGNOSIS — G89.29 ABDOMINAL PAIN, CHRONIC, LEFT LOWER QUADRANT: ICD-10-CM

## 2023-08-10 DIAGNOSIS — E78.00 PURE HYPERCHOLESTEROLEMIA: ICD-10-CM

## 2023-08-10 DIAGNOSIS — Z12.5 SCREENING PSA (PROSTATE SPECIFIC ANTIGEN): ICD-10-CM

## 2023-08-10 DIAGNOSIS — I10 ESSENTIAL (PRIMARY) HYPERTENSION: ICD-10-CM

## 2023-08-10 DIAGNOSIS — M48.062 SPINAL STENOSIS, LUMBAR REGION, WITH NEUROGENIC CLAUDICATION: ICD-10-CM

## 2023-08-10 DIAGNOSIS — R10.32 ABDOMINAL PAIN, CHRONIC, LEFT LOWER QUADRANT: ICD-10-CM

## 2023-08-10 DIAGNOSIS — F32.5 MAJOR DEPRESSIVE DISORDER IN FULL REMISSION, UNSPECIFIED WHETHER RECURRENT (HCC): ICD-10-CM

## 2023-08-10 DIAGNOSIS — C44.92 SCC (SQUAMOUS CELL CARCINOMA): ICD-10-CM

## 2023-08-10 PROCEDURE — 3074F SYST BP LT 130 MM HG: CPT | Performed by: FAMILY MEDICINE

## 2023-08-10 PROCEDURE — 3078F DIAST BP <80 MM HG: CPT | Performed by: FAMILY MEDICINE

## 2023-08-10 PROCEDURE — 99213 OFFICE O/P EST LOW 20 MIN: CPT | Performed by: FAMILY MEDICINE

## 2023-08-10 PROCEDURE — 1123F ACP DISCUSS/DSCN MKR DOCD: CPT | Performed by: FAMILY MEDICINE

## 2023-08-10 PROCEDURE — G8417 CALC BMI ABV UP PARAM F/U: HCPCS | Performed by: FAMILY MEDICINE

## 2023-08-10 PROCEDURE — 1036F TOBACCO NON-USER: CPT | Performed by: FAMILY MEDICINE

## 2023-08-10 PROCEDURE — 99214 OFFICE O/P EST MOD 30 MIN: CPT | Performed by: FAMILY MEDICINE

## 2023-08-10 PROCEDURE — G8427 DOCREV CUR MEDS BY ELIG CLIN: HCPCS | Performed by: FAMILY MEDICINE

## 2023-08-10 RX ORDER — FENOFIBRATE 145 MG/1
TABLET, COATED ORAL
Qty: 90 TABLET | Refills: 3 | Status: SHIPPED | OUTPATIENT
Start: 2023-08-10

## 2023-08-10 RX ORDER — SERTRALINE HYDROCHLORIDE 100 MG/1
TABLET, FILM COATED ORAL
Qty: 90 TABLET | Refills: 2 | Status: SHIPPED | OUTPATIENT
Start: 2023-08-10

## 2023-08-10 RX ORDER — SIMVASTATIN 20 MG
TABLET ORAL
Qty: 90 TABLET | Refills: 3 | Status: SHIPPED | OUTPATIENT
Start: 2023-08-10

## 2023-08-10 ASSESSMENT — ENCOUNTER SYMPTOMS
BACK PAIN: 1
RESPIRATORY NEGATIVE: 1
EYES NEGATIVE: 1
ABDOMINAL PAIN: 1
ALLERGIC/IMMUNOLOGIC NEGATIVE: 1

## 2023-08-10 NOTE — PROGRESS NOTES
Subjective:      Patient ID: Ra Pope is a 66 y.o. male. Hypertension    Back Pain  Associated symptoms include abdominal pain (LLQ) and numbness (more into left leg and feet with prolonged standing, both right and left feet. ). Other  Associated symptoms include abdominal pain (LLQ) and numbness (more into left leg and feet with prolonged standing, both right and left feet. ). Hyperlipidemia    Foot Pain   Associated symptoms include numbness (more into left leg and feet with prolonged standing, both right and left feet. ). Routine follow up on chronic medical conditions, refills, and review of updated labs. I have reviewed the patient's medical history in detail and updated the computerized patient record. He had back surgery 1/26/18 with thoracic and lumbar posterior decompression T9-L5. He had gradual, increasing pain in the months after the surgery, actually more painful than prior to surgery. More extensive lumbar fusion in 2017 with instrumentation. Most recently with spinal cord stimulator placement 11/18. Back pain not as severe and more tolerable. Still with pain that comes and goes. Still has some burning paresthesias and numbness into the right leg off and on, but less severe than prior. Currently with bilateral radicular symptoms described. Still reporting daily pain, limiting his activity. Needed pain pill to get up out of bed and get going. Sleeping in a recliner seems to go better overnight. Consulting with malini, Dr Nate Medellin. Offered surgery. Pascale Domínguez has deferred at present. Compliant with medications . Mood stable. No anxiety of concern. No significant other joint arthritis of concern. Hearing loss better with hearing aides. Fatigues easily and somewhat sob with walking. No urination or bowel issues of concern on review today. Not compliant with cpap. Left lower abdominal pain .     Pain comes almost exclusively at night when he lays down, within a

## 2023-08-10 NOTE — PATIENT INSTRUCTIONS
Hospital Outpatient Visit on 08/03/2023   Component Date Value Ref Range Status    WBC 08/03/2023 7.7  3.5 - 11.3 k/uL Final    RBC 08/03/2023 5.14  4.21 - 5.77 m/uL Final    Hemoglobin 08/03/2023 15.2  13.0 - 17.0 g/dL Final    Hematocrit 08/03/2023 45.6  40.7 - 50.3 % Final    MCV 08/03/2023 88.7  82.6 - 102.9 fL Final    MCH 08/03/2023 29.6  25.2 - 33.5 pg Final    MCHC 08/03/2023 33.3  25.2 - 33.5 g/dL Final    RDW 08/03/2023 13.8  11.8 - 14.4 % Final    Platelets 65/75/9173 209  138 - 453 k/uL Final    MPV 08/03/2023 9.5  8.1 - 13.5 fL Final    NRBC Automated 08/03/2023 0.0  0.0 per 100 WBC Final    Neutrophils % 08/03/2023 54  36 - 65 % Final    Lymphocytes % 08/03/2023 32  24 - 43 % Final    Monocytes % 08/03/2023 11  3 - 12 % Final    Eosinophils % 08/03/2023 2  1 - 4 % Final    Basophils % 08/03/2023 1  0 - 2 % Final    Immature Granulocytes 08/03/2023 0  0 % Final    Neutrophils Absolute 08/03/2023 4.23  1.50 - 8.10 k/uL Final    Lymphocytes Absolute 08/03/2023 2.45  1.10 - 3.70 k/uL Final    Monocytes Absolute 08/03/2023 0.82  0.10 - 1.20 k/uL Final    Eosinophils Absolute 08/03/2023 0.14  0.00 - 0.44 k/uL Final    Basophils Absolute 08/03/2023 0.06  0.00 - 0.20 k/uL Final    Absolute Immature Granulocyte 08/03/2023 <0.03  0.00 - 0.30 k/uL Final    Cholesterol 08/03/2023 159  <200 mg/dL Final    Comment:    Cholesterol Guidelines:      <200  Desirable   200-240  Borderline      >240  Undesirable         HDL 08/03/2023 33 (L)  >40 mg/dL Final    Comment:    HDL Guidelines:    <40     Undesirable   40-59    Borderline    >59     Desirable         LDL Cholesterol 08/03/2023 102  0 - 130 mg/dL Final    Comment:    LDL Guidelines:     <100    Desirable   100-129   Near to/above Desirable   130-159   Borderline      >159   Undesirable     Direct (measured) LDL and calculated LDL are not interchangeable tests.       Chol/HDL Ratio 08/03/2023 4.8  <5 Final            Triglycerides 08/03/2023 121  <150 mg/dL Final

## 2023-08-30 ENCOUNTER — TELEPHONE (OUTPATIENT)
Dept: UROLOGY | Age: 78
End: 2023-08-30

## 2023-08-30 NOTE — TELEPHONE ENCOUNTER
[facility] SPECIALTY [facility]    Pre-Operative Evaluation/Consultation    Name:  Susanne Lara                                         Age:  66 y.o. MRN:  [id number]       :  1945   Date:  2023         Sex: male    There were no encounter diagnoses. Surgeon:  Dr. Byron Jasso   Procedure (Planned):  cysto, greenlight PVP   Date Scheduled surgery: 23    Attending : No att. providers found    Primary Physician: Kolby Ferraro   Cardiologist: None    Type of Anesthesia Requested: General    Patient Medical history: Allergies   Allergen Reactions    Atarax [Hydroxyzine]      Double vision    Gabapentin      dizzy    Phenergan [Promethazine Hcl] Other (See Comments)     \"out of it\"    Morphine And Related Nausea And Vomiting     hallucinations     Patient Active Problem List   Diagnosis    Hyperlipemia    RAFAT on CPAP    Anxiety    Spinal stenosis, lumbar region, with neurogenic claudication    Degeneration of lumbar or lumbosacral intervertebral disc    CKD (chronic kidney disease)    SCC (squamous cell carcinoma)    Claustrophobia    Depression    Hearing loss    Essential (primary) hypertension    Agoraphobia    Mass in neck    Left foot drop    Lumbar disc herniation    Flat back syndrome, postprocedural    Back pain    Postlaminectomy syndrome, lumbar region    Major depressive disorder in full remission ([address])    Lumbar facet joint syndrome    Panniculitis involving lumbar region     Past Medical History:   Diagnosis Date    Agoraphobia     Anxiety     Arthritis     Basal cell carcinoma     Chronic pain     back    CKD (chronic kidney disease)     Claustrophobia     Enlarged lymph node     rt. lower neck. Hearing aid worn     dar. ears.     Hearing loss     Hyperlipidemia     Hypertension     RAFAT (obstructive sleep apnea)     CPAP occasional    PONV (postoperative nausea and vomiting)     Retention of urine     last 2 surgeries, patient unable to void, home with catheter both times    SCC (squamous cell

## 2023-09-11 ENCOUNTER — HOSPITAL ENCOUNTER (OUTPATIENT)
Age: 78
Setting detail: OUTPATIENT SURGERY
Discharge: HOME | End: 2023-09-11
Attending: UROLOGY | Admitting: UROLOGY
Payer: MEDICARE

## 2023-09-11 ENCOUNTER — ANESTHESIA EVENT (OUTPATIENT)
Dept: OPERATING ROOM | Age: 78
End: 2023-09-11
Payer: MEDICARE

## 2023-09-11 ENCOUNTER — ANESTHESIA (OUTPATIENT)
Dept: OPERATING ROOM | Age: 78
End: 2023-09-11
Payer: MEDICARE

## 2023-09-11 VITALS
DIASTOLIC BLOOD PRESSURE: 81 MMHG | HEART RATE: 69 BPM | TEMPERATURE: 97.6 F | OXYGEN SATURATION: 95 % | RESPIRATION RATE: 16 BRPM | BODY MASS INDEX: 30.38 KG/M2 | HEIGHT: 70 IN | SYSTOLIC BLOOD PRESSURE: 168 MMHG | WEIGHT: 212.2 LBS

## 2023-09-11 DIAGNOSIS — G89.18 POSTOPERATIVE PAIN: Primary | ICD-10-CM

## 2023-09-11 PROCEDURE — 3600000013 HC SURGERY LEVEL 3 ADDTL 15MIN: Performed by: UROLOGY

## 2023-09-11 PROCEDURE — 3700000000 HC ANESTHESIA ATTENDED CARE: Performed by: UROLOGY

## 2023-09-11 PROCEDURE — 2580000003 HC RX 258: Performed by: UROLOGY

## 2023-09-11 PROCEDURE — 6360000002 HC RX W HCPCS: Performed by: UROLOGY

## 2023-09-11 PROCEDURE — 7100000010 HC PHASE II RECOVERY - FIRST 15 MIN: Performed by: UROLOGY

## 2023-09-11 PROCEDURE — 2709999900 HC NON-CHARGEABLE SUPPLY: Performed by: UROLOGY

## 2023-09-11 PROCEDURE — 7100000000 HC PACU RECOVERY - FIRST 15 MIN: Performed by: UROLOGY

## 2023-09-11 PROCEDURE — 7100000011 HC PHASE II RECOVERY - ADDTL 15 MIN: Performed by: UROLOGY

## 2023-09-11 PROCEDURE — 3700000001 HC ADD 15 MINUTES (ANESTHESIA): Performed by: UROLOGY

## 2023-09-11 PROCEDURE — 3600000003 HC SURGERY LEVEL 3 BASE: Performed by: UROLOGY

## 2023-09-11 PROCEDURE — 6360000002 HC RX W HCPCS: Performed by: NURSE ANESTHETIST, CERTIFIED REGISTERED

## 2023-09-11 PROCEDURE — 2500000003 HC RX 250 WO HCPCS: Performed by: NURSE ANESTHETIST, CERTIFIED REGISTERED

## 2023-09-11 PROCEDURE — 7100000001 HC PACU RECOVERY - ADDTL 15 MIN: Performed by: UROLOGY

## 2023-09-11 RX ORDER — FENTANYL CITRATE 50 UG/ML
INJECTION, SOLUTION INTRAMUSCULAR; INTRAVENOUS PRN
Status: DISCONTINUED | OUTPATIENT
Start: 2023-09-11 | End: 2023-09-11 | Stop reason: SDUPTHER

## 2023-09-11 RX ORDER — PHENAZOPYRIDINE HYDROCHLORIDE 200 MG/1
200 TABLET, FILM COATED ORAL 3 TIMES DAILY PRN
Qty: 9 TABLET | Refills: 1 | Status: SHIPPED | OUTPATIENT
Start: 2023-09-11 | End: 2023-09-14

## 2023-09-11 RX ORDER — HYDROCODONE BITARTRATE AND ACETAMINOPHEN 5; 325 MG/1; MG/1
1 TABLET ORAL EVERY 6 HOURS PRN
Qty: 18 TABLET | Refills: 0 | Status: SHIPPED | OUTPATIENT
Start: 2023-09-11 | End: 2023-09-16

## 2023-09-11 RX ORDER — SODIUM CHLORIDE, SODIUM LACTATE, POTASSIUM CHLORIDE, CALCIUM CHLORIDE 600; 310; 30; 20 MG/100ML; MG/100ML; MG/100ML; MG/100ML
INJECTION, SOLUTION INTRAVENOUS CONTINUOUS
Status: DISCONTINUED | OUTPATIENT
Start: 2023-09-11 | End: 2023-09-11 | Stop reason: HOSPADM

## 2023-09-11 RX ORDER — EPHEDRINE SULFATE/0.9% NACL/PF 50 MG/5 ML
SYRINGE (ML) INTRAVENOUS PRN
Status: DISCONTINUED | OUTPATIENT
Start: 2023-09-11 | End: 2023-09-11 | Stop reason: SDUPTHER

## 2023-09-11 RX ORDER — PROPOFOL 10 MG/ML
INJECTION, EMULSION INTRAVENOUS PRN
Status: DISCONTINUED | OUTPATIENT
Start: 2023-09-11 | End: 2023-09-11 | Stop reason: SDUPTHER

## 2023-09-11 RX ORDER — ONDANSETRON 2 MG/ML
INJECTION INTRAMUSCULAR; INTRAVENOUS PRN
Status: DISCONTINUED | OUTPATIENT
Start: 2023-09-11 | End: 2023-09-11 | Stop reason: SDUPTHER

## 2023-09-11 RX ORDER — SODIUM CHLORIDE 0.9 % (FLUSH) 0.9 %
5-40 SYRINGE (ML) INJECTION EVERY 12 HOURS SCHEDULED
Status: DISCONTINUED | OUTPATIENT
Start: 2023-09-11 | End: 2023-09-11 | Stop reason: HOSPADM

## 2023-09-11 RX ORDER — SODIUM CHLORIDE 0.9 % (FLUSH) 0.9 %
5-40 SYRINGE (ML) INJECTION PRN
Status: DISCONTINUED | OUTPATIENT
Start: 2023-09-11 | End: 2023-09-11 | Stop reason: HOSPADM

## 2023-09-11 RX ORDER — CEPHALEXIN 500 MG/1
500 CAPSULE ORAL 3 TIMES DAILY
Qty: 15 CAPSULE | Refills: 0 | Status: SHIPPED | OUTPATIENT
Start: 2023-09-11 | End: 2023-09-16

## 2023-09-11 RX ORDER — SODIUM CHLORIDE 9 MG/ML
INJECTION, SOLUTION INTRAVENOUS PRN
Status: DISCONTINUED | OUTPATIENT
Start: 2023-09-11 | End: 2023-09-11 | Stop reason: HOSPADM

## 2023-09-11 RX ORDER — LIDOCAINE HYDROCHLORIDE 20 MG/ML
INJECTION, SOLUTION EPIDURAL; INFILTRATION; INTRACAUDAL; PERINEURAL PRN
Status: DISCONTINUED | OUTPATIENT
Start: 2023-09-11 | End: 2023-09-11 | Stop reason: SDUPTHER

## 2023-09-11 RX ADMIN — PROPOFOL 150 MG: 10 INJECTION, EMULSION INTRAVENOUS at 12:38

## 2023-09-11 RX ADMIN — FENTANYL CITRATE 50 MCG: 50 INJECTION, SOLUTION INTRAMUSCULAR; INTRAVENOUS at 12:45

## 2023-09-11 RX ADMIN — FENTANYL CITRATE 25 MCG: 50 INJECTION, SOLUTION INTRAMUSCULAR; INTRAVENOUS at 13:03

## 2023-09-11 RX ADMIN — SODIUM CHLORIDE, POTASSIUM CHLORIDE, SODIUM LACTATE AND CALCIUM CHLORIDE: 600; 310; 30; 20 INJECTION, SOLUTION INTRAVENOUS at 12:22

## 2023-09-11 RX ADMIN — FENTANYL CITRATE 25 MCG: 50 INJECTION, SOLUTION INTRAMUSCULAR; INTRAVENOUS at 13:05

## 2023-09-11 RX ADMIN — Medication 2000 MG: at 12:41

## 2023-09-11 RX ADMIN — Medication 10 MG: at 13:17

## 2023-09-11 RX ADMIN — Medication 20 MG: at 12:58

## 2023-09-11 RX ADMIN — Medication 10 MG: at 13:05

## 2023-09-11 RX ADMIN — Medication 10 MG: at 12:50

## 2023-09-11 RX ADMIN — LIDOCAINE HYDROCHLORIDE 50 MG: 20 INJECTION, SOLUTION EPIDURAL; INFILTRATION; INTRACAUDAL; PERINEURAL at 12:38

## 2023-09-11 RX ADMIN — ONDANSETRON 4 MG: 2 INJECTION INTRAMUSCULAR; INTRAVENOUS at 13:05

## 2023-09-11 RX ADMIN — PROPOFOL 50 MG: 10 INJECTION, EMULSION INTRAVENOUS at 12:41

## 2023-09-11 ASSESSMENT — PAIN - FUNCTIONAL ASSESSMENT: PAIN_FUNCTIONAL_ASSESSMENT: 0-10

## 2023-09-11 ASSESSMENT — PAIN SCALES - GENERAL: PAINLEVEL_OUTOF10: 0

## 2023-09-11 NOTE — BRIEF OP NOTE
Brief Postoperative Note      Patient: Katerin Pedro  YOB: 1945  MRN: [id number]    Date of Procedure: 9/11/2023    Pre-Op Diagnosis Codes: * Benign prostatic hyperplasia with urinary obstruction [N40.1, N13.8]    Post-Op Diagnosis: Same       Procedure(s):  Cystoscopy Photovaporization of Prostate Greenlight Laser    Surgeon(s):  Shane Buck MD    Assistant:  * No surgical staff found *    Anesthesia: General    Estimated Blood Loss (mL): Minimal    Complications: None    Specimens:   * No specimens in log *    Implants:  * No implants in log *      Drains:   Urinary Catheter 09/11/23 3 Way (Active)   Catheter Indications Urinary retention (acute or chronic), continuous bladder irrigation or bladder outlet obstruction 09/11/23 1327   Urine Color Yellow 09/11/23 1405   Urine Appearance Cloudy 09/11/23 1405   Urine Odor Other (Comment) 09/11/23 1327   Collection Container Leg bag 09/11/23 1405   Securement Method Leg strap 09/11/23 1405   Catheter Best Practices  Catheter secured to thigh 09/11/23 1327   Status Draining 09/11/23 1405       Findings: rodriguez out tomorrow. Anamaria in 2-5 weeks with pvr.  Janes Sands 4 months      Electronically signed by Mukesh Keys MD on 9/11/2023 at 2:11 PM

## 2023-09-11 NOTE — DISCHARGE INSTRUCTIONS
-OK to dc home.  -Home with rodriguez; you may see some blood in urine in the tubing and this is normal as long as the catheter is draining well  -If catheter does not draining, call the office or report to the ER  -Stay well hydrated  -No heavy lifting >20lbs for 6 weeks  -You may see blood with urination on and off for 4 weeks, this is normal and will improve. The most important thing is if the catheter is draining and you are able to empty your bladder.  -You may have burning with urination on and off for 2-3 weeks, this is normal and should improve  - Report to the ER if chest pain, shortness of breath, fever >101.5  - You may take tylenol or motrin OTC as needed for pain  - Take antibiotics as prescribed the day before catheter removal  - Make sure you take stool softeners so that you are not straining during bowel movements    Pt will Follow up with Gustave Harada, MD for a void trial. Call office for follow up. You may resume your blood thinners in 72 hours. Post Operative UROLOGY Instructions: It is very important to increase your fluid intake for the first few days - water is preferred. You may experience slight discomfort with urination for 1-2 days following surgery. Your urine may also be colored red. Take medications as prescribed. If you are experiencing only minor discomfort, you may take Tylenol or any non-prescription pain medication. Activity  You have received sedation. Your judgement and coordination may be impaired.  -Do not drive or operate any machinery today.  -Do not make personal, medical, legal, or business decisions today.  -Do not consume alcohol, tranquilizers, sleeping or non-prescription medications today, unless indicated by your physician.  -You may resume normal activities after 24 hours and return to work unless otherwise stated by your doctor.     Diet  Unless otherwise instructed, begin with clear liquids and progress to your normal diet if you are not

## 2023-09-11 NOTE — H&P
Mukesh Keys MD  History and Physical    Patient:  Katerin Pedro  MRN: [id number]  YOB: 1945    HISTORY OF PRESENT ILLNESS:     The patient is a 66 y.o. male who presents with bph. Here for procedure. Patient's old records, notes and chart reviewed and summarized above. Mukesh Keys MD independently reviewed the images and verified the radiology reports from:    No results found. Past Medical History:    Past Medical History:   Diagnosis Date    Agoraphobia     Anxiety     Arthritis     Basal cell carcinoma     Chronic pain     back    CKD (chronic kidney disease)     Claustrophobia     Enlarged lymph node     rt. lower neck. Hearing aid worn     dar. ears.     Hearing loss     Hyperlipidemia     Hypertension     RAFAT (obstructive sleep apnea)     CPAP occasional    PONV (postoperative nausea and vomiting)     Retention of urine     last 2 surgeries, patient unable to void, home with catheter both times    SCC (squamous cell carcinoma)     HAND    Spinal stenosis, lumbar region, with neurogenic claudication     Wears glasses        Past Surgical History:    Past Surgical History:   Procedure Laterality Date    BACK SURGERY  10/2013    Dr. Eric Brewer hardware lumbar    COLONOSCOPY  06/13/12    mild diverticular dz    LUMBAR FUSION N/A 3/8/2017    LUMBAR L2-3 POSTERIOR DECOMPRESSION FUSION INSTRUMENTATION W/REVISION L3-5 POSTERIOR DECOMPRESSION FUSION INSTRUMENTATION (KOKI GOLBUS & [employer])  CC SN/KILEY performed by Khushbu Muñiz MD at [address] [place] [address] Right     lower neck, 2-    PAIN MANAGEMENT PROCEDURE Bilateral 3/12/2021    Bilateral S1 TRANSFORAMINAL performed by Sallie Elmore MD at [address] Bilateral 3/26/2021    Bilateral S1 TRANSFORAMINAL performed by Sallie Elmroe MD at [address] Bilateral 7/8/2021    BILAT L4-L5 L5-S1 FACET performed by Sallie Elmore MD at [address] POSTERIOR/PSTLAT

## 2023-09-11 NOTE — ANESTHESIA POSTPROCEDURE EVALUATION
Department of Anesthesiology  Postprocedure Note    Patient: Tiffany Woodson  MRN: [id number]  YOB: 1945  Date of evaluation: 9/11/2023      Procedure Summary     Date: 09/11/23 [facility] [address] [facility]    Anesthesia Start: 3447 Anesthesia Stop: 1326    Procedure: Cystoscopy Photovaporization of Prostate Greenlight Laser (Urethra) Diagnosis:       Benign prostatic hyperplasia with urinary obstruction      (Benign prostatic hyperplasia with urinary obstruction [N40.1, N13.8])    Surgeons: Carlos Ulloa MD Responsible Provider: KM Medel CRNA    Anesthesia Type: general ASA Status: 3          Anesthesia Type: No value filed.     Shalini Phase I: Shalini Score: 7    Shalini Phase II:        Anesthesia Post Evaluation    Patient location during evaluation: bedside  Patient participation: complete - patient participated  Level of consciousness: awake and alert  Pain score: 0  Airway patency: patent  Nausea & Vomiting: no nausea and no vomiting  Complications: no  Cardiovascular status: blood pressure returned to baseline  Respiratory status: acceptable  Hydration status: euvolemic  Pain management: adequate

## 2023-09-12 ENCOUNTER — NURSE ONLY (OUTPATIENT)
Dept: UROLOGY | Age: 78
End: 2023-09-12

## 2023-09-12 DIAGNOSIS — N40.1 HYPERTROPHY OF PROSTATE WITH URINARY OBSTRUCTION: Primary | ICD-10-CM

## 2023-09-12 DIAGNOSIS — N13.8 HYPERTROPHY OF PROSTATE WITH URINARY OBSTRUCTION: Primary | ICD-10-CM

## 2023-09-22 NOTE — OP NOTE
Kellie Bellamy MD.  [address] [facility]: 9/11/2023  Patient:  Jacinta Akins  MRN: [id number]  YOB: 1945    SURGEON: Kellie Bellamy MD.    ASSISTANT: none    PREOPERATIVE DIAGNOSIS: BPH with outlet obstruction    POSTOPERATIVE DIAGNOSIS: BPH with outlet obstruction    PROCEDURE PERFORMED:  Cystoscopy, Greenlight Photovaporization of Prostate,        ANESTHESIA: General    COMPLICATIONS: none    OR BLOOD LOSS:  Minimal    FLUIDS: Cystalloids per Anesthesia    SPECIMENS:  * No specimens in log *  none    DRAINS: [address] urethral rodriguez    INDICATIONS FOR PROCEDURE:  The patient is a 66 y.o. male who presents today with Benign prostatic hyperplasia with urinary obstruction [N40.1, N13.8] here for Cystoscopy Photovaporization of Prostate Greenlight Laser. After risks, benefits and alternatives of the procedure were discussed with the patient, the patient elected to proceed. DETAILS OF PROCEDURE:  After informed consent was obtained in the preoperative area, the patient was taken back to the operating room and transferred to the operating table in supine position. EPC cuffs were placed. The machine was turned on. Anesthesia was induced and antibiotics were given. The patient was placed in modified dorsal lithotomy position and sterilely prepped and draped in a standard fashion. A timeout occurred. Two patient identifiers were used. The 25F rigid scope with the visual obturator was carefully advanced through the urethra. .  Once within the bladder the visual obturator was exchanged for the resection bridge. The ureteral orifices were located and noted to be remote from the bladder neck. The prostate was surveyed. the lateral lobes were noted to be significantly obstructing. . There was no median lobe present. Enucleation of the median lobe was not performed. The vaporization was started proximal with a power setting of 80W.  Both the left and

## 2023-10-06 ENCOUNTER — IMMUNIZATION (OUTPATIENT)
Dept: LAB | Age: 78
End: 2023-10-06
Payer: MEDICARE

## 2023-10-06 PROCEDURE — 90694 VACC AIIV4 NO PRSRV 0.5ML IM: CPT | Performed by: FAMILY MEDICINE

## 2023-10-10 ENCOUNTER — OFFICE VISIT (OUTPATIENT)
Dept: UROLOGY | Age: 78
End: 2023-10-10
Payer: MEDICARE

## 2023-10-10 VITALS
HEIGHT: 70 IN | DIASTOLIC BLOOD PRESSURE: 84 MMHG | SYSTOLIC BLOOD PRESSURE: 138 MMHG | WEIGHT: 209 LBS | OXYGEN SATURATION: 96 % | RESPIRATION RATE: 16 BRPM | BODY MASS INDEX: 29.92 KG/M2 | HEART RATE: 62 BPM

## 2023-10-10 DIAGNOSIS — Z87.898 HISTORY OF URINARY RETENTION: ICD-10-CM

## 2023-10-10 DIAGNOSIS — R35.1 NOCTURIA: ICD-10-CM

## 2023-10-10 DIAGNOSIS — R33.9 INCOMPLETE BLADDER EMPTYING: ICD-10-CM

## 2023-10-10 DIAGNOSIS — N13.8 HYPERTROPHY OF PROSTATE WITH URINARY OBSTRUCTION: Primary | ICD-10-CM

## 2023-10-10 DIAGNOSIS — N40.1 HYPERTROPHY OF PROSTATE WITH URINARY OBSTRUCTION: Primary | ICD-10-CM

## 2023-10-10 DIAGNOSIS — R32 URINARY INCONTINENCE, UNSPECIFIED TYPE: ICD-10-CM

## 2023-10-10 LAB — POST VOID RESIDUAL (PVR): 45 ML

## 2023-10-10 PROCEDURE — PBSHW PR MEAS POST-VOIDING RESIDUAL URINE&/BLADDER CAP: Performed by: NURSE PRACTITIONER

## 2023-10-10 PROCEDURE — 99213 OFFICE O/P EST LOW 20 MIN: CPT | Performed by: NURSE PRACTITIONER

## 2023-10-10 PROCEDURE — 51798 US URINE CAPACITY MEASURE: CPT | Performed by: NURSE PRACTITIONER

## 2023-10-10 PROCEDURE — 99024 POSTOP FOLLOW-UP VISIT: CPT | Performed by: NURSE PRACTITIONER

## 2023-10-10 NOTE — PATIENT INSTRUCTIONS
Okay to stop Flomax for 1-2 weeks. If symptoms worsen, okay to restart. Call sooner with any questions or concerns.

## 2023-10-10 NOTE — PROGRESS NOTES
Post void residual by bladder scanner 45cc.
non-tender. POC  No results found for this visit on 10/10/23. Patients recent PSA values are as follows  Lab Results   Component Value Date    PSA 1.38 02/03/2023    PSA 1.09 07/13/2021    PSA 1.20 07/13/2020        Recent BUN/Creatinine:  Lab Results   Component Value Date/Time    BUN 19 08/03/2023 08:03 AM    CREATININE 1.0 08/03/2023 08:03 AM       Radiology  No recent images reviewed. Assessment/Plan:   1. Hypertrophy of prostate with urinary obstruction  2. Nocturia  3. History of urinary retention  4. Incomplete bladder emptying  - KS MYCHAL POST-VOIDING RESIDUAL URINE&/BLADDER CAP  5. Urinary incontinence, unspecified type    - S/p Cystoscopy Photovaporization of Prostate Greenlight Laser  - Nocturia improved. - PVR 45ml   - Continue oxybutynin for incontinence. Wife reports believes this has improved. - Okay for trial period of stopping Flomax, if symptoms worsen restart.       -Patient has no other questions, comments, or concerns.   -They agree with and understand the plan of care. -The patient was encouraged to call the office or seek emergency care should this change.       Jayden Childs, APRN - CNP

## 2023-11-06 RX ORDER — METOPROLOL SUCCINATE 50 MG/1
TABLET, EXTENDED RELEASE ORAL
Qty: 90 TABLET | Refills: 1 | Status: SHIPPED | OUTPATIENT
Start: 2023-11-06

## 2023-11-06 NOTE — TELEPHONE ENCOUNTER
Aren Iraheta called requesting a refill of the below medication which has been pended for you:     Requested Prescriptions     Pending Prescriptions Disp Refills    metoprolol succinate (TOPROL XL) 50 MG extended release tablet [Pharmacy Med Name: METOPROLOL SUCC ER 50 MG TAB] 90 tablet 1     Sig: TAKE ONE TABLET BY MOUTH DAILY       Last Appointment Date: 8/10/2023  Next Appointment Date: 2/13/2024    Allergies   Allergen Reactions    Atarax [Hydroxyzine]      Double vision    Gabapentin      dizzy    Phenergan [Promethazine Hcl] Other (See Comments)     \"out of it\"    Morphine And Related Nausea And Vomiting     hallucinations

## 2023-11-08 NOTE — CARE COORDINATION
[id number]    Admit date:  1/26/2018  Discharge date:  *Code Status Order: Full Code   Advance Directives: no  Admitting Physician:  Gracie Elliott MD  PCP: Nneka Pike MD    Discharging Nurse: [facility] Unit/Room#: 6435/3779-23  Discharging Unit Phone Number: [phone]    Emergency Contact:   Contact 1: Name: Jennifer Johnson   Contact 1: Number: [phone]  Contact 1: Relationship: spouse    Past Surgical History:  Past Surgical History:   Procedure Laterality Date    BACK SURGERY  10/2013    Dr. Lynn Pink: hardware involved.     COLONOSCOPY  06/13/12    mild diverticular dz    LUMBAR FUSION N/A 3/8/2017    LUMBAR L2-3 POSTERIOR DECOMPRESSION FUSION INSTRUMENTATION W/REVISION L3-5 POSTERIOR DECOMPRESSION FUSION INSTRUMENTATION ([facility] [address] [facility])  CC SN/KILEY performed by Gracie Elliott MD at [address] [facility] Right     lower neck, 2-    AK ARTHRODESIS POSTERIOR/POSTEROLATERAL THORACIC N/A 1/26/2018    THORACIC & LUMBAR POSTERIOR DECOMPRESSION AND FUSION REVISION T9 THRU L5 performed by Gracie Elliott MD at [facility]  [address]  IND N/A 8/23/2017    COLONOSCOPY performed by Nila Khoury MD at [address] [place] [address] PRE-MALIG[place]NT / [address]      SKIN BIOPSY      VASECTOMY         Immunization History:   Immunization History   Administered Date(s) Administered    DTaP 12/30/2011    Hepatitis A 11/16/1999, 06/13/2000    Hepatitis B, unspecified formulation 06/13/2000, 07/11/2000, 12/19/2000    Influenza, High Dose 10/19/2015, 10/27/2016, 10/20/2017    Pneumococcal 13-valent Conjugate (Sung Babe) 12/17/2015, 12/17/2015    Pneumococcal Polysaccharide (Svngplkil39) 10/12/2011    Zoster 12/30/2011       Active Problems:  Patient Active Problem List   Diagnosis Code    Hyperlipemia E78.5    RAFAT on CPAP G47.33, Z99.89    Anxiety F41.9    Spinal stenosis, lumbar region, with neurogenic claudication M48.062    Degeneration of lumbar or Concerns: At Risk for Falls    Impairments/Disabilities:      None    Nutrition Therapy:  Current Nutrition Therapy:   - Oral Diet:  General    Routes of Feeding: Oral  Liquids: No Restrictions  Daily Fluid Restriction: no  Last Modified Barium Swallow with Video (Video Swallowing Test): not done    Treatments at the Time of Hospital Discharge:   Respiratory Treatments: n/a  Oxygen Therapy:  is not on home oxygen therapy. Ventilator:    - No ventilator support    Rehab Therapies: Physical Therapy and Occupational Therapy  Weight Bearing Status/Restrictions: No weight bearing restirctions  Other Medical Equipment (for information only, NOT a DME order):  walker  Other Treatments: skilled nursing assessment    Patient's personal belongings (please select all that are sent with patient):  Ron    RN SIGNATURE:  Electronically signed by Morris Briggs RN on 1/31/18 at 9:48 AM    CASE MANAGEMENT/SOCIAL WORK SECTION    Inpatient Status Date: 1/26/18    [facility] Readmission Risk Assessment Score:  Risk Score: 17.5   (Score > 14= high risk for readmission)    Discharging to [facility]/ [address] [place] [address]  Shira Fus:  [phone]  F:  [phone]    / signature: Electronically signed by Morris Briggs RN on 1/31/18 at 9:48 AM    PHYSICIAN SECTION    Prognosis: Good    Condition at Discharge: Stable    Rehab Potential (if transferring to Rehab): Good    Recommended Labs or Other Treatments After Discharge: na    Physician Certification: I certify the above information and transfer of Jonathan Jameson  is necessary for the continuing treatment of the diagnosis listed and that he requires [address] for less 30 days.      Update Admission H&P: No change in H&P    PHYSICIAN SIGNATURE:  Electronically signed by Yumiko Marsh MD on 1/31/18 at 6:57 AM    Current Discharge Medication List     CONTINUE these medications which have CHANGED or have new The patient is a 79y Female complaining of shortness of breath.

## 2024-01-12 NOTE — PROGRESS NOTES
[facility]  [address] [facility] [address]  Dept: [phone]    Visit Date: 1/15/2024        HPI:     Max Candelaria is a 78 y.o. male who presents today for:  Chief Complaint   Patient presents with    Benign Prostatic Hypertrophy     3 mo f/u       HPI  Patient presents to urology clinic for follow-up.    Wife present with patient today.     Max underwent PVP 9/2023. PVR 0ml. He reports urinary frequency and nocturia continues to be bothersome.   Currently on oxybutynin, feels like he is not emptying well. He does report good urinary flow.   Discussed stopping oxybutynin and trialing Myrbetriq.   Denies flank pain, suprapubic pressure, gross hematuria, dysuria, fever or chills.        Previous records:  BPH  Worsening incontinence  Transfer from [facility] office  Still not at goal    Current Outpatient Medications   Medication Sig Dispense Refill    MYRBETRIQ 25 MG TB24 Take 1 tablet by mouth daily 30 tablet 5    tamsulosin (FLOMAX) 0.4 MG capsule TAKE ONE CAPSULE BY MOUTH DAILY 90 capsule 0    metoprolol succinate (TOPROL XL) 50 MG extended release tablet TAKE ONE TABLET BY MOUTH DAILY 90 tablet 1    sertraline (ZOLOFT) 100 MG tablet TAKE ONE TABLET BY MOUTH DAILY 90 tablet 2    simvastatin (ZOCOR) 20 MG tablet TAKE ONE TABLET BY MOUTH DAILY 90 tablet 3    fenofibrate (TRICOR) 145 MG tablet TAKE ONE TABLET BY MOUTH DAILY 90 tablet 3    Multiple Vitamin (MULTI VITAMIN DAILY PO) Take by mouth       No current facility-administered medications for this visit.       Past Medical History  Max  has a past medical history of Agoraphobia, Anxiety, Arthritis, Basal cell carcinoma, Chronic pain, CKD (chronic kidney disease), Claustrophobia, Enlarged lymph node, Hearing aid worn, Hearing loss, Hyperlipidemia, Hypertension, RAFAT (obstructive sleep apnea), PONV (postoperative nausea and

## 2024-01-15 ENCOUNTER — OFFICE VISIT (OUTPATIENT)
Dept: UROLOGY | Age: 79
End: 2024-01-15
Payer: MEDICARE

## 2024-01-15 VITALS
OXYGEN SATURATION: 97 % | DIASTOLIC BLOOD PRESSURE: 72 MMHG | HEIGHT: 70 IN | SYSTOLIC BLOOD PRESSURE: 122 MMHG | BODY MASS INDEX: 29.99 KG/M2 | HEART RATE: 58 BPM

## 2024-01-15 DIAGNOSIS — Z87.898 HISTORY OF URINARY RETENTION: ICD-10-CM

## 2024-01-15 DIAGNOSIS — R32 URINARY INCONTINENCE, UNSPECIFIED TYPE: ICD-10-CM

## 2024-01-15 DIAGNOSIS — N40.1 HYPERTROPHY OF PROSTATE WITH URINARY OBSTRUCTION: Primary | ICD-10-CM

## 2024-01-15 DIAGNOSIS — R35.1 NOCTURIA: ICD-10-CM

## 2024-01-15 DIAGNOSIS — N13.8 HYPERTROPHY OF PROSTATE WITH URINARY OBSTRUCTION: Primary | ICD-10-CM

## 2024-01-15 PROCEDURE — 3074F SYST BP LT 130 MM HG: CPT | Performed by: NURSE PRACTITIONER

## 2024-01-15 PROCEDURE — G8484 FLU IMMUNIZE NO ADMIN: HCPCS | Performed by: NURSE PRACTITIONER

## 2024-01-15 PROCEDURE — G8427 DOCREV CUR MEDS BY ELIG CLIN: HCPCS | Performed by: NURSE PRACTITIONER

## 2024-01-15 PROCEDURE — 99214 OFFICE O/P EST MOD 30 MIN: CPT | Performed by: NURSE PRACTITIONER

## 2024-01-15 PROCEDURE — 99213 OFFICE O/P EST LOW 20 MIN: CPT | Performed by: NURSE PRACTITIONER

## 2024-01-15 PROCEDURE — 1036F TOBACCO NON-USER: CPT | Performed by: NURSE PRACTITIONER

## 2024-01-15 PROCEDURE — G8417 CALC BMI ABV UP PARAM F/U: HCPCS | Performed by: NURSE PRACTITIONER

## 2024-01-15 PROCEDURE — 3078F DIAST BP <80 MM HG: CPT | Performed by: NURSE PRACTITIONER

## 2024-01-15 PROCEDURE — 1123F ACP DISCUSS/DSCN MKR DOCD: CPT | Performed by: NURSE PRACTITIONER

## 2024-01-15 RX ORDER — MIRABEGRON 25 MG/1
25 TABLET, FILM COATED, EXTENDED RELEASE ORAL DAILY
Qty: 30 TABLET | Refills: 5 | Status: SHIPPED | OUTPATIENT
Start: 2024-01-15 | End: 2024-02-14

## 2024-01-15 NOTE — PATIENT INSTRUCTIONS
Stop oxybutynin, start Myrbetriq.     Avoid liquids 2-3 hours prior to bedtime.     Call sooner with any questions or concerns.

## 2024-02-13 DIAGNOSIS — G89.29 CHRONIC BILATERAL LOW BACK PAIN WITH LEFT-SIDED SCIATICA: ICD-10-CM

## 2024-02-13 DIAGNOSIS — M54.42 CHRONIC BILATERAL LOW BACK PAIN WITH LEFT-SIDED SCIATICA: ICD-10-CM

## 2024-02-13 RX ORDER — HYDROCODONE BITARTRATE AND ACETAMINOPHEN 5; 325 MG/1; MG/1
1 TABLET ORAL 2 TIMES DAILY
Qty: 60 TABLET | Refills: 0 | Status: SHIPPED | OUTPATIENT
Start: 2024-02-13 | End: 2024-03-14

## 2024-02-13 NOTE — TELEPHONE ENCOUNTER
Meds verified in Epic last filled 9/11/23 for #18/5 days per Dr. Escamilla (urology), but previously prescribed by Dr. Pool    Last OV: 8/10/23  Next OV: Visit date not found

## 2024-02-13 NOTE — TELEPHONE ENCOUNTER
Wife calling for a refill, questioned as pt has not gotten filled for a while, wife stats he hadn't been taking but with the weather the way it has been, he's been taking them again, please advise.

## 2024-03-07 DIAGNOSIS — M51.37 DEGENERATION OF LUMBAR OR LUMBOSACRAL INTERVERTEBRAL DISC: ICD-10-CM

## 2024-03-07 DIAGNOSIS — M48.062 SPINAL STENOSIS, LUMBAR REGION, WITH NEUROGENIC CLAUDICATION: Primary | ICD-10-CM

## 2024-03-07 DIAGNOSIS — M51.26 LUMBAR DISC HERNIATION: ICD-10-CM

## 2024-03-07 DIAGNOSIS — M40.30 FLAT BACK SYNDROME, POSTPROCEDURAL: ICD-10-CM

## 2024-03-07 DIAGNOSIS — M96.1 POSTLAMINECTOMY SYNDROME, LUMBAR REGION: ICD-10-CM

## 2024-03-07 DIAGNOSIS — M51.37 DEGENERATION OF LUMBAR OR LUMBOSACRAL INTERVERTEBRAL DISC: Primary | ICD-10-CM

## 2024-03-25 DIAGNOSIS — R35.1 NOCTURIA: ICD-10-CM

## 2024-03-25 DIAGNOSIS — N40.1 HYPERTROPHY OF PROSTATE WITH URINARY OBSTRUCTION: ICD-10-CM

## 2024-03-25 DIAGNOSIS — N13.8 HYPERTROPHY OF PROSTATE WITH URINARY OBSTRUCTION: ICD-10-CM

## 2024-03-25 RX ORDER — TAMSULOSIN HYDROCHLORIDE 0.4 MG/1
0.4 CAPSULE ORAL DAILY
Qty: 90 CAPSULE | Refills: 0 | Status: SHIPPED | OUTPATIENT
Start: 2024-03-25

## 2024-03-25 NOTE — TELEPHONE ENCOUNTER
Max called requesting a refill of the below medication which has been pended for you:     Requested Prescriptions     Pending Prescriptions Disp Refills    tamsulosin (FLOMAX) 0.4 MG capsule 90 capsule 0     Sig: Take 1 capsule by mouth daily       Last Appointment Date: 8/10/2023  Next Appointment Date: 7/10/2024    Allergies   Allergen Reactions    Atarax [Hydroxyzine]      Double vision    Gabapentin      dizzy    Phenergan [Promethazine Hcl] Other (See Comments)     \"out of it\"    Morphine And Related Nausea And Vomiting     hallucinations

## 2024-04-05 ENCOUNTER — TELEPHONE (OUTPATIENT)
Dept: FAMILY MEDICINE CLINIC | Age: 79
End: 2024-04-05

## 2024-04-05 NOTE — TELEPHONE ENCOUNTER
Patient wife is calling in requesting a letter be sent to [facility] in [place] ([phone]). They are paying for a lift chair but if they have a letter stating the patient needs this , they wont have to pay the taxes on it. Please call patient wife and let her know either way. Please advise

## 2024-05-01 DIAGNOSIS — M54.42 CHRONIC BILATERAL LOW BACK PAIN WITH LEFT-SIDED SCIATICA: ICD-10-CM

## 2024-05-01 DIAGNOSIS — G89.29 CHRONIC BILATERAL LOW BACK PAIN WITH LEFT-SIDED SCIATICA: ICD-10-CM

## 2024-05-01 RX ORDER — HYDROCODONE BITARTRATE AND ACETAMINOPHEN 5; 325 MG/1; MG/1
1 TABLET ORAL 2 TIMES DAILY
Qty: 60 TABLET | Refills: 0 | Status: SHIPPED | OUTPATIENT
Start: 2024-05-01 | End: 2024-05-31

## 2024-05-06 DIAGNOSIS — I10 ESSENTIAL (PRIMARY) HYPERTENSION: Primary | ICD-10-CM

## 2024-05-07 RX ORDER — METOPROLOL SUCCINATE 50 MG/1
TABLET, EXTENDED RELEASE ORAL
Qty: 90 TABLET | Refills: 1 | Status: SHIPPED | OUTPATIENT
Start: 2024-05-07

## 2024-05-07 NOTE — TELEPHONE ENCOUNTER
Max called requesting a refill of the below medication which has been pended for you:     Requested Prescriptions     Pending Prescriptions Disp Refills    metoprolol succinate (TOPROL XL) 50 MG extended release tablet [Pharmacy Med Name: METOPROLOL SUCC ER 50 MG TAB] 90 tablet 1     Sig: TAKE 1 TABLET BY MOUTH DAILY       Last Appointment Date: 8/10/2023  Next Appointment Date: 7/10/2024    Allergies   Allergen Reactions    Atarax [Hydroxyzine]      Double vision    Gabapentin      dizzy    Phenergan [Promethazine Hcl] Other (See Comments)     \"out of it\"    Morphine And Related Nausea And Vomiting     hallucinations

## 2024-05-24 ENCOUNTER — OFFICE VISIT (OUTPATIENT)
Dept: UROLOGY | Age: 79
End: 2024-05-24
Payer: MEDICARE

## 2024-05-24 VITALS
HEART RATE: 66 BPM | SYSTOLIC BLOOD PRESSURE: 134 MMHG | WEIGHT: 209 LBS | OXYGEN SATURATION: 96 % | RESPIRATION RATE: 20 BRPM | DIASTOLIC BLOOD PRESSURE: 70 MMHG | BODY MASS INDEX: 29.92 KG/M2 | HEIGHT: 70 IN

## 2024-05-24 DIAGNOSIS — N40.1 BENIGN PROSTATIC HYPERPLASIA WITH URINARY FREQUENCY: ICD-10-CM

## 2024-05-24 DIAGNOSIS — R35.0 BENIGN PROSTATIC HYPERPLASIA WITH URINARY FREQUENCY: ICD-10-CM

## 2024-05-24 DIAGNOSIS — N32.81 OAB (OVERACTIVE BLADDER): Primary | ICD-10-CM

## 2024-05-24 PROCEDURE — G8417 CALC BMI ABV UP PARAM F/U: HCPCS

## 2024-05-24 PROCEDURE — 1036F TOBACCO NON-USER: CPT

## 2024-05-24 PROCEDURE — 99214 OFFICE O/P EST MOD 30 MIN: CPT

## 2024-05-24 PROCEDURE — 3078F DIAST BP <80 MM HG: CPT

## 2024-05-24 PROCEDURE — G8427 DOCREV CUR MEDS BY ELIG CLIN: HCPCS

## 2024-05-24 PROCEDURE — 1123F ACP DISCUSS/DSCN MKR DOCD: CPT

## 2024-05-24 PROCEDURE — 3075F SYST BP GE 130 - 139MM HG: CPT

## 2024-05-24 PROCEDURE — 99213 OFFICE O/P EST LOW 20 MIN: CPT

## 2024-05-24 RX ORDER — SOLIFENACIN SUCCINATE 10 MG/1
10 TABLET, FILM COATED ORAL DAILY
Qty: 90 TABLET | Refills: 1 | Status: SHIPPED | OUTPATIENT
Start: 2024-05-24

## 2024-05-24 NOTE — PROGRESS NOTES
Multiple Vitamin (MULTI VITAMIN DAILY PO) Take by mouth         Atarax [hydroxyzine], Gabapentin, Phenergan [promethazine hcl], and Morphine and related  Social History     Tobacco Use   Smoking Status Never   Smokeless Tobacco Never       Social History     Substance and Sexual Activity   Alcohol Use No       REVIEW OF SYSTEMS:  Constitutional: negative  Eyes: negative  Respiratory: negative  Cardiovascular: negative  Gastrointestinal: negative  Musculoskeletal: negative  Genitourinary: negative except for what is in HPI  Skin: negative   Neurological: negative  Hematological/Lymphatic: negative  Psychological: negative    Physical Exam:      Vitals:    05/24/24 1257   BP: 134/70   Pulse: 66   Resp: 20   SpO2: 96%     Patient is a 79 y.o. male in no acute distress and alert and oriented to person, place and time.    Pulmonary: Non-labored respiration.  Cardiovascular: Normal rate, regular rhythm, normal peripheral pulses.  Skin: Warm and dry.  Psych: Normal mood and affect.  Genitourinary: Bladder non-distended and non-tender.      Assessment and Plan   BPH w/ LUTS  - Well controlled on Tamsulosin 0.4 mg QD. Denies feelings of incomplete emptying, though he does void frequently 2/2 to #2. Continue Flomax.    OAB  - +UUI. Very bothersome to the patient. Failed Oxybutynin. Unable to afford Myrbetriq ($100/month).   - Start Vesicare 10 mg QD.    *Follow-up in 2-3 months for symptom check and PVR.    Lonnie Cash PA-C  Urology

## 2024-06-17 DIAGNOSIS — N40.1 HYPERTROPHY OF PROSTATE WITH URINARY OBSTRUCTION: ICD-10-CM

## 2024-06-17 DIAGNOSIS — N13.8 HYPERTROPHY OF PROSTATE WITH URINARY OBSTRUCTION: ICD-10-CM

## 2024-06-17 DIAGNOSIS — R35.1 NOCTURIA: ICD-10-CM

## 2024-06-18 RX ORDER — TAMSULOSIN HYDROCHLORIDE 0.4 MG/1
0.4 CAPSULE ORAL DAILY
Qty: 90 CAPSULE | Refills: 0 | Status: SHIPPED | OUTPATIENT
Start: 2024-06-18

## 2024-07-03 ENCOUNTER — HOSPITAL ENCOUNTER (OUTPATIENT)
Age: 79
Discharge: HOME | End: 2024-07-03
Payer: MEDICARE

## 2024-07-03 DIAGNOSIS — E78.00 PURE HYPERCHOLESTEROLEMIA: ICD-10-CM

## 2024-07-03 DIAGNOSIS — D50.0 IRON DEFICIENCY ANEMIA DUE TO CHRONIC BLOOD LOSS: ICD-10-CM

## 2024-07-03 DIAGNOSIS — I10 ESSENTIAL (PRIMARY) HYPERTENSION: ICD-10-CM

## 2024-07-03 LAB
ALBUMIN SERPL-MCNC: 4.2 G/DL (ref 3.5–5.2)
ALBUMIN/GLOB SERPL: 1.4 {RATIO} (ref 1–2.5)
ALP SERPL-CCNC: 80 U/L (ref 40–129)
ALT SERPL-CCNC: 14 U/L (ref 5–41)
ANION GAP SERPL CALCULATED.3IONS-SCNC: 11 MMOL/L (ref 9–17)
AST SERPL-CCNC: 23 U/L
BASOPHILS # BLD: 0.05 K/UL (ref 0–0.2)
BASOPHILS NFR BLD: 1 % (ref 0–2)
BILIRUB SERPL-MCNC: 0.6 MG/DL (ref 0.3–1.2)
BUN SERPL-MCNC: 21 MG/DL (ref 8–23)
BUN/CREAT SERPL: 19 (ref 9–20)
CALCIUM SERPL-MCNC: 9.4 MG/DL (ref 8.6–10.4)
CHLORIDE SERPL-SCNC: 107 MMOL/L (ref 98–107)
CHOLEST SERPL-MCNC: 156 MG/DL (ref 0–199)
CHOLESTEROL/HDL RATIO: 5
CO2 SERPL-SCNC: 25 MMOL/L (ref 20–31)
CREAT SERPL-MCNC: 1.1 MG/DL (ref 0.7–1.2)
EOSINOPHIL # BLD: 0.17 K/UL (ref 0–0.44)
EOSINOPHILS RELATIVE PERCENT: 2 % (ref 1–4)
ERYTHROCYTE [DISTWIDTH] IN BLOOD BY AUTOMATED COUNT: 13.8 % (ref 11.8–14.4)
GFR, ESTIMATED: 68 ML/MIN/1.73M2
GLUCOSE SERPL-MCNC: 96 MG/DL (ref 70–99)
HCT VFR BLD AUTO: 44.2 % (ref 40.7–50.3)
HDLC SERPL-MCNC: 31 MG/DL
HGB BLD-MCNC: 14.8 G/DL (ref 13–17)
IMM GRANULOCYTES # BLD AUTO: <0.03 K/UL (ref 0–0.3)
IMM GRANULOCYTES NFR BLD: 0 %
LDLC SERPL CALC-MCNC: 103 MG/DL (ref 0–100)
LYMPHOCYTES NFR BLD: 1.94 K/UL (ref 1.1–3.7)
LYMPHOCYTES RELATIVE PERCENT: 24 % (ref 24–43)
MCH RBC QN AUTO: 29.7 PG (ref 25.2–33.5)
MCHC RBC AUTO-ENTMCNC: 33.5 G/DL (ref 25.2–33.5)
MCV RBC AUTO: 88.8 FL (ref 82.6–102.9)
MONOCYTES NFR BLD: 0.8 K/UL (ref 0.1–1.2)
MONOCYTES NFR BLD: 10 % (ref 3–12)
NEUTROPHILS NFR BLD: 63 % (ref 36–65)
NEUTS SEG NFR BLD: 5.1 K/UL (ref 1.5–8.1)
NRBC BLD-RTO: 0 PER 100 WBC
PLATELET # BLD AUTO: 196 K/UL (ref 138–453)
PMV BLD AUTO: 9.1 FL (ref 8.1–13.5)
POTASSIUM SERPL-SCNC: 4.1 MMOL/L (ref 3.7–5.3)
PROT SERPL-MCNC: 7.2 G/DL (ref 6.4–8.3)
RBC # BLD AUTO: 4.98 M/UL (ref 4.21–5.77)
SODIUM SERPL-SCNC: 143 MMOL/L (ref 135–144)
TRIGL SERPL-MCNC: 110 MG/DL
VLDLC SERPL CALC-MCNC: 22 MG/DL
WBC OTHER # BLD: 8.1 K/UL (ref 3.5–11.3)

## 2024-07-03 PROCEDURE — 85025 COMPLETE CBC W/AUTO DIFF WBC: CPT

## 2024-07-03 PROCEDURE — 36415 COLL VENOUS BLD VENIPUNCTURE: CPT

## 2024-07-03 PROCEDURE — 80061 LIPID PANEL: CPT

## 2024-07-03 PROCEDURE — 80053 COMPREHEN METABOLIC PANEL: CPT

## 2024-07-10 ENCOUNTER — OFFICE VISIT (OUTPATIENT)
Dept: FAMILY MEDICINE CLINIC | Age: 79
End: 2024-07-10
Payer: MEDICARE

## 2024-07-10 VITALS
BODY MASS INDEX: 29.55 KG/M2 | DIASTOLIC BLOOD PRESSURE: 62 MMHG | SYSTOLIC BLOOD PRESSURE: 130 MMHG | HEART RATE: 63 BPM | WEIGHT: 206.4 LBS | OXYGEN SATURATION: 95 % | HEIGHT: 70 IN

## 2024-07-10 DIAGNOSIS — G47.33 OSA ON CPAP: ICD-10-CM

## 2024-07-10 DIAGNOSIS — R26.89 BALANCE PROBLEM: ICD-10-CM

## 2024-07-10 DIAGNOSIS — G89.29 ABDOMINAL PAIN, CHRONIC, LEFT LOWER QUADRANT: ICD-10-CM

## 2024-07-10 DIAGNOSIS — R10.32 ABDOMINAL PAIN, CHRONIC, LEFT LOWER QUADRANT: ICD-10-CM

## 2024-07-10 DIAGNOSIS — Z01.89 NEED FOR PHYSICAL THERAPY ASSESSMENT: ICD-10-CM

## 2024-07-10 DIAGNOSIS — E78.00 PURE HYPERCHOLESTEROLEMIA: ICD-10-CM

## 2024-07-10 DIAGNOSIS — C44.92 SCC (SQUAMOUS CELL CARCINOMA): ICD-10-CM

## 2024-07-10 DIAGNOSIS — Z12.5 SCREENING PSA (PROSTATE SPECIFIC ANTIGEN): ICD-10-CM

## 2024-07-10 DIAGNOSIS — N18.30 STAGE 3 CHRONIC KIDNEY DISEASE, UNSPECIFIED WHETHER STAGE 3A OR 3B CKD (HCC): ICD-10-CM

## 2024-07-10 DIAGNOSIS — I10 ESSENTIAL (PRIMARY) HYPERTENSION: Primary | ICD-10-CM

## 2024-07-10 DIAGNOSIS — D50.0 IRON DEFICIENCY ANEMIA DUE TO CHRONIC BLOOD LOSS: ICD-10-CM

## 2024-07-10 DIAGNOSIS — H90.6 MIXED CONDUCTIVE AND SENSORINEURAL HEARING LOSS OF BOTH EARS: ICD-10-CM

## 2024-07-10 DIAGNOSIS — M48.062 SPINAL STENOSIS, LUMBAR REGION, WITH NEUROGENIC CLAUDICATION: ICD-10-CM

## 2024-07-10 PROCEDURE — 99213 OFFICE O/P EST LOW 20 MIN: CPT | Performed by: FAMILY MEDICINE

## 2024-07-10 SDOH — ECONOMIC STABILITY: FOOD INSECURITY: WITHIN THE PAST 12 MONTHS, THE FOOD YOU BOUGHT JUST DIDN'T LAST AND YOU DIDN'T HAVE MONEY TO GET MORE.: NEVER TRUE

## 2024-07-10 SDOH — ECONOMIC STABILITY: INCOME INSECURITY: HOW HARD IS IT FOR YOU TO PAY FOR THE VERY BASICS LIKE FOOD, HOUSING, MEDICAL CARE, AND HEATING?: NOT HARD AT ALL

## 2024-07-10 SDOH — ECONOMIC STABILITY: FOOD INSECURITY: WITHIN THE PAST 12 MONTHS, YOU WORRIED THAT YOUR FOOD WOULD RUN OUT BEFORE YOU GOT MONEY TO BUY MORE.: NEVER TRUE

## 2024-07-10 ASSESSMENT — PATIENT HEALTH QUESTIONNAIRE - PHQ9
8. MOVING OR SPEAKING SO SLOWLY THAT OTHER PEOPLE COULD HAVE NOTICED. OR THE OPPOSITE, BEING SO FIGETY OR RESTLESS THAT YOU HAVE BEEN MOVING AROUND A LOT MORE THAN USUAL: NOT AT ALL
9. THOUGHTS THAT YOU WOULD BE BETTER OFF DEAD, OR OF HURTING YOURSELF: NOT AT ALL
SUM OF ALL RESPONSES TO PHQ QUESTIONS 1-9: 0
5. POOR APPETITE OR OVEREATING: NOT AT ALL
SUM OF ALL RESPONSES TO PHQ QUESTIONS 1-9: 0
4. FEELING TIRED OR HAVING LITTLE ENERGY: NOT AT ALL
SUM OF ALL RESPONSES TO PHQ QUESTIONS 1-9: 0
10. IF YOU CHECKED OFF ANY PROBLEMS, HOW DIFFICULT HAVE THESE PROBLEMS MADE IT FOR YOU TO DO YOUR WORK, TAKE CARE OF THINGS AT HOME, OR GET ALONG WITH OTHER PEOPLE: NOT DIFFICULT AT ALL
2. FEELING DOWN, DEPRESSED OR HOPELESS: NOT AT ALL
SUM OF ALL RESPONSES TO PHQ9 QUESTIONS 1 & 2: 0
7. TROUBLE CONCENTRATING ON THINGS, SUCH AS READING THE NEWSPAPER OR WATCHING TELEVISION: NOT AT ALL
SUM OF ALL RESPONSES TO PHQ QUESTIONS 1-9: 0
1. LITTLE INTEREST OR PLEASURE IN DOING THINGS: NOT AT ALL
6. FEELING BAD ABOUT YOURSELF - OR THAT YOU ARE A FAILURE OR HAVE LET YOURSELF OR YOUR FAMILY DOWN: NOT AT ALL
3. TROUBLE FALLING OR STAYING ASLEEP: NOT AT ALL

## 2024-07-10 ASSESSMENT — ENCOUNTER SYMPTOMS
ALLERGIC/IMMUNOLOGIC NEGATIVE: 1
RESPIRATORY NEGATIVE: 1
ABDOMINAL PAIN: 1
EYES NEGATIVE: 1
BACK PAIN: 1

## 2024-07-10 NOTE — PROGRESS NOTES
Subjective:      Patient ID: Max Candelaria is a 79 y.o. male.    Hypertension    Back Pain  Associated symptoms include abdominal pain (LLQ) and numbness (more into left leg and feet with prolonged standing, both right and left feet.).   Other  Associated symptoms include abdominal pain (LLQ) and numbness (more into left leg and feet with prolonged standing, both right and left feet.).   Hyperlipidemia    Foot Pain   Associated symptoms include numbness (more into left leg and feet with prolonged standing, both right and left feet.).      Routine follow up on chronic medical conditions, refills, and review of updated labs.  I have reviewed the patient's medical history in detail and updated the computerized patient record.   He had back surgery 1/26/18 with thoracic and lumbar posterior decompression T9-L5.  He had gradual, increasing pain in the months after the surgery, actually more painful than prior to surgery.  More extensive lumbar fusion in 2017 with instrumentation.  Most recently with spinal cord stimulator placement 11/18.  Back pain not as severe and more tolerable.  Still with pain that comes and goes.  Still has some burning paresthesias and numbness into the right leg off and on, but less severe than prior. Currently with bilateral radicular symptoms described.     Still reporting daily pain, limiting his activity. Needed pain pill to get up out of bed and get going.  Sleeping in a recliner seems to go better overnight. Consulting with malini, Dr Abebe.  Offered surgery.  Melvin has deferred at present.    Compliant with medications .  Mood stable.  No anxiety of concern.  No significant other joint arthritis of concern.  Hearing loss better with hearing aides.   Fatigues easily and somewhat sob with walking.  No urination or bowel issues of concern on review today.  Not compliant with cpap.    Currently reporting some dry mouth in the am.  Likely snoring overnight.       Left lower abdominal

## 2024-07-10 NOTE — PATIENT INSTRUCTIONS
particularly when comparing to results   calculated using previous equations.  The CKD-EPI equation is less accurate in patients with extremes of muscle mass, extra-renal   metabolism of creatine, excessive creatine ingestion, or following therapy that affects   renal tubular secretion.      BUN/Creatinine Ratio 07/03/2024 19  9 - 20 Final    Calcium 07/03/2024 9.4  8.6 - 10.4 mg/dL Final    Total Protein 07/03/2024 7.2  6.4 - 8.3 g/dL Final    Albumin 07/03/2024 4.2  3.5 - 5.2 g/dL Final    Albumin/Globulin Ratio 07/03/2024 1.4  1.0 - 2.5 Final    Total Bilirubin 07/03/2024 0.6  0.3 - 1.2 mg/dL Final    Alkaline Phosphatase 07/03/2024 80  40 - 129 U/L Final    ALT 07/03/2024 14  5 - 41 U/L Final    AST 07/03/2024 23  <40 U/L Final    Cholesterol, Total 07/03/2024 156  0 - 199 mg/dL Final    Comment:    Cholesterol Guidelines:      <200  Desirable   200-240  Borderline      >240  Undesirable         HDL 07/03/2024 31 (L)  >40 mg/dL Final    Comment:    HDL Guidelines:    <40     Undesirable   40-59    Borderline    >59     Desirable         LDL Cholesterol 07/03/2024 103 (H)  0 - 100 mg/dL Final    Comment:    LDL Guidelines:     <100    Desirable   100-129   Near to/above Desirable   130-159   Borderline      >159   Undesirable     Direct (measured) LDL and calculated LDL are not interchangeable tests.      Chol/HDL Ratio 07/03/2024 5.0   Final    Triglycerides 07/03/2024 110  <150 mg/dL Final    Comment:    Triglyceride Guidelines:     <150   Desirable   150-199  Borderline   200-499  High     >499   Very high   Based on AHA Guidelines for fasting triglyceride, October 2012.         VLDL 07/03/2024 22  mg/dL Final

## 2024-07-12 ENCOUNTER — TELEMEDICINE (OUTPATIENT)
Dept: FAMILY MEDICINE CLINIC | Age: 79
End: 2024-07-12
Payer: MEDICARE

## 2024-07-12 DIAGNOSIS — Z00.00 MEDICARE ANNUAL WELLNESS VISIT, SUBSEQUENT: Primary | ICD-10-CM

## 2024-07-12 PROCEDURE — G0439 PPPS, SUBSEQ VISIT: HCPCS | Performed by: FAMILY MEDICINE

## 2024-07-12 PROCEDURE — 1123F ACP DISCUSS/DSCN MKR DOCD: CPT | Performed by: FAMILY MEDICINE

## 2024-07-12 ASSESSMENT — PATIENT HEALTH QUESTIONNAIRE - PHQ9
6. FEELING BAD ABOUT YOURSELF - OR THAT YOU ARE A FAILURE OR HAVE LET YOURSELF OR YOUR FAMILY DOWN: NOT AT ALL
10. IF YOU CHECKED OFF ANY PROBLEMS, HOW DIFFICULT HAVE THESE PROBLEMS MADE IT FOR YOU TO DO YOUR WORK, TAKE CARE OF THINGS AT HOME, OR GET ALONG WITH OTHER PEOPLE: NOT DIFFICULT AT ALL
9. THOUGHTS THAT YOU WOULD BE BETTER OFF DEAD, OR OF HURTING YOURSELF: NOT AT ALL
1. LITTLE INTEREST OR PLEASURE IN DOING THINGS: NOT AT ALL
SUM OF ALL RESPONSES TO PHQ QUESTIONS 1-9: 0
SUM OF ALL RESPONSES TO PHQ QUESTIONS 1-9: 0
5. POOR APPETITE OR OVEREATING: NOT AT ALL
7. TROUBLE CONCENTRATING ON THINGS, SUCH AS READING THE NEWSPAPER OR WATCHING TELEVISION: NOT AT ALL
3. TROUBLE FALLING OR STAYING ASLEEP: NOT AT ALL
SUM OF ALL RESPONSES TO PHQ QUESTIONS 1-9: 0
4. FEELING TIRED OR HAVING LITTLE ENERGY: NOT AT ALL
2. FEELING DOWN, DEPRESSED OR HOPELESS: NOT AT ALL
SUM OF ALL RESPONSES TO PHQ QUESTIONS 1-9: 0
SUM OF ALL RESPONSES TO PHQ9 QUESTIONS 1 & 2: 0
8. MOVING OR SPEAKING SO SLOWLY THAT OTHER PEOPLE COULD HAVE NOTICED. OR THE OPPOSITE, BEING SO FIGETY OR RESTLESS THAT YOU HAVE BEEN MOVING AROUND A LOT MORE THAN USUAL: NOT AT ALL

## 2024-07-12 NOTE — PROGRESS NOTES
Indra SINHA MD   solifenacin (VESICARE) 10 MG tablet Take 1 tablet by mouth daily Yes Lonnie Cash PA-C   metoprolol succinate (TOPROL XL) 50 MG extended release tablet TAKE 1 TABLET BY MOUTH DAILY Yes Indra Pool MD   sertraline (ZOLOFT) 100 MG tablet TAKE ONE TABLET BY MOUTH DAILY Yes Indra Pool MD   simvastatin (ZOCOR) 20 MG tablet TAKE ONE TABLET BY MOUTH DAILY Yes Indra Pool MD   fenofibrate (TRICOR) 145 MG tablet TAKE ONE TABLET BY MOUTH DAILY Yes Indra Pool MD   Multiple Vitamin (MULTI VITAMIN DAILY PO) Take by mouth Yes Provider, MD Renetta Erickson (Including outside providers/suppliers regularly involved in providing care):   Patient Care Team:  Indra Pool MD as PCP - General (Family Medicine)  Indra Pool MD as PCP - Empaneled Provider      Reviewed and updated this visit:  Allergies  Meds  Med Hx  Surg Hx  Soc Hx  Fam Hx      I, Shana Parker RN, 7/12/2024, performed the documented evaluation under the direct supervision of the attending physician.    Max Candelaria, was evaluated through a synchronous (real-time) telephone encounter. The patient (or guardian if applicable) is aware that this is a billable service, which includes applicable co-pays. This Virtual Visit was conducted with patient's (and/or legal guardian's) consent. Patient identification was verified, and a caregiver was present when appropriate.   The patient was located at Home: [address] [place] [address] [address] [place] [address]  Provider was located at Facility (Appt Dept): [address] [facility] [place] [address]  Confirm you are appropriately licensed, registered, or certified to deliver care in the state where the patient is located as indicated above. If you are not or unsure, please re-schedule the visit: Yes, I confirm.        This encounter was performed under my, Indra Pool MD’s, direct supervision, 7/12/2024.  Electronically signed by Indra Pool MD on 7/17/2024 at 3:29 PM

## 2024-07-12 NOTE — PATIENT INSTRUCTIONS
kind of pain reliever, such as acetaminophen (Tylenol).   When should you call for help?   Call 911 if you have symptoms of a heart attack. These may include:    Chest pain or pressure, or a strange feeling in the chest.     Sweating.     Shortness of breath.     Pain, pressure, or a strange feeling in the back, neck, jaw, or upper belly or in one or both shoulders or arms.     Lightheadedness or sudden weakness.     A fast or irregular heartbeat.   After you call 911, the  may tell you to chew 1 adult-strength or 2 to 4 low-dose aspirin. Wait for an ambulance. Do not try to drive yourself.  Watch closely for changes in your health, and be sure to contact your doctor if you have any problems.  Where can you learn more?  Go to https://www.[employer]akÂ®.net/patientEd and enter F075 to learn more about \"A Healthy Heart: Care Instructions.\"  Current as of: June 24, 2023  Content Version: 14.1  © 2724-2948 [employer].   Care instructions adapted under license by [employer]. If you have questions about a medical condition or this instruction, always ask your healthcare professional. [employer] disclaims any warranty or liability for your use of this information.      Personalized Preventive Plan for Max Candelaria - 7/12/2024  Medicare offers a range of preventive health benefits. Some of the tests and screenings are paid in full while other may be subject to a deductible, co-insurance, and/or copay.    Some of these benefits include a comprehensive review of your medical history including lifestyle, illnesses that may run in your family, and various assessments and screenings as appropriate.    After reviewing your medical record and screening and assessments performed today your provider may have ordered immunizations, labs, imaging, and/or referrals for you.  A list of these orders (if applicable) as well as your Preventive Care list are included within your After Visit Summary for

## 2024-07-18 ENCOUNTER — HOSPITAL ENCOUNTER (OUTPATIENT)
Dept: PHYSICAL THERAPY | Age: 79
Setting detail: THERAPIES SERIES
Discharge: HOME | End: 2024-07-18
Attending: FAMILY MEDICINE
Payer: MEDICARE

## 2024-07-18 PROCEDURE — 97161 PT EVAL LOW COMPLEX 20 MIN: CPT | Performed by: PHYSICAL THERAPIST

## 2024-07-18 NOTE — PROGRESS NOTES
Yes  Additional Comments: Increased fall frequency    Objective:     PROM RLE (degrees)  RLE PROM: WFL  PROM LLE (degrees)  LLE PROM: WFL    Strength RLE  Comment: 4/5  Strength LLE  Comment: 4/5     Additional Measures  Special Tests: STS in 30\" x4. ( consistent loss of balance posterior)  Other: TUG ( no device) 22\"        Transfers  Sit to Stand: Modified independent  Stand to Sit: Modified independent  Comment: Needs UE assist. Falls backward into chair       Ambulation  Surface: Level tile  Device: No Device  Assistance: Supervision  Quality of Gait: Rigid trunk.  Gait Deviations: Slow Angie;Shuffles;Staggers;Decreased step length;Decreased step height  Comments: Slow balance reactions. Increasing fall risk        Assessment:    Conditions Requiring Skilled Therapeutic Intervention  Body Structures, Functions, Activity Limitations Requiring Skilled Therapeutic Intervention: Decreased balance;Decreased functional mobility ;Decreased strength  Therapy Prognosis: Fair  Treatment Diagnosis: R26.89 Balance Problem  Activity Tolerance  Activity Tolerance: Patient tolerated treatment well  Activity Tolerance: Patient tolerated treatment well         Plan:    Physical Therapy Plan  Plan weeks: 4  Current Treatment Recommendations: Strengthening, Balance training, Gait training, Functional mobility training, Home exercise program      OutComes Score:  Butler Balance Score: 29 (07/18/24 1120)                         Goals:  Short Term Goals  Time Frame for Short Term Goals: 1 week  Short Term Goal 1: Stress use of RW for max safety with gait  Long Term Goals  Time Frame for Long Term Goals : 4 weeks  Long Term Goal 1: STS in 30\" x6 for B LE power and balance control  Long Term Goal 2: TUG ( with RW ) 15\" for gait efficiency  Long Term Goal 3: Sit to stand on first attempt without balance loss  Long Term Goal 4: Butler Balance Scale improve to 38/56 to reduce fall risk       Therapy Time:   Individual Concurrent Group

## 2024-07-18 NOTE — FLOWSHEET NOTE
motor skill, proprioception.  (21278)    Manual Treatments:    [] Provided manual therapy to mobilize soft tissue/joints for the purpose of modulating pain, promoting relaxation,  increasing ROM, reducing/eliminating soft tissue swelling/inflammation/restriction, improving soft tissue extensibility. (26738)    Service Based Modalities:  Eval 25'    Timed Code Treatment Minutes:       Total Treatment Minutes:   25'    Treatment/Activity Tolerance:  [x] Patient tolerated treatment well [] Patient limited by fatique  [] Patient limited by pain  [x] Patient limited by other medical complications  [] Other:     Prognosis: [] Good [x] Fair  [] Poor    Patient Requires Follow-up: [x] Yes  [] No      Goals:  Short Term Goals  Time Frame for Short Term Goals: 1 week  Short Term Goal 1: Stress use of RW for max safety with gait    Long Term Goals  Time Frame for Long Term Goals : 4 weeks  Long Term Goal 1: STS in 30\" x6 for B LE power and balance control  Long Term Goal 2: TUG ( with RW ) 15\" for gait efficiency  Long Term Goal 3: Sit to stand on first attempt without balance loss  Long Term Goal 4: Butler Balance Scale improve to 38/56 to reduce fall risk          Plan:   [] Continue per plan of care [] Alter current plan (see comments)  [x] Plan of care initiated [] Hold pending MD visit [] Discharge  Plan for Next Session:      Electronically signed by:  Leonel Llanos PT,PT

## 2024-07-18 NOTE — PLAN OF CARE
Poor     Electronically signed by:  Leonel Llanos PT      If you have any questions or concerns, please don't hesitate to call.  Thank you for your referral.      Physician Signature:________________________________Date:__________________  By signing above, therapist’s plan is approved by physician

## 2024-07-22 ENCOUNTER — HOSPITAL ENCOUNTER (OUTPATIENT)
Dept: PHYSICAL THERAPY | Age: 79
Setting detail: THERAPIES SERIES
Discharge: HOME | End: 2024-07-22
Attending: FAMILY MEDICINE
Payer: MEDICARE

## 2024-07-22 ENCOUNTER — TELEPHONE (OUTPATIENT)
Dept: FAMILY MEDICINE CLINIC | Age: 79
End: 2024-07-22

## 2024-07-22 PROCEDURE — 97110 THERAPEUTIC EXERCISES: CPT

## 2024-07-22 NOTE — FLOWSHEET NOTE
(12820)    Therapeutic Activities:     [] Therapeutic activities, direct (one-on-one) patient contact (use of dynamic activities to improve functional performance). (68147)    Gait:   [] Provided training and instruction to the patient for ambulation re-education. (97693)    Self-Care/ADL's  [] Self-care/home management training and compensatory training, meal preparation, safety procedures, and instructions in use of assistive technology devices/adaptive equipment, direct one-on-one contact. (67211)    Home Exercise Program:     [] Reviewed/Progressed HEP activities related to strengthening, flexibility, endurance, ROM. (33263)  [] Reviewed/Progressed HEP activities related to improving balance, coordination, kinesthetic sense, posture, motor skill, proprioception.  (95828)    Manual Treatments:    [] Provided manual therapy to mobilize soft tissue/joints for the purpose of modulating pain, promoting relaxation,  increasing ROM, reducing/eliminating soft tissue swelling/inflammation/restriction, improving soft tissue extensibility. (84053)    Service Based Modalities:      Timed Code Treatment Minutes:  DARRIN 25'      Total Treatment Minutes:   25'    Treatment/Activity Tolerance:  [x] Patient tolerated treatment well [] Patient limited by fatique  [] Patient limited by pain  [x] Patient limited by other medical complications  [] Other:     Prognosis: [] Good [x] Fair  [] Poor    Patient Requires Follow-up: [x] Yes  [] No      Goals:  Short Term Goals  Time Frame for Short Term Goals: 1 week  Short Term Goal 1: Stress use of RW for max safety with gait    Long Term Goals  Time Frame for Long Term Goals : 4 weeks  Long Term Goal 1: STS in 30\" x6 for B LE power and balance control  Long Term Goal 2: TUG ( with RW ) 15\" for gait efficiency  Long Term Goal 3: Sit to stand on first attempt without balance loss  Long Term Goal 4: Butler Balance Scale improve to 38/56 to reduce fall risk      Plan:   [x] Continue per plan of

## 2024-07-23 NOTE — PROGRESS NOTES
I have reviewed and agree to the content of the note written by the PTA.  Electronically signed by Leonel Llanos PT [id number]

## 2024-07-25 ENCOUNTER — HOSPITAL ENCOUNTER (OUTPATIENT)
Dept: PHYSICAL THERAPY | Age: 79
Setting detail: THERAPIES SERIES
Discharge: HOME | End: 2024-07-25
Attending: FAMILY MEDICINE
Payer: MEDICARE

## 2024-07-25 PROCEDURE — 97110 THERAPEUTIC EXERCISES: CPT

## 2024-07-25 NOTE — FLOWSHEET NOTE
Continue per plan of care [] Alter current plan (see comments)  [] Plan of care initiated [] Hold pending MD visit [] Discharge  Plan for Next Session:      Electronically signed by:  CAROL WATT PTA

## 2024-07-29 ENCOUNTER — HOSPITAL ENCOUNTER (OUTPATIENT)
Dept: PHYSICAL THERAPY | Age: 79
Setting detail: THERAPIES SERIES
Discharge: HOME | End: 2024-07-29
Attending: FAMILY MEDICINE
Payer: MEDICARE

## 2024-07-29 PROCEDURE — 97110 THERAPEUTIC EXERCISES: CPT

## 2024-07-29 NOTE — FLOWSHEET NOTE
proprioception. (09437)    Therapeutic Activities:     [] Therapeutic activities, direct (one-on-one) patient contact (use of dynamic activities to improve functional performance). (60765)    Gait:   [] Provided training and instruction to the patient for ambulation re-education. (51623)    Self-Care/ADL's  [] Self-care/home management training and compensatory training, meal preparation, safety procedures, and instructions in use of assistive technology devices/adaptive equipment, direct one-on-one contact. (47016)    Home Exercise Program:     [] Reviewed/Progressed HEP activities related to strengthening, flexibility, endurance, ROM. (34275)  [] Reviewed/Progressed HEP activities related to improving balance, coordination, kinesthetic sense, posture, motor skill, proprioception.  (07075)    Manual Treatments:    [] Provided manual therapy to mobilize soft tissue/joints for the purpose of modulating pain, promoting relaxation,  increasing ROM, reducing/eliminating soft tissue swelling/inflammation/restriction, improving soft tissue extensibility. (89556)    Service Based Modalities:      Timed Code Treatment Minutes:  DARRIN 29'      Total Treatment Minutes:   29'    Treatment/Activity Tolerance:  [x] Patient tolerated treatment well [] Patient limited by fatique  [] Patient limited by pain  [x] Patient limited by other medical complications  [] Other:     Prognosis: [] Good [x] Fair  [] Poor    Patient Requires Follow-up: [x] Yes  [] No      Goals:  Short Term Goals  Time Frame for Short Term Goals: 1 week  Short Term Goal 1: Stress use of RW for max safety with gait    Long Term Goals  Time Frame for Long Term Goals : 4 weeks  Long Term Goal 1: STS in 30\" x6 for B LE power and balance control  Long Term Goal 2: TUG ( with RW ) 15\" for gait efficiency  Long Term Goal 3: Sit to stand on first attempt without balance loss  Long Term Goal 4: Butler Balance Scale improve to 38/56 to reduce fall risk      Plan:   [x]

## 2024-08-02 ENCOUNTER — HOSPITAL ENCOUNTER (OUTPATIENT)
Dept: PHYSICAL THERAPY | Age: 79
Setting detail: THERAPIES SERIES
Discharge: HOME | End: 2024-08-02
Attending: FAMILY MEDICINE
Payer: MEDICARE

## 2024-08-02 PROCEDURE — 97110 THERAPEUTIC EXERCISES: CPT

## 2024-08-02 NOTE — FLOWSHEET NOTE
Physical Therapy Daily Treatment Note    Date:  2024    Patient Name:  Max Candelaria    :  1945  MRN: [id number]  Restrictions/Precautions:   T9-L5 spinal fusions  Medical/Treatment Diagnosis Information:   Diagnosis: R26.89 Balance Problem  Treatment Diagnosis: R26.89 Balance Problem  Insurance/Certification information:  PT Insurance Information: Medicare  Physician Information:   Yrn  Plan of care signed (Y/N):    Visit# / total visits: 5 /10  Pain level: --/10       Time In: 10:59   Time Out: 11:29    Progress Note: []  Yes  [x]  No  Next due by: Visit #10 , or 24     Subjective: Pt reports no falls or complaints since last session.     Objective: Therex completed per flowsheet to increase strength, stability and improve balance for fall prevention and allow ease with ambulation. Gait belt donned for all exercises. Increased reps for several standing and seated exercises this date with fair tolerance. Verbal cuing provided for proper technique and to complete exercises slow and controlled. Posterior lean and falling occurs frequently with STS.     Observation:   Test measurements:    TUG with RW: 21\"     Exercises:   Exercise/Equipment Resistance/Repetitions Other comments   NUSTEP 5' L1   Counter ex 15x ea adv   STS 10x booster Uses chair and bar for UE support, CGA/min A to prevent posterior fall  adv   B hip abd/add 15x red    Seated marches  15x adv   LAQ 10x    HS curls  15x red     Toe tap on step 10x 4\"    Mini squats 15x  Narrow, shld width   Adv    Step up 10x 4\"    Lunges 10x  F  At bar, CGA        Gait RW  3 laps  Stress for long term safety                             [x] Provided verbal/tactile cueing for activities related to strengthening, flexibility, endurance, ROM. (92095)  [x] Provided verbal/tactile cueing for activities related to improving balance, coordination, kinesthetic sense, posture, motor skill, proprioception. (09899)    Therapeutic Activities:     [] Therapeutic

## 2024-08-05 ENCOUNTER — HOSPITAL ENCOUNTER (OUTPATIENT)
Dept: PHYSICAL THERAPY | Age: 79
Setting detail: THERAPIES SERIES
Discharge: HOME | End: 2024-08-05
Attending: FAMILY MEDICINE
Payer: MEDICARE

## 2024-08-05 PROCEDURE — 97110 THERAPEUTIC EXERCISES: CPT

## 2024-08-05 NOTE — PROGRESS NOTES
I have reviewed and agree to the content of the note written by the PTA.  Electronically signed by Leonel Llanos PT [id number]

## 2024-08-05 NOTE — FLOWSHEET NOTE
Physical Therapy Daily Treatment Note    Date:  2024    Patient Name:  Max Candelaria    :  1945  MRN: [id number]  Restrictions/Precautions:   T9-L5 spinal fusions  Medical/Treatment Diagnosis Information:   Diagnosis: R26.89 Balance Problem  Treatment Diagnosis: R26.89 Balance Problem  Insurance/Certification information:  PT Insurance Information: Medicare  Physician Information:   Yrn  Plan of care signed (Y/N):    Visit# / total visits: 6 /10  Pain level: --/10       Time In: 11:16   Time Out: 11:45    Progress Note: []  Yes  [x]  No  Next due by: Visit #10 , or 24     Subjective: Pt reports no issues or falls this date.     Objective: Therex completed per flowsheet to increase strength, stability and improve balance for fall prevention and allow ease with ambulation. Gait belt donned for all exercises. Increased reps for several seated exercises this date with fair tolerance. Verbal cuing provided for proper technique and to complete exercises slow and controlled. Posterior lean and falling occurs frequently with STS. Ongoing education provided on importance of pushing off chair arm rests vs walker for stability and fall prevention.     Observation:   Test measurements:    TUG with RW: 21\"   Posterior LOB remains with 1st STS rep     Exercises:   Exercise/Equipment Resistance/Repetitions Other comments   NUSTEP 5' L1   Counter ex 15x ea adv   STS 10x booster Uses chair and bar for UE support, CGA/min A to prevent posterior fall   B hip abd/add 20x red adv   Seated marches  15x    LAQ 15x adv   HS curls  15x red     Toe tap on step 10x 4\"    Mini squats 15x  Narrow, shld width     Step up 10x 4\"    Lunges 10x  F  At bar, CGA             Gait RW  3 laps  Stress for long term safety                             [x] Provided verbal/tactile cueing for activities related to strengthening, flexibility, endurance, ROM. (23399)  [x] Provided verbal/tactile cueing for activities related to improving

## 2024-08-07 ENCOUNTER — HOSPITAL ENCOUNTER (OUTPATIENT)
Dept: PHYSICAL THERAPY | Age: 79
Setting detail: THERAPIES SERIES
Discharge: HOME | End: 2024-08-07
Attending: FAMILY MEDICINE
Payer: MEDICARE

## 2024-08-07 PROCEDURE — 97110 THERAPEUTIC EXERCISES: CPT

## 2024-08-08 NOTE — PROGRESS NOTES
I have reviewed and agree to the content of the note written by the PTA.  Electronically signed by Leonel Llanos PT [id number]

## 2024-08-13 ENCOUNTER — HOSPITAL ENCOUNTER (OUTPATIENT)
Dept: PHYSICAL THERAPY | Age: 79
Setting detail: THERAPIES SERIES
Discharge: HOME | End: 2024-08-13
Attending: FAMILY MEDICINE
Payer: MEDICARE

## 2024-08-13 PROCEDURE — 97110 THERAPEUTIC EXERCISES: CPT | Performed by: PHYSICAL THERAPY ASSISTANT

## 2024-08-14 NOTE — PROGRESS NOTES
I have reviewed and agree to the content of the note written by the PTA.  Electronically signed by Leonel Llanos PT [id number]

## 2024-08-15 ENCOUNTER — HOSPITAL ENCOUNTER (OUTPATIENT)
Dept: PHYSICAL THERAPY | Age: 79
Setting detail: THERAPIES SERIES
Discharge: HOME | End: 2024-08-15
Attending: FAMILY MEDICINE
Payer: MEDICARE

## 2024-08-15 PROCEDURE — 97110 THERAPEUTIC EXERCISES: CPT | Performed by: PHYSICAL THERAPIST

## 2024-08-15 NOTE — FLOWSHEET NOTE
Physical Therapy Daily Treatment Note    Date:  8/15/2024    Patient Name:  Max Candelaria    :  1945  MRN: [id number]  Restrictions/Precautions:   T9-L5 spinal fusions  Medical/Treatment Diagnosis Information:   Diagnosis: R26.89 Balance Problem  Treatment Diagnosis: R26.89 Balance Problem  Insurance/Certification information:  PT Insurance Information: Medicare  Physician Information:   Yrn  Plan of care signed (Y/N):    Visit# / total visits: 9/10  Pain level: --/10       Time In: 10:55  Time Out: 11:26    Progress Note: []  Yes  [x]  No  Next due by: Visit #10     Subjective: Tries to use the RW much of the time.     Objective:   Observation:   Test measurements:   Able to do STS from standard chair height on first attempt, no loss of balance backwards.   TUG 15\"  Is stable with RW, efficient with covering distance of 300-400 ft      Exercises:   Exercise/Equipment Resistance/Repetitions Other comments   NUSTEP 6' L2   Counter ex 20x ea    STS 10x  SBA    B hip abd/add 20x red    Seated marches  20x    LAQ 20x    HS curls  20x red     Toe tap on step 10x 4\"    Mini squats 10x , 3 position Narrow, shld width     Step up 10x 4\"    Lunges 10x  F  At bar, CGA        Gait RW  3 laps  Stress for long term safety         Side step, tandem  3x ea                   [x] Provided verbal/tactile cueing for activities related to strengthening, flexibility, endurance, ROM. (16257)  [x] Provided verbal/tactile cueing for activities related to improving balance, coordination, kinesthetic sense, posture, motor skill, proprioception. (57713)    Therapeutic Activities:     [] Therapeutic activities, direct (one-on-one) patient contact (use of dynamic activities to improve functional performance). (76770)    Gait:   [] Provided training and instruction to the patient for ambulation re-education. (15886)    Self-Care/ADL's  [] Self-care/home management training and compensatory training, meal preparation, safety

## 2024-08-15 NOTE — DISCHARGE SUMMARY
[facility]  Rehabilitation and Sports Medicine    [] [place]  Phone: [phone]  Fax: [phone]      [] [place]  Phone: [phone]  Fax: [phone]    Physical Therapy Discharge Note  Date: 8/15/2024        Patient Name:  Max Candelaria    :  1945  MRN: [id number]  Restrictions/Precautions:      Medical/Treatment Diagnosis Information:   Diagnosis: R26.89 Balance Problem  Treatment Diagnosis: R26.89 Balance Problem     Insurance/Certification information:   Medicare   Physician Information:   Yrn   Plan of care signed (Y/N): y   Visit# / total visits:  9  Pain level: --/10         Plan of Care/Treatment to date:  [x] Therapeutic Exercise    [] Modalities:  [x] Therapeutic Activity     [] Ultrasound  [] Electrical Stimulation  [x] Gait Training      [] Cervical Traction    [] Lumbar Traction  [x] Neuromuscular Re-education  [] Cold/hotpack [] Iontophoresis  [x] Instruction in HEP      Other:  [] Manual Therapy       []    [] Aquatic Therapy       []                              Subjective:   Tries to use the RW much of the time.          Objective:   Able to do STS from standard chair height on first attempt, no loss of balance backwards.   TUG 15\"  Is stable with RW, efficient with covering distance of 300-400 ft            Plan:    D/c       Goals:   Short Term Goals  Time Frame for Short Term Goals: 1 week  Short Term Goal 1: Stress use of RW for max safety with gait     Long Term Goals  Time Frame for Long Term Goals : 4 weeks  Long Term Goal 1: STS in 30\" x6 for B LE power and balance control -met  Long Term Goal 2: TUG ( with RW ) 15\" for gait efficiency - met  Long Term Goal 3: Sit to stand on first attempt without balance loss - met  Long Term Goal 4: Butler Balance Scale improve to 38/56 to reduce fall risk- met         Percentage of Goals Met: 100            Discharge Prognosis: [] Excellent [] Good [] Fair  [] Poor     Goal Status:  [x] Achieved [] Partially

## 2024-08-19 RX ORDER — FENOFIBRATE 145 MG/1
TABLET, COATED ORAL
Qty: 90 TABLET | Refills: 3 | Status: SHIPPED | OUTPATIENT
Start: 2024-08-19

## 2024-08-19 NOTE — TELEPHONE ENCOUNTER
Max called requesting a refill of the below medication which has been pended for you:     Requested Prescriptions     Pending Prescriptions Disp Refills    fenofibrate (TRICOR) 145 MG tablet [Pharmacy Med Name: FENOFIBRATE 145 MG TABLET] 90 tablet 3     Sig: TAKE 1 TABLET BY MOUTH DAILY       Last Appointment Date: 7/12/2024  Next Appointment Date: 1/10/2025    Allergies   Allergen Reactions    Atarax [Hydroxyzine]      Double vision    Gabapentin      dizzy    Phenergan [Promethazine Hcl] Other (See Comments)     \"out of it\"    Morphine And Codeine Nausea And Vomiting     hallucinations

## 2024-08-23 ENCOUNTER — OFFICE VISIT (OUTPATIENT)
Dept: UROLOGY | Age: 79
End: 2024-08-23
Payer: MEDICARE

## 2024-08-23 VITALS
HEIGHT: 70 IN | BODY MASS INDEX: 29.62 KG/M2 | HEART RATE: 68 BPM | DIASTOLIC BLOOD PRESSURE: 64 MMHG | SYSTOLIC BLOOD PRESSURE: 130 MMHG

## 2024-08-23 DIAGNOSIS — R35.0 BENIGN PROSTATIC HYPERPLASIA WITH URINARY FREQUENCY: Primary | ICD-10-CM

## 2024-08-23 DIAGNOSIS — R32 URINARY INCONTINENCE, UNSPECIFIED TYPE: ICD-10-CM

## 2024-08-23 DIAGNOSIS — N40.1 BENIGN PROSTATIC HYPERPLASIA WITH URINARY FREQUENCY: Primary | ICD-10-CM

## 2024-08-23 DIAGNOSIS — N32.81 OAB (OVERACTIVE BLADDER): ICD-10-CM

## 2024-08-23 PROCEDURE — PBSHW MEAS,POST-VOID RES,US,NON-IMAGING

## 2024-08-23 PROCEDURE — 3075F SYST BP GE 130 - 139MM HG: CPT

## 2024-08-23 PROCEDURE — 51798 US URINE CAPACITY MEASURE: CPT

## 2024-08-23 PROCEDURE — 1036F TOBACCO NON-USER: CPT

## 2024-08-23 PROCEDURE — G8417 CALC BMI ABV UP PARAM F/U: HCPCS

## 2024-08-23 PROCEDURE — 99214 OFFICE O/P EST MOD 30 MIN: CPT

## 2024-08-23 PROCEDURE — 99212 OFFICE O/P EST SF 10 MIN: CPT

## 2024-08-23 PROCEDURE — 3078F DIAST BP <80 MM HG: CPT

## 2024-08-23 PROCEDURE — G8427 DOCREV CUR MEDS BY ELIG CLIN: HCPCS

## 2024-08-23 PROCEDURE — 1123F ACP DISCUSS/DSCN MKR DOCD: CPT

## 2024-08-23 NOTE — PROGRESS NOTES
[facility] SPECIALY CARE, [facility] ([facility] A DEPARTMENT OF [facility]  [address] [facility] [address]  Dept: [phone]  Visit Date: 8/23/2024      HPI  Max Candelaria is a 79 y.o. male that presents to the urology clinic for BPH and OAB follow-up.    Urinary incontinence is Mr. Candelaria's primary complaint \"I'm tired of pissing all over myself.\" Prescribed Myrbetriq 25 mg QD at last visit but did not start second to cost. Failed Oxybutynin XL 10 mg and Solifenacin 10 mg.    Patient continues to take Tamsulosin 0.4 mg QD for BPH. Still with urgency and frequency 2/2 to OAB.       Most Recent PSA: 1.38 (02/03/23)    PVR of 103 mL.      Last BUN and Creatinine:  Lab Results   Component Value Date    BUN 21 07/03/2024     Lab Results   Component Value Date    CREATININE 1.1 07/03/2024           PAST MEDICAL, FAMILY AND SOCIAL HISTORY UPDATE:  Past Medical History:   Diagnosis Date    Agoraphobia     Anxiety     Arthritis     Basal cell carcinoma     Chronic pain     back    CKD (chronic kidney disease)     Claustrophobia     Enlarged lymph node     rt. lower neck.    Hearing aid worn     dar. ears.    Hearing loss     Hyperlipidemia     Hypertension     RAFAT (obstructive sleep apnea)     CPAP occasional    PONV (postoperative nausea and vomiting)     Retention of urine     last 2 surgeries, patient unable to void, home with catheter both times    SCC (squamous cell carcinoma)     HAND    Spinal stenosis, lumbar region, with neurogenic claudication     Wears glasses      Past Surgical History:   Procedure Laterality Date    BACK SURGERY  10/2013    Dr. Hussein hardware lumbar    COLONOSCOPY  06/13/12    mild diverticular dz    LUMBAR FUSION N/A 3/8/2017    LUMBAR L2-3 POSTERIOR DECOMPRESSION FUSION INSTRUMENTATION W/REVISION L3-5 POSTERIOR DECOMPRESSION FUSION INSTRUMENTATION (KOKI GOLBUS & NEUROMONITOR)  CC SN/KILEY performed by Olman Hussein MD at [facility] OR

## 2024-08-25 DIAGNOSIS — E78.00 PURE HYPERCHOLESTEROLEMIA: Primary | ICD-10-CM

## 2024-08-26 NOTE — TELEPHONE ENCOUNTER
Max called requesting a refill of the below medication which has been pended for you:     Requested Prescriptions     Pending Prescriptions Disp Refills    simvastatin (ZOCOR) 20 MG tablet [Pharmacy Med Name: SIMVASTATIN 20 MG TABLET] 90 tablet 3     Sig: TAKE 1 TABLET BY MOUTH DAILY       Last Appointment Date: 7/12/2024  Next Appointment Date: 1/10/2025    Allergies   Allergen Reactions    Atarax [Hydroxyzine]      Double vision    Gabapentin      dizzy    Phenergan [Promethazine Hcl] Other (See Comments)     \"out of it\"    Morphine And Codeine Nausea And Vomiting     hallucinations

## 2024-08-27 RX ORDER — SIMVASTATIN 20 MG
TABLET ORAL
Qty: 90 TABLET | Refills: 3 | Status: SHIPPED | OUTPATIENT
Start: 2024-08-27

## 2024-09-15 DIAGNOSIS — N13.8 HYPERTROPHY OF PROSTATE WITH URINARY OBSTRUCTION: ICD-10-CM

## 2024-09-15 DIAGNOSIS — N40.1 HYPERTROPHY OF PROSTATE WITH URINARY OBSTRUCTION: ICD-10-CM

## 2024-09-15 DIAGNOSIS — R35.1 NOCTURIA: ICD-10-CM

## 2024-09-16 RX ORDER — TAMSULOSIN HYDROCHLORIDE 0.4 MG/1
0.4 CAPSULE ORAL DAILY
Qty: 90 CAPSULE | Refills: 1 | Status: SHIPPED | OUTPATIENT
Start: 2024-09-16

## 2024-10-14 ENCOUNTER — PROCEDURE VISIT (OUTPATIENT)
Dept: UROLOGY | Age: 79
End: 2024-10-14

## 2024-10-14 VITALS
DIASTOLIC BLOOD PRESSURE: 64 MMHG | OXYGEN SATURATION: 95 % | RESPIRATION RATE: 16 BRPM | WEIGHT: 194 LBS | HEIGHT: 70 IN | BODY MASS INDEX: 27.77 KG/M2 | HEART RATE: 68 BPM | SYSTOLIC BLOOD PRESSURE: 118 MMHG

## 2024-10-14 DIAGNOSIS — R35.0 URINARY FREQUENCY: ICD-10-CM

## 2024-10-14 DIAGNOSIS — R35.0 BENIGN PROSTATIC HYPERPLASIA WITH URINARY FREQUENCY: Primary | ICD-10-CM

## 2024-10-14 DIAGNOSIS — N40.1 BENIGN PROSTATIC HYPERPLASIA WITH URINARY FREQUENCY: Primary | ICD-10-CM

## 2024-10-14 DIAGNOSIS — N13.8 ENLARGED PROSTATE WITH URINARY OBSTRUCTION: ICD-10-CM

## 2024-10-14 DIAGNOSIS — N40.1 ENLARGED PROSTATE WITH URINARY OBSTRUCTION: ICD-10-CM

## 2024-10-14 RX ORDER — LIDOCAINE HYDROCHLORIDE 20 MG/ML
JELLY TOPICAL ONCE
Status: COMPLETED | OUTPATIENT
Start: 2024-10-14 | End: 2024-10-14

## 2024-10-14 RX ADMIN — LIDOCAINE HYDROCHLORIDE: 20 JELLY TOPICAL at 11:40

## 2024-10-14 NOTE — PATIENT INSTRUCTIONS
[facility] ([facility])  Pre-Surgery Instructions  [facility] has made preparations for your day of surgery to be safe and   comfortable as possible. If you follow the instructions below, you will assist us in providing you   with very good care.  *Please Verify with your Insurance Company that your surgery/procedure & hospital stay is   covered*  ?   Check in at Hospital Registration - [place] Entrance (Enter back building under overhead   crosswalk that connects the 2 buildings)  ?   DO NOT EAT or DRINK ANYTHING after MIDNIGHT. This includes gum, hard candy,   and mints.  ?   Vitamins and Herbal supplements are to be STOPPED 7 days before surgery.  ?   STOP these medications ____ days before surgery.  ? Date to STOP: _________________ Medication: ___________________________    ? Date to STOP: _________________ Medication: ___________________________    ? Date to STOP: _________________ Medication: ___________________________    ?   Medications to be taken at home the morning of your surgery should be with a SMALL   SIP of WATER:  ? Med(s) to be taken: __________________________________________________  PRE-PROCEDURE PREP to be DONE at HOME  ?   Shower night before & morning of surgery from the neck down.  The DAY of SURGERY:  ?  Do NOT use deodorant, lotions, creams, or powders  ?  Do NOT wear contacts  ?  Do NOT wear make-up and ask about your nail polish  ?  Do NOT wear or bring jewelry (including your wedding ring) & also remove ALL body   piercings.  ?  Do NOT bring valuables (but see below)  ?  Please wear loose, comfortable clothing.  ?  DO NOT SMOKE the NIGHT BEFORE & MORNING of SURGERY  ?  Avoid alcohol 24 hours before surgery  ?  DO NOT USE Illegal Drugs 5 days before surgery (marijuana, heroin, cocaine, LSD, etc.)  ?  You will NEED a  the DAY of SURGERY if you are having SEDATION.  ?  The day of Surgery/Procedure ONLY one (1) adult may come into

## 2024-10-14 NOTE — PROGRESS NOTES
Edy Storz Cystourethroscope Model Number [id number]; Serial Number [id number] scope#3 used for procedure today, was removed from sterile container after visual inspection

## 2024-10-14 NOTE — PROGRESS NOTES
Cystoscopy    Operative Note    Patient:  Max Candelaria  MRN: [id number]  YOB: 1945    Date: 10/14/24  Surgeon: WM HAMMOND MD  Anesthesia: Urojet Local  Indications:     Failed PVP   Severe oab symptoms      Position: Supine  EBL: 0 ml    Findings:   The patient was prepped and draped in the usual sterile fashion.  The flexible cystoscope was advanced through the urethra and into the bladder.  The bladder was thoroughly inspected and the following was noted:    Residual Urine: moderate.  Urine clear, with no obvious infection  Urethra: No abnormalities of the urethra are noted. Urethral dilation was not performed.    Prostate:open bladder neck with dystrophic calcifications that I siphoned out. I extracted calculi from the bladder during the procedure.   Bladder: no tumor noted .   Mild trabeculation noted.  no bladder diverticulum.  Ureters: Orifices with normal configuration and location.      Severe oab  Removed bladder stones that were embedded in prostate. Pushed them off prostate adenoma and siphoned out  Oab severe incontinence severe- recommend 100 u botox mac (5)

## 2024-10-16 ENCOUNTER — TELEPHONE (OUTPATIENT)
Dept: UROLOGY | Age: 79
End: 2024-10-16

## 2024-10-16 NOTE — TELEPHONE ENCOUNTER
[facility]     Pre-Operative Evaluation/Consultation    Name:  Max Candelaria                                         Age:  79 y.o.  MRN:  [id number]       :  1945   Date:  10/16/2024         Sex: male    OAB    Surgeon:  Dr. Michael Escamilla  Procedure (Planned):  Cystoscopy with Botox 100 Units  Date Scheduled surgery: 24    Attending : No att. providers found    Primary Physician:   Cardiologist: None    Type of Anesthesia Requested: Monitored Anesthesia Care    Patient Medical history:  Allergies   Allergen Reactions    Atarax [Hydroxyzine]      Double vision    Gabapentin      dizzy    Phenergan [Promethazine Hcl] Other (See Comments)     \"out of it\"    Morphine And Codeine Nausea And Vomiting     hallucinations     Social History     Tobacco Use    Smoking status: Never    Smokeless tobacco: Never   Vaping Use    Vaping status: Never Used   Substance Use Topics    Alcohol use: No    Drug use: No         Additional ordered pre-operative testing:  []CBC    []ABG      [] BMP   []URINALYSIS   []CMP    []HCG   []COAGS PT/INR  []T&C  []LFTs   []TYPE AND SCREEN    [] EKG  [] Chest X-Ray  [] Other Radiology    [] Sent to Hospitalist None  [] Sent to Anesthesia for your review: None   [] Additional Orders: None     Comments:None   Requests: No Special requests    Signed: Aliya Wick LPN 10/16/2024 3:06 PM

## 2024-10-18 NOTE — TELEPHONE ENCOUNTER
Cystoscopy with Botox 100 Units under MAC scheduled for 11/11/24 at [facility] at [facility].  Medication list, insurance cards, and last OV notes available via EMR.  Notified patient of procedure date and instructed to arrive 90 minutes prior to procedure. Instructed to hold aspirin, vitamins, and supplements for one week prior to procedure.  Patient instructed to be NPO after midnight, and to hold all morning medications until after procedure.  Patient repeats instructions back and voices understanding.

## 2024-10-29 DIAGNOSIS — I10 ESSENTIAL (PRIMARY) HYPERTENSION: ICD-10-CM

## 2024-10-29 RX ORDER — METOPROLOL SUCCINATE 50 MG/1
TABLET, EXTENDED RELEASE ORAL
Qty: 90 TABLET | Refills: 1 | Status: SHIPPED | OUTPATIENT
Start: 2024-10-29

## 2024-10-29 RX ORDER — SERTRALINE HYDROCHLORIDE 100 MG/1
TABLET, FILM COATED ORAL
Qty: 90 TABLET | Refills: 2 | Status: SHIPPED | OUTPATIENT
Start: 2024-10-29

## 2024-10-29 NOTE — TELEPHONE ENCOUNTER
Max called requesting a refill of the below medication which has been pended for you:     Requested Prescriptions     Pending Prescriptions Disp Refills    metoprolol succinate (TOPROL XL) 50 MG extended release tablet 90 tablet 1     Sig: TAKE 1 TABLET BY MOUTH DAILY    sertraline (ZOLOFT) 100 MG tablet 90 tablet 2     Sig: TAKE ONE TABLET BY MOUTH DAILY       Last Appointment Date: 7/12/2024  Next Appointment Date: 1/10/2025    Allergies   Allergen Reactions    Atarax [Hydroxyzine]      Double vision    Gabapentin      dizzy    Phenergan [Promethazine Hcl] Other (See Comments)     \"out of it\"    Morphine And Codeine Nausea And Vomiting     hallucinations

## 2024-11-11 ENCOUNTER — HOSPITAL ENCOUNTER (OUTPATIENT)
Age: 79
Setting detail: OUTPATIENT SURGERY
Discharge: HOME | End: 2024-11-11
Attending: UROLOGY | Admitting: UROLOGY
Payer: MEDICARE

## 2024-11-11 ENCOUNTER — ANESTHESIA EVENT (OUTPATIENT)
Dept: OPERATING ROOM | Age: 79
End: 2024-11-11
Payer: MEDICARE

## 2024-11-11 ENCOUNTER — ANESTHESIA (OUTPATIENT)
Dept: OPERATING ROOM | Age: 79
End: 2024-11-11
Payer: MEDICARE

## 2024-11-11 VITALS
RESPIRATION RATE: 16 BRPM | DIASTOLIC BLOOD PRESSURE: 77 MMHG | SYSTOLIC BLOOD PRESSURE: 175 MMHG | OXYGEN SATURATION: 94 % | HEART RATE: 53 BPM | TEMPERATURE: 98 F

## 2024-11-11 PROCEDURE — 3600000002 HC SURGERY LEVEL 2 BASE: Performed by: UROLOGY

## 2024-11-11 PROCEDURE — 6360000002 HC RX W HCPCS: Performed by: NURSE ANESTHETIST, CERTIFIED REGISTERED

## 2024-11-11 PROCEDURE — 6360000002 HC RX W HCPCS: Performed by: UROLOGY

## 2024-11-11 PROCEDURE — 2580000003 HC RX 258: Performed by: UROLOGY

## 2024-11-11 PROCEDURE — 3600000012 HC SURGERY LEVEL 2 ADDTL 15MIN: Performed by: UROLOGY

## 2024-11-11 PROCEDURE — 7100000010 HC PHASE II RECOVERY - FIRST 15 MIN: Performed by: UROLOGY

## 2024-11-11 PROCEDURE — 3700000001 HC ADD 15 MINUTES (ANESTHESIA): Performed by: UROLOGY

## 2024-11-11 PROCEDURE — 2709999900 HC NON-CHARGEABLE SUPPLY: Performed by: UROLOGY

## 2024-11-11 PROCEDURE — 3700000000 HC ANESTHESIA ATTENDED CARE: Performed by: UROLOGY

## 2024-11-11 PROCEDURE — 7100000011 HC PHASE II RECOVERY - ADDTL 15 MIN: Performed by: UROLOGY

## 2024-11-11 RX ORDER — SODIUM CHLORIDE 0.9 % (FLUSH) 0.9 %
5-40 SYRINGE (ML) INJECTION PRN
Status: DISCONTINUED | OUTPATIENT
Start: 2024-11-11 | End: 2024-11-11 | Stop reason: HOSPADM

## 2024-11-11 RX ORDER — SODIUM CHLORIDE 9 MG/ML
INJECTION, SOLUTION INTRAVENOUS PRN
Status: DISCONTINUED | OUTPATIENT
Start: 2024-11-11 | End: 2024-11-11 | Stop reason: HOSPADM

## 2024-11-11 RX ORDER — SODIUM CHLORIDE 0.9 % (FLUSH) 0.9 %
5-40 SYRINGE (ML) INJECTION EVERY 12 HOURS SCHEDULED
Status: DISCONTINUED | OUTPATIENT
Start: 2024-11-11 | End: 2024-11-11 | Stop reason: HOSPADM

## 2024-11-11 RX ORDER — CIPROFLOXACIN 500 MG/1
500 TABLET, FILM COATED ORAL 2 TIMES DAILY
Qty: 14 TABLET | Refills: 0 | Status: SHIPPED | OUTPATIENT
Start: 2024-11-11 | End: 2024-11-18

## 2024-11-11 RX ORDER — FENTANYL CITRATE 50 UG/ML
INJECTION, SOLUTION INTRAMUSCULAR; INTRAVENOUS
Status: DISCONTINUED | OUTPATIENT
Start: 2024-11-11 | End: 2024-11-11 | Stop reason: SDUPTHER

## 2024-11-11 RX ORDER — PROPOFOL 10 MG/ML
INJECTION, EMULSION INTRAVENOUS
Status: DISCONTINUED | OUTPATIENT
Start: 2024-11-11 | End: 2024-11-11 | Stop reason: SDUPTHER

## 2024-11-11 RX ORDER — SODIUM CHLORIDE, SODIUM LACTATE, POTASSIUM CHLORIDE, CALCIUM CHLORIDE 600; 310; 30; 20 MG/100ML; MG/100ML; MG/100ML; MG/100ML
INJECTION, SOLUTION INTRAVENOUS CONTINUOUS
Status: DISCONTINUED | OUTPATIENT
Start: 2024-11-11 | End: 2024-11-11 | Stop reason: HOSPADM

## 2024-11-11 RX ADMIN — SODIUM CHLORIDE, PRESERVATIVE FREE 10 ML: 5 INJECTION INTRAVENOUS at 08:49

## 2024-11-11 RX ADMIN — PROPOFOL 50 MG: 10 INJECTION, EMULSION INTRAVENOUS at 10:23

## 2024-11-11 RX ADMIN — FENTANYL CITRATE 50 MCG: 50 INJECTION, SOLUTION INTRAMUSCULAR; INTRAVENOUS at 10:23

## 2024-11-11 RX ADMIN — FENTANYL CITRATE 50 MCG: 50 INJECTION, SOLUTION INTRAMUSCULAR; INTRAVENOUS at 10:35

## 2024-11-11 RX ADMIN — PROPOFOL 120 MCG/KG/MIN: 10 INJECTION, EMULSION INTRAVENOUS at 10:24

## 2024-11-11 RX ADMIN — Medication 2000 MG: at 10:23

## 2024-11-11 ASSESSMENT — PAIN - FUNCTIONAL ASSESSMENT: PAIN_FUNCTIONAL_ASSESSMENT: 0-10

## 2024-11-11 NOTE — BRIEF OP NOTE
Brief Postoperative Note      Patient: Max Candelaria  YOB: 1945  MRN: [id number]    Date of Procedure: 11/11/2024    Pre-Op Diagnosis Codes:      * Enlarged prostate with urinary obstruction [N40.1, N13.8]     * Urinary frequency [R35.0]    Post-Op Diagnosis: Same       Procedure(s):  Cystoscopy with Botox    Surgeon(s):  Michael Escamilla MD    Assistant:  * No surgical staff found *    Anesthesia: Monitor Anesthesia Care    Estimated Blood Loss (mL): Minimal    Complications: None    Specimens:   * No specimens in log *    Implants:  * No implants in log *      Drains: * No LDAs found *    Findings:  4-6 weeks JUSTIN with pvr  Stopped vesicare    Electronically signed by MICHAEL ESCAMILLA MD on 11/11/2024 at 11:01 AM

## 2024-11-11 NOTE — ANESTHESIA PRE PROCEDURE
Department of Anesthesiology  Preprocedure Note       Name:  Max Candelaria   Age:  79 y.o.  :  1945                                          MRN:  [id number]         Date:  2024      Surgeon: Surgeon(s):  Michael Escamilla MD    Procedure: Procedure(s):  Cystoscopy with Botox    Medications prior to admission:   Prior to Admission medications    Medication Sig Start Date End Date Taking? Authorizing Provider   metoprolol succinate (TOPROL XL) 50 MG extended release tablet TAKE 1 TABLET BY MOUTH DAILY 10/29/24  Yes Indra Pool MD   sertraline (ZOLOFT) 100 MG tablet TAKE ONE TABLET BY MOUTH DAILY 10/29/24  Yes Indra Pool MD   tamsulosin (FLOMAX) 0.4 MG capsule TAKE 1 CAPSULE BY MOUTH DAILY 24  Yes Indra Pool MD   simvastatin (ZOCOR) 20 MG tablet TAKE 1 TABLET BY MOUTH DAILY 24  Yes Indra Pool MD   fenofibrate (TRICOR) 145 MG tablet TAKE 1 TABLET BY MOUTH DAILY 24  Yes Indra Pool MD   solifenacin (VESICARE) 10 MG tablet Take 1 tablet by mouth daily 24  Yes Lonnie Cash PA-C   Multiple Vitamin (MULTI VITAMIN DAILY PO) Take by mouth   Yes Provider, MD Georgina       Current medications:    Current Facility-Administered Medications   Medication Dose Route Frequency Provider Last Rate Last Admin   • sodium chloride flush 0.9 % injection 5-40 mL  5-40 mL IntraVENous 2 times per day Michael Escamilla MD       • sodium chloride flush 0.9 % injection 5-40 mL  5-40 mL IntraVENous PRN Michael Escamilla MD   10 mL at 24 0849   • 0.9 % sodium chloride infusion   IntraVENous PRN Michael Escamilla MD       • lactated ringers infusion   IntraVENous Continuous Michale Escamilla MD       • ceFAZolin (ANCEF) 2000 mg in sterile water 20 mL IV syringe  2,000 mg IntraVENous On Call to OR Michael Escamilla MD           Allergies:    Allergies   Allergen Reactions   • Atarax [Hydroxyzine]      Double vision   • Gabapentin      dizzy   • Phenergan [Promethazine Hcl] Other (See Comments)     \"out of it\"   •

## 2024-11-11 NOTE — ANESTHESIA POSTPROCEDURE EVALUATION
Department of Anesthesiology  Postprocedure Note    Patient: Max Candelaria  MRN: [id number]  YOB: 1945  Date of evaluation: 11/11/2024    Procedure Summary       Date: 11/11/24 Room / Location: [address]    Anesthesia Start: 1022 Anesthesia Stop: 1046    Procedure: Cystoscopy with Botox Diagnosis:       Enlarged prostate with urinary obstruction      Urinary frequency      (Enlarged prostate with urinary obstruction [N40.1, N13.8])      (Urinary frequency [R35.0])    Surgeons: Michael Escamilla MD Responsible Provider: Jeronimo Blair APRN - CRNA    Anesthesia Type: General, TIVA ASA Status: 3            Anesthesia Type: General, TIVA    Shalini Phase I: Shalini Score: 10    Shalini Phase II: Shalini Score: 9    Anesthesia Post Evaluation    Patient location during evaluation: bedside  Level of consciousness: sleepy but conscious  Airway patency: patent  Nausea & Vomiting: no nausea and no vomiting  Cardiovascular status: hemodynamically stable  Respiratory status: spontaneous ventilation  Hydration status: stable  Pain management: satisfactory to patient    No notable events documented.

## 2024-11-11 NOTE — H&P
normal.  Skin: Normal  Lungs: Respiratory effort normal, CTA  Cardiovascular:  Normal peripheral pulses; no murmur. Normal rhythm  Abdomen: Soft, non-tender, non-distended with no CVA, flank pain, hepatosplenomegaly or hernia.  Kidneys normal.  Bladder non-tender and not distended.      LABS:   No results for input(s): \"WBC\", \"HGB\", \"HCT\", \"MCV\", \"PLT\" in the last 72 hours.  No results for input(s): \"NA\", \"K\", \"CL\", \"CO2\", \"PHOS\", \"BUN\", \"CREATININE\" in the last 72 hours.    Invalid input(s): \"CA\"  Lab Results   Component Value Date    PSA 1.38 02/03/2023    PSA 1.09 07/13/2021    PSA 1.20 07/13/2020         Urinalysis: No results for input(s): \"COLORU\", \"PHUR\", \"LABCAST\", \"WBCUA\", \"RBCUA\", \"MUCUS\", \"TRICHOMONAS\", \"YEAST\", \"BACTERIA\", \"CLARITYU\", \"SPECGRAV\", \"LEUKOCYTESUR\", \"UROBILINOGEN\", \"BILIRUBINUR\", \"BLOODU\" in the last 72 hours.    Invalid input(s): \"NITRATE\", \"GLUCOSEUKETONESUAMORPHOUS\"     -----------------------------------------------------------------      Assessment and Plan     Impression:    Patient Active Problem List   Diagnosis    Hyperlipemia    RAFAT on CPAP    Anxiety    Spinal stenosis, lumbar region, with neurogenic claudication    Degeneration of lumbar or lumbosacral intervertebral disc    CKD (chronic kidney disease)    SCC (squamous cell carcinoma)    Claustrophobia    Depression    Hearing loss    Essential (primary) hypertension    Agoraphobia    Mass in neck    Left foot drop    Lumbar disc herniation    Flat back syndrome, postprocedural    Back pain    Postlaminectomy syndrome, lumbar region    Major depressive disorder in full remission (HCC)    Lumbar facet joint syndrome    Panniculitis involving lumbar region    Enlarged prostate with urinary obstruction    Urinary frequency       Plan:     Consent obtained; cysto botox 100 u in OR today    WM HAMMOND MD  8:04 AM 11/11/2024

## 2024-11-11 NOTE — DISCHARGE INSTRUCTIONS
Discharge Instructions following Cystoscopy with Botox Injection    You have received a series of small injections of Botox into your bladder wall.  The Botox injections have the effect of partially \"paralysing\" the bladder, reducing the urgency and urge incontinence.  The Botox takes approximately 4 weeks to become effective.  You will be reassessed in the office to review your symptoms and ensure that your bladder is emptying properly.    It is very important to increase your fluid intake for the first few days - water is preferred.  You may experience slight discomfort with urination for 1-2 days following surgery.  Your urine may also be colored red.        Take medications as prescribed.  If you are experiencing only minor discomfort, you may take Tylenol or any non-prescription pain medication.    Activity  You have received sedation. Your judgement and coordination may be impaired.  -Do not drive or operate any machinery today.  -Do not make personal, medical, legal, or business decisions today.  -Do not consume alcohol, tranquilizers, sleeping or non-prescription medications today, unless indicated by your physician.  -You may resume normal activities after 24 hours and return to work unless otherwise stated by your doctor.    Diet  Unless otherwise instructed, begin with clear liquids and progress to your normal diet if you are not nauseated.    Medications  -resume your usual medications unless otherwise instructed.  Stop Vesicare.    Physician Follow up  -follow up with your doctor at your next scheduled appointment.      Call Your Physician If You Experience Any of the Following  -Fever >100.5, Chills, Excessive sweating.  -Any redness or swelling at the IV site.  -Persistant nausea or vomiting  -Severe abdominal or chest pain  -You are unable to urinate within 12 hours.    If you have any questions or problems, call:  [phone] ([facility]) or after hours call: [phone] ([facility]

## 2024-11-11 NOTE — OP NOTE
Michael Escamilla MD.  Urologic Surgery      [place], [place]    DATE: 11/11/2024  Patient:  Max Candelaria  MRN: [id number]  YOB: 1945    SURGEON: Michael Escamilla MD.    ASSISTANT: none    PREOPERATIVE DIAGNOSIS: overactive bladder    POSTOPERATIVE DIAGNOSIS: overactive bladder    PROCEDURE PERFORMED: cystoscopy with botox instillation    ANESTHESIA: Monitor Anesthesia Care    COMPLICATIONS: none    OR BLOOD LOSS:  Minimal    FLUIDS: Cystalloids per Anesthesia    SPECIMENS:  * No specimens in log *  none    DRAINS: none    INDICATIONS FOR PROCEDURE:  The patient is a 79 y.o. male who presents today with Enlarged prostate with urinary obstruction [N40.1, N13.8]  Urinary frequency [R35.0] here for Cystoscopy with Botox. After risks, benefits and alternatives of the procedure were discussed with the patient, the patient elected to proceed.     DETAILS OF PROCEDURE:  After informed consent was obtained in the preoperative area, the patient was taken back to the operating room and transferred to the operating table in supine position.  Anesthesia was induced and antibiotics were given.  A timeout occurred.  Two patient identifiers were used. We entered the urethra with a 22F rigid cystoscope with a 30 degree lens.     Once within the bladder, the injection needle was advanced and purged of air. We then completed a total of 20 injections throughout the bladder. A total of 100 units of Botox were injected. The trigone and ureteral orifices were spared from injection to reduce the risk of vesicoureteral reflux.     Once completed the bladder was surveyed. There was no evidence of active bleeding from the injection sites. The patient's bladder was the drained. All instruments were removed. The patient was awakened and discharged back to the PACU in good and stable condition.

## 2025-01-02 ENCOUNTER — OFFICE VISIT (OUTPATIENT)
Dept: UROLOGY | Age: 80
End: 2025-01-02
Payer: MEDICARE

## 2025-01-02 VITALS
BODY MASS INDEX: 27.77 KG/M2 | WEIGHT: 194 LBS | SYSTOLIC BLOOD PRESSURE: 120 MMHG | DIASTOLIC BLOOD PRESSURE: 72 MMHG | HEART RATE: 69 BPM | RESPIRATION RATE: 16 BRPM | HEIGHT: 70 IN | OXYGEN SATURATION: 96 %

## 2025-01-02 DIAGNOSIS — N32.81 OAB (OVERACTIVE BLADDER): ICD-10-CM

## 2025-01-02 DIAGNOSIS — R33.9 INCOMPLETE BLADDER EMPTYING: Primary | ICD-10-CM

## 2025-01-02 DIAGNOSIS — N40.1 BENIGN PROSTATIC HYPERPLASIA WITH URINARY FREQUENCY: ICD-10-CM

## 2025-01-02 DIAGNOSIS — R35.0 BENIGN PROSTATIC HYPERPLASIA WITH URINARY FREQUENCY: ICD-10-CM

## 2025-01-02 PROCEDURE — G8427 DOCREV CUR MEDS BY ELIG CLIN: HCPCS

## 2025-01-02 PROCEDURE — G8417 CALC BMI ABV UP PARAM F/U: HCPCS

## 2025-01-02 PROCEDURE — 3074F SYST BP LT 130 MM HG: CPT

## 2025-01-02 PROCEDURE — 99212 OFFICE O/P EST SF 10 MIN: CPT

## 2025-01-02 PROCEDURE — 99213 OFFICE O/P EST LOW 20 MIN: CPT

## 2025-01-02 PROCEDURE — 51798 US URINE CAPACITY MEASURE: CPT

## 2025-01-02 PROCEDURE — 1123F ACP DISCUSS/DSCN MKR DOCD: CPT

## 2025-01-02 PROCEDURE — PBSHW MEAS,POST-VOID RES,US,NON-IMAGING

## 2025-01-02 PROCEDURE — 1036F TOBACCO NON-USER: CPT

## 2025-01-02 PROCEDURE — 1159F MED LIST DOCD IN RCRD: CPT

## 2025-01-02 PROCEDURE — 3078F DIAST BP <80 MM HG: CPT

## 2025-01-02 NOTE — PROGRESS NOTES
[facility]  [address] [facility] [address]  Dept: [phone]  Visit Date: 1/2/2025      HPI  Max Candelaria is a 79 y.o. male that presents to the urology clinic for BPH and OAB follow-up.    Urinary incontinence is Mr. Candelaria's primary complaint \"I'm tired of pissing all over myself.\" Prescribed Myrbetriq 25 mg QD at last visit but did not start second to cost. Failed Oxybutynin XL 10 mg and Solifenacin 10 mg.    Now S/P Cystoscopy, Bladder Botox 100 U. Patient reporting 90% relief in symptoms! Very pleased.    Patient continues to take Tamsulosin 0.4 mg QD for BPH. Denies weak urinary stream, straining, or hesitancy. PVR of 86 mL in the office today.    Most Recent PSA: 1.38 (02/03/23)        Last BUN and Creatinine:  Lab Results   Component Value Date    BUN 21 07/03/2024     Lab Results   Component Value Date    CREATININE 1.1 07/03/2024           PAST MEDICAL, FAMILY AND SOCIAL HISTORY UPDATE:  Past Medical History:   Diagnosis Date    Agoraphobia     Anxiety     Arthritis     Basal cell carcinoma     Chronic pain     back    CKD (chronic kidney disease)     Claustrophobia     Enlarged lymph node     rt. lower neck.    Hearing aid worn     dar. ears.    Hyperlipidemia     Hypertension     RAFAT (obstructive sleep apnea)     CPAP occasional    PONV (postoperative nausea and vomiting)     Retention of urine     last 2 surgeries, patient unable to void, home with catheter both times    SCC (squamous cell carcinoma)     HAND    Spinal stenosis, lumbar region, with neurogenic claudication      Past Surgical History:   Procedure Laterality Date    BACK SURGERY  10/2013    Dr. Hussein hardware lumbar    COLONOSCOPY  06/13/2012    mild diverticular dz    CYSTOSCOPY N/A 10/14/2024    In-Office Dr. Escamilla    CYSTOSCOPY N/A 11/11/2024    Cystoscopy with Botox performed by Michael Escamilla MD at [facility] [place] [facility] OR

## 2025-01-16 ENCOUNTER — OFFICE VISIT (OUTPATIENT)
Dept: FAMILY MEDICINE CLINIC | Age: 80
End: 2025-01-16
Payer: MEDICARE

## 2025-01-16 VITALS
BODY MASS INDEX: 26.88 KG/M2 | OXYGEN SATURATION: 95 % | SYSTOLIC BLOOD PRESSURE: 132 MMHG | HEIGHT: 70 IN | HEART RATE: 59 BPM | DIASTOLIC BLOOD PRESSURE: 80 MMHG | WEIGHT: 187.8 LBS

## 2025-01-16 DIAGNOSIS — C44.92 SCC (SQUAMOUS CELL CARCINOMA): ICD-10-CM

## 2025-01-16 DIAGNOSIS — M54.42 CHRONIC BILATERAL LOW BACK PAIN WITH LEFT-SIDED SCIATICA: ICD-10-CM

## 2025-01-16 DIAGNOSIS — M48.062 SPINAL STENOSIS, LUMBAR REGION, WITH NEUROGENIC CLAUDICATION: ICD-10-CM

## 2025-01-16 DIAGNOSIS — G47.33 OSA ON CPAP: ICD-10-CM

## 2025-01-16 DIAGNOSIS — N18.30 STAGE 3 CHRONIC KIDNEY DISEASE, UNSPECIFIED WHETHER STAGE 3A OR 3B CKD (HCC): ICD-10-CM

## 2025-01-16 DIAGNOSIS — F32.5 MAJOR DEPRESSIVE DISORDER IN FULL REMISSION, UNSPECIFIED WHETHER RECURRENT (HCC): ICD-10-CM

## 2025-01-16 DIAGNOSIS — I10 ESSENTIAL (PRIMARY) HYPERTENSION: Primary | ICD-10-CM

## 2025-01-16 DIAGNOSIS — E78.00 PURE HYPERCHOLESTEROLEMIA: ICD-10-CM

## 2025-01-16 DIAGNOSIS — G89.29 CHRONIC BILATERAL LOW BACK PAIN WITH LEFT-SIDED SCIATICA: ICD-10-CM

## 2025-01-16 PROCEDURE — G8417 CALC BMI ABV UP PARAM F/U: HCPCS | Performed by: FAMILY MEDICINE

## 2025-01-16 PROCEDURE — G8427 DOCREV CUR MEDS BY ELIG CLIN: HCPCS | Performed by: FAMILY MEDICINE

## 2025-01-16 PROCEDURE — 1123F ACP DISCUSS/DSCN MKR DOCD: CPT | Performed by: FAMILY MEDICINE

## 2025-01-16 PROCEDURE — 3075F SYST BP GE 130 - 139MM HG: CPT | Performed by: FAMILY MEDICINE

## 2025-01-16 PROCEDURE — 1159F MED LIST DOCD IN RCRD: CPT | Performed by: FAMILY MEDICINE

## 2025-01-16 PROCEDURE — 3079F DIAST BP 80-89 MM HG: CPT | Performed by: FAMILY MEDICINE

## 2025-01-16 PROCEDURE — 1036F TOBACCO NON-USER: CPT | Performed by: FAMILY MEDICINE

## 2025-01-16 PROCEDURE — 99214 OFFICE O/P EST MOD 30 MIN: CPT | Performed by: FAMILY MEDICINE

## 2025-01-16 RX ORDER — HYDROCODONE BITARTRATE AND ACETAMINOPHEN 5; 325 MG/1; MG/1
1 TABLET ORAL 2 TIMES DAILY
Qty: 60 TABLET | Refills: 0 | Status: SHIPPED | OUTPATIENT
Start: 2025-01-16 | End: 2025-02-15

## 2025-01-16 SDOH — ECONOMIC STABILITY: FOOD INSECURITY: WITHIN THE PAST 12 MONTHS, YOU WORRIED THAT YOUR FOOD WOULD RUN OUT BEFORE YOU GOT MONEY TO BUY MORE.: NEVER TRUE

## 2025-01-16 SDOH — ECONOMIC STABILITY: FOOD INSECURITY: WITHIN THE PAST 12 MONTHS, THE FOOD YOU BOUGHT JUST DIDN'T LAST AND YOU DIDN'T HAVE MONEY TO GET MORE.: NEVER TRUE

## 2025-01-16 ASSESSMENT — ENCOUNTER SYMPTOMS
ABDOMINAL PAIN: 1
EYES NEGATIVE: 1
RESPIRATORY NEGATIVE: 1
ALLERGIC/IMMUNOLOGIC NEGATIVE: 1
BACK PAIN: 1

## 2025-01-16 ASSESSMENT — PATIENT HEALTH QUESTIONNAIRE - PHQ9: DEPRESSION UNABLE TO ASSESS: PT REFUSES

## 2025-01-16 NOTE — PROGRESS NOTES
Compliant with medications .  He reports taking his medications independently at present.  Mood stable.  No anxiety of concern.  No significant other joint arthritis of concern.  Hearing loss better with hearing aides.   Fatigues easily and somewhat sob with walking.  No urination or bowel issues of concern on review today.  Not compliant with cpap.    Currently reporting some dry mouth in the am.  Likely snoring overnight.       Left lower abdominal pain .    Pain comes almost exclusively at night when he lays down, within a couple of minutes.  Laying on his back makes the pain worse, seems to go away if he holds still on his back for 10 minutes. Pain recurrent at present.  Never with vomiting. He doesn't feel like he is constipated.  Laying on his back seems to make the left lower quadrant pain come on or trigger it raising the possibility of a back source.     Memory loss progressing.  Speech a little softer and slightly mumbling.      Past Medical History:   Diagnosis Date    Agoraphobia     Anxiety     Arthritis     Basal cell carcinoma     Chronic pain     back    CKD (chronic kidney disease)     Claustrophobia     Enlarged lymph node     rt. lower neck.    Hearing aid worn     dar. ears.    Hyperlipidemia     Hypertension     RAFAT (obstructive sleep apnea)     CPAP occasional    PONV (postoperative nausea and vomiting)     Retention of urine     last 2 surgeries, patient unable to void, home with catheter both times    SCC (squamous cell carcinoma)     HAND    Spinal stenosis, lumbar region, with neurogenic claudication      Past Surgical History:   Procedure Laterality Date    BACK SURGERY  10/2013    Dr. Hussein hardware lumbar    COLONOSCOPY  06/13/2012    mild diverticular dz    CYSTOSCOPY N/A 10/14/2024    In-Office Dr. Escamilla    CYSTOSCOPY N/A 11/11/2024    Cystoscopy with Botox performed by Michael Escamilla MD at [facility] OR    LUMBAR FUSION N/A 03/08/2017    LUMBAR L2-3 POSTERIOR DECOMPRESSION FUSION

## 2025-01-20 ENCOUNTER — HOSPITAL ENCOUNTER (OUTPATIENT)
Age: 80
Discharge: HOME | End: 2025-01-20
Payer: MEDICARE

## 2025-01-20 DIAGNOSIS — I10 ESSENTIAL (PRIMARY) HYPERTENSION: ICD-10-CM

## 2025-01-20 DIAGNOSIS — E78.00 PURE HYPERCHOLESTEROLEMIA: ICD-10-CM

## 2025-01-20 DIAGNOSIS — Z12.5 SCREENING PSA (PROSTATE SPECIFIC ANTIGEN): ICD-10-CM

## 2025-01-20 LAB
ALBUMIN SERPL-MCNC: 4.4 G/DL (ref 3.5–5.2)
ALBUMIN/GLOB SERPL: 1.6 {RATIO} (ref 1–2.5)
ALP SERPL-CCNC: 66 U/L (ref 40–129)
ALT SERPL-CCNC: 10 U/L (ref 5–41)
ANION GAP SERPL CALCULATED.3IONS-SCNC: 9 MMOL/L (ref 9–17)
AST SERPL-CCNC: 20 U/L
BASOPHILS # BLD: 0.05 K/UL (ref 0–0.2)
BASOPHILS NFR BLD: 1 % (ref 0–2)
BILIRUB SERPL-MCNC: 0.7 MG/DL (ref 0.3–1.2)
BUN SERPL-MCNC: 22 MG/DL (ref 8–23)
BUN/CREAT SERPL: 22 (ref 9–20)
CALCIUM SERPL-MCNC: 9.9 MG/DL (ref 8.6–10.4)
CHLORIDE SERPL-SCNC: 109 MMOL/L (ref 98–107)
CHOLEST SERPL-MCNC: 176 MG/DL (ref 0–199)
CHOLESTEROL/HDL RATIO: 5.2
CO2 SERPL-SCNC: 25 MMOL/L (ref 20–31)
CREAT SERPL-MCNC: 1 MG/DL (ref 0.7–1.2)
EOSINOPHIL # BLD: 0.15 K/UL (ref 0–0.44)
EOSINOPHILS RELATIVE PERCENT: 2 % (ref 1–4)
ERYTHROCYTE [DISTWIDTH] IN BLOOD BY AUTOMATED COUNT: 13.8 % (ref 11.8–14.4)
GFR, ESTIMATED: 77 ML/MIN/1.73M2
GLUCOSE SERPL-MCNC: 97 MG/DL (ref 70–99)
HCT VFR BLD AUTO: 45.5 % (ref 40.7–50.3)
HDLC SERPL-MCNC: 34 MG/DL
HGB BLD-MCNC: 15 G/DL (ref 13–17)
IMM GRANULOCYTES # BLD AUTO: <0.03 K/UL (ref 0–0.3)
IMM GRANULOCYTES NFR BLD: 0 %
LDLC SERPL CALC-MCNC: 120 MG/DL (ref 0–100)
LYMPHOCYTES NFR BLD: 2.18 K/UL (ref 1.1–3.7)
LYMPHOCYTES RELATIVE PERCENT: 30 % (ref 24–43)
MCH RBC QN AUTO: 29.4 PG (ref 25.2–33.5)
MCHC RBC AUTO-ENTMCNC: 33 G/DL (ref 25.2–33.5)
MCV RBC AUTO: 89.2 FL (ref 82.6–102.9)
MONOCYTES NFR BLD: 0.75 K/UL (ref 0.1–1.2)
MONOCYTES NFR BLD: 10 % (ref 3–12)
NEUTROPHILS NFR BLD: 57 % (ref 36–65)
NEUTS SEG NFR BLD: 4.22 K/UL (ref 1.5–8.1)
NRBC BLD-RTO: 0 PER 100 WBC
PLATELET # BLD AUTO: 215 K/UL (ref 138–453)
PMV BLD AUTO: 9.4 FL (ref 8.1–13.5)
POTASSIUM SERPL-SCNC: 4 MMOL/L (ref 3.7–5.3)
PROT SERPL-MCNC: 7.1 G/DL (ref 6.4–8.3)
PSA SERPL-MCNC: 1 NG/ML (ref 0–4)
RBC # BLD AUTO: 5.1 M/UL (ref 4.21–5.77)
SODIUM SERPL-SCNC: 143 MMOL/L (ref 135–144)
TRIGL SERPL-MCNC: 110 MG/DL
VLDLC SERPL CALC-MCNC: 22 MG/DL (ref 1–30)
WBC OTHER # BLD: 7.4 K/UL (ref 3.5–11.3)

## 2025-01-20 PROCEDURE — 85025 COMPLETE CBC W/AUTO DIFF WBC: CPT

## 2025-01-20 PROCEDURE — 80053 COMPREHEN METABOLIC PANEL: CPT

## 2025-01-20 PROCEDURE — 80061 LIPID PANEL: CPT

## 2025-01-20 PROCEDURE — G0103 PSA SCREENING: HCPCS

## 2025-01-20 PROCEDURE — 36415 COLL VENOUS BLD VENIPUNCTURE: CPT

## 2025-03-17 DIAGNOSIS — N40.1 HYPERTROPHY OF PROSTATE WITH URINARY OBSTRUCTION: ICD-10-CM

## 2025-03-17 DIAGNOSIS — R35.1 NOCTURIA: ICD-10-CM

## 2025-03-17 DIAGNOSIS — N13.8 HYPERTROPHY OF PROSTATE WITH URINARY OBSTRUCTION: ICD-10-CM

## 2025-03-17 RX ORDER — TAMSULOSIN HYDROCHLORIDE 0.4 MG/1
0.4 CAPSULE ORAL DAILY
Qty: 90 CAPSULE | Refills: 1 | Status: SHIPPED | OUTPATIENT
Start: 2025-03-17

## 2025-03-17 NOTE — TELEPHONE ENCOUNTER
Max called requesting a refill of the below medication which has been pended for you:     Requested Prescriptions     Pending Prescriptions Disp Refills    tamsulosin (FLOMAX) 0.4 MG capsule 90 capsule 1     Sig: Take 1 capsule by mouth daily       Last Appointment Date: 1/16/2025  Next Appointment Date: 7/16/2025    Allergies   Allergen Reactions    Atarax [Hydroxyzine]      Double vision    Gabapentin      dizzy    Phenergan [Promethazine Hcl] Other (See Comments)     \"out of it\"    Morphine And Codeine Nausea And Vomiting     hallucinations

## 2025-04-11 ENCOUNTER — TELEPHONE (OUTPATIENT)
Dept: FAMILY MEDICINE CLINIC | Age: 80
End: 2025-04-11

## 2025-04-11 NOTE — TELEPHONE ENCOUNTER
Patient resides at [facility] now and they are administering his medication. His Norco was not on the active med list they received so are unable to give this to him. Can you send over an order to dispense his Norco 5/325 mg every 12 hours as needed for pain. Daughter currently has script, they  just need the ok to give. No call back needed unless further questions. Ok to wait until Monday

## 2025-04-14 ENCOUNTER — TELEPHONE (OUTPATIENT)
Dept: FAMILY MEDICINE CLINIC | Age: 80
End: 2025-04-14

## 2025-04-14 NOTE — TELEPHONE ENCOUNTER
Casie at [facility] calling stating family is requesting pt to be allowed to drive, Casie thought pt might need an OT evaluation, as they cannot let pt drive unless there is a risk agreement, do you want to order evaluation or do you just want to write a note that it's ok for pt to drive, Casie states pt is still falling some, please call Casie at [phone]

## 2025-04-14 NOTE — TELEPHONE ENCOUNTER
I would evaluate him first.  He has some memory/cognitive decline.  He may need direct observation to  his relative driving skills.     normal (ped)...

## 2025-04-17 ENCOUNTER — TELEPHONE (OUTPATIENT)
Dept: FAMILY MEDICINE CLINIC | Age: 80
End: 2025-04-17

## 2025-04-17 NOTE — TELEPHONE ENCOUNTER
Casie from [facility] calling stating she requested a fax from Dr Pool regarding his pain medication. Is PCP going to continue filling his pain medications, because she gave him tylenol 10 minutes ago and he is throwing a fit that it is not working. She said she knows he has an appt tomorrow so she didn't know if PCP was waiting to evaluate. She would like a call back at [phone]. Please advise.

## 2025-04-17 NOTE — TELEPHONE ENCOUNTER
Spoke with Casie and advised this was faxed already. She states they did not receive it, so she will send a new fax to sign.

## 2025-04-24 ENCOUNTER — TELEPHONE (OUTPATIENT)
Dept: FAMILY MEDICINE CLINIC | Age: 80
End: 2025-04-24

## 2025-04-24 DIAGNOSIS — F41.9 ANXIETY: Primary | ICD-10-CM

## 2025-04-24 RX ORDER — HYDROXYZINE HYDROCHLORIDE 50 MG/1
50 TABLET, FILM COATED ORAL EVERY 8 HOURS PRN
Qty: 90 TABLET | Refills: 3 | Status: SHIPPED | OUTPATIENT
Start: 2025-04-24 | End: 2025-08-22

## 2025-04-24 NOTE — TELEPHONE ENCOUNTER
Gay calling as pt refused to come to appt today, Gay states pt has been at [facility] and pt doesn't want to be there, is not eating, wants someone to shoot him, staff at [facility] think pt needs his anxiety meds increased and Gay is at a loss since pt won't come in for an appt, pt is afraid you are going to take away his driving privileges so he won't come in, Gay questions if you could call her to discuss options for pt?

## 2025-04-24 NOTE — TELEPHONE ENCOUNTER
Patient as described in notes. More anxiety, upset about being in the facility. Perseverating on losing driving privileges. Compliant with zoloft.  Discussed his case with dtr.   Plan to trial hydroxyzine 50m q 8 hrs prn anxiety, agitation ,or to help sleep.   He has a side effect from 2018, thought he had double vision at one point taking this.  In the interest of starting something non-habit forming first, would attempt re challenge with the drug at this time.

## 2025-05-05 ENCOUNTER — HOSPITAL ENCOUNTER (OUTPATIENT)
Age: 80
Setting detail: SPECIMEN
Discharge: HOME | End: 2025-05-05

## 2025-05-05 ENCOUNTER — OUTSIDE SERVICES (OUTPATIENT)
Dept: INTERNAL MEDICINE | Age: 80
End: 2025-05-05

## 2025-05-05 DIAGNOSIS — F32.A DEPRESSION, UNSPECIFIED DEPRESSION TYPE: ICD-10-CM

## 2025-05-05 DIAGNOSIS — F41.9 ANXIETY: ICD-10-CM

## 2025-05-05 DIAGNOSIS — N18.31 STAGE 3A CHRONIC KIDNEY DISEASE (HCC): ICD-10-CM

## 2025-05-05 DIAGNOSIS — F03.B0 MODERATE DEMENTIA, UNSPECIFIED DEMENTIA TYPE, UNSPECIFIED WHETHER BEHAVIORAL, PSYCHOTIC, OR MOOD DISTURBANCE OR ANXIETY (HCC): Primary | ICD-10-CM

## 2025-05-05 DIAGNOSIS — F40.00 AGORAPHOBIA: ICD-10-CM

## 2025-05-05 DIAGNOSIS — I10 ESSENTIAL (PRIMARY) HYPERTENSION: ICD-10-CM

## 2025-05-05 LAB
ANION GAP SERPL CALCULATED.3IONS-SCNC: 12 MMOL/L (ref 9–16)
BASOPHILS # BLD: 0.05 K/UL (ref 0–0.2)
BASOPHILS NFR BLD: 1 % (ref 0–2)
BUN SERPL-MCNC: 18 MG/DL (ref 8–23)
BUN/CREAT SERPL: 20 (ref 9–20)
CALCIUM SERPL-MCNC: 9.8 MG/DL (ref 8.6–10.4)
CHLORIDE SERPL-SCNC: 105 MMOL/L (ref 98–107)
CO2 SERPL-SCNC: 25 MMOL/L (ref 20–31)
CREAT SERPL-MCNC: 0.9 MG/DL (ref 0.7–1.2)
EOSINOPHIL # BLD: 0.16 K/UL (ref 0–0.44)
EOSINOPHILS RELATIVE PERCENT: 2 % (ref 1–4)
ERYTHROCYTE [DISTWIDTH] IN BLOOD BY AUTOMATED COUNT: 14.1 % (ref 11.8–14.4)
GFR, ESTIMATED: 86 ML/MIN/1.73M2
GLUCOSE SERPL-MCNC: 88 MG/DL (ref 74–99)
HCT VFR BLD AUTO: 49 % (ref 40.7–50.3)
HGB BLD-MCNC: 15.9 G/DL (ref 13–17)
IMM GRANULOCYTES # BLD AUTO: <0.03 K/UL (ref 0–0.3)
IMM GRANULOCYTES NFR BLD: 0 %
LYMPHOCYTES NFR BLD: 2.21 K/UL (ref 1.1–3.7)
LYMPHOCYTES RELATIVE PERCENT: 28 % (ref 24–43)
MCH RBC QN AUTO: 29.4 PG (ref 25.2–33.5)
MCHC RBC AUTO-ENTMCNC: 32.4 G/DL (ref 25.2–33.5)
MCV RBC AUTO: 90.7 FL (ref 82.6–102.9)
MONOCYTES NFR BLD: 0.84 K/UL (ref 0.1–1.2)
MONOCYTES NFR BLD: 11 % (ref 3–12)
NEUTROPHILS NFR BLD: 58 % (ref 36–65)
NEUTS SEG NFR BLD: 4.67 K/UL (ref 1.5–8.1)
NRBC BLD-RTO: 0 PER 100 WBC
PLATELET # BLD AUTO: 275 K/UL (ref 138–453)
PMV BLD AUTO: 9.6 FL (ref 8.1–13.5)
POTASSIUM SERPL-SCNC: 4.1 MMOL/L (ref 3.7–5.3)
RBC # BLD AUTO: 5.4 M/UL (ref 4.21–5.77)
SODIUM SERPL-SCNC: 142 MMOL/L (ref 136–145)
WBC OTHER # BLD: 8 K/UL (ref 3.5–11.3)

## 2025-05-05 PROCEDURE — 85025 COMPLETE CBC W/AUTO DIFF WBC: CPT

## 2025-05-05 PROCEDURE — 36415 COLL VENOUS BLD VENIPUNCTURE: CPT

## 2025-05-05 PROCEDURE — 80048 BASIC METABOLIC PNL TOTAL CA: CPT

## 2025-05-05 NOTE — PROGRESS NOTES
05/05/25  Max Candelaria  1945    Patient Resident of [facility]    Chief Complaint  1. Moderate dementia, unspecified dementia type, unspecified whether behavioral, psychotic, or mood disturbance or anxiety (HCC)    2. Agoraphobia    3. Anxiety    4. Depression, unspecified depression type    5. Stage 3a chronic kidney disease (HCC)    6. Essential (primary) hypertension        HPI:  80-year-old patient with history of moderate dementia, anxiety depression, chronic kidney disease hypertension being seen for ER follow-up.  Patient was having psychiatric behaviors was seen in the ER and sent to full care as he was reportedly making suicidal threats to [facility]  at [facility].  Patient's medications were adjusted due to weakness and progressive dementia was sent to the [facility] for rehabilitation./Further monitoring of psychiatric patient.    Allergies   Allergen Reactions    Atarax [Hydroxyzine]      Double vision    Gabapentin      dizzy    Phenergan [Promethazine Hcl] Other (See Comments)     \"out of it\"    Morphine And Codeine Nausea And Vomiting     hallucinations       Past Medical History:   Diagnosis Date    Agoraphobia     Anxiety     Arthritis     Basal cell carcinoma     Chronic pain     back    CKD (chronic kidney disease)     Claustrophobia     Enlarged lymph node     rt. lower neck.    Hearing aid worn     dar. ears.    Hyperlipidemia     Hypertension     RAFAT (obstructive sleep apnea)     CPAP occasional    PONV (postoperative nausea and vomiting)     Retention of urine     last 2 surgeries, patient unable to void, home with catheter both times    SCC (squamous cell carcinoma)     HAND    Spinal stenosis, lumbar region, with neurogenic claudication        Past Surgical History:   Procedure Laterality Date    BACK SURGERY  10/2013    Dr. Keenan negron lumbar    COLONOSCOPY  06/13/2012    mild diverticular dz    CYSTOSCOPY N/A 10/14/2024    In-Office Dr. Escamilla

## 2025-05-07 DIAGNOSIS — F32.3 MAJOR DEPRESSIVE DISORDER WITH PSYCHOTIC FEATURES (HCC): Primary | ICD-10-CM

## 2025-05-07 RX ORDER — SERTRALINE HYDROCHLORIDE 100 MG/1
150 TABLET, FILM COATED ORAL DAILY
COMMUNITY

## 2025-05-07 RX ORDER — FENOFIBRATE 48 MG/1
48 TABLET, FILM COATED ORAL DAILY
COMMUNITY

## 2025-05-07 RX ORDER — DIVALPROEX SODIUM 125 MG/1
125 CAPSULE, COATED PELLETS ORAL 2 TIMES DAILY
COMMUNITY
Start: 2025-05-03

## 2025-05-07 RX ORDER — ACETAMINOPHEN 325 MG/1
650 TABLET ORAL 2 TIMES DAILY
COMMUNITY

## 2025-05-07 RX ORDER — HYDROCODONE BITARTRATE AND ACETAMINOPHEN 5; 325 MG/1; MG/1
1 TABLET ORAL EVERY 8 HOURS PRN
COMMUNITY
End: 2025-05-09 | Stop reason: SDUPTHER

## 2025-05-09 DIAGNOSIS — M40.30 FLAT BACK SYNDROME, POSTPROCEDURAL: Primary | ICD-10-CM

## 2025-05-09 DIAGNOSIS — M96.1 POSTLAMINECTOMY SYNDROME, LUMBAR REGION: ICD-10-CM

## 2025-05-09 RX ORDER — HYDROCODONE BITARTRATE AND ACETAMINOPHEN 5; 325 MG/1; MG/1
1 TABLET ORAL EVERY 8 HOURS PRN
Qty: 90 TABLET | Refills: 0 | Status: SHIPPED | OUTPATIENT
Start: 2025-05-09 | End: 2025-06-08

## 2025-05-09 NOTE — TELEPHONE ENCOUNTER
Mahnaz requesting a refill of the below medication which has been pended for you:     Requested Prescriptions     Pending Prescriptions Disp Refills    HYDROcodone-acetaminophen (NORCO) 5-325 MG per tablet 90 tablet 0     Sig: Take 1 tablet by mouth every 8 hours as needed for Pain for up to 30 days. Max Daily Amount: 3 tablets         Allergies   Allergen Reactions    Atarax [Hydroxyzine]      Double vision    Gabapentin      dizzy    Phenergan [Promethazine Hcl] Other (See Comments)     \"out of it\"    Morphine And Codeine Nausea And Vomiting     hallucinations

## 2025-05-21 ENCOUNTER — OUTSIDE SERVICES (OUTPATIENT)
Dept: INTERNAL MEDICINE | Age: 80
End: 2025-05-21
Payer: MEDICARE

## 2025-05-21 DIAGNOSIS — R53.1 GENERAL WEAKNESS: ICD-10-CM

## 2025-05-21 DIAGNOSIS — F41.9 ANXIETY: ICD-10-CM

## 2025-05-21 DIAGNOSIS — F32.A DEPRESSION, UNSPECIFIED DEPRESSION TYPE: ICD-10-CM

## 2025-05-21 DIAGNOSIS — M54.50 CHRONIC LOW BACK PAIN WITHOUT SCIATICA, UNSPECIFIED BACK PAIN LATERALITY: ICD-10-CM

## 2025-05-21 DIAGNOSIS — M15.9 OSTEOARTHRITIS OF MULTIPLE JOINTS, UNSPECIFIED OSTEOARTHRITIS TYPE: Primary | ICD-10-CM

## 2025-05-21 DIAGNOSIS — G89.29 CHRONIC LOW BACK PAIN WITHOUT SCIATICA, UNSPECIFIED BACK PAIN LATERALITY: ICD-10-CM

## 2025-05-21 DIAGNOSIS — I10 ESSENTIAL (PRIMARY) HYPERTENSION: ICD-10-CM

## 2025-05-21 PROCEDURE — 99315 NF DSCHRG MGMT 30 MIN/LESS: CPT | Performed by: NURSE PRACTITIONER

## 2025-05-21 NOTE — PROGRESS NOTES
05/21/25  Max Candelaria  1945    Patient Resident of [facility]    Chief Complaint  1. Osteoarthritis of multiple joints, unspecified osteoarthritis type    2. Anxiety    3. Depression, unspecified depression type    4. Essential (primary) hypertension    5. Chronic low back pain without sciatica, unspecified back pain laterality    6. General weakness        HPI:  80-year-old patient with history of moderate dementia, anxiety depression, chronic kidney disease hypertension being seen for ER follow-up. Patient was having psychiatric behaviors was seen in the ER and sent to full care as he was reportedly making suicidal threats to [facility]  at [facility]. Patient's medications were adjusted due to weakness and progressive dementia was sent to the [facility] for rehabilitation./Further monitoring of psychiatric patient.     In the interim  Patient has been working with physical therapy and will be planning on discharging back to assisted living later this week.  Patient unable to walk long distances due to chronic arthritic pain/back pain.  Has been utilizing a manual wheelchair at [facility].  Family asking for prescription for manual wheelchair.  Does ambulate short distances with a rolling walker.  Unfortunately did have another fall last evening but thankfully no injuries.  No family present on exam.  Vital signs have been stable.  Physical therapy suggest further outpatient PT OT and speech therapy for continued improvement.  Patient denies any acute concerns on exam.  Does have chronic back pain feels stable at present.  Nursing staff deny any acute nursing service issues  Allergies   Allergen Reactions    Atarax [Hydroxyzine]      Double vision    Gabapentin      dizzy    Phenergan [Promethazine Hcl] Other (See Comments)     \"out of it\"    Morphine And Codeine Nausea And Vomiting     hallucinations       Past Medical History:   Diagnosis Date    Agoraphobia     Anxiety

## 2025-06-27 DIAGNOSIS — F32.5 MAJOR DEPRESSIVE DISORDER IN FULL REMISSION, UNSPECIFIED WHETHER RECURRENT: Primary | ICD-10-CM

## 2025-07-16 ENCOUNTER — TELEPHONE (OUTPATIENT)
Dept: FAMILY MEDICINE CLINIC | Age: 80
End: 2025-07-16

## 2025-07-16 NOTE — TELEPHONE ENCOUNTER
Attempted to reach patient regarding missed appointment on 7/16/25. Unable to contact at this time. Unable to leave message. Letter mailed to reschedule.

## 2025-07-30 ENCOUNTER — TELEPHONE (OUTPATIENT)
Dept: FAMILY MEDICINE CLINIC | Age: 80
End: 2025-07-30
Payer: MEDICARE

## 2025-07-30 DIAGNOSIS — Z85.828 PERSONAL HISTORY OF OTHER MALIGNANT NEOPLASM OF SKIN: ICD-10-CM

## 2025-07-30 DIAGNOSIS — F03.B3 MODERATE DEMENTIA WITH MOOD DISTURBANCE, UNSPECIFIED DEMENTIA TYPE (HCC): ICD-10-CM

## 2025-07-30 DIAGNOSIS — M54.42 ACUTE BACK PAIN WITH SCIATICA, LEFT: ICD-10-CM

## 2025-07-30 DIAGNOSIS — K62.89 CHRONIC IDIOPATHIC ANAL PAIN: ICD-10-CM

## 2025-07-30 DIAGNOSIS — F03.B4 MODERATE DEMENTIA WITH ANXIETY, UNSPECIFIED DEMENTIA TYPE (HCC): ICD-10-CM

## 2025-07-30 DIAGNOSIS — Z98.1 ARTHRODESIS STATUS: ICD-10-CM

## 2025-07-30 DIAGNOSIS — M15.9 GENERALIZED OSTEOARTHROSIS, INVOLVING MULTIPLE SITES: ICD-10-CM

## 2025-07-30 DIAGNOSIS — Z79.899 POLYPHARMACY: ICD-10-CM

## 2025-07-30 DIAGNOSIS — F32.5 MAJOR DEPRESSIVE DISORDER, SINGLE EPISODE IN FULL REMISSION: Primary | ICD-10-CM

## 2025-07-30 DIAGNOSIS — D50.9 IRON DEFICIENCY ANEMIA, UNSPECIFIED IRON DEFICIENCY ANEMIA TYPE: ICD-10-CM

## 2025-07-30 DIAGNOSIS — H91.10 PRESBYCUSIS, UNSPECIFIED LATERALITY: ICD-10-CM

## 2025-07-30 DIAGNOSIS — M48.062 PSEUDOCLAUDICATION SYNDROME: ICD-10-CM

## 2025-07-30 DIAGNOSIS — G89.29 CHRONIC IDIOPATHIC ANAL PAIN: ICD-10-CM

## 2025-07-30 DIAGNOSIS — F40.240 CLAUSTROPHOBIA: ICD-10-CM

## 2025-07-30 DIAGNOSIS — F03.B18 MODERATE DEMENTIA, WITH BEHAVIORAL DISTURBANCE (HCC): ICD-10-CM

## 2025-07-30 DIAGNOSIS — F40.00: ICD-10-CM

## 2025-07-30 DIAGNOSIS — N18.30 STAGE 3 CHRONIC KIDNEY DISEASE, UNSPECIFIED WHETHER STAGE 3A OR 3B CKD (HCC): ICD-10-CM

## 2025-07-30 DIAGNOSIS — E78.00 PURE HYPERCHOLESTEROLEMIA: ICD-10-CM

## 2025-07-30 DIAGNOSIS — G47.33 OBSTRUCTIVE SLEEP APNEA (ADULT) (PEDIATRIC): ICD-10-CM

## 2025-07-30 DIAGNOSIS — H91.90 HEARING DISORDER, UNSPECIFIED LATERALITY: ICD-10-CM

## 2025-07-30 DIAGNOSIS — I12.9 RENAL HYPERTENSION: ICD-10-CM

## 2025-07-30 PROCEDURE — G0179 MD RECERTIFICATION HHA PT: HCPCS | Performed by: FAMILY MEDICINE

## 2025-07-30 NOTE — TELEPHONE ENCOUNTER
Home health certification and plan of care done 07/30/2025 on patient for date services 06/28/2025-08/26/2025. Verified medications. Physician time spent is 15 minutes for activities to coordinate services, documenting, medical decision making, and review of reports, treatment plans, and test results.

## 2025-08-11 ENCOUNTER — TELEPHONE (OUTPATIENT)
Dept: FAMILY MEDICINE CLINIC | Age: 80
End: 2025-08-11

## 2025-08-29 ENCOUNTER — TELEPHONE (OUTPATIENT)
Dept: FAMILY MEDICINE CLINIC | Age: 80
End: 2025-08-29

## (undated) DEVICE — DRAIN SURG 15FR L3 16IN DIA47MM SIL RND HUBLESS FULL FLUT

## (undated) DEVICE — GLOVE ORANGE PI 8   MSG9080

## (undated) DEVICE — SUTURE VCRL + SZ 2 L27IN ABSRB UD L40MM CP 1/2 CIR REV CUT VCP195H

## (undated) DEVICE — DISCONTINUED NO SUB IDED TG GLOVE SURG SENSICARE ALOE LT LF PF ST GRN SZ 8

## (undated) DEVICE — SINGLE PORT MANIFOLD: Brand: NEPTUNE 2

## (undated) DEVICE — Z DUP USE 2565107 PACK SURG PROC LEG CYSTO T-DRAPE REINF TBL CVR HND TWL

## (undated) DEVICE — GLOVE ORANGE PI 7   MSG9070

## (undated) DEVICE — REMOTE CONTROL KIT: Brand: FREELINK™

## (undated) DEVICE — Device: Brand: LEVEL 1

## (undated) DEVICE — COVER LT HNDL BLU PLAS

## (undated) DEVICE — MEDI-VAC NON-CONDUCTIVE SUCTION TUBING: Brand: CARDINAL HEALTH

## (undated) DEVICE — LINE SAMP O2 6.5FT W/FEMALE CONN F/ADULT CAPNOLINE PLUS

## (undated) DEVICE — JACKSON / MODULAR SPINAL TABLE STYLE POSITIONING KIT - STANDARD: Brand: SOULE MEDICAL

## (undated) DEVICE — GLOVE ORANGE PI 7 1/2   MSG9075

## (undated) DEVICE — GLOVE SURG SZ 8 L12IN THK91MIL BRN LTX FREE POLYCHLOROPRENE

## (undated) DEVICE — Z DISCONTINUED PER MEDLINE USE 2741942 DRESSING AQUACEL 6 IN ALG W9XL15CM SIL CVR WTRPRF VIR BACT BARR ANTIMIC

## (undated) DEVICE — Z DUP USE 2175984 BAND URIN CATH W2IN LEG FIT UPTO 30IN LOK SYS STRTCH MAT 316] DALE MEDICAL PRODUCTS INC]

## (undated) DEVICE — GOWN,AURORA,NONREINFORCED,LARGE: Brand: MEDLINE

## (undated) DEVICE — Z DISCONTINUED PER MEDLINE USE 2741943 DRESSING AQUACEL 10 IN ALG W9XL25CM SIL CVR WTRPRF VIR BACT BARR ANTIMIC

## (undated) DEVICE — CABLE SURG L60CM EXTN NEUROMODULATION PRECIS SPECTR

## (undated) DEVICE — CONNECTOR TBNG AUX H2O JET DISP FOR OLY 160/180 SER

## (undated) DEVICE — SET DRN 19FR BLAK SIL RND HUBLESS W 3 16IN BEND

## (undated) DEVICE — SUTURE PERMAHAND SZ 0 L30IN NONABSORBABLE BLK L26MM SH 1/2 K834H

## (undated) DEVICE — DRAINBAG,ANTI-REFLUX TOWER,L/F,2000ML,LL: Brand: MEDLINE

## (undated) DEVICE — Device

## (undated) DEVICE — SOLUTION IV IRRIG POUR BRL 0.9% SODIUM CHL 2F7124

## (undated) DEVICE — Z INACTIVE USE 2660663 SOLUTION IRRIG 1000ML STRIL H2O USP PLAS POUR BTL

## (undated) DEVICE — CHLORAPREP 26ML CLEAR

## (undated) DEVICE — GLOVE SURG SZ 8 L12IN FNGR THK13MIL BRN LTX SYN POLYMER W

## (undated) DEVICE — TUNNELER NEUROSTIMULATOR L28CM FOR SPNL CRD STIM SYS

## (undated) DEVICE — SOLUTION IV 1000ML 0.9% SOD CHL PH 5 INJ USP VIAFLX PLAS

## (undated) DEVICE — AGENT HEMOSTATIC SURGIFLOW MATRIX KIT W/THROMBIN

## (undated) DEVICE — SOLUTION IV IRRIG WATER 1000ML POUR BRL 2F7114

## (undated) DEVICE — SOLUTION SCRB 4OZ 4% CHG CLN BASE FOR PT SKIN ANTISEPSIS

## (undated) DEVICE — ELEVATOR NEUROSTIMULATOR SPNL PASS FOR SPNL CRD STIM SYS

## (undated) DEVICE — STRAP,CATHETER,ELASTIC,HOOK&LOOP: Brand: MEDLINE

## (undated) DEVICE — VENTED/UNVENTED 60 GTT 2 SMRTSITE NDLLSS VLV PRT SET

## (undated) DEVICE — TRAY PAIN CUST

## (undated) DEVICE — POUCH DRNGE FLX BND INTEGR RAIL CLMP DISP EZ CTCH

## (undated) DEVICE — SOLUTION IV 100ML 0.9% SOD CHL PH5 CONTAIN DEHP VIAFLX QP

## (undated) DEVICE — PACK,CYSTOSCOPY,PK I,AURORA: Brand: MEDLINE

## (undated) DEVICE — SUTURE VCRL + SZ 2-0 L27IN ABSRB UD CP-1 1/2 CIR REV CUT VCP266H

## (undated) DEVICE — PRESSURE MONITORING SET: Brand: TRUWAVE, VAMP PLUS

## (undated) DEVICE — TUBING, SUCTION, 3/16" X 20', STRAIGHT: Brand: MEDLINE

## (undated) DEVICE — Z INACTIVE USE 2660664 SOLUTION IRRIG 3000ML 0.9% SOD CHL USP UROMATIC PLAS CONT

## (undated) DEVICE — KIT ART LN 20GA L12CM FEP RADPQ 0.025X13.75IN SPR GWIRE

## (undated) DEVICE — KIT POS FRME SFT TCH HDRST PLLW ARM CRADL PD CVR DRP BAR

## (undated) DEVICE — 4-PORT MANIFOLD: Brand: NEPTUNE 2

## (undated) DEVICE — BUR RND DIA COARSE 5.0MM

## (undated) DEVICE — TUBE ET DIA7.5MM ORAL NSL CUF MURPHY EYE HI LO RADPQ LN

## (undated) DEVICE — JELLY LUBRICATING 4OZ FLIP TOP TB E Z

## (undated) DEVICE — MEDI-VAC YANKAUER SUCTION HANDLE W/BULBOUS TIP: Brand: CARDINAL HEALTH

## (undated) DEVICE — CYSTO/BLADDER IRRIGATION SET, REGULATING CLAMP

## (undated) DEVICE — 1200CC GUARDIAN II: Brand: GUARDIAN

## (undated) DEVICE — BAG PRSS INFUS 500ML 2 PRSS SFTY VLV RIG HNGR SLIP EASILY

## (undated) DEVICE — NEEDLE, QUINCKE, 22GX5": Brand: MEDLINE

## (undated) DEVICE — GARMENT,MEDLINE,DVT,INT,CALF,LG, GEN2: Brand: MEDLINE

## (undated) DEVICE — BANDAGE ADH DIA7/8IN NAT FLEX-FABRIC WVN FOR WND PROTCT

## (undated) DEVICE — DISCONTINUED USE 419147 KIT ENDOSCOPY CUSTOM PACK

## (undated) DEVICE — GAUZE,SPONGE,4"X4",16PLY,XRAY,STRL,LF: Brand: MEDLINE

## (undated) DEVICE — SURGICAL PROCEDURE PACK GWN W/ BTC A

## (undated) DEVICE — SUTURE VCRL + SZ 3-0 L27IN ABSRB WHT FS-1 3/8 CIR REV CUT VCP442H

## (undated) DEVICE — SET IV 12ML L36IN 2ND RLER CLMP SPIN M LUERLOCK W/ HNGR

## (undated) DEVICE — SUTURE ETHLN SZ 3-0 L18IN NONABSORBABLE BLK FS-1 L24MM 3/8 663H

## (undated) DEVICE — 3M™ WARMING BLANKET, LOWER BODY, 10 PER CASE, 42568: Brand: BAIR HUGGER™

## (undated) DEVICE — MULTIPLE BAND LIGATOR: Brand: SPEEDBAND SUPERVIEW SUPER 7

## (undated) DEVICE — GOWN,AURORA,NONRNF,XL,30/CS: Brand: MEDLINE

## (undated) DEVICE — Device: Brand: INJETAK ADJUSTABLE TIP NEEDLE 70CM

## (undated) DEVICE — GLOVE SURG SZ 8 L12IN FNGR THK79MIL GRN LTX FREE

## (undated) DEVICE — CATHETER,FOLEY,3-WAY,22FR,30ML,100%SILI: Brand: MEDLINE

## (undated) DEVICE — MONITORING NEURO WO INTERPRETATION SYS PRICING

## (undated) DEVICE — PLUG,CATHETER,DRAINAGE PROTECTOR,TUBE: Brand: MEDLINE

## (undated) DEVICE — GAUZE,SPONGE,FLUFF,6"X6.75",STRL,5/TRAY: Brand: MEDLINE

## (undated) DEVICE — KIT CHARGING CHRG BASE STN PWR SUPL CHRG BELT PRECIS SPECTR

## (undated) DEVICE — SOLUTION IV 1000ML LAC RINGERS PH 6.5 INJ USP VIAFLX PLAS

## (undated) DEVICE — Z DISCONTINUED USE 2624852 GLOVE SURG 7 PF TEXT NEOPRNE BRN STRL NEOLON 2G LF

## (undated) DEVICE — TRAY URIN CATH 16FR DRNGE BG STATLOK STBL DEV F SURSTP

## (undated) DEVICE — KIT EVAC 400CC DIA1/8IN H PAT 12.5IN 3 SPR RND SHP PVC DRN

## (undated) DEVICE — C-ARMOR C-ARM EQUIPMENT COVERS CLEAR STERILE UNIVERSAL FIT 12 PER CASE: Brand: C-ARMOR

## (undated) DEVICE — CONVERTED USE 393286 PROTECTOR EYE FOAM ST MEDICHOICE

## (undated) DEVICE — SOLUTION IV 500ML 0.9% SOD CHL PH 5 INJ USP VIAFLX PLAS